# Patient Record
Sex: FEMALE | Race: WHITE | NOT HISPANIC OR LATINO | Employment: FULL TIME | URBAN - METROPOLITAN AREA
[De-identification: names, ages, dates, MRNs, and addresses within clinical notes are randomized per-mention and may not be internally consistent; named-entity substitution may affect disease eponyms.]

---

## 2017-05-30 ENCOUNTER — GENERIC CONVERSION - ENCOUNTER (OUTPATIENT)
Dept: OTHER | Facility: OTHER | Age: 37
End: 2017-05-30

## 2017-06-01 ENCOUNTER — HOSPITAL ENCOUNTER (EMERGENCY)
Facility: HOSPITAL | Age: 37
Discharge: HOME | End: 2017-06-01
Attending: EMERGENCY MEDICINE | Admitting: EMERGENCY MEDICINE

## 2017-06-01 ENCOUNTER — APPOINTMENT (EMERGENCY)
Dept: RADIOLOGY | Facility: HOSPITAL | Age: 37
End: 2017-06-01

## 2017-06-01 VITALS
DIASTOLIC BLOOD PRESSURE: 78 MMHG | SYSTOLIC BLOOD PRESSURE: 118 MMHG | RESPIRATION RATE: 18 BRPM | HEART RATE: 77 BPM | WEIGHT: 233.25 LBS | BODY MASS INDEX: 40.04 KG/M2 | TEMPERATURE: 97.8 F | OXYGEN SATURATION: 99 %

## 2017-06-01 DIAGNOSIS — F41.1 ANXIETY STATE: ICD-10-CM

## 2017-06-01 DIAGNOSIS — R07.9 CHEST PAIN, UNSPECIFIED TYPE: Primary | ICD-10-CM

## 2017-06-01 LAB
ANION GAP SERPL CALCULATED.3IONS-SCNC: 12 MMOL/L (ref 4–13)
ATRIAL RATE: 96 BPM
BASOPHILS # BLD AUTO: 0.04 THOUSANDS/ΜL (ref 0–0.1)
BASOPHILS NFR BLD AUTO: 0 % (ref 0–1)
BUN SERPL-MCNC: 17 MG/DL (ref 5–25)
CALCIUM SERPL-MCNC: 9.8 MG/DL (ref 8.3–10.1)
CHLORIDE SERPL-SCNC: 104 MMOL/L (ref 100–108)
CO2 SERPL-SCNC: 24 MMOL/L (ref 21–32)
CREAT SERPL-MCNC: 1.03 MG/DL (ref 0.6–1.3)
DEPRECATED D DIMER PPP: 318 NG/ML (FEU) (ref 0–424)
EOSINOPHIL # BLD AUTO: 0.37 THOUSAND/ΜL (ref 0–0.61)
EOSINOPHIL NFR BLD AUTO: 4 % (ref 0–6)
ERYTHROCYTE [DISTWIDTH] IN BLOOD BY AUTOMATED COUNT: 13.5 % (ref 11.6–15.1)
GFR SERPL CREATININE-BSD FRML MDRD: >60 ML/MIN/1.73SQ M
GLUCOSE SERPL-MCNC: 88 MG/DL (ref 65–140)
HCG UR QL: NEGATIVE
HCT VFR BLD AUTO: 42.1 % (ref 34.8–46.1)
HGB BLD-MCNC: 14.1 G/DL (ref 11.5–15.4)
LYMPHOCYTES # BLD AUTO: 2.47 THOUSANDS/ΜL (ref 0.6–4.47)
LYMPHOCYTES NFR BLD AUTO: 25 % (ref 14–44)
MCH RBC QN AUTO: 29.6 PG (ref 26.8–34.3)
MCHC RBC AUTO-ENTMCNC: 33.5 G/DL (ref 31.4–37.4)
MCV RBC AUTO: 88 FL (ref 82–98)
MONOCYTES # BLD AUTO: 0.7 THOUSAND/ΜL (ref 0.17–1.22)
MONOCYTES NFR BLD AUTO: 7 % (ref 4–12)
NEUTROPHILS # BLD AUTO: 6.34 THOUSANDS/ΜL (ref 1.85–7.62)
NEUTS SEG NFR BLD AUTO: 64 % (ref 43–75)
NT-PROBNP SERPL-MCNC: 19 PG/ML
P AXIS: 60 DEGREES
PLATELET # BLD AUTO: 294 THOUSANDS/UL (ref 149–390)
PMV BLD AUTO: 9.4 FL (ref 8.9–12.7)
POTASSIUM SERPL-SCNC: 3.9 MMOL/L (ref 3.5–5.3)
PR INTERVAL: 140 MS
QRS AXIS: 85 DEGREES
QRSD INTERVAL: 82 MS
QT INTERVAL: 344 MS
QTC INTERVAL: 418 MS
RBC # BLD AUTO: 4.76 MILLION/UL (ref 3.81–5.12)
SODIUM SERPL-SCNC: 140 MMOL/L (ref 136–145)
T WAVE AXIS: 77 DEGREES
TROPONIN I SERPL-MCNC: <0.02 NG/ML
TSH SERPL DL<=0.05 MIU/L-ACNC: 3.16 UIU/ML (ref 0.36–3.74)
VENTRICULAR RATE: 89 BPM
WBC # BLD AUTO: 9.92 THOUSAND/UL (ref 4.31–10.16)

## 2017-06-01 PROCEDURE — 36415 COLL VENOUS BLD VENIPUNCTURE: CPT | Performed by: EMERGENCY MEDICINE

## 2017-06-01 PROCEDURE — 81025 URINE PREGNANCY TEST: CPT | Performed by: EMERGENCY MEDICINE

## 2017-06-01 PROCEDURE — 83880 ASSAY OF NATRIURETIC PEPTIDE: CPT | Performed by: EMERGENCY MEDICINE

## 2017-06-01 PROCEDURE — 80048 BASIC METABOLIC PNL TOTAL CA: CPT | Performed by: EMERGENCY MEDICINE

## 2017-06-01 PROCEDURE — 84484 ASSAY OF TROPONIN QUANT: CPT | Performed by: EMERGENCY MEDICINE

## 2017-06-01 PROCEDURE — 96360 HYDRATION IV INFUSION INIT: CPT

## 2017-06-01 PROCEDURE — 71020 HB CHEST X-RAY 2VW FRONTAL&LATL: CPT

## 2017-06-01 PROCEDURE — 93005 ELECTROCARDIOGRAM TRACING: CPT | Performed by: EMERGENCY MEDICINE

## 2017-06-01 PROCEDURE — 93005 ELECTROCARDIOGRAM TRACING: CPT

## 2017-06-01 PROCEDURE — 85379 FIBRIN DEGRADATION QUANT: CPT | Performed by: EMERGENCY MEDICINE

## 2017-06-01 PROCEDURE — 84443 ASSAY THYROID STIM HORMONE: CPT | Performed by: EMERGENCY MEDICINE

## 2017-06-01 PROCEDURE — 85025 COMPLETE CBC W/AUTO DIFF WBC: CPT | Performed by: EMERGENCY MEDICINE

## 2017-06-01 PROCEDURE — 99285 EMERGENCY DEPT VISIT HI MDM: CPT

## 2017-06-01 RX ADMIN — SODIUM CHLORIDE 1000 ML: 0.9 INJECTION, SOLUTION INTRAVENOUS at 13:39

## 2017-06-05 ENCOUNTER — ALLSCRIPTS OFFICE VISIT (OUTPATIENT)
Dept: OTHER | Facility: OTHER | Age: 37
End: 2017-06-05

## 2017-06-09 ENCOUNTER — GENERIC CONVERSION - ENCOUNTER (OUTPATIENT)
Dept: OTHER | Facility: OTHER | Age: 37
End: 2017-06-09

## 2017-06-29 ENCOUNTER — ALLSCRIPTS OFFICE VISIT (OUTPATIENT)
Dept: OTHER | Facility: OTHER | Age: 37
End: 2017-06-29

## 2017-12-13 ENCOUNTER — APPOINTMENT (EMERGENCY)
Dept: CT IMAGING | Facility: HOSPITAL | Age: 37
End: 2017-12-13

## 2017-12-13 ENCOUNTER — HOSPITAL ENCOUNTER (EMERGENCY)
Facility: HOSPITAL | Age: 37
Discharge: HOME | End: 2017-12-13
Attending: EMERGENCY MEDICINE

## 2017-12-13 ENCOUNTER — APPOINTMENT (EMERGENCY)
Dept: ULTRASOUND IMAGING | Facility: HOSPITAL | Age: 37
End: 2017-12-13

## 2017-12-13 VITALS
SYSTOLIC BLOOD PRESSURE: 136 MMHG | DIASTOLIC BLOOD PRESSURE: 86 MMHG | RESPIRATION RATE: 20 BRPM | HEART RATE: 81 BPM | OXYGEN SATURATION: 95 % | TEMPERATURE: 97.5 F

## 2017-12-13 DIAGNOSIS — D21.9 FIBROID: ICD-10-CM

## 2017-12-13 DIAGNOSIS — R10.9 ABDOMINAL PAIN: Primary | ICD-10-CM

## 2017-12-13 LAB
ALBUMIN SERPL BCP-MCNC: 4.1 G/DL (ref 3.5–5)
ALP SERPL-CCNC: 89 U/L (ref 46–116)
ALT SERPL W P-5'-P-CCNC: 63 U/L (ref 12–78)
ANION GAP SERPL CALCULATED.3IONS-SCNC: 7 MMOL/L (ref 4–13)
APTT PPP: 27 SECONDS (ref 23–35)
AST SERPL W P-5'-P-CCNC: 42 U/L (ref 5–45)
ATRIAL RATE: 72 BPM
BASOPHILS # BLD AUTO: 0.05 THOUSANDS/ΜL (ref 0–0.1)
BASOPHILS NFR BLD AUTO: 1 % (ref 0–1)
BILIRUB SERPL-MCNC: 0.5 MG/DL (ref 0.2–1)
BUN SERPL-MCNC: 13 MG/DL (ref 5–25)
CALCIUM SERPL-MCNC: 9.5 MG/DL (ref 8.3–10.1)
CHLORIDE SERPL-SCNC: 102 MMOL/L (ref 100–108)
CLARITY, POC: CLEAR
CO2 SERPL-SCNC: 27 MMOL/L (ref 21–32)
COLOR, POC: YELLOW
CREAT SERPL-MCNC: 1.07 MG/DL (ref 0.6–1.3)
EOSINOPHIL # BLD AUTO: 0.45 THOUSAND/ΜL (ref 0–0.61)
EOSINOPHIL NFR BLD AUTO: 5 % (ref 0–6)
ERYTHROCYTE [DISTWIDTH] IN BLOOD BY AUTOMATED COUNT: 13.3 % (ref 11.6–15.1)
EXT BILIRUBIN, UA: NORMAL
EXT BLOOD URINE: NORMAL
EXT GLUCOSE, UA: NORMAL
EXT KETONES: NORMAL
EXT NITRITE, UA: NORMAL
EXT PH, UA: 5
EXT PREG TEST URINE: NEGATIVE
EXT PROTEIN, UA: NORMAL
EXT SPECIFIC GRAVITY, UA: 1.02
EXT UROBILINOGEN: 0.2
GFR SERPL CREATININE-BSD FRML MDRD: 67 ML/MIN/1.73SQ M
GLUCOSE SERPL-MCNC: 121 MG/DL (ref 65–140)
HCT VFR BLD AUTO: 44.6 % (ref 34.8–46.1)
HGB BLD-MCNC: 14.6 G/DL (ref 11.5–15.4)
INR PPP: 0.88 (ref 0.86–1.16)
LIPASE SERPL-CCNC: 214 U/L (ref 73–393)
LYMPHOCYTES # BLD AUTO: 2.99 THOUSANDS/ΜL (ref 0.6–4.47)
LYMPHOCYTES NFR BLD AUTO: 31 % (ref 14–44)
MCH RBC QN AUTO: 29 PG (ref 26.8–34.3)
MCHC RBC AUTO-ENTMCNC: 32.7 G/DL (ref 31.4–37.4)
MCV RBC AUTO: 89 FL (ref 82–98)
MONOCYTES # BLD AUTO: 0.6 THOUSAND/ΜL (ref 0.17–1.22)
MONOCYTES NFR BLD AUTO: 6 % (ref 4–12)
NEUTROPHILS # BLD AUTO: 5.65 THOUSANDS/ΜL (ref 1.85–7.62)
NEUTS SEG NFR BLD AUTO: 57 % (ref 43–75)
P AXIS: 58 DEGREES
PLATELET # BLD AUTO: 311 THOUSANDS/UL (ref 149–390)
PMV BLD AUTO: 9.3 FL (ref 8.9–12.7)
POTASSIUM SERPL-SCNC: 3.5 MMOL/L (ref 3.5–5.3)
PR INTERVAL: 144 MS
PROT SERPL-MCNC: 8.5 G/DL (ref 6.4–8.2)
PROTHROMBIN TIME: 12.2 SECONDS (ref 12.1–14.4)
QRS AXIS: 86 DEGREES
QRSD INTERVAL: 84 MS
QT INTERVAL: 374 MS
QTC INTERVAL: 409 MS
RBC # BLD AUTO: 5.03 MILLION/UL (ref 3.81–5.12)
SODIUM SERPL-SCNC: 136 MMOL/L (ref 136–145)
T WAVE AXIS: 66 DEGREES
VENTRICULAR RATE: 72 BPM
WBC # BLD AUTO: 9.74 THOUSAND/UL (ref 4.31–10.16)
WBC # BLD EST: NORMAL 10*3/UL

## 2017-12-13 PROCEDURE — 83690 ASSAY OF LIPASE: CPT | Performed by: EMERGENCY MEDICINE

## 2017-12-13 PROCEDURE — 96361 HYDRATE IV INFUSION ADD-ON: CPT

## 2017-12-13 PROCEDURE — 93005 ELECTROCARDIOGRAM TRACING: CPT

## 2017-12-13 PROCEDURE — 85610 PROTHROMBIN TIME: CPT | Performed by: EMERGENCY MEDICINE

## 2017-12-13 PROCEDURE — 81025 URINE PREGNANCY TEST: CPT | Performed by: EMERGENCY MEDICINE

## 2017-12-13 PROCEDURE — 74177 CT ABD & PELVIS W/CONTRAST: CPT

## 2017-12-13 PROCEDURE — 81002 URINALYSIS NONAUTO W/O SCOPE: CPT | Performed by: EMERGENCY MEDICINE

## 2017-12-13 PROCEDURE — 99284 EMERGENCY DEPT VISIT MOD MDM: CPT

## 2017-12-13 PROCEDURE — 76830 TRANSVAGINAL US NON-OB: CPT

## 2017-12-13 PROCEDURE — 76856 US EXAM PELVIC COMPLETE: CPT

## 2017-12-13 PROCEDURE — 85730 THROMBOPLASTIN TIME PARTIAL: CPT | Performed by: EMERGENCY MEDICINE

## 2017-12-13 PROCEDURE — 96374 THER/PROPH/DIAG INJ IV PUSH: CPT

## 2017-12-13 PROCEDURE — 36415 COLL VENOUS BLD VENIPUNCTURE: CPT | Performed by: EMERGENCY MEDICINE

## 2017-12-13 PROCEDURE — 85025 COMPLETE CBC W/AUTO DIFF WBC: CPT | Performed by: EMERGENCY MEDICINE

## 2017-12-13 PROCEDURE — 93005 ELECTROCARDIOGRAM TRACING: CPT | Performed by: EMERGENCY MEDICINE

## 2017-12-13 PROCEDURE — 80053 COMPREHEN METABOLIC PANEL: CPT | Performed by: EMERGENCY MEDICINE

## 2017-12-13 RX ORDER — MORPHINE SULFATE 10 MG/ML
6 INJECTION, SOLUTION INTRAMUSCULAR; INTRAVENOUS ONCE
Status: COMPLETED | OUTPATIENT
Start: 2017-12-13 | End: 2017-12-13

## 2017-12-13 RX ADMIN — HYDROMORPHONE HYDROCHLORIDE 1 MG: 1 INJECTION, SOLUTION INTRAMUSCULAR; INTRAVENOUS; SUBCUTANEOUS at 18:03

## 2017-12-13 RX ADMIN — SODIUM CHLORIDE 1000 ML: 0.9 INJECTION, SOLUTION INTRAVENOUS at 16:32

## 2017-12-13 RX ADMIN — IOHEXOL 100 ML: 350 INJECTION, SOLUTION INTRAVENOUS at 17:30

## 2017-12-13 RX ADMIN — MORPHINE SULFATE 6 MG: 10 INJECTION, SOLUTION INTRAMUSCULAR; INTRAVENOUS at 16:29

## 2017-12-13 NOTE — ED NOTES
Patient is resting comfortably  States pain is now 5/10  Reports, "I don't know, my chest feels a little funny " Dr Lucio Valencia aware and verbal order given to perform EKG        Tuyet Wade RN  12/13/17 1386

## 2017-12-13 NOTE — ED PROVIDER NOTES
History  Chief Complaint   Patient presents with    Groin Pain     Pt c/o L lower abd pain/L groin pain with nausea  Pt appears very uncomfortable during triage  Pt reports pain was sudden onset today around 0630  History provided by:  Patient  Abdominal Pain   Pain location:  LLQ  Pain quality: aching, fullness, heavy and pressure    Pain radiates to:  Does not radiate  Pain severity:  Severe  Onset quality:  Gradual  Timing:  Constant  Progression:  Worsening  Chronicity:  New  Context: awakening from sleep    Relieved by:  Nothing  Worsened by:  Nothing  Ineffective treatments:  None tried  Associated symptoms: nausea    Associated symptoms: no chest pain, no chills, no constipation, no cough, no diarrhea, no dysuria, no fever, no flatus, no hematemesis, no hematochezia, no shortness of breath, no sore throat and no vomiting        Prior to Admission Medications   Prescriptions Last Dose Informant Patient Reported? Taking? Cetirizine HCl (ZYRTEC ALLERGY) 10 MG CAPS   Yes No   Sig: Take 10 mg by mouth  sertraline (ZOLOFT) 100 mg tablet   Yes No   Sig: Take 100 mg by mouth daily at bedtime  zolpidem (AMBIEN CR) 12 5 MG CR tablet   Yes No   Sig: Take 12 5 mg by mouth daily at bedtime as needed for sleep  Facility-Administered Medications: None       History reviewed  No pertinent past medical history  Past Surgical History:   Procedure Laterality Date    CHOLECYSTECTOMY      TUBAL LIGATION         History reviewed  No pertinent family history  I have reviewed and agree with the history as documented  Social History   Substance Use Topics    Smoking status: Never Smoker    Smokeless tobacco: Never Used    Alcohol use Yes      Comment: occasional        Review of Systems   Constitutional: Negative for chills and fever  HENT: Negative for rhinorrhea, sore throat and trouble swallowing  Eyes: Negative for pain     Respiratory: Negative for cough, shortness of breath, wheezing and stridor  Cardiovascular: Negative for chest pain and leg swelling  Gastrointestinal: Positive for abdominal pain and nausea  Negative for constipation, diarrhea, flatus, hematemesis, hematochezia and vomiting  Endocrine: Negative for polyuria  Genitourinary: Negative for dysuria, flank pain and urgency  Musculoskeletal: Negative for joint swelling, myalgias and neck stiffness  Skin: Negative for rash  Allergic/Immunologic: Negative for immunocompromised state  Neurological: Negative for dizziness, syncope, weakness, numbness and headaches  Psychiatric/Behavioral: Negative for confusion and suicidal ideas  All other systems reviewed and are negative  Physical Exam  ED Triage Vitals   Temperature Pulse Respirations Blood Pressure SpO2   12/13/17 1544 12/13/17 1542 12/13/17 1542 12/13/17 1542 12/13/17 1542   97 5 °F (36 4 °C) 102 22 (!) 193/79 99 %      Temp Source Heart Rate Source Patient Position - Orthostatic VS BP Location FiO2 (%)   12/13/17 1544 12/13/17 1542 12/13/17 1542 12/13/17 1542 --   Oral Monitor Sitting Left arm       Pain Score       12/13/17 1542       8           Orthostatic Vital Signs  Vitals:    12/13/17 1542 12/13/17 1817 12/13/17 1927   BP: (!) 193/79 131/75 136/86   Pulse: 102 82 81   Patient Position - Orthostatic VS: Sitting Lying Sitting       Physical Exam   Constitutional: She is oriented to person, place, and time  She appears well-developed and well-nourished  HENT:   Head: Normocephalic and atraumatic  Eyes: EOM are normal  Pupils are equal, round, and reactive to light  Neck: Normal range of motion  Neck supple  Cardiovascular: Normal rate and regular rhythm  Exam reveals no friction rub  No murmur heard  Pulmonary/Chest: Breath sounds normal  No respiratory distress  She has no wheezes  She has no rales  Abdominal: Soft  Bowel sounds are normal  She exhibits no distension  There is tenderness in the left upper quadrant and left lower quadrant  There is no CVA tenderness  Musculoskeletal: Normal range of motion  She exhibits no edema or tenderness  Neurological: She is alert and oriented to person, place, and time  Skin: Skin is warm  No rash noted  Psychiatric: She has a normal mood and affect  Nursing note and vitals reviewed        ED Medications  Medications   sodium chloride 0 9 % bolus 1,000 mL (0 mL Intravenous Stopped 12/13/17 1944)   morphine (PF) 10 mg/mL injection 6 mg (6 mg Intravenous Given 12/13/17 1629)   iohexol (OMNIPAQUE) 350 MG/ML injection (SINGLE-DOSE) 100 mL (100 mL Intravenous Given 12/13/17 1730)   HYDROmorphone (DILAUDID) 1 mg/mL injection 1 mg (1 mg Intravenous Given 12/13/17 1803)       Diagnostic Studies  Results Reviewed     Procedure Component Value Units Date/Time    POCT pregnancy, urine [[id number]]  (Normal) Resulted:  12/13/17 1713    Lab Status:  Final result Updated:  12/13/17 1713     EXT PREG TEST UR (Ref: Negative) negative    POCT urinalysis dipstick [[id number]]  (Normal) Resulted:  12/13/17 1712    Lab Status:  Final result Specimen:  Urine Updated:  12/13/17 1713     Color, UA yellow     Clarity, UA clear     EXT Glucose, UA neg     EXT Bilirubin, UA (Ref: Negative) neg     EXT Ketones, UA (Ref: Negative) neg     EXT Spec Grav, UA 1 025     EXT Blood, UA (Ref: Negative) small     EXT pH, UA 5     EXT Protein, UA (Ref: Negative) neg     EXT Urobilinogen, UA (Ref: 0 2- 1 0) 0 2     EXT Leukocytes, UA (Ref: Negative) neg     EXT Nitrite, UA (Ref: Negative) neg    Comprehensive metabolic panel [[id number]]  (Abnormal) Collected:  12/13/17 1632    Lab Status:  Final result Specimen:  Blood from Arm, Left Updated:  12/13/17 1654     Sodium 136 mmol/L      Potassium 3 5 mmol/L      Chloride 102 mmol/L      CO2 27 mmol/L      Anion Gap 7 mmol/L      BUN 13 mg/dL      Creatinine 1 07 mg/dL      Glucose 121 mg/dL      Calcium 9 5 mg/dL      AST 42 U/L      ALT 63 U/L      Alkaline Phosphatase 89 U/L      Total Protein 8 5 (H) g/dL      Albumin 4 1 g/dL      Total Bilirubin 0 50 mg/dL      eGFR 67 ml/min/1 73sq m     Narrative:         National Kidney Disease Education Program recommendations are as follows:  GFR calculation is accurate only with a steady state creatinine  Chronic Kidney disease less than 60 ml/min/1 73 sq  meters  Kidney failure less than 15 ml/min/1 73 sq  meters  Lipase [[id number]]  (Normal) Collected:  12/13/17 1632    Lab Status:  Final result Specimen:  Blood from Arm, Left Updated:  12/13/17 1654     Lipase 214 u/L     Protime-INR [[id number]]  (Normal) Collected:  12/13/17 1632    Lab Status:  Final result Specimen:  Blood from Arm, Left Updated:  12/13/17 1652     Protime 12 2 seconds      INR 0 88    APTT [[id number]]  (Normal) Collected:  12/13/17 1632    Lab Status:  Final result Specimen:  Blood from Arm, Left Updated:  12/13/17 1652     PTT 27 seconds     Narrative: Therapeutic Heparin Range = 60-90 seconds    CBC and differential [[id number]]  (Normal) Collected:  12/13/17 1632    Lab Status:  Final result Specimen:  Blood from Arm, Left Updated:  12/13/17 1637     WBC 9 74 Thousand/uL      RBC 5 03 Million/uL      Hemoglobin 14 6 g/dL      Hematocrit 44 6 %      MCV 89 fL      MCH 29 0 pg      MCHC 32 7 g/dL      RDW 13 3 %      MPV 9 3 fL      Platelets 880 Thousands/uL      Neutrophils Relative 57 %      Lymphocytes Relative 31 %      Monocytes Relative 6 %      Eosinophils Relative 5 %      Basophils Relative 1 %      Neutrophils Absolute 5 65 Thousands/µL      Lymphocytes Absolute 2 99 Thousands/µL      Monocytes Absolute 0 60 Thousand/µL      Eosinophils Absolute 0 45 Thousand/µL      Basophils Absolute 0 05 Thousands/µL                  US pelvis complete w transvaginal   Final Result by Michael Nino DO (12/13 1929)      No evidence of ovarian torsion  Questionable small fundal fibroid               Workstation performed: [id number]         CT abdomen pelvis with contrast   Final Result by Sofie Chaudhary DO (12/13 1742)      No acute intra-abdominal or pelvic pathology  Hepatomegaly with fatty infiltration and indeterminant 11 mm hyperattenuating nodule in the dome of the right lobe  Recommend nonemergent (outpatient) MRI abdomen with IV contrast Kathleen Montenegro for further evaluation  Workstation performed: [id number]                    Procedures  Procedures       Phone Contacts  ED Phone Contact    ED Course  ED Course                                MDM  Number of Diagnoses or Management Options  Abdominal pain: new and requires workup  Fibroid: new and requires workup  Diagnosis management comments: This 82-year-old female presents emergency department left lower quadrant abdominal pain and discomfort  The patient had 2 CAT scan done as well as ultrasound done with no acute abnormality noted possible fibroid as a cause the patient pain differential included ovarian torsion, diverticulitis, intra-abdominal pathology, possible kidney stone as well given the fact the patient has some blood however given IV contrast unable to visualize  The patient's pain improved with medications given in the emergency department laboratory studies were unremarkable she feels much improved and follow up as an outpatient given strict instructions when to return back to the emergency department  Pt re-examined and evaluated after testing and treatment  Spoke with the patient and feeling improved and sxs have resolved  Will discharge home with close f/u with pcp and instructed to return to the ED if sxs worsen or continue  Pt agrees with the plan for discharge and feels comfortable to go home with proper f/u  Advised to return for worsening or additional problems  Diagnostic tests were reviewed and questions answered  Diagnosis, care plan and treatment options were discussed  The patient understand instructions and will follow up as directed           Amount and/or Complexity of Data Reviewed  Clinical lab tests: reviewed and ordered  Tests in the radiology section of CPT®: ordered and reviewed  Review and summarize past medical records: yes      CritCare Time    Disposition  Final diagnoses:   Abdominal pain   Fibroid     Time reflects when diagnosis was documented in both MDM as applicable and the Disposition within this note     Time User Action Codes Description Comment    12/13/2017  7:37 PM Juanis Lopez R Add [R10 9] Abdominal pain     12/13/2017  7:37 PM Brock Juan Add [D25 9] Fibroid       ED Disposition     ED Disposition Condition Comment    Discharge  [facility] discharge to home/self care  Condition at discharge: Stable        Follow-up Information     Follow up With Specialties Details Why Rose Marie Pandey MD Internal Medicine Call If symptoms worsen [address] [place] [address],[address]  [address] [address]  [phone]          Discharge Medication List as of 12/13/2017  7:38 PM      CONTINUE these medications which have NOT CHANGED    Details   Cetirizine HCl (ZYRTEC ALLERGY) 10 MG CAPS Take 10 mg by mouth , Until Discontinued, Historical Med      sertraline (ZOLOFT) 100 mg tablet Take 100 mg by mouth daily at bedtime  , Until Discontinued, Historical Med      zolpidem (AMBIEN CR) 12 5 MG CR tablet Take 12 5 mg by mouth daily at bedtime as needed for sleep , Until Discontinued, Historical Med           No discharge procedures on file      ED Provider  Electronically Signed by           Stefan Cortez DO  12/14/17 0288

## 2017-12-14 NOTE — ED NOTES
Discharge instructions discussed with patient prior to d/c  Encouraged to f/u with pcp  Educated on s/s of when to return to ED  Family on unit to take patient home        Francis Peralta RN  12/13/17 1950

## 2017-12-14 NOTE — DISCHARGE INSTRUCTIONS
Abdominal Pain, Ambulatory Care   GENERAL INFORMATION:   Abdominal pain  can be dull, achy, or sharp  You may have pain in one area of your abdomen, or in your entire abdomen  Your pain may be caused by constipation, food sensitivity or poisoning, infection, or a blockage  Abdominal pain can also be caused by a hernia, appendicitis, or an ulcer  The cause of your abdominal pain may be unknown  Seek immediate care for the following symptoms:   · New chest pain or shortness of breath    · Pulsing pain in your upper abdomen or lower back that suddenly becomes constant    · Pain in the right lower abdominal area that worsens with movement    · Fever over 100 4°F (38°C) or shaking chills    · Vomiting and you cannot keep food or fluids down    · Pain does not improve or gets worse over the next 8 to 12 hours    · Blood in your vomit or bowel movements, or they look black and tarry    · Skin or the whites of your eyes turn yellow    · Large amount of vaginal bleeding that is not your monthly period  Treatment for abdominal pain  may include medicine to calm your stomach, prevent vomiting, or decrease pain  Follow up with your healthcare provider as directed:  Write down your questions so you remember to ask them during your visits  CARE AGREEMENT:   You have the right to help plan your care  Learn about your health condition and how it may be treated  Discuss treatment options with your caregivers to decide what care you want to receive  You always have the right to refuse treatment  The above information is an  only  It is not intended as medical advice for individual conditions or treatments  Talk to your doctor, nurse or pharmacist before following any medical regimen to see if it is safe and effective for you  © 2014 [address] is for End User's use only and may not be sold, redistributed or otherwise used for commercial purposes   All illustrations and images included in [facility] are the copyrighted property of [employer]  or Munir Rehman  Abdominal Pain   WHAT YOU NEED TO KNOW:   Abdominal pain can be dull, achy, or sharp  You may have pain in one area of your abdomen, or in your entire abdomen  Your pain may be caused by a condition such as constipation, food sensitivity or poisoning, infection, or a blockage  Abdominal pain can also be from a hernia, appendicitis, or an ulcer  Liver, gallbladder, or kidney conditions can also cause abdominal pain  The cause of your abdominal pain may be unknown  DISCHARGE INSTRUCTIONS:   Return to the emergency department if:   · You have new chest pain or shortness of breath  · You have pulsing pain in your upper abdomen or lower back that suddenly becomes constant  · Your pain is in the right lower abdominal area and worsens with movement  · You have a fever over 100 4°F (38°C) or shaking chills  · You are vomiting and cannot keep food or liquids down  · Your pain does not improve or gets worse over the next 8 to 12 hours  · You see blood in your vomit or bowel movements, or they look black and tarry  · Your skin or the whites of your eyes turn yellow  · You are a woman and have a large amount of vaginal bleeding that is not your monthly period  Contact your healthcare provider if:   · You have pain in your lower back  · You are a man and have pain in your testicles  · You have pain when you urinate  · You have questions or concerns about your condition or care  Follow up with your healthcare provider within 24 hours or as directed:  Write down your questions so you remember to ask them during your visits  Medicines:   · Medicines  may be given to calm your stomach and prevent vomiting or to decrease pain  Ask how to take pain medicine safely  · Take your medicine as directed    Contact your healthcare provider if you think your medicine is not helping or if you have side effects  Tell him of her if you are allergic to any medicine  Keep a list of the medicines, vitamins, and herbs you take  Include the amounts, and when and why you take them  Bring the list or the pill bottles to follow-up visits  Carry your medicine list with you in case of an emergency  © 2017 [address] Information is for End User's use only and may not be sold, redistributed or otherwise used for commercial purposes  All illustrations and images included in CareNotes® are the copyrighted property of [employer]  or [employer]  The above information is an  only  It is not intended as medical advice for individual conditions or treatments  Talk to your doctor, nurse or pharmacist before following any medical regimen to see if it is safe and effective for you

## 2018-01-09 NOTE — MISCELLANEOUS
Message  Return to work or school:        Excused from work 03/17/2016 and 03/18/2016  DR [place]        Signatures   Electronically signed by : Ghazala Wells MD; Mar 30 2016  3:02PM EST                       (Author)

## 2018-01-09 NOTE — RESULT NOTES
Verified Results  STRESS TEST ONLY, EXERCISE [id number] 07:47AM Madalyn Valdivia    Order Number: [id number]     Test Name Result Flag Reference   STRESS TEST ONLY, EXERCISE (Report)     [address]   [address]   [phone]     Stress Electrocardiography during Exercise     Name: Gianfranco Pillai   MR #: [id number]   Account #: [[id number]]   Study date: 2016   : 1980   Age: 37 years   Gender: Female   Height: 64 in   Weight: 200 lb   BSA: 1 96 m squared     Allergies: ALLERGIES NOT ON FILE     Diagnosis: R07 9 - Chest pain, unspecified     RN: Rikki Oneal RN   Primary Physician: Ivette Collins   Referring Physician: Fransisco Francisco: Anne Marie Osco [facility]   Report Prepared By[de-identified] Tanya Jeans   Technician: Tanya Jeans   Technician: Jami Harris   Interpreting Physician: Nayely Perez MD     CLINICAL QUESTION: Detection of coronary artery disease  HISTORY: The patient is a 37year old  female  Chest pain status:   chest pain  Other symptoms: dyspnea  Coronary artery disease risk factors:   dyslipidemia  Cardiovascular history: none significant  PHYSICAL EXAM: Baseline physical exam screening: no wheezes audible  REST ECG: Normal sinus rhythm  Normal baseline ECG  PROCEDURE: Treadmill exercise testing was performed, using the Bear protocol  Stress electrocardiographic evaluation was performed  BEAR PROTOCOL:   HR bpm SBP mmHg DBP mmHg Symptoms   Baseline 89 120 80 none   Stage 1 134 140 90 none   Stage 2 155 148 90 --   Stage 3 171 148 90 chest discomfort   Recovery 1 121 170 102 chest discomfort   Recovery 2 102 140 92 subsiding   pre 02 sat 99% peak 02 sat 98% No medications or fluids given  STRESS SUMMARY: Duration of exercise was 7 min and 14 sec  The patient   exercised to protocol stage 3  Maximal work rate was 8 9 METs   Functional   capacity was decreased (greater than 40%)  Maximal heart rate during stress was   171 bpm ( 91 % of maximal predicted heart rate)  The heart rate response to   stress was exaggerated  There was normal resting blood pressure with a   hypertensive response to stress  The rate-pressure product for the peak heart   rate and blood pressure was 05682  The patient experienced typical chest pain   during stress; pain resolved spontaneously  The stress test was terminated due   to achievement of target heart rate, atypical chest discomfort, and fatigue  The stress ECG was negative for ischemia  There were no stress arrhythmias or   conduction abnormalities  SUMMARY:   - Stress results: Duration of exercise was 7 min and 14 sec  Functional   capacity was decreased (greater than 40%)  Target heart rate was achieved  There was normal resting blood pressure with a hypertensive response to stress  The patient experienced typical chest pain during stress; pain resolved   spontaneously  - ECG conclusions: The stress ECG was negative for ischemia       Prepared and signed by     Isiah Escudero MD   Signed 01/25/2016 11:27:57

## 2018-01-10 NOTE — RESULT NOTES
Verified Results  (1) THIN PREP PAP WITH IMAGING [id number] 86:40PB [address] [facility]     Test Name Result Flag Reference   LAB AP CASE REPORT (Report)     Gynecologic Cytology Report            Case: [id number]                  Authorizing Provider: Chung Humphrey DO     Collected:      02/22/2016 5684        First Screen:     Nestor Daniels, CT    Received:      02/25/2016 1002        Rescreen:       JOSE Salmeron                             Specimen:  LIQUID-BASED PAP, SCREENING, Endocervical   HPV HIGH RISK RESULT (Report)     HPV, High Risk: HPV NEG, HPV16 NEG, HPV18 NEG      Other High Risk HPV Negative, HPV 16 Negative, HPV 18 Negative  HPV types: 16,18,31,33,35,39,45,51,52,56,58,59,66 and 68 DNA are undetectable or below the pre-set threshold  [employer]'s FDA approved Yvan 4800 is utilized with strict adherence to the 's instruction  manual to test for the presence of High-Risk HPV DNA, as well as HPV 16 and HPV 18  This instrument  has been validated by our laboratory and/or by the   A negative result does not preclude the presence of HPV infection because results depend on adequate  specimen collection, absence of inhibitors and sufficient DNA to be detected  Additionally, HPV negative  results are not intended to prevent women from proceeding to colposcopy if clinically warranted  Positive HPV test results indicate the presence of any one or more of the high risk types, but since patients  are often co-infected with low-risk types it does not rule out the presence of low-risk types in patients  with mixed infections  LAB AP GYN PRIMARY INTERPRETATION      Negative for intraepithelial lesion or malignancy   LAB AP GYN INTERPRETATION      Shift in danita suggestive of bacterial vaginosis   LAB AP GYN SPECIMEN ADEQUACY      Satisfactory for evaluation  Endocervical/transformation zone component present     LAB AP GYN ADDITIONAL INFORMATION (Report)     [employer]'s FDA approved ,  and ThinPrep Imaging System are   utilized with strict adherence to the 's instruction manual to   prepare gynecologic and non-gynecologic cytology specimens for the   production of ThinPrep slides as well as for gynecologic ThinPrep imaging  These processes have been validated by our laboratory and/or by the     The Pap test is not a diagnostic procedure and should not be used as the   sole means to detect cervical cancer  It is only a screening procedure to   aid in the detection of cervical cancer and its precursors  Both   false-negative and false-positive results have been experienced  Your   patient's test result should be interpreted in this context together with   the history and clinical findings     LAB AP LMP not given     not given

## 2018-01-10 NOTE — MISCELLANEOUS
Message  Return to work or school:  4/29/2016       Please excuse patient on 5/6/2016 & 5/9/2016 due to medical illness  Reena Li DO        Signatures   Electronically signed by : Reena Li DO; May  9 2016 12:35PM EST                       (Author)

## 2018-01-10 NOTE — MISCELLANEOUS
Message   Recorded as Task   Date: 05/09/2016 10:25 AM, Created By: Jose Luis Barnett   Task Name: Medical Complaint Callback   Assigned To: [facility]   Regarding Patient: An Roche, Status: Active   CommentHall Coho - 09 May 2016 10:25 AM     TASK CREATED  Caller: myriam, Parent; Medical Complaint  myriam pt, mother called and stated pt, would like work note faxed for dates 5/6/2015 and 5/9/2016   pt, did not got to work due to diarrhea    fax number [phone] - work note entered per patient request as one time only      Signatures   Electronically signed by : Lizabeth Najjar, DO; May  9 2016 12:31PM EST                       (Author)

## 2018-01-10 NOTE — MISCELLANEOUS
Message  Return to work or school:   Yesy Lazcano is under my professional care  She was seen in my office on 02/02/2016   She is able to return to work on  02/08/2016      Dr Ashli Moss          Signatures   Electronically signed by : Ashli Moss DO; Feb 2 2016  2:20PM EST                       (Author)

## 2018-01-11 NOTE — RESULT NOTES
Verified Results  (1) CHLAMYDIA/GC AMPLIFIED DNA, PCR [id number] 39:12XC Laura Frazier     Test Name Result Flag Reference   CHLAMYDIA,AMPLIFIED DNA PROBE   C  trachomatis Amplified DNA Negative   C  trachomatis Amplified DNA Negative   N  GONORRHOEAE AMPLIFIED DNA   N  gonorrhoeae Amplified DNA Negative   N  gonorrhoeae Amplified DNA Negative

## 2018-01-11 NOTE — MISCELLANEOUS
Message  rec'd call from Dr Wayne Harvey - [facility] med dept(ph: #[phone])    pt had testing at [facility] in may for UDS/SDS(?) which was positive for BZD  I prescribed BZD on 6/5/17 and provided a letter stating Bernardinomarisela Martin is taking bzd under my care starting on 6/5  pt's PCP is Dr Carmen Portillo    pt reports she took old rx dose of BZD prior to UDS testing at [facility] med dept, last rx prior to mine in her chart was from 2014  no other prescribers of BZD to her in past per Bernardino Martin  had ambien ordered 9 mos ago by PCP and has 3 pills left, takes prn per Bernardino Martin    pt upset and tearful, wants to get this job at [employer] [facility] [employer] [facility], has had a lot of stressors lately in her life  feeling anxious on phone but denies suicidal ideation and reports she is safe/would not harm herself    plan - I did not discuss Brielle's medical care with Dr Wayne Harvey due to HIPAA restrictions, Dr Wayne Harvey rec'd letter I created and has seen rx that I ordered for BZD dated 6/5/17 already & mentioned this to me over phone  Bernardino Martin declined that I call Dr Wayne Harvey back & will speak to him   advised if she has rx to call her pharmacy to have rx pulled from file so she can show this to Dr Wayne Harvey   offered assistance to Afshan Trevino in any way I can, she was appreciative and feels safe, denies thoughts or plan of self-harm      Signatures   Electronically signed by : Lizabeth Najjar, DO; Jun 9 2017 12:48PM EST                       (Author)

## 2018-01-12 NOTE — MISCELLANEOUS
To whom it may concern:    Varghese Saunders is currently a patient in our office  She has been prescribed Ambien and trazodone in the past      If you have any questions or concerns, kindly call my office at [phone]          Sincerely,      Angeles Zamora MD            Electronically signed by:Fabiana Mancilla MD  May 30 2017  3:52PM EST Author

## 2018-01-12 NOTE — MISCELLANEOUS
Message  Return to work or school:        PT TO BE OUT OF WORK DUE TO MEDICAL ILLNESS ON [address]  DR [place]        Signatures   Electronically signed by : Rigo Mendoza MD; May 27 2016 12:58PM EST                       (Author)

## 2018-01-12 NOTE — MISCELLANEOUS
Message  Return to work or school:   Delvin Varela is under my professional care  She was seen in my office on 2/2/16 & 2/9/16   She is able to return to work on  2/15/16      Please excuse Ms Trina Henderson from work from Feb 1, 2016 thru Feb 12, 2016 due to medical illness  Keyona Ann DO        Signatures   Electronically signed by : Keyona Ann DO; Feb 15 2016 10:29AM EST                       (Author)

## 2018-01-13 VITALS
BODY MASS INDEX: 38.67 KG/M2 | HEART RATE: 84 BPM | RESPIRATION RATE: 16 BRPM | WEIGHT: 226.5 LBS | DIASTOLIC BLOOD PRESSURE: 88 MMHG | OXYGEN SATURATION: 94 % | SYSTOLIC BLOOD PRESSURE: 142 MMHG | HEIGHT: 64 IN | TEMPERATURE: 97.8 F

## 2018-01-13 NOTE — MISCELLANEOUS
Message  Return to work or school:    She is able to return to work on  07/06/2016      Pt to be excused from work on Tuesday, July 5th,2016 due to medical illness  Dr Abbie Ashford        Signatures   Electronically signed by : Liz Escoto MD; Jul 5 2016  6:26PM EST                       (Author)

## 2018-01-13 NOTE — MISCELLANEOUS
Message  The mother in law of the 3:30 appt called Candace Elizalde [phone]) and cancelled Dr Radha Umaña appt today (Jovanni Rump - a new patient)  Said she had to work and couldn't make calls from work  PT will call back to reschedule  Active Problems    1  Acute upper respiratory infection (465 9) (J06 9)   2  Adjustment disorder with anxiety (309 24) (F43 22)   3  Anemia (285 9) (D64 9)   4  Anxiety (300 00) (F41 9)   5  Chest pain (786 50) (R07 9)   6  Cough (786 2) (R05)   7  History of allergy (V15 09) (Z88 9)   8  Homocysteinemia (270 4) (E72 11)   9  Insomnia (780 52) (G47 00)   10  Migraine headache (346 90) (G43 909)   11  Overweight (278 02) (E66 3)   12  Patellofemoral dysfunction (719 86) (M25 869)   13  Pulmonary embolism (415 19) (I26 99)   14  Right knee pain (719 46) (M25 561)   15  Screening for STD (sexually transmitted disease) (V74 5) (Z11 3)   16  Vitamin D deficiency (268 9) (E55 9)   17  Well female exam with routine gynecological exam (V72 31) (Z01 419)    Current Meds   1  Multi-Vitamin Oral Tablet; TAKE 1 TABLET DAILY; Therapy: 76XVY9494 to Recorded   2  ProAir  (90 Base) MCG/ACT Inhalation Aerosol Solution; INHALE 1 PUFFS   Every 6 hours PRN SOB; Therapy: 46YEN7755 to (Last Rx:77Csg5542)  Requested for: 81Kbn6951 Ordered   3  Sertraline HCl - 100 MG Oral Tablet; TAKE 1 TABLET ONCE DAILY; Therapy: 91JFZ0455 to (Evaluate:60Teg7124)  Requested for: 33EQC8383; Last   Rx:19Jan2016 Ordered   4  Vitamin D 1000 UNIT CAPS; take on tablet daily; Therapy: 26ZLV6393 to Recorded   5  Zolpidem Tartrate 10 MG Oral Tablet; TAKE 1/2 -1 TABLET Bedtime PRN; Therapy: 37BIW2328 to (Evaluate:19Mar2016); Last Rx:19Jan2016 Ordered   6  ZyrTEC Allergy 10 MG Oral Tablet; TAKE 1 TABLET DAILY AS NEEDED; Therapy: 81SDK4594 to Recorded    Allergies    1   Penicillins    Signatures   Electronically signed by : Tawny Moore, ; Feb 25 2016 10:45AM EST                       (Author)

## 2018-01-13 NOTE — MISCELLANEOUS
Provider Comments  Provider Comments:   PT WAS A NO SHOW FOR HER OV WITH DR Levi Perea ON 06/29/2017 / Damien Vasquez      Signatures   Electronically signed by : Jacques Lau MD; Jun 29 2017  3:50PM EST                       (Author)

## 2018-01-13 NOTE — RESULT NOTES
Verified Results  (1) TSH 58Zmt3336 10:36AM [facility]    Order Number: [id number]     Test Name Result Flag Reference   TSH 2 144 uIU/mL  0 358-3 740   - Patient Instructions: This bloodwork is non-fasting  Please drink two glasses of water morning of bloodwork  - Patient Instructions: This bloodwork is non-fasting  Please drink two glasses of water morning of bloodwork  Patients undergoing fluorescein dye angiography may retain small amounts of fluorescein in the body for 48-72 hours post procedure  Samples containing fluorescein can produce falsely depressed TSH values  If the patient had this procedure,a specimen should be resubmitted post fluorescein clearance  The recommended reference ranges for TSH during pregnancy are as follows:  First trimester 0 1 to 2 5 uIU/mL  Second trimester  0 2 to 3 0 uIU/mL  Third trimester 0 3 to 3 0 uIU/m     (1) VITAMIN D 25-HYDROXY 97Aui4110 10:36AM [facility]   TW Order Number: [id number]     Test Name Result Flag Reference   VIT D 25-HYDROX 28 1 ng/mL L 30 0-100 0   This assay is a certified procedure of the CDC Vitamin D Standardization Certification Program (VDSCP)     Deficiency <20ng/ml   Insufficiency 20-30ng/ml   Sufficient  ng/ml     *Patients undergoing fluorescein dye angiography may retain small amounts of fluorescein in the body for 48-72 hours post procedure  Samples containing fluorescein can produce falsely elevated Vitamin D values  If the patient had this procedure, a specimen should be resubmitted post fluorescein clearance

## 2018-01-15 NOTE — MISCELLANEOUS
Dear to whom it may concern,    Please be aware that Camille Boyce has been prescribed alprazolam for medical treatment purposes  Please contact my office with any questions      [address],      Eduardo Duane, [place] [facility]  [phone]      Electronically signed by:Santiago Washington Foot DO  Jun 9 2017 10:37AM EST Review

## 2018-01-16 NOTE — RESULT NOTES
Verified Results  (1) VITAMIN B12 50MLW7382 10:09AM [facility] Order Number: [id number]     Test Name Result Flag Reference   VITAMIN B12 408 pg/mL  100-900     (1) FERRITIN 55GQW7219 10:09AM [facility] Order Number: [id number]     Test Name Result Flag Reference   FERRITIN 7 ng/mL L 8-388     (1) FOLATE 82BYR3791 10:09AM [facility] Order Number: [id number]     Test Name Result Flag Reference   FOLATE 4 0 ng/mL  3 1-17 5     (1) IRON 67GHG8746 10:09AM [facility] Order Number: [id number]     Test Name Result Flag Reference   IRON 19 ug/dL L      (1) TIBC 51LJO2910 10:09AM [facility] Order Number: [id number]     Test Name Result Flag Reference   TOTAL IRON BINDING CAPACITY 456 ug/dL H 250-450

## 2018-01-16 NOTE — MISCELLANEOUS
Message  Return to work or school:   Shayla Vasquez is under my professional care  She was seen in my office on       Please excuse from work on 3/17/16 due to illness  Dr Geovany Rajan/ roma vazquez        Signatures   Electronically signed by : Evelyn Mitchell, ; Mar 23 2016 11:19AM EST                       (Author)

## 2018-01-17 NOTE — PROGRESS NOTES
Assessment    1  Influenza-like illness (487 1) (J11 1)    Plan  Influenza-like illness    · Guaifenesin-Codeine 100-10 MG/5ML Oral Syrup; TAKE 5 ML Every 6 hours PRN  cough, may cause drowsiness   · ProAir  (90 Base) MCG/ACT Inhalation Aerosol Solution; INHALE 1 PUFFS  Every 6 hours PRN SOB   · Follow Up if Not Better Evaluation and Treatment  Follow-up  Status: Complete  Done:  70BKQ8402   · Drink at least 6 glasses of water or juice a day ; Status:Complete;   Done: 93OYP1182   · Good hand washing is one of the best ways to control the spread of germs ;  Status:Complete;   Done: 76UMB8944   · Rest in bed until your temperature returns to normal ; Status:Complete;   Done:  61MKS2690   · Stay home from work or school until your condition is improved ; Status:Complete;    Done: 60IRJ3667   · The following home treatments may soothe a sore throat ; Status:Complete;   Done:  07NOL2445   · Use a cool mist humidifier in the room ; Status:Complete;   Done: 04ILX5218    Discussion/Summary    1  rest, hydration  2  stay home/'self-quarantine' as we discussed  3  if symptoms worsen, go to Urgent care or ER  4  medication for cough - may cause drowsiness  5  inhaler to use every 8-12 hours as needed and taper down use over next several days  The patient was counseled regarding patient and family education, impressions, importance of compliance with treatment  Possible side effects of new medications were reviewed with the patient/guardian today  The treatment plan was reviewed with the patient/guardian   The patient/guardian understands and agrees with the treatment plan      Provider Comments  Provider Comments:   rapid flu test negative in office, however suspect BRET    recommend rest, self-quarantine & symptom based treatment  cough rx and inhaler prn - d/w pt in detail  note provided for off work this week & return 2/8/16  precautions given      Chief Complaint  Coughing,congestion,sore throat      History of Present Illness  HPI: 36 y/o F new to me with c/o sudden onset of fever, chills, cough, runny nose, feeling unwell for last 2 days    was feeling fine on sunday just prior to symptoms starting  minimal phlegm production but fever 101F yesterday at home  no SOB, except with wearing mask in office  no known flu exposure  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  rec'd flu vaccine 11/1/15  no hx of asthma, CVD, non-smoker, and not morbidly obese  denies any chance of being pregnant - had tubal ligation  no other complaints   Hospital Based Practices Required Assessment:    Readiness To Learn: Receptive  Barriers To Learning: none  Education Completed: disease/condition, medications, further treatment/follow-up and treatment/procedure   Teaching Method: verbal   Person Taught: patient   Evaluation Of Learning: verbalized/demonstrated understanding      Review of Systems    Constitutional: fever, feeling poorly and chills  ENT: sore throat and nasal discharge, but no earache  Cardiovascular: no chest pain  Respiratory: no wheezing    cough  Gastrointestinal: no diarrhea  Musculoskeletal: myalgias  Neurological: no confusion  ROS reviewed  Active Problems    1  Adjustment disorder with anxiety (309 24) (F43 22)   2  Anemia (285 9) (D64 9)   3  Anxiety (300 00) (F41 9)   4  Chest pain (786 50) (R07 9)   5  History of allergy (V15 09) (Z88 9)   6  Homocysteinemia (270 4) (E72 11)   7  Insomnia (780 52) (G47 00)   8  Migraine headache (346 90) (G43 909)   9  Overweight (278 02) (E66 3)   10  Patellofemoral dysfunction (719 86) (M25 869)   11  Pulmonary embolism (415 19) (I26 99)   12  Right knee pain (719 46) (M25 561)   13  Sterilization consult (V25 09) (Z30 09)   14  Vitamin D deficiency (268 9) (E55 9)    Past Medical History  Active Problems And Past Medical History Reviewed: The active problems and past medical history were reviewed and updated today  Social History    · Alcohol Use (History)   · Special occassions   · Daily Coffee Consumption (1  Cups/Day)   · 3x a week   · Marital History - Currently    · Never A Smoker   · Never Used Drugs   · Parentage   · 1 DAUGHTER   · Rarely consumes alcohol (V49 89) (Z78 9)   · Working Full Time   · cardiology ofc, used to be PCA unit clerk  The social history was reviewed and updated today  Current Meds   1  Multi-Vitamin Oral Tablet; TAKE 1 TABLET DAILY; Therapy: 51KRM5236 to Recorded   2  Sertraline HCl - 100 MG Oral Tablet; TAKE 1 TABLET ONCE DAILY; Therapy: 18TOY5192 to (Evaluate:92Xwc7218)  Requested for: 44JPO3649; Last   Rx:19Jan2016 Ordered   3  Vitamin D 1000 UNIT CAPS; take on tablet daily; Therapy: 56QML9988 to Recorded   4  Zolpidem Tartrate 10 MG Oral Tablet; TAKE 1/2 -1 TABLET Bedtime PRN; Therapy: 28NCM0301 to (Evaluate:19Mar2016); Last Rx:19Jan2016 Ordered   5  ZyrTEC Allergy 10 MG Oral Tablet; TAKE 1 TABLET DAILY AS NEEDED; Therapy: 37PVS1128 to Recorded    The medication list was reviewed and updated today  Allergies    1  Penicillins    Vitals   Recorded: 11Pnl4245 01:00PM Recorded: 22Afa8248 12:43PM   Temperature  99 6 F   Heart Rate  88   Respiration  16   Systolic  918   Diastolic  70   Height  5 ft 5 in   Weight  199 lb    BMI Calculated  33 12   BSA Calculated  1 98   O2 Saturation 99, RA      Physical Exam    Constitutional WD/WN, coughing and feeling unwell but able to speak in clear/concise sentences and in no acute distress  Head and Face   Palpation of the face and sinuses: No sinus tenderness  Eyes   Pupils and irises: Equal, round, reactive to light  Ears, Nose, Mouth, and Throat   Otoscopic examination: Tympanic membranes translucent with normal light reflex  Canals patent without erythema  Oropharynx: Abnormal   The posterior pharynx was erythematous, but did not have an exudate     Pulmonary   Respiratory effort: No increased work of breathing or signs of respiratory distress  Auscultation of lungs: Clear to auscultation  Cardiovascular   Auscultation of heart: Normal rate and rhythm, normal S1 and S2, no murmurs  Abdomen   Abdomen: Non-tender, no masses  Lymphatic   Palpation of lymph nodes in neck: No lymphadenopathy  Psychiatric   Judgment and insight: Normal     Mood and affect: Normal        Future Appointments    Date/Time Provider Specialty Site   02/05/2016 01:15 PM BRAYDEN Edwards   Hematology Oncology CANCER [facility] MEDICAL ONCOLOGY [place]   03/29/2016 06:00 PM Juancarlos Rajan MD Internal Medicine [facility] INTERNAL MED   84/61/5428 75:69 PM [address] [place] [address], Arthur Coburn DO Obstetrics/Gynecology [address] [place] [address]     Signatures   Electronically signed by : Drake Gurrola DO; Feb 2 2016  2:06PM EST                       (Author)

## 2018-01-17 NOTE — RESULT NOTES
Verified Results  (1) HOMOCYSTEINE 21Jan2016 08:02AM Floydene Negar Order Number: [id number]     Test Name Result Flag Reference   HOMOCYSTEINE 18 9 umol/L H 3 7-11 2     (1) CBC/PLT/DIFF 76VZC9529 08:02AM [facility]   TW Order Number: [id number]    TW Order Number: [id number]     Test Name Result Flag Reference   WBC COUNT 6 88 Thousand/uL  4 31-10 16   RBC COUNT 4 65 Million/uL  3 81-5 12   HEMOGLOBIN 10 7 g/dL L 11 5-15 4   HEMATOCRIT 35 2 %  34 8-46  1   MCV 75 7 fL L 82 0-98 0   MCH 23 0 pg L 26 8-34 3   MCHC 30 4 g/dL L 31 4-37 4   RDW 15 7 % H 11 6-15 1   MPV 9 4 fL  8 9-12 7   PLATELET COUNT 409 Thousands/uL  149-390   nRBC AUTOMATED 0 /100 WBCs     NEUTROPHILS RELATIVE PERCENT 50 %  43-75   LYMPHOCYTES RELATIVE PERCENT 37 %  14-44   MONOCYTES RELATIVE PERCENT 8 %  4-12   EOSINOPHILS RELATIVE PERCENT 4 %  0-6   BASOPHILS RELATIVE PERCENT 1 %  0-1   NEUTROPHILS ABSOLUTE COUNT 3 41 Thousands/µL  1 85-7 62   LYMPHOCYTES ABSOLUTE COUNT 2 53 Thousands/µL  0 60-4 47   MONOCYTES ABSOLUTE COUNT 0 55 Thousand/µL  0 17-1 22   EOSINOPHILS ABSOLUTE COUNT 0 24 Thousand/µL  0 00-0 61   BASOPHILS ABSOLUTE COUNT 0 08 Thousands/µL  0 00-0 10     (1) COMPREHENSIVE METABOLIC PANEL 73WMM7576 23:49ZZ Floydene Negar Order Number: [id number]      National Kidney Disease Education Program recommendations are as follows:  GFR calculation is accurate only with a steady state creatinine  Chronic Kidney disease less than 60 ml/min/1 73 sq  meters  Kidney failure less than 15 ml/min/1 73 sq  meters  Test Name Result Flag Reference   GLUCOSE,RANDM 97 mg/dL     If the patient is fasting, the ADA then defines impaired fasting glucose as > 100 mg/dL and diabetes as > or equal to 123 mg/dL     SODIUM 142 mmol/L  136-145   POTASSIUM 4 5 mmol/L  3 5-5 3   CHLORIDE 111 mmol/L H 100-108   CARBON DIOXIDE 25 mmol/L  21-32   ANION GAP (CALC) 6 mmol/L  4-13   BLOOD UREA NITROGEN 14 mg/dL  5-25 CREATININE 1 16 mg/dL  0 60-1 30   Standardized to IDMS reference method   CALCIUM 8 8 mg/dL  8 3-10 1   BILI, TOTAL 0 20 mg/dL  0 20-1 00   ALK PHOSPHATAS 95 U/L     ALT (SGPT) 24 U/L  12-78   AST(SGOT) 12 U/L  5-45   ALBUMIN 3 6 g/dL  3 5-5 0   TOTAL PROTEIN 7 7 g/dL  6 4-8 2   eGFR Non-African American 53 8 ml/min/1 73sq m       (1) LIPID PANEL, FASTING 21Jan2016 08:02AM Tian[facility] Contreras   TW Order Number: [id number]      Triglyceride:         Normal              <150 mg/dl       Borderline High    150-199 mg/dl       High               200-499 mg/dl       Very High          >499 mg/dl  Cholesterol:         Desirable        <200 mg/dl      Borderline High  200-239 mg/dl      High             >239 mg/dl  HDL Cholesterol:        High    >59 mg/dL      Low     <41 mg/dL  LDL CALCULATED:    This screening LDL is a calculated result  It does not have the accuracy of the Direct Measured LDL in the monitoring of patients with hyperlipidemia and/or statin therapy  Direct Measure LDL (YAK627) must be ordered separately in these patients  Test Name Result Flag Reference   CHOLESTEROL 173 mg/dL     HDL,DIRECT 43 mg/dL  40-60   LDL CHOLESTEROL CALCULATED 103 mg/dL H 0-100   TRIGLYCERIDES 134 mg/dL  <=150     (1) TSH 21Jan2016 08:02AM Tianna Contreras   TW Order Number: [id number]    Patients undergoing fluorescein dye angiography may retain small amounts of fluorescein in the body for 48-72 hours post procedure  Samples containing fluorescein can produce falsely depressed TSH values  If the patient had this procedure,a specimen should be resubmitted post fluorescein clearance          The recommended reference ranges for TSH during pregnancy are as follows:  First trimester 0 1 to 2 5 uIU/mL  Second trimester  0 2 to 3 0 uIU/mL  Third trimester 0 3 to 3 0 uIU/m     Test Name Result Flag Reference   TSH 2 760 uIU/mL  0 358-3 740     (1) VITAMIN D 25-HYDROXY 38EQV5545 08:02AM [facility] Contreras TW Order Number: [id number]    TW Order Number: [id number]     Test Name Result Flag Reference   VIT D 25-HYDROX 21 6 ng/mL L 30 0-100 0

## 2018-01-18 NOTE — RESULT NOTES
Message   let patient know chest x-ray looks fine/no pneumonia  recommend to continue with current treatment plan     Verified Results  * XR CHEST PA & LATERAL 11XUW4351 12:22PM Laney Neumann Order Number: [id number]   Performing Comments: 36 y/o F with recent flu-like illness and now with ongoing cough, r/o pneumonia     Test Name Result Flag Reference   XR CHEST PA & LATERAL (Report)     CHEST      INDICATION: Respiratory tract infection  Cough     COMPARISON: 1/5/2016     VIEWS: Frontal and lateral projections; 2 images     FINDINGS:        Cardiomediastinal silhouette appears unremarkable  The lungs are clear  No pneumothorax or pleural effusion  Visualized osseous structures appear within normal limits for the patient's age  IMPRESSION:     No active pulmonary disease         Workstation performed: [id number]     Signed by:   Ron Cerrato MD   2/9/16

## 2018-04-20 ENCOUNTER — HOSPITAL ENCOUNTER (EMERGENCY)
Facility: HOSPITAL | Age: 38
Discharge: HOME | End: 2018-04-20
Attending: EMERGENCY MEDICINE | Admitting: EMERGENCY MEDICINE
Payer: COMMERCIAL

## 2018-04-20 ENCOUNTER — APPOINTMENT (EMERGENCY)
Dept: RADIOLOGY | Facility: HOSPITAL | Age: 38
End: 2018-04-20
Payer: COMMERCIAL

## 2018-04-20 VITALS
WEIGHT: 220 LBS | SYSTOLIC BLOOD PRESSURE: 126 MMHG | BODY MASS INDEX: 37.76 KG/M2 | RESPIRATION RATE: 18 BRPM | TEMPERATURE: 98.3 F | DIASTOLIC BLOOD PRESSURE: 76 MMHG | HEART RATE: 78 BPM | OXYGEN SATURATION: 96 %

## 2018-04-20 DIAGNOSIS — Z86.711 HISTORY OF PULMONARY EMBOLUS (PE): ICD-10-CM

## 2018-04-20 DIAGNOSIS — R07.89 CHEST WALL PAIN: Primary | ICD-10-CM

## 2018-04-20 DIAGNOSIS — R06.00 DYSPNEA: ICD-10-CM

## 2018-04-20 LAB
ALBUMIN SERPL BCP-MCNC: 3.8 G/DL (ref 3.5–5)
ALP SERPL-CCNC: 103 U/L (ref 46–116)
ALT SERPL W P-5'-P-CCNC: 54 U/L (ref 12–78)
ANION GAP SERPL CALCULATED.3IONS-SCNC: 10 MMOL/L (ref 4–13)
APTT PPP: 26 SECONDS (ref 23–35)
AST SERPL W P-5'-P-CCNC: 27 U/L (ref 5–45)
ATRIAL RATE: 83 BPM
BASOPHILS # BLD AUTO: 0.04 THOUSANDS/ΜL (ref 0–0.1)
BASOPHILS NFR BLD AUTO: 1 % (ref 0–1)
BILIRUB SERPL-MCNC: 0.3 MG/DL (ref 0.2–1)
BUN SERPL-MCNC: 11 MG/DL (ref 5–25)
CALCIUM SERPL-MCNC: 9.1 MG/DL (ref 8.3–10.1)
CHLORIDE SERPL-SCNC: 104 MMOL/L (ref 100–108)
CO2 SERPL-SCNC: 25 MMOL/L (ref 21–32)
CREAT SERPL-MCNC: 0.9 MG/DL (ref 0.6–1.3)
DEPRECATED D DIMER PPP: 283 NG/ML (FEU) (ref 0–424)
EOSINOPHIL # BLD AUTO: 0.42 THOUSAND/ΜL (ref 0–0.61)
EOSINOPHIL NFR BLD AUTO: 5 % (ref 0–6)
ERYTHROCYTE [DISTWIDTH] IN BLOOD BY AUTOMATED COUNT: 13.1 % (ref 11.6–15.1)
GFR SERPL CREATININE-BSD FRML MDRD: 83 ML/MIN/1.73SQ M
GLUCOSE SERPL-MCNC: 110 MG/DL (ref 65–140)
HCT VFR BLD AUTO: 40.3 % (ref 34.8–46.1)
HGB BLD-MCNC: 13.7 G/DL (ref 11.5–15.4)
INR PPP: 0.88 (ref 0.86–1.16)
LYMPHOCYTES # BLD AUTO: 2.92 THOUSANDS/ΜL (ref 0.6–4.47)
LYMPHOCYTES NFR BLD AUTO: 33 % (ref 14–44)
MCH RBC QN AUTO: 29.3 PG (ref 26.8–34.3)
MCHC RBC AUTO-ENTMCNC: 34 G/DL (ref 31.4–37.4)
MCV RBC AUTO: 86 FL (ref 82–98)
MONOCYTES # BLD AUTO: 0.61 THOUSAND/ΜL (ref 0.17–1.22)
MONOCYTES NFR BLD AUTO: 7 % (ref 4–12)
NEUTROPHILS # BLD AUTO: 4.82 THOUSANDS/ΜL (ref 1.85–7.62)
NEUTS SEG NFR BLD AUTO: 54 % (ref 43–75)
P AXIS: 59 DEGREES
PLATELET # BLD AUTO: 272 THOUSANDS/UL (ref 149–390)
PMV BLD AUTO: 9.3 FL (ref 8.9–12.7)
POTASSIUM SERPL-SCNC: 3.8 MMOL/L (ref 3.5–5.3)
PR INTERVAL: 138 MS
PROT SERPL-MCNC: 7.8 G/DL (ref 6.4–8.2)
PROTHROMBIN TIME: 12.2 SECONDS (ref 12.1–14.4)
QRS AXIS: 66 DEGREES
QRSD INTERVAL: 74 MS
QT INTERVAL: 342 MS
QTC INTERVAL: 401 MS
RBC # BLD AUTO: 4.67 MILLION/UL (ref 3.81–5.12)
SODIUM SERPL-SCNC: 139 MMOL/L (ref 136–145)
T WAVE AXIS: 78 DEGREES
TROPONIN I SERPL-MCNC: <0.02 NG/ML
VENTRICULAR RATE: 83 BPM
WBC # BLD AUTO: 8.81 THOUSAND/UL (ref 4.31–10.16)

## 2018-04-20 PROCEDURE — 85379 FIBRIN DEGRADATION QUANT: CPT | Performed by: EMERGENCY MEDICINE

## 2018-04-20 PROCEDURE — 84484 ASSAY OF TROPONIN QUANT: CPT | Performed by: EMERGENCY MEDICINE

## 2018-04-20 PROCEDURE — 36415 COLL VENOUS BLD VENIPUNCTURE: CPT | Performed by: EMERGENCY MEDICINE

## 2018-04-20 PROCEDURE — 96361 HYDRATE IV INFUSION ADD-ON: CPT

## 2018-04-20 PROCEDURE — 85025 COMPLETE CBC W/AUTO DIFF WBC: CPT | Performed by: EMERGENCY MEDICINE

## 2018-04-20 PROCEDURE — 96375 TX/PRO/DX INJ NEW DRUG ADDON: CPT

## 2018-04-20 PROCEDURE — 71046 X-RAY EXAM CHEST 2 VIEWS: CPT

## 2018-04-20 PROCEDURE — 85610 PROTHROMBIN TIME: CPT | Performed by: EMERGENCY MEDICINE

## 2018-04-20 PROCEDURE — 99285 EMERGENCY DEPT VISIT HI MDM: CPT

## 2018-04-20 PROCEDURE — 96374 THER/PROPH/DIAG INJ IV PUSH: CPT

## 2018-04-20 PROCEDURE — 85730 THROMBOPLASTIN TIME PARTIAL: CPT | Performed by: EMERGENCY MEDICINE

## 2018-04-20 PROCEDURE — 93010 ELECTROCARDIOGRAM REPORT: CPT | Performed by: INTERNAL MEDICINE

## 2018-04-20 PROCEDURE — 80053 COMPREHEN METABOLIC PANEL: CPT | Performed by: EMERGENCY MEDICINE

## 2018-04-20 PROCEDURE — 93005 ELECTROCARDIOGRAM TRACING: CPT

## 2018-04-20 RX ORDER — ONDANSETRON 2 MG/ML
4 INJECTION INTRAMUSCULAR; INTRAVENOUS ONCE
Status: COMPLETED | OUTPATIENT
Start: 2018-04-20 | End: 2018-04-20

## 2018-04-20 RX ORDER — KETOROLAC TROMETHAMINE 30 MG/ML
15 INJECTION, SOLUTION INTRAMUSCULAR; INTRAVENOUS ONCE
Status: COMPLETED | OUTPATIENT
Start: 2018-04-20 | End: 2018-04-20

## 2018-04-20 RX ORDER — IBUPROFEN 600 MG/1
600 TABLET ORAL EVERY 8 HOURS PRN
Qty: 15 TABLET | Refills: 0 | Status: SHIPPED | OUTPATIENT
Start: 2018-04-20 | End: 2018-05-31 | Stop reason: HOSPADM

## 2018-04-20 RX ORDER — ALPRAZOLAM 0.25 MG/1
0.25 TABLET ORAL
COMMUNITY
Start: 2017-06-05 | End: 2019-01-23 | Stop reason: SDUPTHER

## 2018-04-20 RX ADMIN — SODIUM CHLORIDE 1000 ML: 0.9 INJECTION, SOLUTION INTRAVENOUS at 10:59

## 2018-04-20 RX ADMIN — ONDANSETRON 4 MG: 2 INJECTION INTRAMUSCULAR; INTRAVENOUS at 12:00

## 2018-04-20 RX ADMIN — KETOROLAC TROMETHAMINE 15 MG: 30 INJECTION, SOLUTION INTRAMUSCULAR at 12:56

## 2018-04-20 RX ADMIN — HYDROMORPHONE HYDROCHLORIDE 1 MG: 1 INJECTION, SOLUTION INTRAMUSCULAR; INTRAVENOUS; SUBCUTANEOUS at 11:01

## 2018-04-20 NOTE — ED PROVIDER NOTES
History  Chief Complaint   Patient presents with    Chest Pain     Pt presents to ER with c/o's (L) sided chest pain with some trouble breathing that started yesterday  Healthy appearing adult presents anxious in triage - moaning & tachypneic  79-year-old female presents to the emergency department for evaluation with chest pains on the left side    She relates additionally that she is having hard time breathing    She relates that she has always had chest pains   She relates that she initially did not think much of this  Yesterday the pains became more intense while seated at work  She reports having had the pain a lot of the time through the night  Today the pain is much more intense  She gestures to the mid chest and over towards the left shoulder describing the pain as shooting    She notes that the pain takes my breath away    She does not specifically noticed the pain worsening upon taking a deep breath  No cough  Patient does have some nausea and relates that she had had a bit of gagging  No true vomiting  No abdominal discomfort  Denies otherwise having felt poorly recently  No leg pain or swelling  Past medical history significant for mitral valve prolapse  Patient relates that she was told upon testing a couple of years ago that was not too bad  She had 1 episode of syncope around the time of diagnosis  Patient relates that she has had PEs before    She describes these as having occurred in the setting of long term hormonal use   She relates that she subsequently had tubal ligation and has not been on hormonal medications recently  Patient denies any recent immobilization  Discomfort does feel a bit similar to that with PEs  Prior to Admission Medications   Prescriptions Last Dose Informant Patient Reported? Taking?    ALPRAZolam (XANAX) 0 25 mg tablet   Yes Yes   Sig: Take 0 25 mg by mouth   Cetirizine HCl (ZYRTEC ALLERGY) 10 MG CAPS   Yes No   Sig: Take 10 mg by mouth    sertraline (ZOLOFT) 100 mg tablet   Yes No   Sig: Take 100 mg by mouth daily at bedtime  zolpidem (AMBIEN CR) 12 5 MG CR tablet   Yes No   Sig: Take 12 5 mg by mouth daily at bedtime as needed for sleep  Facility-Administered Medications: None       Past Medical History:   Diagnosis Date    Anxiety     Depression     Iliotibial band syndrome     last assessed - [id number]    Mitral valve prolapse     Presence of contraceptive device     last assessed - [id number]       Past Surgical History:   Procedure Laterality Date    CHOLECYSTECTOMY      GALLBLADDER SURGERY      KNEE SURGERY Left     growth plate fx left knee    TUBAL LIGATION         Family History   Problem Relation Age of Onset    Adopted: Yes    No Known Problems Mother      I have reviewed and agree with the history as documented  Social History   Substance Use Topics    Smoking status: Never Smoker    Smokeless tobacco: Never Used    Alcohol use Yes      Comment: occasional; Special occasions, rarely consumes alcohol - per [employer]        Review of Systems   Constitutional: Positive for diaphoresis (The patient relates that it does feel quite warm in the room  Ambient temperature cool  )  All other systems reviewed and are negative        Physical Exam  ED Triage Vitals   Temperature Pulse Respirations Blood Pressure SpO2   04/20/18 1024 04/20/18 1024 04/20/18 1024 04/20/18 1024 04/20/18 1024   98 3 °F (36 8 °C) 84 (!) 24 145/89 99 %      Temp Source Heart Rate Source Patient Position - Orthostatic VS BP Location FiO2 (%)   04/20/18 1024 04/20/18 1024 04/20/18 1024 04/20/18 1024 --   Oral Monitor Sitting Left arm       Pain Score       04/20/18 1055       8           Orthostatic Vital Signs  Vitals:    04/20/18 1200 04/20/18 1245 04/20/18 1300 04/20/18 1330   BP: 106/55 139/67 119/74 126/76   Pulse: 72 64 72 78   Patient Position - Orthostatic VS: Lying Lying Lying Lying       Physical Exam   Constitutional: She is oriented to person, place, and time  She appears well-developed and well-nourished  She appears distressed  HENT:   Head: Normocephalic  Eyes: Conjunctivae and EOM are normal    Cardiovascular: Normal rate and regular rhythm  Pulmonary/Chest: Effort normal and breath sounds normal  She has no wheezes  She has no rales  She exhibits tenderness (Central and left upper  No posterior thoracic tenderness  )  Abdominal: Soft  She exhibits no distension  There is no tenderness  Musculoskeletal: Normal range of motion  She exhibits no edema or tenderness  Neurological: She is alert and oriented to person, place, and time  Skin: Skin is warm  Mildly diaphoretic   Psychiatric: She has a normal mood and affect  Her behavior is normal    Nursing note and vitals reviewed  ED Medications  Medications   sodium chloride 0 9 % bolus 1,000 mL (0 mL Intravenous Stopped 4/20/18 1345)   HYDROmorphone (DILAUDID) injection 1 mg (1 mg Intravenous Given 4/20/18 1101)   ondansetron (ZOFRAN) injection 4 mg (4 mg Intravenous Given 4/20/18 1200)   ketorolac (TORADOL) injection 15 mg (15 mg Intravenous Given 4/20/18 1256)       Diagnostic Studies  Results Reviewed     Procedure Component Value Units Date/Time    D-Dimer [[id number]]  (Normal) Collected:  04/20/18 1057    Lab Status:  Final result Specimen:  Blood from Arm, Left Updated:  04/20/18 1216     D-Dimer, Quant 283 ng/ml (FEU)     Troponin I [[id number]]  (Normal) Collected:  04/20/18 1057    Lab Status:  Final result Specimen:  Blood from Arm, Left Updated:  04/20/18 1133     Troponin I <0 02 ng/mL     Narrative:         [employer] Chemistry analyzer 99% cutoff is > 0 04 ng/mL in network labs    o cTnI 99% cutoff is useful only when applied to patients in the clinical setting of myocardial ischemia  o cTnI 99% cutoff should be interpreted in the context of clinical history, ECG findings and possibly cardiac imaging to establish correct diagnosis    o cTnI 99% cutoff may be suggestive but clearly not indicative of a coronary event without the clinical setting of myocardial ischemia  Comprehensive metabolic panel [[id number]] Collected:  04/20/18 1057    Lab Status:  Final result Specimen:  Blood from Arm, Left Updated:  04/20/18 1121     Sodium 139 mmol/L      Potassium 3 8 mmol/L      Chloride 104 mmol/L      CO2 25 mmol/L      Anion Gap 10 mmol/L      BUN 11 mg/dL      Creatinine 0 90 mg/dL      Glucose 110 mg/dL      Calcium 9 1 mg/dL      AST 27 U/L      ALT 54 U/L      Alkaline Phosphatase 103 U/L      Total Protein 7 8 g/dL      Albumin 3 8 g/dL      Total Bilirubin 0 30 mg/dL      eGFR 83 ml/min/1 73sq m     Narrative:         National Kidney Disease Education Program recommendations are as follows:  GFR calculation is accurate only with a steady state creatinine  Chronic Kidney disease less than 60 ml/min/1 73 sq  meters  Kidney failure less than 15 ml/min/1 73 sq  meters      Protime-INR [[id number]]  (Normal) Collected:  04/20/18 1057    Lab Status:  Final result Specimen:  Blood from Arm, Left Updated:  04/20/18 1120     Protime 12 2 seconds      INR 0 88    APTT [[id number]]  (Normal) Collected:  04/20/18 1057    Lab Status:  Final result Specimen:  Blood from Arm, Left Updated:  04/20/18 1120     PTT 26 seconds     CBC and differential [[id number]]  (Normal) Collected:  04/20/18 1057    Lab Status:  Final result Specimen:  Blood from Arm, Left Updated:  04/20/18 1103     WBC 8 81 Thousand/uL      RBC 4 67 Million/uL      Hemoglobin 13 7 g/dL      Hematocrit 40 3 %      MCV 86 fL      MCH 29 3 pg      MCHC 34 0 g/dL      RDW 13 1 %      MPV 9 3 fL      Platelets 206 Thousands/uL      Neutrophils Relative 54 %      Lymphocytes Relative 33 %      Monocytes Relative 7 %      Eosinophils Relative 5 %      Basophils Relative 1 %      Neutrophils Absolute 4 82 Thousands/µL      Lymphocytes Absolute 2 92 Thousands/µL      Monocytes Absolute 0 61 Thousand/µL      Eosinophils Absolute 0 42 Thousand/µL      Basophils Absolute 0 04 Thousands/µL                  XR chest 2 views   Final Result by Eliseo Hand MD (04/20 1114)      No active pulmonary disease  Workstation performed: [id number]                    Procedures  ECG 12 Lead Documentation  Date/Time: 4/20/2018 10:41 AM  Performed by: Samantha Hu  Authorized by: Samantha Hu     ECG reviewed by me, the ED Provider: yes    Patient location:  ED  Previous ECG:     Previous ECG:  Compared to current    Comparison ECG info:  December 13, 2017    Similarity:  No change  Rate:     ECG rate:  83  Rhythm:     Rhythm: sinus rhythm    Ectopy:     Ectopy: none    QRS:     QRS axis:  Normal  Conduction:     Conduction: normal    ST segments:     ST segments:  Normal  T waves:     T waves: normal             Phone Contacts  ED Phone Contact    ED Course  ED Course as of Apr 20 1501   Fri Apr 20, 2018   1310 Patient experienced some improvement following hydromorphone  Patient was updated on study result is very relieved to know that she does not have a PE  Given chest wall tenderness will treat with NSAID  Will reassess  Discussed possibility of anxiety factoring into symptoms  Patient relates that she has always under stress though denies any increase in this today  She relates that in the past she has felt extremely anxious though did not have that sensation today  1331 Patient feeling much better at this time  Will discharge                                  MDM  CritCare Time    Disposition  Final diagnoses:   Chest wall pain   Dyspnea   History of pulmonary embolus (PE)     Time reflects when diagnosis was documented in both MDM as applicable and the Disposition within this note     Time User Action Codes Description Comment    4/20/2018  1:33 PM Zhen Vincent A Add [R07 [id number]] Chest wall pain     4/20/2018  1:33 PM Zhen [place] A Add [R06 [id number]] Dyspnea     4/20/2018  1:33 PM Roselyn Delaney Add [[id number]] History of pulmonary embolus (PE)       ED Disposition     ED Disposition Condition Comment    Discharge  Briellelynda Mak discharge to home/self care  Condition at discharge: Good        Follow-up Information     Follow up With Specialties Details Why Rose Marie Pandey MD Internal Medicine  For re-evaluation in 3 to 7 days if symptoms persist [address]  [address] [place] [address]  [phone]          Discharge Medication List as of 4/20/2018  1:34 PM      START taking these medications    Details   ibuprofen (MOTRIN) 600 mg tablet Take 1 tablet (600 mg total) by mouth every 8 (eight) hours as needed for moderate pain (pain), Starting Fri 4/20/2018, Print         CONTINUE these medications which have NOT CHANGED    Details   ALPRAZolam (XANAX) 0 25 mg tablet Take 0 25 mg by mouth, Starting Mon 6/5/2017, Historical Med      Cetirizine HCl (ZYRTEC ALLERGY) 10 MG CAPS Take 10 mg by mouth , Until Discontinued, Historical Med      sertraline (ZOLOFT) 100 mg tablet Take 100 mg by mouth daily at bedtime  , Until Discontinued, Historical Med      zolpidem (AMBIEN CR) 12 5 MG CR tablet Take 12 5 mg by mouth daily at bedtime as needed for sleep , Until Discontinued, Historical Med           No discharge procedures on file      ED Provider  Electronically Signed by           Meyer Dandy, MD  04/20/18 8848

## 2018-04-20 NOTE — ED NOTES
Prior to dilaudid admin  pt reports able to get a ride home from mother if d/c'd        aTty Monday, RN  04/20/18 1100

## 2018-04-20 NOTE — DISCHARGE INSTRUCTIONS
Chest Wall Pain, Ambulatory Care   GENERAL INFORMATION:   Chest wall pain  may be caused by problems with the muscles, cartilage, or bones of the chest wall  Chest wall pain may also be caused by pain that spreads to your chest from another part of your body  The pain may be aching, severe, dull, or sharp  It may come and go, or it may be constant  The pain may be worse when you move in certain ways, breathe deeply, or cough  Seek immediate care for the following symptoms:   · Severe pain    · You have any of the following signs of a heart attack:      ¨ Squeezing, pressure, or pain in your chest that lasts longer than 5 minutes or returns    ¨ Discomfort or pain in your back, neck, jaw, stomach, or arm     ¨ Trouble breathing    ¨ Nausea or vomiting    ¨ Lightheadedness or a sudden cold sweat, especially with chest pain or trouble breathing  Treatment  depends on the cause of your chest wall pain  You may need any of the following:  · NSAIDs  help decrease swelling and pain or fever  This medicine is available with or without a doctor's order  NSAIDs can cause stomach bleeding or kidney problems in certain people  If you take blood thinner medicine, always ask your healthcare provider if NSAIDs are safe for you  Always read the medicine label and follow directions  · Acetaminophen  decreases pain  It is available without a doctor's order  Ask how much to take and how often to take it  Follow directions  Acetaminophen can cause liver damage if not taken correctly  · Apply heat  on your chest for 20 to 30 minutes every 2 hours for as many days as directed  Heat helps decrease pain and muscle spasms  · Apply ice  on your chest for 15 to 20 minutes every hour or as directed  Use an ice pack, or put crushed ice in a plastic bag  Cover it with a towel  Ice helps prevent tissue damage and decreases swelling and pain    Follow up with your healthcare provider as directed:  Write down your questions so you remember to ask them during your visits  CARE AGREEMENT:   You have the right to help plan your care  Learn about your health condition and how it may be treated  Discuss treatment options with your caregivers to decide what care you want to receive  You always have the right to refuse treatment  The above information is an  only  It is not intended as medical advice for individual conditions or treatments  Talk to your doctor, nurse or pharmacist before following any medical regimen to see if it is safe and effective for you  © 2014 [address] is for End User's use only and may not be sold, redistributed or otherwise used for commercial purposes  All illustrations and images included in CareNotes® are the copyrighted property of [employer]  or [address]

## 2018-05-09 ENCOUNTER — OFFICE VISIT (OUTPATIENT)
Dept: INTERNAL MEDICINE CLINIC | Facility: CLINIC | Age: 38
End: 2018-05-09
Payer: COMMERCIAL

## 2018-05-09 VITALS
HEIGHT: 65 IN | WEIGHT: 230.4 LBS | RESPIRATION RATE: 18 BRPM | BODY MASS INDEX: 38.39 KG/M2 | OXYGEN SATURATION: 98 % | DIASTOLIC BLOOD PRESSURE: 84 MMHG | HEART RATE: 72 BPM | SYSTOLIC BLOOD PRESSURE: 130 MMHG | TEMPERATURE: 98.4 F

## 2018-05-09 DIAGNOSIS — G47.09 OTHER INSOMNIA: Primary | ICD-10-CM

## 2018-05-09 DIAGNOSIS — R05.9 COUGH: Primary | ICD-10-CM

## 2018-05-09 PROCEDURE — 94640 AIRWAY INHALATION TREATMENT: CPT | Performed by: INTERNAL MEDICINE

## 2018-05-09 PROCEDURE — 99213 OFFICE O/P EST LOW 20 MIN: CPT | Performed by: INTERNAL MEDICINE

## 2018-05-09 RX ORDER — ZOLPIDEM TARTRATE 12.5 MG/1
12.5 TABLET, FILM COATED, EXTENDED RELEASE ORAL
Qty: 30 TABLET | Refills: 0 | Status: CANCELLED | OUTPATIENT
Start: 2018-05-09

## 2018-05-09 RX ORDER — ZOLPIDEM TARTRATE 10 MG/1
TABLET ORAL
Qty: 30 TABLET | Refills: 0 | Status: SHIPPED | OUTPATIENT
Start: 2018-05-09 | End: 2018-06-20 | Stop reason: SDUPTHER

## 2018-05-09 RX ORDER — DOXYCYCLINE HYCLATE 100 MG/1
100 CAPSULE ORAL EVERY 12 HOURS SCHEDULED
Qty: 20 CAPSULE | Refills: 0 | Status: SHIPPED | OUTPATIENT
Start: 2018-05-09 | End: 2018-05-19

## 2018-05-09 RX ORDER — ALBUTEROL SULFATE 2.5 MG/3ML
2.5 SOLUTION RESPIRATORY (INHALATION) ONCE
Status: COMPLETED | OUTPATIENT
Start: 2018-05-09 | End: 2018-05-09

## 2018-05-09 RX ORDER — ALBUTEROL SULFATE 90 UG/1
2 AEROSOL, METERED RESPIRATORY (INHALATION) EVERY 6 HOURS PRN
Qty: 1 INHALER | Refills: 1 | Status: SHIPPED | OUTPATIENT
Start: 2018-05-09 | End: 2020-01-21 | Stop reason: SDUPTHER

## 2018-05-09 RX ADMIN — ALBUTEROL SULFATE 2.5 MG: 2.5 SOLUTION RESPIRATORY (INHALATION) at 11:14

## 2018-05-09 NOTE — PROGRESS NOTES
Assessment/Plan:    No problem-specific Assessment & Plan notes found for this encounter  Diagnoses and all orders for this visit:    Cough  Comments:  Symptomatic improvement after nebulizer treatment, given albuterol inhaler to use as needed  Start abx, use saline nasal spray daily  Stop decongestant  Orders:  -     albuterol inhalation solution 2 5 mg; Take 3 mL (2 5 mg total) by nebulization once   -     doxycycline hyclate (VIBRAMYCIN) 100 mg capsule; Take 1 capsule (100 mg total) by mouth every 12 (twelve) hours for 10 days  -     albuterol (PROVENTIL HFA,VENTOLIN HFA) 90 mcg/act inhaler; Inhale 2 puffs every 6 (six) hours as needed for wheezing    Other orders  -     Cancel: zolpidem (AMBIEN CR) 12 5 MG CR tablet; Take 1 tablet (12 5 mg total) by mouth daily at bedtime as needed for sleep      Work note provided  Subjective:      Patient ID: Rachel Lockhart is a 30 y o  female  Brielle complains of cold and sinus symptoms which started about 4 days ago  Concerns were due to allergies, took the congested with some relief  Symptoms gradually worse, now coughing up thick green sputum  Denies any fever or chills  She also has sinus congestion, occasional postnasal drip  No GI symptoms, muscle or joint pains  She has been taking Mucinex and other over-the-counter cold medication with minimal relief  The following portions of the patient's history were reviewed and updated as appropriate: allergies, current medications, past medical history, past social history and problem list     Review of Systems   Constitutional: Positive for fatigue  Negative for chills and fever  HENT: Positive for congestion, ear pain and postnasal drip  Negative for sinus pain, sinus pressure and sore throat  Respiratory: Positive for cough and wheezing  Negative for shortness of breath  Cardiovascular: Negative for chest pain  Musculoskeletal: Negative for arthralgias and myalgias     Neurological: Negative for dizziness and headaches  Objective:      /84   Pulse 72   Temp 98 4 °F (36 9 °C)   Resp 18   Ht 5' 5" (1 651 m)   Wt 105 kg (230 lb 6 4 oz)   SpO2 98%   BMI 38 34 kg/m²          Physical Exam   Constitutional: She appears well-developed and well-nourished  HENT:   Head: Normocephalic and atraumatic  Right Ear: Tympanic membrane, external ear and ear canal normal    Left Ear: Tympanic membrane, external ear and ear canal normal    Nose: Mucosal edema and rhinorrhea present  Mouth/Throat: Mucous membranes are normal    Eyes: Conjunctivae are normal  Pupils are equal, round, and reactive to light  Neck: Neck supple  Cardiovascular: Normal rate, regular rhythm and normal heart sounds  Pulmonary/Chest: Effort normal and breath sounds normal  She has no wheezes  She has no rales  Abdominal: Normal appearance and bowel sounds are normal    Lymphadenopathy:     She has no cervical adenopathy  Neurological: She is alert  Skin: Skin is warm  No rash noted  Nursing note and vitals reviewed  Patient given a treatment of albuterol nebulizer  Patient subjectively reports improvement of symptoms  Lung sounds improved

## 2018-05-09 NOTE — LETTER
May 9, 2018     Patient: Chucky Lyles   YOB: 1980   Date of Visit: 5/9/2018       To Whom it May Concern:    Aura Pankajsmooth is under my professional care  She was seen in my office on 5/9/2018  Kindly excuse her from work from 5/8 to 5/10/18  She may return to work on 5/11/18  If you have any questions or concerns, please don't hesitate to call           Sincerely,          Marcelo Gabriel MD        CC: No Recipients

## 2018-05-11 ENCOUNTER — TELEPHONE (OUTPATIENT)
Dept: INTERNAL MEDICINE CLINIC | Facility: CLINIC | Age: 38
End: 2018-05-11

## 2018-05-11 NOTE — TELEPHONE ENCOUNTER
PT  WAS SUPPOSED TO GO BACK TO WORK TODAY BUT IS STILL SICK  CAN YOU GIVE HER ANOTHER WORK NOTE ? SHE WILL GO BACK ON MON

## 2018-05-14 ENCOUNTER — HOSPITAL ENCOUNTER (EMERGENCY)
Facility: HOSPITAL | Age: 38
Discharge: HOME | End: 2018-05-14
Attending: EMERGENCY MEDICINE
Payer: COMMERCIAL

## 2018-05-14 ENCOUNTER — APPOINTMENT (EMERGENCY)
Dept: RADIOLOGY | Facility: HOSPITAL | Age: 38
End: 2018-05-14
Payer: COMMERCIAL

## 2018-05-14 VITALS
TEMPERATURE: 98.8 F | RESPIRATION RATE: 22 BRPM | DIASTOLIC BLOOD PRESSURE: 65 MMHG | SYSTOLIC BLOOD PRESSURE: 148 MMHG | OXYGEN SATURATION: 98 % | HEART RATE: 90 BPM

## 2018-05-14 DIAGNOSIS — J40 BRONCHITIS: Primary | ICD-10-CM

## 2018-05-14 LAB
ALBUMIN SERPL BCP-MCNC: 3.8 G/DL (ref 3.5–5)
ALP SERPL-CCNC: 104 U/L (ref 46–116)
ALT SERPL W P-5'-P-CCNC: 55 U/L (ref 12–78)
ANION GAP SERPL CALCULATED.3IONS-SCNC: 15 MMOL/L (ref 4–13)
AST SERPL W P-5'-P-CCNC: 23 U/L (ref 5–45)
ATRIAL RATE: 87 BPM
BASOPHILS # BLD AUTO: 0.05 THOUSANDS/ΜL (ref 0–0.1)
BASOPHILS NFR BLD AUTO: 1 % (ref 0–1)
BILIRUB SERPL-MCNC: 0.4 MG/DL (ref 0.2–1)
BUN SERPL-MCNC: 14 MG/DL (ref 5–25)
CALCIUM SERPL-MCNC: 9.8 MG/DL (ref 8.3–10.1)
CHLORIDE SERPL-SCNC: 103 MMOL/L (ref 100–108)
CO2 SERPL-SCNC: 19 MMOL/L (ref 21–32)
CREAT SERPL-MCNC: 1.04 MG/DL (ref 0.6–1.3)
DEPRECATED D DIMER PPP: <270 NG/ML (FEU) (ref 0–424)
EOSINOPHIL # BLD AUTO: 0.31 THOUSAND/ΜL (ref 0–0.61)
EOSINOPHIL NFR BLD AUTO: 3 % (ref 0–6)
ERYTHROCYTE [DISTWIDTH] IN BLOOD BY AUTOMATED COUNT: 13 % (ref 11.6–15.1)
GFR SERPL CREATININE-BSD FRML MDRD: 70 ML/MIN/1.73SQ M
GLUCOSE SERPL-MCNC: 118 MG/DL (ref 65–140)
HCT VFR BLD AUTO: 41.1 % (ref 34.8–46.1)
HGB BLD-MCNC: 13.9 G/DL (ref 11.5–15.4)
LYMPHOCYTES # BLD AUTO: 3.28 THOUSANDS/ΜL (ref 0.6–4.47)
LYMPHOCYTES NFR BLD AUTO: 32 % (ref 14–44)
MCH RBC QN AUTO: 29 PG (ref 26.8–34.3)
MCHC RBC AUTO-ENTMCNC: 33.8 G/DL (ref 31.4–37.4)
MCV RBC AUTO: 86 FL (ref 82–98)
MONOCYTES # BLD AUTO: 0.54 THOUSAND/ΜL (ref 0.17–1.22)
MONOCYTES NFR BLD AUTO: 5 % (ref 4–12)
NEUTROPHILS # BLD AUTO: 6.21 THOUSANDS/ΜL (ref 1.85–7.62)
NEUTS SEG NFR BLD AUTO: 59 % (ref 43–75)
P AXIS: 57 DEGREES
PLATELET # BLD AUTO: 282 THOUSANDS/UL (ref 149–390)
PMV BLD AUTO: 9.2 FL (ref 8.9–12.7)
POTASSIUM SERPL-SCNC: 3.5 MMOL/L (ref 3.5–5.3)
PR INTERVAL: 132 MS
PROT SERPL-MCNC: 8 G/DL (ref 6.4–8.2)
QRS AXIS: 89 DEGREES
QRSD INTERVAL: 86 MS
QT INTERVAL: 372 MS
QTC INTERVAL: 438 MS
RBC # BLD AUTO: 4.8 MILLION/UL (ref 3.81–5.12)
SODIUM SERPL-SCNC: 137 MMOL/L (ref 136–145)
T WAVE AXIS: 63 DEGREES
TROPONIN I SERPL-MCNC: <0.02 NG/ML
VENTRICULAR RATE: 83 BPM
WBC # BLD AUTO: 10.39 THOUSAND/UL (ref 4.31–10.16)

## 2018-05-14 PROCEDURE — 84484 ASSAY OF TROPONIN QUANT: CPT | Performed by: EMERGENCY MEDICINE

## 2018-05-14 PROCEDURE — 80053 COMPREHEN METABOLIC PANEL: CPT | Performed by: EMERGENCY MEDICINE

## 2018-05-14 PROCEDURE — 85379 FIBRIN DEGRADATION QUANT: CPT | Performed by: EMERGENCY MEDICINE

## 2018-05-14 PROCEDURE — 93010 ELECTROCARDIOGRAM REPORT: CPT | Performed by: INTERNAL MEDICINE

## 2018-05-14 PROCEDURE — 96374 THER/PROPH/DIAG INJ IV PUSH: CPT

## 2018-05-14 PROCEDURE — 36415 COLL VENOUS BLD VENIPUNCTURE: CPT | Performed by: EMERGENCY MEDICINE

## 2018-05-14 PROCEDURE — 93005 ELECTROCARDIOGRAM TRACING: CPT

## 2018-05-14 PROCEDURE — 71046 X-RAY EXAM CHEST 2 VIEWS: CPT

## 2018-05-14 PROCEDURE — 85025 COMPLETE CBC W/AUTO DIFF WBC: CPT | Performed by: EMERGENCY MEDICINE

## 2018-05-14 PROCEDURE — 99285 EMERGENCY DEPT VISIT HI MDM: CPT

## 2018-05-14 PROCEDURE — 94640 AIRWAY INHALATION TREATMENT: CPT

## 2018-05-14 PROCEDURE — 96361 HYDRATE IV INFUSION ADD-ON: CPT

## 2018-05-14 RX ORDER — METHYLPREDNISOLONE SODIUM SUCCINATE 125 MG/2ML
80 INJECTION, POWDER, LYOPHILIZED, FOR SOLUTION INTRAMUSCULAR; INTRAVENOUS ONCE
Status: COMPLETED | OUTPATIENT
Start: 2018-05-14 | End: 2018-05-14

## 2018-05-14 RX ORDER — PREDNISONE 20 MG/1
60 TABLET ORAL DAILY
Qty: 15 TABLET | Refills: 0 | Status: SHIPPED | OUTPATIENT
Start: 2018-05-14 | End: 2018-05-29

## 2018-05-14 RX ORDER — IPRATROPIUM BROMIDE AND ALBUTEROL SULFATE 2.5; .5 MG/3ML; MG/3ML
3 SOLUTION RESPIRATORY (INHALATION) ONCE
Status: COMPLETED | OUTPATIENT
Start: 2018-05-14 | End: 2018-05-14

## 2018-05-14 RX ADMIN — IPRATROPIUM BROMIDE AND ALBUTEROL SULFATE 3 ML: .5; 3 SOLUTION RESPIRATORY (INHALATION) at 11:00

## 2018-05-14 RX ADMIN — SODIUM CHLORIDE 1000 ML: 0.9 INJECTION, SOLUTION INTRAVENOUS at 11:02

## 2018-05-14 RX ADMIN — IPRATROPIUM BROMIDE AND ALBUTEROL SULFATE 3 ML: .5; 3 SOLUTION RESPIRATORY (INHALATION) at 12:57

## 2018-05-14 RX ADMIN — METHYLPREDNISOLONE SODIUM SUCCINATE 80 MG: 125 INJECTION, POWDER, FOR SOLUTION INTRAMUSCULAR; INTRAVENOUS at 11:05

## 2018-05-14 NOTE — ED PROVIDER NOTES
History  Chief Complaint   Patient presents with    Shortness of Breath     pt d/x bronchitis on wednesday, sob with exertion, no c/o SOB with numbness in b/l arms  62-year-old female presents today complaining of shortness of breath which has been ongoing for approximately 1 week  States she was seen evaluated by her primary care physician last Wednesday and was diagnosed with bronchitis  Was given prescriptions for albuterol inhaler and antibiotics  States her symptoms are not improving  Also reports she does have a history of pulmonary embolism  Is not currently on anticoagulants  Reports tingling in bilateral upper extremities and dizziness  History provided by:  Patient  Shortness of Breath   Severity:  Severe  Onset quality:  Gradual  Duration:  1 week  Timing:  Constant  Progression:  Waxing and waning  Chronicity:  New  Context comment:  See HPI  Relieved by:  Nothing  Worsened by:  Nothing  Ineffective treatments:  Inhaler (Antibiotics)  Associated symptoms: chest pain (Tightness) and wheezing    Associated symptoms: no abdominal pain, no claudication, no cough, no diaphoresis, no ear pain, no fever, no headaches, no hemoptysis, no neck pain, no PND, no rash, no sore throat, no sputum production, no syncope, no swollen glands and no vomiting    Risk factors: hx of PE/DVT and obesity    Risk factors: no recent alcohol use, no hx of cancer and no oral contraceptive use        Prior to Admission Medications   Prescriptions Last Dose Informant Patient Reported? Taking? ALPRAZolam (XANAX) 0 25 mg tablet More than a month at Unknown time Self Yes No   Sig: Take 0 25 mg by mouth   Cetirizine HCl (ZYRTEC ALLERGY) 10 MG CAPS Past Week at Unknown time Self Yes Yes   Sig: Take 10 mg by mouth     albuterol (PROVENTIL HFA,VENTOLIN HFA) 90 mcg/act inhaler 5/14/2018 at Unknown time  No Yes   Sig: Inhale 2 puffs every 6 (six) hours as needed for wheezing   doxycycline hyclate (VIBRAMYCIN) 100 mg capsule 5/14/2018 at Unknown time  No Yes   Sig: Take 1 capsule (100 mg total) by mouth every 12 (twelve) hours for 10 days   ibuprofen (MOTRIN) 600 mg tablet 5/13/2018 at Unknown time Self No Yes   Sig: Take 1 tablet (600 mg total) by mouth every 8 (eight) hours as needed for moderate pain (pain)   sertraline (ZOLOFT) 100 mg tablet 5/13/2018 at Unknown time Self Yes Yes   Sig: Take 100 mg by mouth daily at bedtime  zolpidem (AMBIEN) 10 mg tablet Past Month at Unknown time  No Yes   Sig: Take 1/2 to 1 tablet PO qHS prn      Facility-Administered Medications: None       Past Medical History:   Diagnosis Date    Anxiety     Depression     Iliotibial band syndrome     last assessed - [id number]    Mitral valve prolapse     Presence of contraceptive device     last assessed - [id number]       Past Surgical History:   Procedure Laterality Date    CHOLECYSTECTOMY      GALLBLADDER SURGERY      KNEE SURGERY Left     growth plate fx left knee    TUBAL LIGATION         Family History   Problem Relation Age of Onset    Adopted: Yes    No Known Problems Mother      I have reviewed and agree with the history as documented  Social History   Substance Use Topics    Smoking status: Never Smoker    Smokeless tobacco: Never Used    Alcohol use Yes      Comment: occasional; Special occasions, rarely consumes alcohol - per [employer]        Review of Systems   Constitutional: Negative for diaphoresis and fever  HENT: Negative for ear pain and sore throat  Eyes: Negative for visual disturbance  Respiratory: Positive for shortness of breath and wheezing  Negative for cough, hemoptysis and sputum production  Cardiovascular: Positive for chest pain (Tightness)  Negative for claudication, leg swelling, syncope and PND  Gastrointestinal: Negative for abdominal pain and vomiting  Musculoskeletal: Negative for neck pain  Skin: Negative for pallor, rash and wound  Neurological: Positive for numbness   Negative for headaches  Psychiatric/Behavioral: The patient is nervous/anxious  Physical Exam  ED Triage Vitals   Temperature Pulse Respirations Blood Pressure SpO2   05/14/18 1020 05/14/18 1013 05/14/18 1013 05/14/18 1013 05/14/18 1013   98 8 °F (37 1 °C) 81 22 150/75 99 %      Temp Source Heart Rate Source Patient Position - Orthostatic VS BP Location FiO2 (%)   05/14/18 1020 05/14/18 1013 05/14/18 1013 05/14/18 1013 --   Oral Monitor Sitting Left arm       Pain Score       05/14/18 1013       7           Orthostatic Vital Signs  Vitals:    05/14/18 1245 05/14/18 1300 05/14/18 1315 05/14/18 1400   BP:  119/62 148/65    Pulse: 68 78 94 90   Patient Position - Orthostatic VS:           Physical Exam   Constitutional: She is oriented to person, place, and time  She appears well-developed and well-nourished  She appears distressed (Extremely anxious, and hyperventilating)  HENT:   Head: Normocephalic and atraumatic  Mouth/Throat: Uvula is midline, oropharynx is clear and moist and mucous membranes are normal  No tonsillar exudate  Eyes: Pupils are equal, round, and reactive to light  Neck: Normal range of motion  Neck supple  Cardiovascular: Normal rate and regular rhythm  Pulmonary/Chest: Effort normal and breath sounds normal    Abdominal: Soft  Bowel sounds are normal  There is no tenderness  There is no rebound and no guarding  Musculoskeletal: Normal range of motion  Neurological: She is alert and oriented to person, place, and time  Patient moving all extremities equally, no focal neuro deficits noted  Skin: Skin is warm and dry  Psychiatric: She has a normal mood and affect  Nursing note and vitals reviewed        ED Medications  Medications   sodium chloride 0 9 % bolus 1,000 mL (0 mL Intravenous Stopped 5/14/18 1410)   methylPREDNISolone sodium succinate (Solu-MEDROL) injection 80 mg (80 mg Intravenous Given 5/14/18 1105)   ipratropium-albuterol (DUO-NEB) 0 5-2 5 mg/3 mL inhalation solution 3 mL (3 mL Nebulization Given 5/14/18 1100)   ipratropium-albuterol (DUO-NEB) 0 5-2 5 mg/3 mL inhalation solution 3 mL (3 mL Nebulization Given 5/14/18 1257)       Diagnostic Studies  Results Reviewed     Procedure Component Value Units Date/Time    D-Dimer [[id number]]  (Normal) Collected:  05/14/18 1100    Lab Status:  Final result Specimen:  Blood from Arm, Left Updated:  05/14/18 1147     D-Dimer, Quant <270 ng/ml (FEU)     Troponin I [[id number]]  (Normal) Collected:  05/14/18 1100    Lab Status:  Final result Specimen:  Blood from Arm, Left Updated:  05/14/18 1142     Troponin I <0 02 ng/mL     Narrative:         [employer] Chemistry analyzer 99% cutoff is > 0 04 ng/mL in network labs    o cTnI 99% cutoff is useful only when applied to patients in the clinical setting of myocardial ischemia  o cTnI 99% cutoff should be interpreted in the context of clinical history, ECG findings and possibly cardiac imaging to establish correct diagnosis  o cTnI 99% cutoff may be suggestive but clearly not indicative of a coronary event without the clinical setting of myocardial ischemia  Comprehensive metabolic panel [[id number]]  (Abnormal) Collected:  05/14/18 1100    Lab Status:  Final result Specimen:  Blood from Arm, Left Updated:  05/14/18 1140     Sodium 137 mmol/L      Potassium 3 5 mmol/L      Chloride 103 mmol/L      CO2 19 (L) mmol/L      Anion Gap 15 (H) mmol/L      BUN 14 mg/dL      Creatinine 1 04 mg/dL      Glucose 118 mg/dL      Calcium 9 8 mg/dL      AST 23 U/L      ALT 55 U/L      Alkaline Phosphatase 104 U/L      Total Protein 8 0 g/dL      Albumin 3 8 g/dL      Total Bilirubin 0 40 mg/dL      eGFR 70 ml/min/1 73sq m     Narrative:         National Kidney Disease Education Program recommendations are as follows:  GFR calculation is accurate only with a steady state creatinine  Chronic Kidney disease less than 60 ml/min/1 73 sq  meters  Kidney failure less than 15 ml/min/1 73 sq  meters      CBC and differential [[id number]]  (Abnormal) Collected:  05/14/18 1100    Lab Status:  Final result Specimen:  Blood from Arm, Left Updated:  05/14/18 1114     WBC 10 39 (H) Thousand/uL      RBC 4 80 Million/uL      Hemoglobin 13 9 g/dL      Hematocrit 41 1 %      MCV 86 fL      MCH 29 0 pg      MCHC 33 8 g/dL      RDW 13 0 %      MPV 9 2 fL      Platelets 031 Thousands/uL      Neutrophils Relative 59 %      Lymphocytes Relative 32 %      Monocytes Relative 5 %      Eosinophils Relative 3 %      Basophils Relative 1 %      Neutrophils Absolute 6 21 Thousands/µL      Lymphocytes Absolute 3 28 Thousands/µL      Monocytes Absolute 0 54 Thousand/µL      Eosinophils Absolute 0 31 Thousand/µL      Basophils Absolute 0 05 Thousands/µL                  XR chest 2 views   Final Result by Paul Cabral MD (05/14 1341)      No acute cardiopulmonary disease  Workstation performed: [id number]                    Procedures  Procedures       Phone Contacts  ED Phone Contact    ED Course                               MDM  Number of Diagnoses or Management Options  Bronchitis: new and requires workup  Diagnosis management comments: 852 AM  59-year-old female presents today complaining of shortness of breath worsening over the last week  Was recently diagnosed with bronchitis and is on antibiotics and an albuterol inhaler  States symptoms are not improving, and now states that she cannot breathe at all and is complaining of tingling in bilateral hands  I suspect the tingling is due to hyperventilation from severe anxiety, spent a long time verbally coaching the patient to decrease her respiratory rate  Vital signs are completely normal   Breath sounds are slightly decreased on exam, but difficult to auscultate due to the patient's frequent moaning with expiration, even when I asked her not to  Will send labs, including a D-dimer and a troponin  Will order DuoNeb and Solu-Medrol and re-evaluate      2:09 PM  Feeling better after 2nd DuoNeb  Chest x-ray is negative for acute pathology  Labs are unremarkable including a negative D-dimer  Symptoms are likely related to her bronchitis  She is not hypoxic  Will DC home on a burst of prednisone and have follow-up with PCP as an outpatient  Work note as requested by the patient which I will provide  Will DC  Amount and/or Complexity of Data Reviewed  Clinical lab tests: ordered and reviewed  Tests in the radiology section of CPT®: ordered and reviewed  Tests in the medicine section of CPT®: reviewed and ordered  Decide to obtain previous medical records or to obtain history from someone other than the patient: yes  Review and summarize past medical records: yes  Independent visualization of images, tracings, or specimens: yes    Risk of Complications, Morbidity, and/or Mortality  Presenting problems: high  Diagnostic procedures: high  Management options: high    Patient Progress  Patient progress: improved    CritCare Time    Disposition  Final diagnoses:   Bronchitis     Time reflects when diagnosis was documented in both MDM as applicable and the Disposition within this note     Time User Action Codes Description Comment    5/14/2018  1:59 PM Real Barreto Add [J40] Bronchitis       ED Disposition     ED Disposition Condition Comment    Discharge  [address] discharge to home/self care      Condition at discharge: Stable        Follow-up Information     Follow up With Specialties Details Why Contact Info Additional Klever Harris MD Internal Medicine Schedule an appointment as soon as possible for a visit  [address] Emergency Department Emergency Medicine  If symptoms worsen [address] [facility] [address] [phone] AN ED, [address], [place], [place], [address]        Patient's Medications   Discharge Prescriptions PREDNISONE 20 MG TABLET    Take 3 tablets (60 mg total) by mouth daily       Start Date: 5/14/2018 End Date: --       Order Dose: 60 mg       Quantity: 15 tablet    Refills: 0     No discharge procedures on file      ED Provider  Electronically Signed by           Chelsey Watt DO  05/14/18 7617

## 2018-05-14 NOTE — DISCHARGE INSTRUCTIONS
Acute Bronchitis   WHAT YOU NEED TO KNOW:   Acute bronchitis is swelling and irritation in the air passages of your lungs  This irritation may cause you to cough or have other breathing problems  Acute bronchitis often starts because of another illness, such as a cold or the flu  The illness spreads from your nose and throat to your windpipe and airways  Bronchitis is often called a chest cold  Acute bronchitis lasts about 3 to 6 weeks and is usually not a serious illness  Your cough can last for several weeks  DISCHARGE INSTRUCTIONS:   Return to the emergency department if:   · You cough up blood  · Your lips or fingernails turn blue  · You feel like you are not getting enough air when you breathe  Contact your healthcare provider if:   · You have a fever  · Your breathing problems do not go away or get worse  · Your cough does not get better within 4 weeks  · You have questions or concerns about your condition or care  Self-care:   · Get more rest   Rest helps your body to heal  Slowly start to do more each day  Rest when you feel it is needed  · Avoid irritants in the air  Avoid chemicals, fumes, and dust  Wear a face mask if you must work around dust or fumes  Stay inside on days when air pollution levels are high  If you have allergies, stay inside when pollen counts are high  Do not use aerosol products, such as spray-on deodorant, bug spray, and hair spray  · Do not smoke or be around others who smoke  Nicotine and other chemicals in cigarettes and cigars damages the cilia that move mucus out of your lungs  Ask your healthcare provider for information if you currently smoke and need help to quit  E-cigarettes or smokeless tobacco still contain nicotine  Talk to your healthcare provider before you use these products  · Drink liquids as directed  Liquids help keep your air passages moist and help you cough up mucus   You may need to drink more liquids when you have acute bronchitis  Ask how much liquid to drink each day and which liquids are best for you  · Use a humidifier or vaporizer  Use a cool mist humidifier or a vaporizer to increase air moisture in your home  This may make it easier for you to breathe and help decrease your cough  Decrease risk for acute bronchitis:   · Get the vaccinations you need  Ask your healthcare provider if you should get vaccinated against the flu or pneumonia  · Prevent the spread of germs  You can decrease your risk of acute bronchitis and other illnesses by doing the following:     [facility] your hands often with soap and water  Carry germ-killing hand lotion or gel with you  You can use the lotion or gel to clean your hands when soap and water are not available  ¨ Do not touch your eyes, nose, or mouth unless you have washed your hands first     ¨ Always cover your mouth when you cough to prevent the spread of germs  It is best to cough into a tissue or your shirt sleeve instead of into your hand  Ask those around you cover their mouths when they cough  ¨ Try to avoid people who have a cold or the flu  If you are sick, stay away from others as much as possible  Medicines: Your healthcare provider may  give you any of the following:  · Ibuprofen or acetaminophen  are medicines that help lower your fever  They are available without a doctor's order  Ask your healthcare provider which medicine is right for you  Ask how much to take and how often to take it  Follow directions  These medicines can cause stomach bleeding if not taken correctly  Ibuprofen can cause kidney damage  Do not take ibuprofen if you have kidney disease, an ulcer, or allergies to aspirin  Acetaminophen can cause liver damage  Do not take more than 4,000 milligrams in 24 hours  · Decongestants  help loosen mucus in your lungs and make it easier to cough up  This can help you breathe easier  · Cough suppressants  decrease your urge to cough   If your cough produces mucus, do not take a cough suppressant unless your healthcare provider tells you to  Your healthcare provider may suggest that you take a cough suppressant at night so you can rest     · Inhalers  may be given  Your healthcare provider may give you one or more inhalers to help you breathe easier and cough less  An inhaler gives your medicine to open your airways  Ask your healthcare provider to show you how to use your inhaler correctly  · Take your medicine as directed  Contact your healthcare provider if you think your medicine is not helping or if you have side effects  Tell him of her if you are allergic to any medicine  Keep a list of the medicines, vitamins, and herbs you take  Include the amounts, and when and why you take them  Bring the list or the pill bottles to follow-up visits  Carry your medicine list with you in case of an emergency  Follow up with your healthcare provider as directed:  Write down questions you have so you will remember to ask them during your follow-up visits  © 2017 [address] Information is for End User's use only and may not be sold, redistributed or otherwise used for commercial purposes  All illustrations and images included in CareNotes® are the copyrighted property of [employer]  or Munir Rehman  The above information is an  only  It is not intended as medical advice for individual conditions or treatments  Talk to your doctor, nurse or pharmacist before following any medical regimen to see if it is safe and effective for you

## 2018-05-14 NOTE — ED NOTES
EKG completed at 10:29, viewed and signed by Dr Zen Chatman, copy on chart     Anette Purigordy  05/14/18 [phone]

## 2018-05-21 ENCOUNTER — HOSPITAL ENCOUNTER (EMERGENCY)
Facility: HOSPITAL | Age: 38
Discharge: HOME | End: 2018-05-21
Attending: EMERGENCY MEDICINE | Admitting: EMERGENCY MEDICINE
Payer: COMMERCIAL

## 2018-05-21 ENCOUNTER — APPOINTMENT (EMERGENCY)
Dept: RADIOLOGY | Facility: HOSPITAL | Age: 38
End: 2018-05-21
Payer: COMMERCIAL

## 2018-05-21 VITALS
HEART RATE: 87 BPM | TEMPERATURE: 97.5 F | RESPIRATION RATE: 17 BRPM | WEIGHT: 230.4 LBS | SYSTOLIC BLOOD PRESSURE: 147 MMHG | DIASTOLIC BLOOD PRESSURE: 88 MMHG | OXYGEN SATURATION: 100 % | BODY MASS INDEX: 38.34 KG/M2

## 2018-05-21 DIAGNOSIS — R10.9 ABDOMINAL PAIN: Primary | ICD-10-CM

## 2018-05-21 DIAGNOSIS — K29.70 GASTRITIS: ICD-10-CM

## 2018-05-21 LAB
ALBUMIN SERPL BCP-MCNC: 3.5 G/DL (ref 3.5–5)
ALP SERPL-CCNC: 95 U/L (ref 46–116)
ALT SERPL W P-5'-P-CCNC: 46 U/L (ref 12–78)
ANION GAP SERPL CALCULATED.3IONS-SCNC: 12 MMOL/L (ref 4–13)
APTT PPP: 23 SECONDS (ref 24–36)
AST SERPL W P-5'-P-CCNC: 23 U/L (ref 5–45)
ATRIAL RATE: 83 BPM
BASOPHILS # BLD AUTO: 0.08 THOUSANDS/ΜL (ref 0–0.1)
BASOPHILS NFR BLD AUTO: 1 % (ref 0–1)
BILIRUB SERPL-MCNC: 0.4 MG/DL (ref 0.2–1)
BUN SERPL-MCNC: 24 MG/DL (ref 5–25)
CALCIUM SERPL-MCNC: 8.2 MG/DL (ref 8.3–10.1)
CHLORIDE SERPL-SCNC: 103 MMOL/L (ref 100–108)
CO2 SERPL-SCNC: 25 MMOL/L (ref 21–32)
CREAT SERPL-MCNC: 1.16 MG/DL (ref 0.6–1.3)
DEPRECATED D DIMER PPP: <270 NG/ML (FEU) (ref 0–424)
EOSINOPHIL # BLD AUTO: 0.3 THOUSAND/ΜL (ref 0–0.61)
EOSINOPHIL NFR BLD AUTO: 2 % (ref 0–6)
ERYTHROCYTE [DISTWIDTH] IN BLOOD BY AUTOMATED COUNT: 13.8 % (ref 11.6–15.1)
GFR SERPL CREATININE-BSD FRML MDRD: 61 ML/MIN/1.73SQ M
GLUCOSE SERPL-MCNC: 93 MG/DL (ref 65–140)
HCT VFR BLD AUTO: 43.1 % (ref 34.8–46.1)
HGB BLD-MCNC: 14.4 G/DL (ref 11.5–15.4)
INR PPP: 0.97 (ref 0.86–1.17)
LIPASE SERPL-CCNC: 295 U/L (ref 73–393)
LYMPHOCYTES # BLD AUTO: 5.13 THOUSANDS/ΜL (ref 0.6–4.47)
LYMPHOCYTES NFR BLD AUTO: 36 % (ref 14–44)
MCH RBC QN AUTO: 29.3 PG (ref 26.8–34.3)
MCHC RBC AUTO-ENTMCNC: 33.4 G/DL (ref 31.4–37.4)
MCV RBC AUTO: 88 FL (ref 82–98)
MONOCYTES # BLD AUTO: 0.96 THOUSAND/ΜL (ref 0.17–1.22)
MONOCYTES NFR BLD AUTO: 7 % (ref 4–12)
NEUTROPHILS # BLD AUTO: 7.67 THOUSANDS/ΜL (ref 1.85–7.62)
NEUTS SEG NFR BLD AUTO: 54 % (ref 43–75)
P AXIS: 59 DEGREES
PLATELET # BLD AUTO: 308 THOUSANDS/UL (ref 149–390)
PMV BLD AUTO: 9 FL (ref 8.9–12.7)
POTASSIUM SERPL-SCNC: 3.2 MMOL/L (ref 3.5–5.3)
PR INTERVAL: 136 MS
PROT SERPL-MCNC: 7.2 G/DL (ref 6.4–8.2)
PROTHROMBIN TIME: 12.6 SECONDS (ref 11.8–14.2)
QRS AXIS: 54 DEGREES
QRSD INTERVAL: 78 MS
QT INTERVAL: 350 MS
QTC INTERVAL: 402 MS
RBC # BLD AUTO: 4.92 MILLION/UL (ref 3.81–5.12)
SODIUM SERPL-SCNC: 140 MMOL/L (ref 136–145)
T WAVE AXIS: 62 DEGREES
TROPONIN I SERPL-MCNC: <0.02 NG/ML
VENTRICULAR RATE: 79 BPM
WBC # BLD AUTO: 14.14 THOUSAND/UL (ref 4.31–10.16)

## 2018-05-21 PROCEDURE — 71046 X-RAY EXAM CHEST 2 VIEWS: CPT

## 2018-05-21 PROCEDURE — 85025 COMPLETE CBC W/AUTO DIFF WBC: CPT | Performed by: EMERGENCY MEDICINE

## 2018-05-21 PROCEDURE — 93005 ELECTROCARDIOGRAM TRACING: CPT

## 2018-05-21 PROCEDURE — 83690 ASSAY OF LIPASE: CPT | Performed by: EMERGENCY MEDICINE

## 2018-05-21 PROCEDURE — 84484 ASSAY OF TROPONIN QUANT: CPT | Performed by: EMERGENCY MEDICINE

## 2018-05-21 PROCEDURE — 85610 PROTHROMBIN TIME: CPT | Performed by: EMERGENCY MEDICINE

## 2018-05-21 PROCEDURE — 99285 EMERGENCY DEPT VISIT HI MDM: CPT

## 2018-05-21 PROCEDURE — 80053 COMPREHEN METABOLIC PANEL: CPT | Performed by: EMERGENCY MEDICINE

## 2018-05-21 PROCEDURE — 85379 FIBRIN DEGRADATION QUANT: CPT | Performed by: EMERGENCY MEDICINE

## 2018-05-21 PROCEDURE — 93010 ELECTROCARDIOGRAM REPORT: CPT | Performed by: INTERNAL MEDICINE

## 2018-05-21 PROCEDURE — 36415 COLL VENOUS BLD VENIPUNCTURE: CPT | Performed by: EMERGENCY MEDICINE

## 2018-05-21 PROCEDURE — 85730 THROMBOPLASTIN TIME PARTIAL: CPT | Performed by: EMERGENCY MEDICINE

## 2018-05-21 PROCEDURE — 96374 THER/PROPH/DIAG INJ IV PUSH: CPT

## 2018-05-21 PROCEDURE — 96361 HYDRATE IV INFUSION ADD-ON: CPT

## 2018-05-21 RX ORDER — FAMOTIDINE 20 MG/1
20 TABLET, FILM COATED ORAL 2 TIMES DAILY
Qty: 60 TABLET | Refills: 0 | Status: SHIPPED | OUTPATIENT
Start: 2018-05-21 | End: 2018-05-31 | Stop reason: HOSPADM

## 2018-05-21 RX ORDER — MAGNESIUM HYDROXIDE/ALUMINUM HYDROXICE/SIMETHICONE 120; 1200; 1200 MG/30ML; MG/30ML; MG/30ML
30 SUSPENSION ORAL ONCE
Status: COMPLETED | OUTPATIENT
Start: 2018-05-21 | End: 2018-05-21

## 2018-05-21 RX ORDER — KETOROLAC TROMETHAMINE 30 MG/ML
30 INJECTION, SOLUTION INTRAMUSCULAR; INTRAVENOUS ONCE
Status: COMPLETED | OUTPATIENT
Start: 2018-05-21 | End: 2018-05-21

## 2018-05-21 RX ORDER — FAMOTIDINE 20 MG/1
20 TABLET, FILM COATED ORAL ONCE
Status: DISCONTINUED | OUTPATIENT
Start: 2018-05-21 | End: 2018-05-21 | Stop reason: HOSPADM

## 2018-05-21 RX ADMIN — KETOROLAC TROMETHAMINE 30 MG: 30 INJECTION, SOLUTION INTRAMUSCULAR at 10:37

## 2018-05-21 RX ADMIN — SODIUM CHLORIDE 1000 ML: 0.9 INJECTION, SOLUTION INTRAVENOUS at 10:37

## 2018-05-21 RX ADMIN — ALUMINUM HYDROXIDE, MAGNESIUM HYDROXIDE, AND SIMETHICONE 30 ML: 200; 200; 20 SUSPENSION ORAL at 10:22

## 2018-05-21 RX ADMIN — LIDOCAINE HYDROCHLORIDE 15 ML: 20 SOLUTION ORAL; TOPICAL at 10:22

## 2018-05-21 NOTE — DISCHARGE INSTRUCTIONS
Abdominal Pain, Ambulatory Care   GENERAL INFORMATION:   Abdominal pain  can be dull, achy, or sharp  You may have pain in one area of your abdomen, or in your entire abdomen  Your pain may be caused by constipation, food sensitivity or poisoning, infection, or a blockage  Abdominal pain can also be caused by a hernia, appendicitis, or an ulcer  The cause of your abdominal pain may be unknown  Seek immediate care for the following symptoms:   · New chest pain or shortness of breath    · Pulsing pain in your upper abdomen or lower back that suddenly becomes constant    · Pain in the right lower abdominal area that worsens with movement    · Fever over 100 4°F (38°C) or shaking chills    · Vomiting and you cannot keep food or fluids down    · Pain does not improve or gets worse over the next 8 to 12 hours    · Blood in your vomit or bowel movements, or they look black and tarry    · Skin or the whites of your eyes turn yellow    · Large amount of vaginal bleeding that is not your monthly period  Treatment for abdominal pain  may include medicine to calm your stomach, prevent vomiting, or decrease pain  Follow up with your healthcare provider as directed:  Write down your questions so you remember to ask them during your visits  CARE AGREEMENT:   You have the right to help plan your care  Learn about your health condition and how it may be treated  Discuss treatment options with your caregivers to decide what care you want to receive  You always have the right to refuse treatment  The above information is an  only  It is not intended as medical advice for individual conditions or treatments  Talk to your doctor, nurse or pharmacist before following any medical regimen to see if it is safe and effective for you  © 2014 [address] is for End User's use only and may not be sold, redistributed or otherwise used for commercial purposes   All illustrations and images included in Rene are the copyrighted property of A D [employer] M [employer]  or Munir Rehman  Gastritis   WHAT YOU NEED TO KNOW:   What is gastritis? Gastritis is inflammation or irritation of the lining of your stomach  What increases my risk for gastritis? · Infection with bacteria, a virus, or a parasite    · NSAIDs, aspirin, or steroid medicine    · Use of tobacco products or alcohol    · Trauma such as an injury to your stomach or intestine    · Autoimmune disorders such as diabetes, thyroid disease, or Crohn disease    · Stress    · Age older than 60 years    · Illegal drugs, such as cocaine  What are the signs and symptoms of gastritis? · Stomach pain, burning, or tenderness when you press on it    · Stomach fullness or tightness    · Nausea or vomiting    · Loss of appetite, or feeling full quickly when you eat    · Bad breath    · Fatigue or feeling more tired than usual    · Heartburn  How is gastritis diagnosed? Your healthcare provider will ask about your signs and symptoms and examine you  You may need any of the following:  · Blood tests  may be used to show an infection, dehydration, or anemia (low red blood cell levels)  · A bowel movement sample  may be tested for blood or the germ that may be causing your gastritis  · A breath test  may show if H pylori is causing your gastritis  You will be given a liquid to drink  Then you will breathe into a bag  Your healthcare provider will measure the amount of carbon dioxide in your breath  Extra amounts of carbon dioxide may mean you have an H pylori infection  · An endoscopy  may be used to look for irritation or bleeding in your stomach  Your healthcare provider will use an endoscope (tube with a light and camera on the end) during the procedure  He or she may take a sample from your stomach to be tested  How is gastritis treated? Your symptoms may go away without treatment  Treatment will depend on what is causing your gastritis  Your healthcare provider may recommend changes to the medicines you take  Medicines may be given to help treat a bacterial infection or decrease stomach acid  How can I manage or prevent gastritis? · Do not smoke  Nicotine and other chemicals in cigarettes and cigars can make your symptoms worse and cause lung damage  Ask your healthcare provider for information if you currently smoke and need help to quit  E-cigarettes or smokeless tobacco still contain nicotine  Talk to your healthcare provider before you use these products  · Do not drink alcohol  Alcohol can prevent healing and make your gastritis worse  Talk to your healthcare provider if you need help to stop drinking  · Do not take NSAIDs or aspirin unless directed  These and similar medicines can cause irritation of your stomach lining  If your healthcare provider says it is okay to take NSAIDs, take them with food  · Do not eat foods that cause irritation  Foods such as oranges and salsa can cause burning or pain  Eat a variety of healthy foods  Examples include fruits (not citrus), vegetables, low-fat dairy products, beans, whole-grain breads, and lean meats and fish  Try to eat small meals, and drink water with your meals  Do not eat for at least 3 hours before you go to bed  · Find ways to relax and decrease stress  Stress can increase stomach acid and make gastritis worse  Activities such as yoga, meditation, or listening to music can help you relax  Spend time with friends, or do things you enjoy  Call 911 for any of the following:   · You develop chest pain or shortness of breath  When should I seek immediate care? · You vomit blood  · You have black or bloody bowel movements  · You have severe stomach or back pain  When should I contact my healthcare provider? · You have a fever  · You have new or worsening symptoms, even after treatment  · You have questions or concerns about your condition or care    CARE AGREEMENT:   You have the right to help plan your care  Learn about your health condition and how it may be treated  Discuss treatment options with your caregivers to decide what care you want to receive  You always have the right to refuse treatment  The above information is an  only  It is not intended as medical advice for individual conditions or treatments  Talk to your doctor, nurse or pharmacist before following any medical regimen to see if it is safe and effective for you  © 2017 [address] Information is for End User's use only and may not be sold, redistributed or otherwise used for commercial purposes  All illustrations and images included in CareNotes® are the copyrighted property of A D A M [employer] Inc  or Munir Rehman

## 2018-05-21 NOTE — ED PROVIDER NOTES
History  Chief Complaint   Patient presents with    Chest Pain     Pt states that she was seen here last week for bronchitis but now is having chest pain that goes into her stomach  Pt states that the pain started yesterday  Patient presents to the emergency department for evaluation of epigastric pain that shoots up into her chest   This began yesterday  She does have a history of pulmonary embolism secondary to birth control pills  She is not on a blood thinner  She denies back or lower belly pain  Denies nausea vomiting diarrhea  Denies fever chills or cough  Denies melena or rectal bleeding  Denies trauma fall injury or new activity  Prior to Admission Medications   Prescriptions Last Dose Informant Patient Reported? Taking? ALPRAZolam (XANAX) 0 25 mg tablet  Self Yes No   Sig: Take 0 25 mg by mouth   Cetirizine HCl (ZYRTEC ALLERGY) 10 MG CAPS  Self Yes No   Sig: Take 10 mg by mouth  albuterol (PROVENTIL HFA,VENTOLIN HFA) 90 mcg/act inhaler   No No   Sig: Inhale 2 puffs every 6 (six) hours as needed for wheezing   ibuprofen (MOTRIN) 600 mg tablet  Self No No   Sig: Take 1 tablet (600 mg total) by mouth every 8 (eight) hours as needed for moderate pain (pain)   predniSONE 20 mg tablet   No No   Sig: Take 3 tablets (60 mg total) by mouth daily   sertraline (ZOLOFT) 100 mg tablet  Self Yes No   Sig: Take 100 mg by mouth daily at bedtime     zolpidem (AMBIEN) 10 mg tablet   No No   Sig: Take 1/2 to 1 tablet PO qHS prn      Facility-Administered Medications: None       Past Medical History:   Diagnosis Date    Anxiety     Depression     History of pulmonary embolus (PE)     Iliotibial band syndrome     last assessed - [id number]    Mitral valve prolapse     Presence of contraceptive device     last assessed - [id number]       Past Surgical History:   Procedure Laterality Date    CHOLECYSTECTOMY      GALLBLADDER SURGERY      KNEE SURGERY Left     growth plate fx left knee    TUBAL LIGATION         Family History   Problem Relation Age of Onset    Adopted: Yes    No Known Problems Mother      I have reviewed and agree with the history as documented  Social History   Substance Use Topics    Smoking status: Never Smoker    Smokeless tobacco: Never Used    Alcohol use Yes      Comment: occasional; Special occasions, rarely consumes alcohol - per [employer]        Review of Systems   Constitutional: Negative  Negative for activity change, appetite change, chills, diaphoresis, fatigue, fever and unexpected weight change  HENT: Negative  Negative for congestion, drooling, mouth sores, nosebleeds, rhinorrhea, sneezing, sore throat and trouble swallowing  Eyes: Negative  Negative for photophobia and visual disturbance  Respiratory: Negative  Negative for cough, chest tightness, shortness of breath, wheezing and stridor  Cardiovascular: Positive for chest pain  Negative for palpitations and leg swelling  Gastrointestinal: Positive for abdominal pain  Negative for abdominal distention, anal bleeding, blood in stool, constipation, diarrhea, nausea, rectal pain and vomiting  Endocrine: Negative  Genitourinary: Negative  Negative for difficulty urinating, dysuria, flank pain, frequency, hematuria, urgency, vaginal bleeding, vaginal discharge and vaginal pain  Musculoskeletal: Negative  Negative for back pain, neck pain and neck stiffness  Skin: Negative  Negative for rash and wound  Allergic/Immunologic: Negative  Neurological: Negative  Negative for dizziness, tremors, seizures, syncope, facial asymmetry, speech difficulty, weakness, light-headedness, numbness and headaches  Hematological: Negative  Does not bruise/bleed easily  Psychiatric/Behavioral: Negative  Negative for confusion, dysphoric mood, hallucinations, self-injury, sleep disturbance and suicidal ideas  The patient is not nervous/anxious and is not hyperactive          Physical Exam  Physical Exam   Constitutional: She is oriented to person, place, and time  She appears well-developed and well-nourished  Nontoxic appearance without respiratory distress  Patient looks mildly uncomfortable sitting upright on the stretcher  HENT:   Head: Normocephalic and atraumatic  Right Ear: External ear normal    Left Ear: External ear normal    Mouth/Throat: Oropharynx is clear and moist    Eyes: Conjunctivae and EOM are normal  Pupils are equal, round, and reactive to light  Neck: Normal range of motion  Neck supple  Cardiovascular: Normal rate, regular rhythm, normal heart sounds and intact distal pulses  Pulmonary/Chest: Effort normal and breath sounds normal  No respiratory distress  She has no wheezes  She has no rales  She exhibits no tenderness  Abdominal: Soft  Bowel sounds are normal  She exhibits no distension and no mass  There is no tenderness  There is no rebound and no guarding  No hernia  No peritoneal signs or reproducible tenderness on palpation  Musculoskeletal: Normal range of motion  She exhibits no edema, tenderness or deformity  Neurological: She is alert and oriented to person, place, and time  She has normal reflexes  She displays normal reflexes  No cranial nerve deficit or sensory deficit  She exhibits normal muscle tone  Coordination normal    Skin: Skin is warm and dry  No rash noted  Psychiatric: Her behavior is normal  Judgment and thought content normal    Anxious affect   Nursing note and vitals reviewed        Vital Signs  ED Triage Vitals   Temperature Pulse Respirations Blood Pressure SpO2   05/21/18 1030 05/21/18 1000 05/21/18 1000 05/21/18 1000 05/21/18 1000   97 5 °F (36 4 °C) 80 16 142/77 98 %      Temp Source Heart Rate Source Patient Position - Orthostatic VS BP Location FiO2 (%)   05/21/18 1030 05/21/18 1000 05/21/18 1000 05/21/18 1000 --   Oral Monitor Sitting Right arm       Pain Score       05/21/18 1000       8           Vitals:    05/21/18 1000 05/21/18 1030 05/21/18 1134   BP: 142/77 130/85 147/88   Pulse: 80 80 87   Patient Position - Orthostatic VS: Sitting Lying Lying       Visual Acuity      ED Medications  Medications   famotidine (PEPCID) tablet 20 mg (not administered)   aluminum-magnesium hydroxide-simethicone (MYLANTA) 200-200-20 mg/5 mL oral suspension 30 mL (30 mL Oral Given 5/21/18 1022)   lidocaine viscous (XYLOCAINE) 2 % mucosal solution 15 mL (15 mL Swish & Spit Given 5/21/18 1022)   sodium chloride 0 9 % bolus 1,000 mL (1,000 mL Intravenous New Bag 5/21/18 1037)   ketorolac (TORADOL) injection 30 mg (30 mg Intravenous Given 5/21/18 1037)       Diagnostic Studies  Results Reviewed     Procedure Component Value Units Date/Time    D-Dimer [[id number]]  (Normal) Collected:  05/21/18 1036    Lab Status:  Final result Specimen:  Blood Updated:  05/21/18 1159     D-Dimer, Quant <270 ng/ml (FEU)     APTT [[id number]]  (Abnormal) Collected:  05/21/18 1032    Lab Status:  Final result Specimen:  Blood from Arm, Left Updated:  05/21/18 1106     PTT 23 (L) seconds     Protime-INR [[id number]]  (Normal) Collected:  05/21/18 1032    Lab Status:  Final result Specimen:  Blood from Arm, Left Updated:  05/21/18 1059     Protime 12 6 seconds      INR 0 97    Comprehensive metabolic panel [[id number]]  (Abnormal) Collected:  05/21/18 1032    Lab Status:  Final result Specimen:  Blood from Arm, Left Updated:  05/21/18 1058     Sodium 140 mmol/L      Potassium 3 2 (L) mmol/L      Chloride 103 mmol/L      CO2 25 mmol/L      Anion Gap 12 mmol/L      BUN 24 mg/dL      Creatinine 1 16 mg/dL      Glucose 93 mg/dL      Calcium 8 2 (L) mg/dL      AST 23 U/L      ALT 46 U/L      Alkaline Phosphatase 95 U/L      Total Protein 7 2 g/dL      Albumin 3 5 g/dL      Total Bilirubin 0 40 mg/dL      eGFR 61 ml/min/1 73sq m     Narrative:         National Kidney Disease Education Program recommendations are as follows:  GFR calculation is accurate only with a steady state creatinine  Chronic Kidney disease less than 60 ml/min/1 73 sq  meters  Kidney failure less than 15 ml/min/1 73 sq  meters  Lipase [[id number]]  (Normal) Collected:  05/21/18 1032    Lab Status:  Final result Specimen:  Blood from Arm, Left Updated:  05/21/18 1058     Lipase 295 u/L     Troponin I [[id number]]  (Normal) Collected:  05/21/18 1032    Lab Status:  Final result Specimen:  Blood from Arm, Left Updated:  05/21/18 1057     Troponin I <0 02 ng/mL     Narrative:         [employer] Chemistry analyzer 99% cutoff is > 0 04 ng/mL in network labs    o cTnI 99% cutoff is useful only when applied to patients in the clinical setting of myocardial ischemia  o cTnI 99% cutoff should be interpreted in the context of clinical history, ECG findings and possibly cardiac imaging to establish correct diagnosis  o cTnI 99% cutoff may be suggestive but clearly not indicative of a coronary event without the clinical setting of myocardial ischemia  CBC and differential [[id number]]  (Abnormal) Collected:  05/21/18 1032    Lab Status:  Final result Specimen:  Blood from Arm, Left Updated:  05/21/18 1039     WBC 14 14 (H) Thousand/uL      RBC 4 92 Million/uL      Hemoglobin 14 4 g/dL      Hematocrit 43 1 %      MCV 88 fL      MCH 29 3 pg      MCHC 33 4 g/dL      RDW 13 8 %      MPV 9 0 fL      Platelets 545 Thousands/uL      Neutrophils Relative 54 %      Lymphocytes Relative 36 %      Monocytes Relative 7 %      Eosinophils Relative 2 %      Basophils Relative 1 %      Neutrophils Absolute 7 67 (H) Thousands/µL      Lymphocytes Absolute 5 13 (H) Thousands/µL      Monocytes Absolute 0 96 Thousand/µL      Eosinophils Absolute 0 30 Thousand/µL      Basophils Absolute 0 08 Thousands/µL                  XR chest 2 views   ED Interpretation by Carola Toledo MD (05/21 1044)   No acute disease                   Procedures  ECG 12 Lead Documentation  Date/Time: 5/21/2018 10:16 AM  Performed by: Dori Galan  Authorized by: Nii Lucas KRISTIN BLANCAS     ECG reviewed by me, the ED Provider: yes    Patient location:  ED  Previous ECG:     Previous ECG:  Compared to current    Similarity:  No change  Interpretation:     Interpretation: normal    Rate:     ECG rate:  79    ECG rate assessment: normal    Rhythm:     Rhythm: sinus rhythm    Ectopy:     Ectopy: none    QRS:     QRS axis:  Normal    QRS intervals:  Normal  Conduction:     Conduction: normal    ST segments:     ST segments:  Normal  T waves:     T waves: normal             Phone Contacts  ED Phone Contact    ED Course  ED Course as of May 21 1219   Mon May 21, 2018   1214 Patient is stable for discharge  I will start her on Pepcid  Will refer to GI  Discussed signs and symptoms requiring return to the ED  MDM  CritCare Time    Disposition  Final diagnoses:   Abdominal pain   Gastritis     Time reflects when diagnosis was documented in both MDM as applicable and the Disposition within this note     Time User Action Codes Description Comment    5/21/2018 12:16 PM Opal Palmer Add [R10 9] Abdominal pain     5/21/2018 12:16 PM Bandar James Add [K29 70] Gastritis       ED Disposition     ED Disposition Condition Comment    Discharge  [address] discharge to home/self care  Condition at discharge: Stable        Follow-up Information     Follow up With Specialties Details Why Devendra Valencia MD Gastroenterology Schedule an appointment as soon as possible for a visit  [address] [facility]  [phone]            Patient's Medications   Discharge Prescriptions    FAMOTIDINE (PEPCID) 20 MG TABLET    Take 1 tablet (20 mg total) by mouth 2 (two) times a day for 30 days       Start Date: 5/21/2018 End Date: 6/20/2018       Order Dose: 20 mg       Quantity: 60 tablet    Refills: 0     No discharge procedures on file      ED Provider  Electronically Signed by           Carola Toledo MD  05/21/18 2906

## 2018-05-21 NOTE — ED NOTES
Pt states that she started with abdominal pain yesterday and states that she then started to vomit which lead to the sharp "stabbing pain" in her chest that radiates down to her stomach        Tali Solorzano RN  05/21/18 0361

## 2018-05-29 ENCOUNTER — OFFICE VISIT (OUTPATIENT)
Dept: INTERNAL MEDICINE CLINIC | Facility: CLINIC | Age: 38
End: 2018-05-29
Payer: COMMERCIAL

## 2018-05-29 ENCOUNTER — HOSPITAL ENCOUNTER (INPATIENT)
Facility: HOSPITAL | Age: 38
LOS: 2 days | Discharge: HOME | DRG: 391 | End: 2018-05-31
Attending: EMERGENCY MEDICINE | Admitting: INTERNAL MEDICINE
Payer: COMMERCIAL

## 2018-05-29 ENCOUNTER — APPOINTMENT (EMERGENCY)
Dept: CT IMAGING | Facility: HOSPITAL | Age: 38
DRG: 391 | End: 2018-05-29
Payer: COMMERCIAL

## 2018-05-29 VITALS
DIASTOLIC BLOOD PRESSURE: 80 MMHG | WEIGHT: 230.2 LBS | HEART RATE: 86 BPM | BODY MASS INDEX: 38.35 KG/M2 | HEIGHT: 65 IN | OXYGEN SATURATION: 99 % | RESPIRATION RATE: 18 BRPM | SYSTOLIC BLOOD PRESSURE: 118 MMHG

## 2018-05-29 DIAGNOSIS — R10.13 EPIGASTRIC ABDOMINAL PAIN: ICD-10-CM

## 2018-05-29 DIAGNOSIS — J18.9 BILATERAL PNEUMONIA: Primary | ICD-10-CM

## 2018-05-29 DIAGNOSIS — K25.3 ACUTE GASTRIC EROSION: ICD-10-CM

## 2018-05-29 DIAGNOSIS — R10.9 LEFT LATERAL ABDOMINAL PAIN: Primary | ICD-10-CM

## 2018-05-29 DIAGNOSIS — R42 DIZZINESS: ICD-10-CM

## 2018-05-29 DIAGNOSIS — K29.70 GASTRITIS: ICD-10-CM

## 2018-05-29 DIAGNOSIS — K52.9 GASTROENTERITIS: ICD-10-CM

## 2018-05-29 DIAGNOSIS — R11.2 INTRACTABLE VOMITING WITH NAUSEA, UNSPECIFIED VOMITING TYPE: ICD-10-CM

## 2018-05-29 DIAGNOSIS — E86.0 MILD DEHYDRATION: ICD-10-CM

## 2018-05-29 LAB
ALBUMIN SERPL BCP-MCNC: 3.9 G/DL (ref 3.5–5)
ALP SERPL-CCNC: 88 U/L (ref 46–116)
ALT SERPL W P-5'-P-CCNC: 58 U/L (ref 12–78)
ANION GAP SERPL CALCULATED.3IONS-SCNC: 11 MMOL/L (ref 4–13)
APTT PPP: 25 SECONDS (ref 24–36)
AST SERPL W P-5'-P-CCNC: 27 U/L (ref 5–45)
BACTERIA UR QL AUTO: ABNORMAL /HPF
BASOPHILS # BLD AUTO: 0.07 THOUSANDS/ΜL (ref 0–0.1)
BASOPHILS NFR BLD AUTO: 1 % (ref 0–1)
BILIRUB SERPL-MCNC: 0.6 MG/DL (ref 0.2–1)
BILIRUB UR QL STRIP: NEGATIVE
BUN SERPL-MCNC: 11 MG/DL (ref 5–25)
CALCIUM SERPL-MCNC: 8.8 MG/DL (ref 8.3–10.1)
CHLORIDE SERPL-SCNC: 104 MMOL/L (ref 100–108)
CLARITY UR: CLEAR
CO2 SERPL-SCNC: 24 MMOL/L (ref 21–32)
COLOR UR: YELLOW
CREAT SERPL-MCNC: 1.09 MG/DL (ref 0.6–1.3)
EOSINOPHIL # BLD AUTO: 0.43 THOUSAND/ΜL (ref 0–0.61)
EOSINOPHIL NFR BLD AUTO: 5 % (ref 0–6)
ERYTHROCYTE [DISTWIDTH] IN BLOOD BY AUTOMATED COUNT: 13.6 % (ref 11.6–15.1)
GFR SERPL CREATININE-BSD FRML MDRD: 66 ML/MIN/1.73SQ M
GLUCOSE SERPL-MCNC: 93 MG/DL (ref 65–140)
GLUCOSE UR STRIP-MCNC: NEGATIVE MG/DL
HCT VFR BLD AUTO: 42.7 % (ref 34.8–46.1)
HGB BLD-MCNC: 14.2 G/DL (ref 11.5–15.4)
HGB UR QL STRIP.AUTO: ABNORMAL
INR PPP: 0.91 (ref 0.86–1.17)
KETONES UR STRIP-MCNC: ABNORMAL MG/DL
LACTATE SERPL-SCNC: 0.9 MMOL/L (ref 0.5–2)
LEUKOCYTE ESTERASE UR QL STRIP: ABNORMAL
LIPASE SERPL-CCNC: 152 U/L (ref 73–393)
LYMPHOCYTES # BLD AUTO: 3.21 THOUSANDS/ΜL (ref 0.6–4.47)
LYMPHOCYTES NFR BLD AUTO: 35 % (ref 14–44)
MCH RBC QN AUTO: 29.3 PG (ref 26.8–34.3)
MCHC RBC AUTO-ENTMCNC: 33.3 G/DL (ref 31.4–37.4)
MCV RBC AUTO: 88 FL (ref 82–98)
MONOCYTES # BLD AUTO: 0.63 THOUSAND/ΜL (ref 0.17–1.22)
MONOCYTES NFR BLD AUTO: 7 % (ref 4–12)
NEUTROPHILS # BLD AUTO: 4.94 THOUSANDS/ΜL (ref 1.85–7.62)
NEUTS SEG NFR BLD AUTO: 53 % (ref 43–75)
NITRITE UR QL STRIP: NEGATIVE
NON-SQ EPI CELLS URNS QL MICRO: ABNORMAL /HPF
PH UR STRIP.AUTO: 6.5 [PH] (ref 4.5–8)
PLATELET # BLD AUTO: 252 THOUSANDS/UL (ref 149–390)
PMV BLD AUTO: 9.1 FL (ref 8.9–12.7)
POTASSIUM SERPL-SCNC: 3.7 MMOL/L (ref 3.5–5.3)
PROT SERPL-MCNC: 7.7 G/DL (ref 6.4–8.2)
PROT UR STRIP-MCNC: ABNORMAL MG/DL
PROTHROMBIN TIME: 12 SECONDS (ref 11.8–14.2)
RBC # BLD AUTO: 4.85 MILLION/UL (ref 3.81–5.12)
RBC #/AREA URNS AUTO: ABNORMAL /HPF
SODIUM SERPL-SCNC: 139 MMOL/L (ref 136–145)
SP GR UR STRIP.AUTO: >=1.03 (ref 1–1.03)
UROBILINOGEN UR QL STRIP.AUTO: 0.2 E.U./DL
WBC # BLD AUTO: 9.28 THOUSAND/UL (ref 4.31–10.16)
WBC #/AREA URNS AUTO: ABNORMAL /HPF

## 2018-05-29 PROCEDURE — 85610 PROTHROMBIN TIME: CPT | Performed by: PHYSICIAN ASSISTANT

## 2018-05-29 PROCEDURE — 81001 URINALYSIS AUTO W/SCOPE: CPT

## 2018-05-29 PROCEDURE — 87040 BLOOD CULTURE FOR BACTERIA: CPT | Performed by: PHYSICIAN ASSISTANT

## 2018-05-29 PROCEDURE — 96375 TX/PRO/DX INJ NEW DRUG ADDON: CPT

## 2018-05-29 PROCEDURE — 85730 THROMBOPLASTIN TIME PARTIAL: CPT | Performed by: PHYSICIAN ASSISTANT

## 2018-05-29 PROCEDURE — 83690 ASSAY OF LIPASE: CPT | Performed by: PHYSICIAN ASSISTANT

## 2018-05-29 PROCEDURE — 85025 COMPLETE CBC W/AUTO DIFF WBC: CPT | Performed by: PHYSICIAN ASSISTANT

## 2018-05-29 PROCEDURE — 96374 THER/PROPH/DIAG INJ IV PUSH: CPT

## 2018-05-29 PROCEDURE — 96376 TX/PRO/DX INJ SAME DRUG ADON: CPT

## 2018-05-29 PROCEDURE — 94760 N-INVAS EAR/PLS OXIMETRY 1: CPT

## 2018-05-29 PROCEDURE — 80053 COMPREHEN METABOLIC PANEL: CPT | Performed by: PHYSICIAN ASSISTANT

## 2018-05-29 PROCEDURE — 1111F DSCHRG MED/CURRENT MED MERGE: CPT | Performed by: INTERNAL MEDICINE

## 2018-05-29 PROCEDURE — 74177 CT ABD & PELVIS W/CONTRAST: CPT

## 2018-05-29 PROCEDURE — 93005 ELECTROCARDIOGRAM TRACING: CPT

## 2018-05-29 PROCEDURE — 36415 COLL VENOUS BLD VENIPUNCTURE: CPT | Performed by: PHYSICIAN ASSISTANT

## 2018-05-29 PROCEDURE — 99214 OFFICE O/P EST MOD 30 MIN: CPT | Performed by: INTERNAL MEDICINE

## 2018-05-29 PROCEDURE — 99285 EMERGENCY DEPT VISIT HI MDM: CPT

## 2018-05-29 PROCEDURE — 94664 DEMO&/EVAL PT USE INHALER: CPT

## 2018-05-29 PROCEDURE — 96361 HYDRATE IV INFUSION ADD-ON: CPT

## 2018-05-29 PROCEDURE — 83605 ASSAY OF LACTIC ACID: CPT | Performed by: PHYSICIAN ASSISTANT

## 2018-05-29 RX ORDER — ALPRAZOLAM 0.25 MG/1
0.25 TABLET ORAL
Status: DISCONTINUED | OUTPATIENT
Start: 2018-05-29 | End: 2018-05-31 | Stop reason: HOSPADM

## 2018-05-29 RX ORDER — SENNOSIDES 8.6 MG
1 TABLET ORAL DAILY
Status: DISCONTINUED | OUTPATIENT
Start: 2018-05-30 | End: 2018-05-31 | Stop reason: HOSPADM

## 2018-05-29 RX ORDER — SODIUM CHLORIDE 9 MG/ML
125 INJECTION, SOLUTION INTRAVENOUS CONTINUOUS
Status: DISCONTINUED | OUTPATIENT
Start: 2018-05-30 | End: 2018-05-29 | Stop reason: HOSPADM

## 2018-05-29 RX ORDER — ACETAMINOPHEN 325 MG/1
650 TABLET ORAL EVERY 6 HOURS PRN
Status: DISCONTINUED | OUTPATIENT
Start: 2018-05-29 | End: 2018-05-31 | Stop reason: HOSPADM

## 2018-05-29 RX ORDER — LEVOFLOXACIN 5 MG/ML
750 INJECTION, SOLUTION INTRAVENOUS EVERY 24 HOURS
Status: DISCONTINUED | OUTPATIENT
Start: 2018-05-30 | End: 2018-05-31 | Stop reason: HOSPADM

## 2018-05-29 RX ORDER — FAMOTIDINE 20 MG/1
20 TABLET, FILM COATED ORAL 2 TIMES DAILY
Status: DISCONTINUED | OUTPATIENT
Start: 2018-05-30 | End: 2018-05-30

## 2018-05-29 RX ORDER — SODIUM CHLORIDE 9 MG/ML
125 INJECTION, SOLUTION INTRAVENOUS CONTINUOUS
Status: DISCONTINUED | OUTPATIENT
Start: 2018-05-30 | End: 2018-05-31

## 2018-05-29 RX ORDER — ONDANSETRON 2 MG/ML
4 INJECTION INTRAMUSCULAR; INTRAVENOUS EVERY 6 HOURS PRN
Status: DISCONTINUED | OUTPATIENT
Start: 2018-05-29 | End: 2018-05-31 | Stop reason: HOSPADM

## 2018-05-29 RX ORDER — MORPHINE SULFATE 4 MG/ML
4 INJECTION, SOLUTION INTRAMUSCULAR; INTRAVENOUS ONCE
Status: COMPLETED | OUTPATIENT
Start: 2018-05-29 | End: 2018-05-29

## 2018-05-29 RX ORDER — LEVOFLOXACIN 5 MG/ML
750 INJECTION, SOLUTION INTRAVENOUS ONCE
Status: COMPLETED | OUTPATIENT
Start: 2018-05-29 | End: 2018-05-30

## 2018-05-29 RX ORDER — MORPHINE SULFATE 10 MG/ML
6 INJECTION, SOLUTION INTRAMUSCULAR; INTRAVENOUS ONCE
Status: COMPLETED | OUTPATIENT
Start: 2018-05-29 | End: 2018-05-29

## 2018-05-29 RX ORDER — ALBUTEROL SULFATE 90 UG/1
2 AEROSOL, METERED RESPIRATORY (INHALATION) EVERY 6 HOURS PRN
Status: DISCONTINUED | OUTPATIENT
Start: 2018-05-29 | End: 2018-05-31 | Stop reason: HOSPADM

## 2018-05-29 RX ORDER — ZOLPIDEM TARTRATE 5 MG/1
5 TABLET ORAL
Status: DISCONTINUED | OUTPATIENT
Start: 2018-05-29 | End: 2018-05-31 | Stop reason: HOSPADM

## 2018-05-29 RX ORDER — LORATADINE 10 MG/1
10 TABLET ORAL DAILY
Status: DISCONTINUED | OUTPATIENT
Start: 2018-05-30 | End: 2018-05-31 | Stop reason: HOSPADM

## 2018-05-29 RX ORDER — ONDANSETRON 2 MG/ML
4 INJECTION INTRAMUSCULAR; INTRAVENOUS ONCE
Status: COMPLETED | OUTPATIENT
Start: 2018-05-29 | End: 2018-05-29

## 2018-05-29 RX ORDER — SERTRALINE HYDROCHLORIDE 100 MG/1
100 TABLET, FILM COATED ORAL
Status: DISCONTINUED | OUTPATIENT
Start: 2018-05-30 | End: 2018-05-31 | Stop reason: HOSPADM

## 2018-05-29 RX ORDER — CALCIUM CARBONATE 200(500)MG
1000 TABLET,CHEWABLE ORAL DAILY PRN
Status: DISCONTINUED | OUTPATIENT
Start: 2018-05-29 | End: 2018-05-31 | Stop reason: HOSPADM

## 2018-05-29 RX ORDER — IBUPROFEN 600 MG/1
600 TABLET ORAL EVERY 8 HOURS PRN
Status: DISCONTINUED | OUTPATIENT
Start: 2018-05-29 | End: 2018-05-30

## 2018-05-29 RX ADMIN — ONDANSETRON 4 MG: 2 INJECTION INTRAMUSCULAR; INTRAVENOUS at 19:45

## 2018-05-29 RX ADMIN — SODIUM CHLORIDE 1000 ML: 0.9 INJECTION, SOLUTION INTRAVENOUS at 19:44

## 2018-05-29 RX ADMIN — ONDANSETRON 4 MG: 2 INJECTION INTRAMUSCULAR; INTRAVENOUS at 21:36

## 2018-05-29 RX ADMIN — MORPHINE SULFATE 6 MG: 10 INJECTION INTRAVENOUS at 19:45

## 2018-05-29 RX ADMIN — IOHEXOL 100 ML: 350 INJECTION, SOLUTION INTRAVENOUS at 20:39

## 2018-05-29 RX ADMIN — LEVOFLOXACIN 750 MG: 5 INJECTION, SOLUTION INTRAVENOUS at 21:36

## 2018-05-29 RX ADMIN — MORPHINE SULFATE 4 MG: 4 INJECTION INTRAVENOUS at 21:36

## 2018-05-29 RX ADMIN — IBUPROFEN 600 MG: 600 TABLET ORAL at 23:59

## 2018-05-29 RX ADMIN — ONDANSETRON 4 MG: 2 INJECTION INTRAMUSCULAR; INTRAVENOUS at 23:59

## 2018-05-29 NOTE — ED PROVIDER NOTES
History  Chief Complaint   Patient presents with    Abdominal Pain     Pt  reports left upper quadrant pain started last Monday  Was seen in ER, told she had gastritis  Pt  reports vomiting and diarrhea since Thursday     Patient presents emergency room with a 1 week history of epigastric tenderness  She was seen and evaluated in the emergency room, 1 week ago and diagnosed with the gastritis  She was discharged home and instructed to follow up with her physician  Last Thursday, 5 days ago patient started with the vomiting and diarrhea daily  She states today she vomited 3 times and had 3 episodes of diarrhea  She denies any blood or mucus in her stools  She states that 2 weeks ago she was on an antibiotic for acute bronchitis with a steroid as well  She has not traveled outside of the [place]  She denies any raw seafood use  She denies any urinary symptoms of dysuria, frequency, urgency, hematuria  She denies any upper respiratory symptoms of coughing, shortness of breath, nasal congestion, sore throat, postnasal drip  She denies any chance of pregnancy as she has had a previous tubal in the past   She went to see her physician today who sent her to the emergency room for repeat evaluation  Patient has past medical history that is positive for anxiety, depression, pulmonary embolism, iliotibial band syndrome, mitral valve prolapse  Past surgical history includes knee surgery, tubal ligation          History provided by:  Patient   used: No    Abdominal Pain   Pain location:  Epigastric  Pain quality: cramping and sharp    Pain radiates to:  Epigastric region and LUQ  Pain severity:  Moderate  Onset quality:  Gradual  Duration:  1 week  Timing:  Constant  Progression:  Worsening  Chronicity:  New  Context: awakening from sleep, diet changes and previous surgery    Context: not alcohol use, not eating, not laxative use, not medication withdrawal, not recent illness, not recent sexual activity, not recent travel, not retching, not sick contacts, not suspicious food intake and not trauma    Relieved by:  Nothing  Worsened by:  Nothing  Ineffective treatments:  None tried  Associated symptoms: anorexia, diarrhea, fatigue, nausea and vomiting    Associated symptoms: no belching, no chest pain, no chills, no constipation, no cough, no dysuria, no fever, no flatus, no hematemesis, no hematochezia, no hematuria, no melena, no shortness of breath and no sore throat    Risk factors: obesity    Risk factors: no alcohol abuse, no aspirin use, not elderly, has not had multiple surgeries, no NSAID use, not pregnant and no recent hospitalization        Prior to Admission Medications   Prescriptions Last Dose Informant Patient Reported? Taking? ALPRAZolam (XANAX) 0 25 mg tablet  Self Yes Yes   Sig: Take 0 25 mg by mouth   Cetirizine HCl (ZYRTEC ALLERGY) 10 MG CAPS  Self Yes Yes   Sig: Take 10 mg by mouth  albuterol (PROVENTIL HFA,VENTOLIN HFA) 90 mcg/act inhaler   No Yes   Sig: Inhale 2 puffs every 6 (six) hours as needed for wheezing   famotidine (PEPCID) 20 mg tablet   No Yes   Sig: Take 1 tablet (20 mg total) by mouth 2 (two) times a day for 30 days   ibuprofen (MOTRIN) 600 mg tablet  Self No Yes   Sig: Take 1 tablet (600 mg total) by mouth every 8 (eight) hours as needed for moderate pain (pain)   sertraline (ZOLOFT) 100 mg tablet  Self Yes Yes   Sig: Take 100 mg by mouth daily at bedtime     zolpidem (AMBIEN) 10 mg tablet   No Yes   Sig: Take 1/2 to 1 tablet PO qHS prn      Facility-Administered Medications: None       Past Medical History:   Diagnosis Date    Anxiety     Depression     History of pulmonary embolus (PE)     Iliotibial band syndrome     last assessed - [id number]    Mitral valve prolapse     Presence of contraceptive device     last assessed - [id number]       Past Surgical History:   Procedure Laterality Date    CHOLECYSTECTOMY      GALLBLADDER SURGERY      KNEE SURGERY Left     growth plate fx left knee    TUBAL LIGATION         Family History   Problem Relation Age of Onset    Adopted: Yes    No Known Problems Mother      I have reviewed and agree with the history as documented  Social History   Substance Use Topics    Smoking status: Never Smoker    Smokeless tobacco: Never Used    Alcohol use Yes      Comment: occasional; Special occasions, rarely consumes alcohol - per [employer]        Review of Systems   Constitutional: Positive for activity change, appetite change and fatigue  Negative for chills and fever  HENT: Negative for congestion, dental problem, ear discharge, ear pain, hearing loss, mouth sores, postnasal drip, rhinorrhea, sore throat and trouble swallowing  Eyes: Negative for pain, discharge, redness and itching  Respiratory: Negative for cough, chest tightness and shortness of breath  Cardiovascular: Negative for chest pain and palpitations  Gastrointestinal: Positive for abdominal pain, anorexia, diarrhea, nausea and vomiting  Negative for constipation, flatus, hematemesis, hematochezia and melena  Endocrine: Negative for cold intolerance, heat intolerance, polydipsia, polyphagia and polyuria  Genitourinary: Negative for dysuria, frequency, hematuria and urgency  Musculoskeletal: Negative for back pain and gait problem  Skin: Negative for color change, pallor, rash and wound  Allergic/Immunologic: Negative for environmental allergies, food allergies and immunocompromised state  Neurological: Negative for weakness  Hematological: Negative for adenopathy  Psychiatric/Behavioral: Negative for confusion  All other systems reviewed and are negative  Physical Exam  Physical Exam   Constitutional: She is oriented to person, place, and time  She appears well-developed and well-nourished  No distress  HENT:   Head: Normocephalic     Right Ear: External ear normal    Left Ear: External ear normal    Nose: Nose normal    Mouth/Throat: Oropharynx is clear and moist    Eyes: Conjunctivae are normal  Right eye exhibits no discharge  Left eye exhibits no discharge  Neck: Neck supple  No JVD present  No tracheal deviation present  No thyromegaly present  Cardiovascular: Normal rate, regular rhythm and normal heart sounds  Exam reveals no gallop and no friction rub  No murmur heard  Pulmonary/Chest: Effort normal and breath sounds normal  No stridor  No respiratory distress  She has no wheezes  She has no rales  She exhibits no tenderness  Abdominal: She exhibits no mass  There is tenderness  There is guarding  There is no rebound  Epigastric area  Musculoskeletal: She exhibits no edema  Lymphadenopathy:     She has no cervical adenopathy  Neurological: She is alert and oriented to person, place, and time  Skin: Skin is warm  Capillary refill takes less than 2 seconds  She is not diaphoretic  Psychiatric: She has a normal mood and affect  Her behavior is normal  Judgment and thought content normal    Nursing note and vitals reviewed        Vital Signs  ED Triage Vitals [05/29/18 1624]   Temperature Pulse Respirations Blood Pressure SpO2   (!) 97 3 °F (36 3 °C) 76 18 123/69 98 %      Temp Source Heart Rate Source Patient Position - Orthostatic VS BP Location FiO2 (%)   Oral Monitor Sitting Left arm --      Pain Score       Worst Possible Pain           Vitals:    05/29/18 2115 05/29/18 2145 05/29/18 2223 05/29/18 2230   BP: 132/70  136/60 115/78   Pulse: 68 66 69 68   Patient Position - Orthostatic VS: Lying  Sitting Lying       Visual Acuity      ED Medications  Medications   sodium chloride 0 9 % bolus 1,000 mL (0 mL Intravenous Stopped 5/29/18 2259)   morphine (PF) 10 mg/mL injection 6 mg (6 mg Intravenous Given 5/29/18 1945)   ondansetron (ZOFRAN) injection 4 mg (4 mg Intravenous Given 5/29/18 1945)   iohexol (OMNIPAQUE) 350 MG/ML injection (MULTI-DOSE) 100 mL (100 mL Intravenous Given 5/29/18 2039) morphine (PF) 4 mg/mL injection 4 mg (4 mg Intravenous Given 5/29/18 2136)   ondansetron (ZOFRAN) injection 4 mg (4 mg Intravenous Given 5/29/18 2136)   levofloxacin (LEVAQUIN) IVPB (premix) 750 mg (750 mg Intravenous New Bag 5/29/18 2136)       Diagnostic Studies  Results Reviewed     Procedure Component Value Units Date/Time    Urine Microscopic [[id number]]  (Abnormal) Collected:  05/29/18 2006    Lab Status:  Final result Specimen:  Urine from Urine, Clean Catch Updated:  05/29/18 2029     RBC, UA 1-2 (A) /hpf      WBC, UA 1-2 (A) /hpf      Epithelial Cells Occasional /hpf      Bacteria, UA Occasional /hpf     Lactic acid, plasma [[id number]]  (Normal) Collected:  05/29/18 1950    Lab Status:  Final result Specimen:  Blood from Arm, Right Updated:  05/29/18 2025     LACTIC ACID 0 9 mmol/L     Narrative:         Result may be elevated if tourniquet was used during collection  Comprehensive metabolic panel [[id number]] Collected:  05/29/18 1950    Lab Status:  Final result Specimen:  Blood from Arm, Right Updated:  05/29/18 2023     Sodium 139 mmol/L      Potassium 3 7 mmol/L      Chloride 104 mmol/L      CO2 24 mmol/L      Anion Gap 11 mmol/L      BUN 11 mg/dL      Creatinine 1 09 mg/dL      Glucose 93 mg/dL      Calcium 8 8 mg/dL      AST 27 U/L      ALT 58 U/L      Alkaline Phosphatase 88 U/L      Total Protein 7 7 g/dL      Albumin 3 9 g/dL      Total Bilirubin 0 60 mg/dL      eGFR 66 ml/min/1 73sq m     Narrative:         National Kidney Disease Education Program recommendations are as follows:  GFR calculation is accurate only with a steady state creatinine  Chronic Kidney disease less than 60 ml/min/1 73 sq  meters  Kidney failure less than 15 ml/min/1 73 sq  meters      Lipase [[id number]]  (Normal) Collected:  05/29/18 1950    Lab Status:  Final result Specimen:  Blood from Arm, Right Updated:  05/29/18 2023     Lipase 152 u/L     Protime-INR [[id number]]  (Normal) Collected:  05/29/18 1950    Lab Status: Final result Specimen:  Blood from Arm, Right Updated:  05/29/18 2017     Protime 12 0 seconds      INR 0 91    APTT [[id number]]  (Normal) Collected:  05/29/18 1950    Lab Status:  Final result Specimen:  Blood from Arm, Right Updated:  05/29/18 2017     PTT 25 seconds     CBC and differential [[id number]]  (Normal) Collected:  05/29/18 1950    Lab Status:  Final result Specimen:  Blood from Arm, Right Updated:  05/29/18 2004     WBC 9 28 Thousand/uL      RBC 4 85 Million/uL      Hemoglobin 14 2 g/dL      Hematocrit 42 7 %      MCV 88 fL      MCH 29 3 pg      MCHC 33 3 g/dL      RDW 13 6 %      MPV 9 1 fL      Platelets 208 Thousands/uL      Neutrophils Relative 53 %      Lymphocytes Relative 35 %      Monocytes Relative 7 %      Eosinophils Relative 5 %      Basophils Relative 1 %      Neutrophils Absolute 4 94 Thousands/µL      Lymphocytes Absolute 3 21 Thousands/µL      Monocytes Absolute 0 63 Thousand/µL      Eosinophils Absolute 0 43 Thousand/µL      Basophils Absolute 0 07 Thousands/µL     ED Urine Macroscopic [[id number]]  (Abnormal) Collected:  05/29/18 2006    Lab Status:  Final result Specimen:  Urine Updated:  05/29/18 2002     Color, UA Yellow     Clarity, UA Clear     pH, UA 6 5     Leukocytes, UA Trace (A)     Nitrite, UA Negative     Protein, UA 30 (1+) (A) mg/dl      Glucose, UA Negative mg/dl      Ketones, UA Trace (A) mg/dl      Urobilinogen, UA 0 2 E U /dl      Bilirubin, UA Negative     Blood, UA Trace (A)     Specific Gravity, UA >=1 030    Narrative:       CLINITEK RESULT    Blood culture #2 [[id number]] Collected:  05/29/18 1950    Lab Status: In process Specimen:  Blood from Arm, Right Updated:  05/29/18 2001    Blood culture #1 [[id number]] Collected:  05/29/18 1956    Lab Status: In process Specimen:  Blood from Arm, Right Updated:  05/29/18 2000                 CT abdomen pelvis with contrast   ED Interpretation by Sophie Salas PA-C (05/29 2058)   No CT findings for pancreatitis  Partially visualized scattered patchy opacities at the bilateral lung bases suspicious for multifocal pneumonia  Consider follow-up with dedicated chest CT for more complete evaluation as warranted  Final Result by Bienvenido Manzo MD (05/29 2056)      1  No CT findings for pancreatitis  2  Partially visualized scattered patchy opacity at the bilateral lung bases suspicious for multifocal pneumonia  Consider follow-up with dedicated chest CT for more complete evaluation as warranted  Workstation performed: [id number]                    Procedures  ECG 12 Lead Documentation  Date/Time: 5/29/2018 8:22 PM  Performed by: Ana Maria Taylor  Authorized by: Ana Maria Taylor     Indications / Diagnosis:  Epigastric pain  ECG reviewed by me, the ED Provider: yes    Patient location:  ED  Previous ECG:     Previous ECG:  Compared to current    Comparison ECG info:  5/21/18    Similarity:  No change    Comparison to cardiac monitor: Yes    Interpretation:     Interpretation: normal    Rate:     ECG rate:  65    ECG rate assessment: normal    Rhythm:     Rhythm: sinus rhythm    Ectopy:     Ectopy: none    QRS:     QRS axis:  Normal  Conduction:     Conduction: normal    ST segments:     ST segments:  Normal  T waves:     T waves: normal             Phone Contacts  ED Phone Contact    ED Course  ED Course as of May 29 2314   Tue May 29, 2018   2106 I spoke to the patient regarding her history of a PE  It was 4 years ago and was related to birth control use  No palpitations  No leg pain of swelling  Recent history of bronchitis                                    MDM  Number of Diagnoses or Management Options  Bilateral pneumonia: new and requires workup  Epigastric abdominal pain: new and requires workup  Gastroenteritis: new and requires workup  Mild dehydration: new and requires workup     Amount and/or Complexity of Data Reviewed  Clinical lab tests: ordered and reviewed  Tests in the radiology section of CPT®: ordered and reviewed  Tests in the medicine section of CPT®: ordered and reviewed    Risk of Complications, Morbidity, and/or Mortality  Presenting problems: high  Diagnostic procedures: high  Management options: high  General comments: Patient presents emergency room with epigastric pain that radiates toward her back  She complains of nausea and vomiting and multiple episodes of diarrhea  She was seen in the emergency room,  1 week ago and was diagnosed with acute gastritis  She was discharged home but she was not feeling any better  She was sent by her family doctor for repeat evaluation  She was seen and examined  X-rays back consisted of a CT scan of her abdomen and pelvis were obtained  They demonstrated multi lobar pneumonia in the lower lobes of both lungs  The patient's laboratory studies were reviewed  She was medicated for pain  Her EKG showed no ischemic changes  She was admitted to the [facility] service for further evaluation and treatment      Patient Progress  Patient progress: stable    CritCare Time    Disposition  Final diagnoses:   Bilateral pneumonia - Community-acquired   Gastroenteritis   Epigastric abdominal pain   Mild dehydration     Time reflects when diagnosis was documented in both MDM as applicable and the Disposition within this note     Time User Action Codes Description Comment    5/29/2018  9:34 PM Verneita Melter Add [J18 9] Bilateral pneumonia     5/29/2018  9:34 PM Verneita Melter Modify [J18 9] Bilateral pneumonia Community-acquired    5/29/2018  9:36 PM Verneita Melter Add [K52 9] Gastroenteritis     5/29/2018  9:37 PM Verneita Melter Add [R10 13] Epigastric abdominal pain     5/29/2018  9:37 PM Verneita Melter Add [E86 0] Mild dehydration       ED Disposition     ED Disposition Condition Comment    Admit  Case was discussed with [facility] PA-C and the patient's admission status was agreed to be Admission Status: inpatient status to the service of Dr Reji Lyons   Follow-up Information    None         Current Discharge Medication List      CONTINUE these medications which have NOT CHANGED    Details   albuterol (PROVENTIL HFA,VENTOLIN HFA) 90 mcg/act inhaler Inhale 2 puffs every 6 (six) hours as needed for wheezing  Qty: 1 Inhaler, Refills: 1    Associated Diagnoses: Cough      ALPRAZolam (XANAX) 0 25 mg tablet Take 0 25 mg by mouth      Cetirizine HCl (ZYRTEC ALLERGY) 10 MG CAPS Take 10 mg by mouth  famotidine (PEPCID) 20 mg tablet Take 1 tablet (20 mg total) by mouth 2 (two) times a day for 30 days  Qty: 60 tablet, Refills: 0    Associated Diagnoses: Gastritis      ibuprofen (MOTRIN) 600 mg tablet Take 1 tablet (600 mg total) by mouth every 8 (eight) hours as needed for moderate pain (pain)  Qty: 15 tablet, Refills: 0    Associated Diagnoses: Chest wall pain      sertraline (ZOLOFT) 100 mg tablet Take 100 mg by mouth daily at bedtime  zolpidem (AMBIEN) 10 mg tablet Take 1/2 to 1 tablet PO qHS prn  Qty: 30 tablet, Refills: 0    Associated Diagnoses: Other insomnia           No discharge procedures on file      ED Provider  Electronically Signed by           Rebecca Deutsch PA-C  05/29/18 0904

## 2018-05-29 NOTE — LETTER
[facility] [address]  [address] [facility] [address]  Dept: [phone]    May 31, 2018     Patient: Sharri Mayes   YOB: 1980   Date of Visit: 5/29/2018       To Whom it May Concern:    Timmie Kocher is under my professional care  She was seen in the hospital from 5/29/2018   to 05/31/18  She may return to work on 6/1/18  Please excuse her absence  If you have any questions or concerns, please don't hesitate to call           Sincerely,          Chirag Horen MD

## 2018-05-29 NOTE — PROGRESS NOTES
Assessment/Plan:       Diagnoses and all orders for this visit:    Left lateral abdominal pain  Comments:  pain moderae to severe today/now in offfice with pt sweating 2nd to level of 7/10 pain  advised given severity of pain/symptoms to go to ER now for further david    Intractable vomiting with nausea, unspecified vomiting type    Dizziness          Subjective:      Patient ID: Tedd Sicard is a 30 y o  female  HPI    Here for follow up  Treated for a URI 3 weeks ago with abx/doxycycline and inhaler  Went to ER few days later as not feeling better, given neb tx and oral steroid for 5-7 days with relief of URI but then developed left upper abd pain/n/v/d/loss of appetite  Also having reflux sx lately  No hx of gastritis in past and denies Etoh intake  Took nsaids last month after ER visit for chest pain 4/2018  Hx of anxiety but reports this has been better as of late, seeing counselor  Pt c/o moderate to severe left sided upper abd pain, grabbing her abdomen, sweating and c/o feeling unwell and Nauseous during appt today  No blood in stool and having very little to eat except some spaghetti today  Works in [place], used to work in clinical care  Had GB removed and tubal ligation in past   No other complaints  Past Medical History:   Diagnosis Date    Anxiety     Depression     History of pulmonary embolus (PE)     Iliotibial band syndrome     last assessed - [id number]    Mitral valve prolapse     Presence of contraceptive device     last assessed - [id number]     Vitals:    05/29/18 1454   BP: 118/80   Pulse: 86   Resp: 18   SpO2: 99%   Weight: 104 kg (230 lb 3 2 oz)   Height: 5' 5" (1 651 m)     Body mass index is 38 31 kg/m²  No current facility-administered medications for this visit       Current Outpatient Prescriptions:     ALPRAZolam (XANAX) 0 25 mg tablet, Take 0 25 mg by mouth, Disp: , Rfl:     Cetirizine HCl (ZYRTEC ALLERGY) 10 MG CAPS, Take 10 mg by mouth , Disp: , Rfl:   famotidine (PEPCID) 20 mg tablet, Take 1 tablet (20 mg total) by mouth 2 (two) times a day for 30 days, Disp: 60 tablet, Rfl: 0    sertraline (ZOLOFT) 100 mg tablet, Take 100 mg by mouth daily at bedtime  , Disp: , Rfl:     zolpidem (AMBIEN) 10 mg tablet, Take 1/2 to 1 tablet PO qHS prn, Disp: 30 tablet, Rfl: 0    albuterol (PROVENTIL HFA,VENTOLIN HFA) 90 mcg/act inhaler, Inhale 2 puffs every 6 (six) hours as needed for wheezing, Disp: 1 Inhaler, Rfl: 1    ibuprofen (MOTRIN) 600 mg tablet, Take 1 tablet (600 mg total) by mouth every 8 (eight) hours as needed for moderate pain (pain), Disp: 15 tablet, Rfl: 0    Facility-Administered Medications Ordered in Other Visits:     levofloxacin (LEVAQUIN) IVPB (premix) 750 mg, 750 mg, Intravenous, Once, Emmy Seip, PA-C, Last Rate: 100 mL/hr at 05/29/18 2136, 750 mg at 05/29/18 2136  Allergies   Allergen Reactions    Penicillin G Hives         Review of Systems   Constitutional: Positive for chills  Negative for fever  HENT: Negative for congestion  Eyes: Negative for visual disturbance  Respiratory: Negative for shortness of breath  Cardiovascular: Negative for chest pain  Gastrointestinal: Positive for abdominal pain, diarrhea, nausea and vomiting  Negative for blood in stool  Genitourinary: Negative for difficulty urinating  Musculoskeletal: Negative for back pain  Neurological: Positive for headaches  Psychiatric/Behavioral: The patient is nervous/anxious  Objective:      /80   Pulse 86   Resp 18   Ht 5' 5" (1 651 m)   Wt 104 kg (230 lb 3 2 oz)   LMP 05/11/2018   SpO2 99%   BMI 38 31 kg/m²          Physical Exam   Constitutional: She is oriented to person, place, and time  She appears well-developed  She appears distressed (2nd to abd pain)  HENT:   Head: Normocephalic and atraumatic  Mouth/Throat: Oropharynx is clear and moist    Eyes: Conjunctivae are normal  Pupils are equal, round, and reactive to light  Cardiovascular: Normal rate, regular rhythm and normal heart sounds  No murmur heard  Pulmonary/Chest: Effort normal and breath sounds normal  She has no wheezes  She has no rales  Abdominal: Soft  Bowel sounds are normal  There is tenderness  There is guarding (voluntary in LUQ)  There is no rebound and negative Vasquez's sign  Musculoskeletal: She exhibits no edema  Neurological: She is alert and oriented to person, place, and time  Psychiatric: Her behavior is normal  Her mood appears anxious  In pain & concerned   Vitals reviewed

## 2018-05-30 PROBLEM — K29.00 ACUTE GASTRITIS WITHOUT HEMORRHAGE: Status: ACTIVE | Noted: 2018-05-30

## 2018-05-30 PROBLEM — R10.13 EPIGASTRIC ABDOMINAL PAIN: Status: ACTIVE | Noted: 2018-05-29

## 2018-05-30 PROBLEM — J18.9 MULTIFOCAL PNEUMONIA: Status: ACTIVE | Noted: 2018-05-30

## 2018-05-30 LAB
ANION GAP SERPL CALCULATED.3IONS-SCNC: 9 MMOL/L (ref 4–13)
ATRIAL RATE: 72 BPM
BUN SERPL-MCNC: 11 MG/DL (ref 5–25)
CALCIUM SERPL-MCNC: 7.8 MG/DL (ref 8.3–10.1)
CHLORIDE SERPL-SCNC: 107 MMOL/L (ref 100–108)
CO2 SERPL-SCNC: 22 MMOL/L (ref 21–32)
CREAT SERPL-MCNC: 1.11 MG/DL (ref 0.6–1.3)
ERYTHROCYTE [DISTWIDTH] IN BLOOD BY AUTOMATED COUNT: 13.4 % (ref 11.6–15.1)
GFR SERPL CREATININE-BSD FRML MDRD: 64 ML/MIN/1.73SQ M
GLUCOSE SERPL-MCNC: 120 MG/DL (ref 65–140)
HCT VFR BLD AUTO: 38 % (ref 34.8–46.1)
HGB BLD-MCNC: 12.5 G/DL (ref 11.5–15.4)
L PNEUMO1 AG UR QL IA.RAPID: NEGATIVE
MCH RBC QN AUTO: 29.5 PG (ref 26.8–34.3)
MCHC RBC AUTO-ENTMCNC: 32.9 G/DL (ref 31.4–37.4)
MCV RBC AUTO: 90 FL (ref 82–98)
P AXIS: 59 DEGREES
PLATELET # BLD AUTO: 210 THOUSANDS/UL (ref 149–390)
PLATELET # BLD AUTO: 227 THOUSANDS/UL (ref 149–390)
PMV BLD AUTO: 9.1 FL (ref 8.9–12.7)
PMV BLD AUTO: 9.4 FL (ref 8.9–12.7)
POTASSIUM SERPL-SCNC: 3.9 MMOL/L (ref 3.5–5.3)
PR INTERVAL: 142 MS
QRS AXIS: 71 DEGREES
QRSD INTERVAL: 80 MS
QT INTERVAL: 398 MS
QTC INTERVAL: 413 MS
RBC # BLD AUTO: 4.24 MILLION/UL (ref 3.81–5.12)
S PNEUM AG UR QL: NEGATIVE
SODIUM SERPL-SCNC: 138 MMOL/L (ref 136–145)
T WAVE AXIS: 60 DEGREES
VENTRICULAR RATE: 65 BPM
WBC # BLD AUTO: 7.7 THOUSAND/UL (ref 4.31–10.16)

## 2018-05-30 PROCEDURE — 87449 NOS EACH ORGANISM AG IA: CPT | Performed by: PHYSICIAN ASSISTANT

## 2018-05-30 PROCEDURE — 99253 IP/OBS CNSLTJ NEW/EST LOW 45: CPT | Performed by: INTERNAL MEDICINE

## 2018-05-30 PROCEDURE — 85027 COMPLETE CBC AUTOMATED: CPT | Performed by: PHYSICIAN ASSISTANT

## 2018-05-30 PROCEDURE — 93010 ELECTROCARDIOGRAM REPORT: CPT | Performed by: INTERNAL MEDICINE

## 2018-05-30 PROCEDURE — 80048 BASIC METABOLIC PNL TOTAL CA: CPT | Performed by: PHYSICIAN ASSISTANT

## 2018-05-30 PROCEDURE — 85049 AUTOMATED PLATELET COUNT: CPT | Performed by: PHYSICIAN ASSISTANT

## 2018-05-30 PROCEDURE — C9113 INJ PANTOPRAZOLE SODIUM, VIA: HCPCS | Performed by: INTERNAL MEDICINE

## 2018-05-30 PROCEDURE — 99223 1ST HOSP IP/OBS HIGH 75: CPT | Performed by: INTERNAL MEDICINE

## 2018-05-30 RX ORDER — METOCLOPRAMIDE HYDROCHLORIDE 5 MG/ML
10 INJECTION INTRAMUSCULAR; INTRAVENOUS EVERY 6 HOURS PRN
Status: DISCONTINUED | OUTPATIENT
Start: 2018-05-30 | End: 2018-05-31 | Stop reason: HOSPADM

## 2018-05-30 RX ORDER — PANTOPRAZOLE SODIUM 40 MG/1
40 INJECTION, POWDER, FOR SOLUTION INTRAVENOUS
Status: DISCONTINUED | OUTPATIENT
Start: 2018-05-30 | End: 2018-05-31 | Stop reason: HOSPADM

## 2018-05-30 RX ORDER — SUCRALFATE ORAL 1 G/10ML
1000 SUSPENSION ORAL EVERY 6 HOURS SCHEDULED
Status: DISCONTINUED | OUTPATIENT
Start: 2018-05-30 | End: 2018-05-31 | Stop reason: HOSPADM

## 2018-05-30 RX ADMIN — METOCLOPRAMIDE 10 MG: 5 INJECTION, SOLUTION INTRAMUSCULAR; INTRAVENOUS at 21:24

## 2018-05-30 RX ADMIN — SUCRALFATE 1000 MG: 1 SUSPENSION ORAL at 16:20

## 2018-05-30 RX ADMIN — SODIUM CHLORIDE 125 ML/HR: 0.9 INJECTION, SOLUTION INTRAVENOUS at 00:06

## 2018-05-30 RX ADMIN — SUCRALFATE 1000 MG: 1 SUSPENSION ORAL at 11:50

## 2018-05-30 RX ADMIN — FAMOTIDINE 20 MG: 20 TABLET ORAL at 09:52

## 2018-05-30 RX ADMIN — ZOLPIDEM TARTRATE 5 MG: 5 TABLET ORAL at 21:33

## 2018-05-30 RX ADMIN — SUCRALFATE 1000 MG: 1 SUSPENSION ORAL at 05:44

## 2018-05-30 RX ADMIN — LORATADINE 10 MG: 10 TABLET ORAL at 09:53

## 2018-05-30 RX ADMIN — SERTRALINE HYDROCHLORIDE 100 MG: 100 TABLET ORAL at 00:05

## 2018-05-30 RX ADMIN — SERTRALINE HYDROCHLORIDE 100 MG: 100 TABLET ORAL at 21:27

## 2018-05-30 RX ADMIN — SUCRALFATE 1000 MG: 1 SUSPENSION ORAL at 00:21

## 2018-05-30 RX ADMIN — SODIUM CHLORIDE 125 ML/HR: 0.9 INJECTION, SOLUTION INTRAVENOUS at 08:10

## 2018-05-30 RX ADMIN — PANTOPRAZOLE SODIUM 40 MG: 40 INJECTION, POWDER, FOR SOLUTION INTRAVENOUS at 16:20

## 2018-05-30 RX ADMIN — SODIUM CHLORIDE 125 ML/HR: 0.9 INJECTION, SOLUTION INTRAVENOUS at 16:18

## 2018-05-30 RX ADMIN — ENOXAPARIN SODIUM 40 MG: 100 INJECTION SUBCUTANEOUS at 09:52

## 2018-05-30 RX ADMIN — ACETAMINOPHEN 650 MG: 325 TABLET ORAL at 21:28

## 2018-05-30 RX ADMIN — METOCLOPRAMIDE 10 MG: 5 INJECTION, SOLUTION INTRAMUSCULAR; INTRAVENOUS at 08:07

## 2018-05-30 RX ADMIN — METOCLOPRAMIDE 10 MG: 5 INJECTION, SOLUTION INTRAMUSCULAR; INTRAVENOUS at 01:44

## 2018-05-30 RX ADMIN — ALPRAZOLAM 0.25 MG: 0.25 TABLET ORAL at 21:28

## 2018-05-30 RX ADMIN — LEVOFLOXACIN 750 MG: 5 INJECTION, SOLUTION INTRAVENOUS at 21:29

## 2018-05-30 RX ADMIN — SODIUM CHLORIDE 125 ML/HR: 0.9 INJECTION, SOLUTION INTRAVENOUS at 21:45

## 2018-05-30 RX ADMIN — ZOLPIDEM TARTRATE 5 MG: 5 TABLET ORAL at 00:05

## 2018-05-30 NOTE — PLAN OF CARE
Nutrition/Hydration-ADULT     Nutrient/Hydration intake appropriate for improving, restoring or maintaining nutritional needs Progressing        PAIN - ADULT     Verbalizes/displays adequate comfort level or baseline comfort level Progressing        Potential for Falls     Patient will remain free of falls Progressing

## 2018-05-30 NOTE — ASSESSMENT & PLAN NOTE
· Noted on CT abdomen in bilateral lower lung fields   Patient report since having bronchitis about 2 weeks ago, she has felt as though she hasn't been able to take a full, deep breath  · No sepsis criteria met on admission  · IV levaquin  · Incentive spirometry  · Trend labs/vitals

## 2018-05-30 NOTE — CASE MANAGEMENT
Initial Clinical Review    Admission: Date/Time/Statement: 5/29/18 @ 2136     Orders Placed This Encounter   Procedures    Inpatient Admission (expected length of stay for this patient is greater than two midnights)     Standing Status:   Standing     Number of Occurrences:   1     Order Specific Question:   Admitting Physician     Answer:   Siria Lazo [[id number]]     Order Specific Question:   Level of Care     Answer:   Med Surg [16]     Order Specific Question:   Estimated length of stay     Answer:   More than 2 Midnights     Order Specific Question:   Certification     Answer:   I certify that inpatient services are medically necessary for this patient for a duration of greater than two midnights  See H&P and MD Progress Notes for additional information about the patient's course of treatment  ED: Date/Time/Mode of Arrival:   ED Arrival Information     Expected Arrival Acuity Means of Arrival Escorted By Service Admission Type    5/29/2018 15:20 5/29/2018 15:30 Urgent Wheelchair Family Member General Medicine Urgent    Arrival Complaint    ABDOMINAL PAIN - N/V          Chief Complaint:   Chief Complaint   Patient presents with    Abdominal Pain     Pt  reports left upper quadrant pain started last Monday  Was seen in ER, told she had gastritis  Pt  reports vomiting and diarrhea since Thursday       History of Illness: 30 y o  female presents the ED for evaluation of worsening left upper quadrant abdominal pain x5 days  Patient reports 2 weeks ago she was diagnosis of bronchitis and was treated with an antibiotic and steroids  On Monday of last week, patient developed left upper quadrant abdominal pain and was evaluated in the ED  Patient was diagnosed with gastritis  Patient reports that since Thursday her symptoms have worsened, stating that her left upper quadrant abdominal pain is sharp and worse in nature  She also reports increased nausea, vomiting and diarrhea  Denies any fevers or chills  Denies any chest pain or shortness of breath  She does feel as though she is unable to take a deep breath 2/2 abdominal pain  Denies any blood in vomit or stool  Patient reports poor oral intake secondary to nausea  Nothing at home was made her symptoms better or worse  Eating seems to exacerbate her pain and nausea  Patient went to follow up with her PCP today who sent her to the ED for repeat evaluation  ED Vital Signs:   ED Triage Vitals [05/29/18 1624]   Temperature Pulse Respirations Blood Pressure SpO2   (!) 97 3 °F (36 3 °C) 76 18 123/69 98 %      Temp Source Heart Rate Source Patient Position - Orthostatic VS BP Location FiO2 (%)   Oral Monitor Sitting Left arm --      Pain Score       Worst Possible Pain        Wt Readings from Last 1 Encounters:   05/29/18 108 kg (238 lb 12 1 oz)       Vital Signs (abnormal): Low temperature 97 3  Abnormal Labs/Diagnostic Test Results:     Ct pelvis - No CT findings for pancreatitis  2  Partially visualized scattered patchy opacity at the bilateral lung bases suspicious for multifocal pneumonia   Consider follow-up with dedicated chest CT for more complete evaluation as warranted    ED Treatment: blood cultures     Medication Administration from 05/29/2018 1519 to 05/29/2018 2306       Date/Time Order Dose Route Action Action by Comments     05/29/2018 2259 sodium chloride 0 9 % bolus 1,000 mL 0 mL Intravenous Stopped Viki Manning RN      05/29/2018 1944 sodium chloride 0 9 % bolus 1,000 mL 1,000 mL Intravenous Roney 37 Elsa eRdman RN      05/29/2018 1945 morphine (PF) 10 mg/mL injection 6 mg 6 mg Intravenous Given Viki Manning RN      05/29/2018 1945 ondansetron (ZOFRAN) injection 4 mg 4 mg Intravenous Given Viki Manning RN      05/29/2018 2039 iohexol (OMNIPAQUE) 350 MG/ML injection (MULTI-DOSE) 100 mL 100 mL Intravenous Given [facility]       05/29/2018 2136 morphine (PF) 4 mg/mL injection 4 mg 4 mg Intravenous Given Elsa Redman RN 05/29/2018 2136 ondansetron (ZOFRAN) injection 4 mg 4 mg Intravenous Given Shar Shetty RN      05/29/2018 2136 levofloxacin (LEVAQUIN) IVPB (premix) 750 mg 750 mg Intravenous New Bag Shar Shetty RN           Past Medical/Surgical History: Active Ambulatory Problems     Diagnosis Date Noted    Anemia 01/22/2016    Anxiety 07/09/2013    Homocysteinemia ([address] ) 01/19/2016    Hyperlipidemia 07/29/2016    Insomnia 06/26/2013    Migraine headache 11/17/2014    Overweight 06/26/2013    Patellofemoral dysfunction 04/28/2014    Pulmonary embolism (HCC) 05/27/2014    Vitamin D deficiency 07/09/2013    Chest pain 01/19/2016     Resolved Ambulatory Problems     Diagnosis Date Noted    No Resolved Ambulatory Problems     Past Medical History:   Diagnosis Date    Anxiety     Depression     History of pulmonary embolus (PE)     Iliotibial band syndrome     Mitral valve prolapse     Presence of contraceptive device        Admitting Diagnosis: Gastroenteritis [K52 9]  Epigastric abdominal pain [R10 13]  Mild dehydration [E86 0]  Bilateral pneumonia [J18 9]  Unspecified abdominal pain [R10 9]    Age/Sex: 30 y o  female    Assessment/Plan:   Multifocal pneumonia   Assessment & Plan     · Noted on CT abdomen in bilateral lower lung fields  Patient report since having bronchitis about 2 weeks ago, she has felt as though she hasn't been able to take a full, deep breath  · No sepsis criteria met on admission  · IV levaquin  · Incentive spirometry  · Trend labs/vitals          Acute gastritis without hemorrhage   Assessment & Plan     · Dx last week  Now with worsening LUQ abdominal pain and epigastric pain   Poor PO intake and increased nausea/vomiting/diarrhea  · Continue PPI, carafate, antinausea, pain control   · GI consult-- appreciate input          Anxiety   Assessment & Plan     · Continue sertraline, alprazolam          Homocysteinemia (HCC)   Assessment & Plan     · Hx of PE 4 years ago-- not currently anticoagulated at this time  · Low suspicion for PE at this time-- no SOB/chest pain/tachycardia/hypoxia          Insomnia   Assessment & Plan     · ambien PRN         Admission Orders:  5/29/2018  2136 INPATIENT   Scheduled Meds:   Current Facility-Administered Medications:  enoxaparin 40 mg Subcutaneous Daily    famotidine 20 mg Oral BID    levofloxacin 750 mg Intravenous Q24H    loratadine 10 mg Oral Daily    senna 1 tablet Oral Daily    sertraline 100 mg Oral HS    sodium chloride 125 mL/hr Intravenous Continuous Last Rate: 125 mL/hr (05/30/18 0810)   sucralfate 1,000 mg Oral Q6H [address]      Continuous Infusions:   sodium chloride 125 mL/hr Last Rate: 125 mL/hr (05/30/18 0810)     PRN Meds:     Metoclopramide 10 mg iv - used x 2      Ondansetron 4 mg iv - used x 1      zolpidem - used x 1  Ibuprofen - used x 1  OTHER ORDERS: aspiration precautions  scds  Clears  Consult GI    Per GI - Epigastric/LUQ pain  N/V  -Patient recently with bronchitis treated with antibiotics and steroids with onset of upper abdominal pain which brought her to the ED last week, she trialed pepcid however her symptoms worsened and she developed vomiting    -Concern for gastritis versus PUD given persistent severe pain  Given the fact that she had an acute onset of N/V/D that has improved may have been a superimposed infectious component  -Recommend PPI BID as well as carafate    -Continue clear liquid diet  -Avoid NSAIDS  -Would recommend EGD tomorrow 5/31 to rule out PUD      Thank you,  [facility] in the [employer] by Munir Rehman for 2017  Network Utilization Review Department  Phone: [phone]; Fax [phone]  ATTENTION: The Network Utilization Review Department is now centralized for our 7 Facilities  Make a note that we have a new phone and fax numbers for our Department   Please call with any questions or concerns to [phone] and carefully follow the prompts so that you are directed to the right person  All voicemails are confidential  Fax any determinations, approvals, denials, and requests for initial or continue stay review clinical to [phone]  Due to HIGH CALL volume, it would be easier if you could please send faxed requests to expedite your requests and in part, help us provide discharge notifications faster

## 2018-05-30 NOTE — ASSESSMENT & PLAN NOTE
· Dx last week  Now with worsening LUQ abdominal pain and epigastric pain   Poor PO intake and increased nausea/vomiting/diarrhea  · Continue PPI, carafate, antinausea, pain control   · GI consult-- appreciate input

## 2018-05-30 NOTE — ASSESSMENT & PLAN NOTE
· Hx of PE 4 years ago-- not currently anticoagulated at this time  · Low suspicion for PE at this time-- no SOB/chest pain/tachycardia/hypoxia

## 2018-05-30 NOTE — H&P
H&P- Jett Castillo 1980, 30 y o  female MRN: [id number]    Unit/Bed#: -01 Encounter: [id number]    Primary Care Provider: Vasiliy Langford MD   Date and time admitted to hospital: 5/29/2018  7:12 PM    * Multifocal pneumonia   Assessment & Plan    · Noted on CT abdomen in bilateral lower lung fields  Patient report since having bronchitis about 2 weeks ago, she has felt as though she hasn't been able to take a full, deep breath  · No sepsis criteria met on admission  · IV levaquin  · Incentive spirometry  · Trend labs/vitals        Acute gastritis without hemorrhage   Assessment & Plan    · Dx last week  Now with worsening LUQ abdominal pain and epigastric pain  Poor PO intake and increased nausea/vomiting/diarrhea  · Continue PPI, carafate, antinausea, pain control   · GI consult-- appreciate input        Anxiety   Assessment & Plan    · Continue sertraline, alprazolam        Homocysteinemia (HCC)   Assessment & Plan    · Hx of PE 4 years ago-- not currently anticoagulated at this time  · Low suspicion for PE at this time-- no SOB/chest pain/tachycardia/hypoxia        Insomnia   Assessment & Plan    · ambien PRN          VTE Prophylaxis: Enoxaparin (Lovenox)  / sequential compression device   Code Status: Level 1-- Full Code  POLST: There is no POLST form on file for this patient (pre-hospital)  Discussion with family: family aware of admission    Anticipated Length of Stay:  Patient will be admitted on an Inpatient basis with an anticipated length of stay of  > 2 midnights  Justification for Hospital Stay: tx PNA, GI eval     Total Time for Visit, including Counseling / Coordination of Care: 30 minutes  Greater than 50% of this total time spent on direct patient counseling and coordination of care      Chief Complaint:   LUQ abdominal pain     History of Present Illness:    Jett Castillo is a 30 y o  female presents the ED for evaluation of worsening left upper quadrant abdominal pain x5 days  Patient reports 2 weeks ago she was diagnosis of bronchitis and was treated with an antibiotic and steroids  On Monday of last week, patient developed left upper quadrant abdominal pain and was evaluated in the ED  Patient was diagnosed with gastritis  Patient reports that since Thursday her symptoms have worsened, stating that her left upper quadrant abdominal pain is sharp and worse in nature  She also reports increased nausea, vomiting and diarrhea  Denies any fevers or chills  Denies any chest pain or shortness of breath  She does feel as though she is unable to take a deep breath 2/2 abdominal pain  Denies any blood in vomit or stool  Patient reports poor oral intake secondary to nausea  Nothing at home was made her symptoms better or worse  Eating seems to exacerbate her pain and nausea  Patient went to follow up with her PCP today who sent her to the ED for repeat evaluation  Review of Systems:    Review of Systems   Constitutional: Positive for appetite change  HENT: Negative  Eyes: Negative  Respiratory: Negative  Cardiovascular: Negative  Gastrointestinal: Positive for abdominal pain, diarrhea, nausea and vomiting  Genitourinary: Negative  Musculoskeletal: Negative  Skin: Negative  Neurological: Negative  Hematological: Negative  Psychiatric/Behavioral: Negative  Past Medical and Surgical History:     Past Medical History:   Diagnosis Date    Anxiety     Depression     History of pulmonary embolus (PE)     Iliotibial band syndrome     last assessed - [id number]    Mitral valve prolapse     Presence of contraceptive device     last assessed - [id number]       Past Surgical History:   Procedure Laterality Date    CHOLECYSTECTOMY      GALLBLADDER SURGERY      KNEE SURGERY Left     growth plate fx left knee    TUBAL LIGATION         Meds/Allergies:    Prior to Admission medications    Medication Sig Start Date End Date Taking?  Authorizing Provider   albuterol (PROVENTIL HFA,VENTOLIN HFA) 90 mcg/act inhaler Inhale 2 puffs every 6 (six) hours as needed for wheezing 5/9/18  Yes Agustin Mcgee MD   ALPRAZolam Marceil Ax) 0 25 mg tablet Take 0 25 mg by mouth 6/5/17  Yes Historical Provider, MD   Cetirizine HCl (ZYRTEC ALLERGY) 10 MG CAPS Take 10 mg by mouth  Yes Historical Provider, MD   famotidine (PEPCID) 20 mg tablet Take 1 tablet (20 mg total) by mouth 2 (two) times a day for 30 days 5/21/18 6/20/18 Yes Jeff Rose MD   ibuprofen (MOTRIN) 600 mg tablet Take 1 tablet (600 mg total) by mouth every 8 (eight) hours as needed for moderate pain (pain) 4/20/18  Yes Vergia MD Nixon   sertraline (ZOLOFT) 100 mg tablet Take 100 mg by mouth daily at bedtime  Yes Historical Provider, MD   zolpidem (AMBIEN) 10 mg tablet Take 1/2 to 1 tablet PO qHS prn 5/9/18  Yes Agustin Mcgee MD     I have reviewed home medications with patient personally  Allergies:    Allergies   Allergen Reactions    Penicillin G Hives       Social History:     Marital Status: /Civil Union   Occupation:  for [employer]   Patient Pre-hospital Living Situation: home with family  Patient Pre-hospital Level of Mobility: independent  Patient Pre-hospital Diet Restrictions: none  Substance Use History:   History   Alcohol Use    Yes     Comment: occasional; Special occasions, rarely consumes alcohol - per Allscripts     History   Smoking Status    Never Smoker   Smokeless Tobacco    Never Used     History   Drug Use No       Family History:    non-contributory    Physical Exam:     Vitals:   Blood Pressure: 123/77 (05/29/18 2313)  Pulse: 70 (05/29/18 2358)  Temperature: 97 6 °F (36 4 °C) (05/29/18 2313)  Temp Source: Oral (05/29/18 2313)  Respirations: 18 (05/29/18 2313)  Height: 5' 5" (165 1 cm) (05/29/18 2313)  Weight - Scale: 108 kg (238 lb 12 1 oz) (05/29/18 2313)  SpO2: 96 % (05/29/18 2358)    Physical Exam   Constitutional: She is oriented to person, place, and time  No distress  obese   HENT:   Head: Normocephalic and atraumatic  Mouth/Throat: No oropharyngeal exudate  Eyes: Conjunctivae and EOM are normal  Pupils are equal, round, and reactive to light  No scleral icterus  Neck: No JVD present  Cardiovascular: Normal rate and regular rhythm  Exam reveals no gallop and no friction rub  No murmur heard  Pulmonary/Chest: No respiratory distress  She has no wheezes  She has no rales  Decreased effort   Abdominal: Soft  Bowel sounds are normal  There is tenderness (LUQ)  There is no rebound and no guarding  Musculoskeletal: She exhibits no edema or tenderness  Neurological: She is alert and oriented to person, place, and time  She displays normal reflexes  No cranial nerve deficit  She exhibits normal muscle tone  Skin: Skin is warm and dry  No rash noted  She is not diaphoretic  No erythema  Psychiatric: She has a normal mood and affect  Her behavior is normal        Additional Data:     Lab Results: I have personally reviewed pertinent reports  Results from last 7 days  Lab Units 05/29/18  1950   WBC Thousand/uL 9 28   HEMOGLOBIN g/dL 14 2   HEMATOCRIT % 42 7   PLATELETS Thousands/uL 252   NEUTROS PCT % 53   LYMPHS PCT % 35   MONOS PCT % 7   EOS PCT % 5       Results from last 7 days  Lab Units 05/29/18  1950   SODIUM mmol/L 139   POTASSIUM mmol/L 3 7   CHLORIDE mmol/L 104   CO2 mmol/L 24   BUN mg/dL 11   CREATININE mg/dL 1 09   CALCIUM mg/dL 8 8   TOTAL PROTEIN g/dL 7 7   BILIRUBIN TOTAL mg/dL 0 60   ALK PHOS U/L 88   ALT U/L 58   AST U/L 27   GLUCOSE RANDOM mg/dL 93       Results from last 7 days  Lab Units 05/29/18  1950   INR  0 91               Imaging: I have personally reviewed pertinent reports  CT abdomen pelvis with contrast   ED Interpretation by Karen Toro PA-C (05/29 2058)   No CT findings for pancreatitis    Partially visualized scattered patchy opacities at the bilateral lung bases suspicious for multifocal pneumonia  Consider follow-up with dedicated chest CT for more complete evaluation as warranted  Final Result by Leigh Vance MD (05/29 2056)      1  No CT findings for pancreatitis  2  Partially visualized scattered patchy opacity at the bilateral lung bases suspicious for multifocal pneumonia  Consider follow-up with dedicated chest CT for more complete evaluation as warranted  Workstation performed: [id number]             EKG, Pathology, and Other Studies Reviewed on Admission:   · EKG: NSR    Allscripts / Epic Records Reviewed: Yes     ** Please Note: This note has been constructed using a voice recognition system   **

## 2018-05-30 NOTE — RESPIRATORY THERAPY NOTE
RT Protocol Note  Sridhar Vidal 30 y o  female MRN: [id number]  Unit/Bed#: -01 Encounter: [id number]    Assessment    Active Problems:    * No active hospital problems  *      Home Pulmonary Medications:  None       Past Medical History:   Diagnosis Date    Anxiety     Depression     History of pulmonary embolus (PE)     Iliotibial band syndrome     last assessed - [id number]    Mitral valve prolapse     Presence of contraceptive device     last assessed - [id number]     Social History     Social History    Marital status: /Civil Union     Spouse name: N/A    Number of children: 1    Years of education: N/A     Occupational History    working [employer]      used to be PCA unit clerk     Social History Main Topics    Smoking status: Never Smoker    Smokeless tobacco: Never Used    Alcohol use Yes      Comment: occasional; Special occasions, rarely consumes alcohol - per [employer]    Drug use: No    Sexual activity: Not Asked     Other Topics Concern    None     Social History Narrative    Daily coffee consumption (1 Cups/Day) 3x a week    Parentage - 1 daughter    working       Subjective    Subjective Data: "I had a PE once"    Objective    Physical Exam:   Assessment Type: Assess only  General Appearance: Alert, Awake  Respiratory Pattern: Normal  Chest Assessment: Chest expansion symmetrical  Bilateral Breath Sounds: Diminished, Clear  Cough: None  O2 Device: RA    Vitals:  Blood pressure 123/77, pulse 70, temperature 97 6 °F (36 4 °C), temperature source Oral, resp  rate 18, height 5' 5" (1 651 m), weight 108 kg (238 lb 12 1 oz), last menstrual period 05/11/2018, SpO2 96 %  Imaging and other studies: I have personally reviewed pertinent reports  O2 Device: RA     Plan    Respiratory Plan: No distress/Pulmonary history, Discontinue Protocol        Resp Comments: Pt assessed per respiratory protocol  SPO2 96% on RA with diminished clear breath sounds   Pt has no pulmonary hx nor does she take any respiratory medications at home  D/C respiratory protocol

## 2018-05-30 NOTE — CONSULTS
Consultation - [facility]  Sharri  30 y o  female MRN: [id number]  Unit/Bed#: -01 Encounter: [id number]        Consults    Reason for Consult / Principal Problem: abdominal pain      ASSESSMENT AND PLAN:      Epigastric/LUQ pain  N/V  -Patient recently with bronchitis treated with antibiotics and steroids with onset of upper abdominal pain which brought her to the ED last week, she trialed pepcid however her symptoms worsened and she developed vomiting    -Concern for gastritis versus PUD given persistent severe pain  Given the fact that she had an acute onset of N/V/D that has improved may have been a superimposed infectious component  -Recommend PPI BID as well as carafate    -Continue clear liquid diet  -Avoid NSAIDS  -Would recommend EGD tomorrow 5/31 to rule out PUD  ______________________________________________________________________    HPI:  The patient is a 31-year-old female who presented with left upper quadrant abdominal pain  She states about 2 weeks ago she was diagnosed bronchitis and was treated with antibiotics and steroids  On May 20th she developed epigastric and left upper quadrant pain nausea pain worse with eating  She attributed this to the recent medications however the pain persisted and worsened so she went to the ER May 21st her symptoms were attributed to gastritis and she was started on Pepcid  She states that since that time her symptoms have gotten worse  On Thursday she developed nausea and vomiting as well as diarrhea  The diarrhea has since resolved  The last time she vomited was yesterday after trying to eat a turkey sandwich  She is tolerating clears  She does have persistent nausea  She denies NSAID use  She denies a chronic history of GI symptoms denies chronic history of GERD  She has never had an EGD prior  She is not a smoker  She does not drink alcohol she denies any other illicit drug use    CT in the ER does not show abnormality in the abdomen pelvis however does show possible multifocal pneumonia and she was started on IV Levaquin  She denies any black or bloody stools  She denies shortness of breath or dyspnea  She has a history of a cholecystectomy  REVIEW OF SYSTEMS:    CONSTITUTIONAL: Positive for chills  Denies any fever, rigors, and weight loss  HEENT: No earache or tinnitus  Denies hearing loss or visual disturbances  CARDIOVASCULAR: No chest pain or palpitations  RESPIRATORY: Denies any cough, hemoptysis, shortness of breath or dyspnea on exertion  GASTROINTESTINAL: As noted in the History of Present Illness  GENITOURINARY: No problems with urination  Denies any hematuria or dysuria  NEUROLOGIC: No dizziness or vertigo, denies headaches  MUSCULOSKELETAL: Denies any muscle or joint pain  SKIN: Denies skin rashes or itching  ENDOCRINE: Denies excessive thirst  Denies intolerance to heat or cold  PSYCHOSOCIAL: Denies depression or anxiety  Denies any recent memory loss  Historical Information   Past Medical History:   Diagnosis Date    Anxiety     Depression     History of pulmonary embolus (PE)     Iliotibial band syndrome     last assessed - [id number]    Mitral valve prolapse     Presence of contraceptive device     last assessed - [id number]     Past Surgical History:   Procedure Laterality Date    CHOLECYSTECTOMY      GALLBLADDER SURGERY      KNEE SURGERY Left     growth plate fx left knee    TUBAL LIGATION       Social History   History   Alcohol Use    Yes     Comment: occasional; Special occasions, rarely consumes alcohol - per [employer]     History   Drug Use No     History   Smoking Status    Never Smoker   Smokeless Tobacco    Never Used     Family History   Problem Relation Age of Onset    Adopted:  Yes    No Known Problems Mother        Meds/Allergies     Prescriptions Prior to Admission   Medication    albuterol (PROVENTIL HFA,VENTOLIN HFA) 90 mcg/act inhaler    ALPRAZolam (XANAX) 0 25 mg tablet    Cetirizine HCl (ZYRTEC ALLERGY) 10 MG CAPS    famotidine (PEPCID) 20 mg tablet    ibuprofen (MOTRIN) 600 mg tablet    sertraline (ZOLOFT) 100 mg tablet    zolpidem (AMBIEN) 10 mg tablet     Current Facility-Administered Medications   Medication Dose Route Frequency    acetaminophen (TYLENOL) tablet 650 mg  650 mg Oral Q6H PRN    albuterol (PROVENTIL HFA,VENTOLIN HFA) inhaler 2 puff  2 puff Inhalation Q6H PRN    ALPRAZolam (XANAX) tablet 0 25 mg  0 25 mg Oral HS PRN    calcium carbonate (TUMS) chewable tablet 1,000 mg  1,000 mg Oral Daily PRN    enoxaparin (LOVENOX) subcutaneous injection 40 mg  40 mg Subcutaneous Daily    famotidine (PEPCID) tablet 20 mg  20 mg Oral BID    levofloxacin (LEVAQUIN) IVPB (premix) 750 mg  750 mg Intravenous Q24H    loratadine (CLARITIN) tablet 10 mg  10 mg Oral Daily    metoclopramide (REGLAN) injection 10 mg  10 mg Intravenous Q6H PRN    ondansetron (ZOFRAN) injection 4 mg  4 mg Intravenous Q6H PRN    senna (SENOKOT) tablet 8 6 mg  1 tablet Oral Daily    sertraline (ZOLOFT) tablet 100 mg  100 mg Oral HS    sodium chloride 0 9 % infusion  125 mL/hr Intravenous Continuous    sucralfate (CARAFATE) oral suspension 1,000 mg  1,000 mg Oral Q6H CAROLINA    zolpidem (AMBIEN) tablet 5 mg  5 mg Oral HS PRN       Allergies   Allergen Reactions    Penicillin G Hives           Objective     Blood pressure 117/70, pulse 69, temperature 97 8 °F (36 6 °C), temperature source Oral, resp  rate 18, height 5' 5" (1 651 m), weight 108 kg (238 lb 12 1 oz), last menstrual period 05/11/2018, SpO2 95 %  Body mass index is 39 73 kg/m²  Intake/Output Summary (Last 24 hours) at 05/30/18 1056  Last data filed at 05/30/18 0809   Gross per 24 hour   Intake          2126 25 ml   Output                0 ml   Net          2126 25 ml         PHYSICAL EXAM:      General Appearance:   Alert, cooperative, no distress   HEENT:   Normocephalic, atraumatic, anicteric      Neck:  Supple, symmetrical, trachea midline   Lungs:   Clear to auscultation bilaterally   Heart[de-identified]   Regular rate and rhythm; no murmur, rub, or gallop     Abdomen:   Soft, moderate tender in the epigastric/LUQ, non-distended; normal bowel sounds; no masses, no organomegaly    Genitalia:   Deferred    Rectal:   Deferred    Extremities:  No cyanosis, clubbing or edema    Pulses:  2+ and symmetric all extremities    Skin:  No jaundice, rashes, or lesions    Lymph nodes:  No palpable cervical lymphadenopathy        Lab Results:   Admission on 05/29/2018   Component Date Value    WBC 05/29/2018 9 28     RBC 05/29/2018 4 85     Hemoglobin 05/29/2018 14 2     Hematocrit 05/29/2018 42 7     MCV 05/29/2018 88     MCH 05/29/2018 29 3     MCHC 05/29/2018 33 3     RDW 05/29/2018 13 6     MPV 05/29/2018 9 1     Platelets 36/89/7547 252     Neutrophils Relative 05/29/2018 53     Lymphocytes Relative 05/29/2018 35     Monocytes Relative 05/29/2018 7     Eosinophils Relative 05/29/2018 5     Basophils Relative 05/29/2018 1     Neutrophils Absolute 05/29/2018 4 94     Lymphocytes Absolute 05/29/2018 3 21     Monocytes Absolute 05/29/2018 0 63     Eosinophils Absolute 05/29/2018 0 43     Basophils Absolute 05/29/2018 0 07     Protime 05/29/2018 12 0     INR 05/29/2018 0 91     PTT 05/29/2018 25     Sodium 05/29/2018 139     Potassium 05/29/2018 3 7     Chloride 05/29/2018 104     CO2 05/29/2018 24     Anion Gap 05/29/2018 11     BUN 05/29/2018 11     Creatinine 05/29/2018 1 09     Glucose 05/29/2018 93     Calcium 05/29/2018 8 8     AST 05/29/2018 27     ALT 05/29/2018 58     Alkaline Phosphatase 05/29/2018 88     Total Protein 05/29/2018 7 7     Albumin 05/29/2018 3 9     Total Bilirubin 05/29/2018 0 60     eGFR 05/29/2018 66     LACTIC ACID 05/29/2018 0 9     Lipase 05/29/2018 152     Color, UA 05/29/2018 Yellow     Clarity, UA 05/29/2018 Clear     pH, UA 05/29/2018 6 5     Leukocytes, UA 05/29/2018 Trace*    Nitrite, UA 05/29/2018 Negative     Protein, UA 05/29/2018 30 (1+)*    Glucose, UA 05/29/2018 Negative     Ketones, UA 05/29/2018 Trace*    Urobilinogen, UA 05/29/2018 0 2     Bilirubin, UA 05/29/2018 Negative     Blood, UA 05/29/2018 Trace*    Specific Gravity, UA 05/29/2018 >=1 030     RBC, UA 05/29/2018 1-2*    WBC, UA 05/29/2018 1-2*    Epithelial Cells 05/29/2018 Occasional     Bacteria, UA 05/29/2018 Occasional     Platelets 59/49/2593 227     MPV 05/30/2018 9 1     Sodium 05/30/2018 138     Potassium 05/30/2018 3 9     Chloride 05/30/2018 107     CO2 05/30/2018 22     Anion Gap 05/30/2018 9     BUN 05/30/2018 11     Creatinine 05/30/2018 1 11     Glucose 05/30/2018 120     Calcium 05/30/2018 7 8*    eGFR 05/30/2018 64     WBC 05/30/2018 7 70     RBC 05/30/2018 4 24     Hemoglobin 05/30/2018 12 5     Hematocrit 05/30/2018 38 0     MCV 05/30/2018 90     MCH 05/30/2018 29 5     MCHC 05/30/2018 32 9     RDW 05/30/2018 13 4     Platelets 25/47/6130 210     MPV 05/30/2018 9 4     Ventricular Rate 05/29/2018 65     Atrial Rate 05/29/2018 72     OH Interval 05/29/2018 142     QRSD Interval 05/29/2018 80     QT Interval 05/29/2018 398     QTC Interval 05/29/2018 413     P Axis 05/29/2018 59     QRS Axis 05/29/2018 71     T Wave Axis 05/29/2018 60        Imaging Studies: I have personally reviewed pertinent imaging studies

## 2018-05-31 ENCOUNTER — ANESTHESIA EVENT (INPATIENT)
Dept: GASTROENTEROLOGY | Facility: HOSPITAL | Age: 38
DRG: 391 | End: 2018-05-31
Payer: COMMERCIAL

## 2018-05-31 ENCOUNTER — ANESTHESIA (INPATIENT)
Dept: GASTROENTEROLOGY | Facility: HOSPITAL | Age: 38
DRG: 391 | End: 2018-05-31
Payer: COMMERCIAL

## 2018-05-31 VITALS
SYSTOLIC BLOOD PRESSURE: 105 MMHG | BODY MASS INDEX: 39.78 KG/M2 | DIASTOLIC BLOOD PRESSURE: 62 MMHG | TEMPERATURE: 98 F | OXYGEN SATURATION: 94 % | HEIGHT: 65 IN | HEART RATE: 67 BPM | RESPIRATION RATE: 18 BRPM | WEIGHT: 238.76 LBS

## 2018-05-31 PROBLEM — K25.9 GASTRIC EROSION: Status: ACTIVE | Noted: 2018-05-31

## 2018-05-31 LAB
ANION GAP SERPL CALCULATED.3IONS-SCNC: 10 MMOL/L (ref 4–13)
BASOPHILS # BLD AUTO: 0.03 THOUSANDS/ΜL (ref 0–0.1)
BASOPHILS NFR BLD AUTO: 1 % (ref 0–1)
BUN SERPL-MCNC: 5 MG/DL (ref 5–25)
CALCIUM SERPL-MCNC: 8.7 MG/DL (ref 8.3–10.1)
CHLORIDE SERPL-SCNC: 108 MMOL/L (ref 100–108)
CO2 SERPL-SCNC: 23 MMOL/L (ref 21–32)
CREAT SERPL-MCNC: 0.96 MG/DL (ref 0.6–1.3)
EOSINOPHIL # BLD AUTO: 0.37 THOUSAND/ΜL (ref 0–0.61)
EOSINOPHIL NFR BLD AUTO: 6 % (ref 0–6)
ERYTHROCYTE [DISTWIDTH] IN BLOOD BY AUTOMATED COUNT: 13.5 % (ref 11.6–15.1)
GFR SERPL CREATININE-BSD FRML MDRD: 77 ML/MIN/1.73SQ M
GLUCOSE SERPL-MCNC: 110 MG/DL (ref 65–140)
HCT VFR BLD AUTO: 39.2 % (ref 34.8–46.1)
HGB BLD-MCNC: 12.9 G/DL (ref 11.5–15.4)
LYMPHOCYTES # BLD AUTO: 1.68 THOUSANDS/ΜL (ref 0.6–4.47)
LYMPHOCYTES NFR BLD AUTO: 27 % (ref 14–44)
MCH RBC QN AUTO: 29.4 PG (ref 26.8–34.3)
MCHC RBC AUTO-ENTMCNC: 32.9 G/DL (ref 31.4–37.4)
MCV RBC AUTO: 89 FL (ref 82–98)
MONOCYTES # BLD AUTO: 0.54 THOUSAND/ΜL (ref 0.17–1.22)
MONOCYTES NFR BLD AUTO: 9 % (ref 4–12)
NEUTROPHILS # BLD AUTO: 3.68 THOUSANDS/ΜL (ref 1.85–7.62)
NEUTS SEG NFR BLD AUTO: 58 % (ref 43–75)
PLATELET # BLD AUTO: 202 THOUSANDS/UL (ref 149–390)
PMV BLD AUTO: 9.1 FL (ref 8.9–12.7)
POTASSIUM SERPL-SCNC: 3.9 MMOL/L (ref 3.5–5.3)
RBC # BLD AUTO: 4.39 MILLION/UL (ref 3.81–5.12)
SODIUM SERPL-SCNC: 141 MMOL/L (ref 136–145)
WBC # BLD AUTO: 6.3 THOUSAND/UL (ref 4.31–10.16)

## 2018-05-31 PROCEDURE — C9113 INJ PANTOPRAZOLE SODIUM, VIA: HCPCS | Performed by: INTERNAL MEDICINE

## 2018-05-31 PROCEDURE — 0DD68ZX EXTRACTION OF STOMACH, VIA NATURAL OR ARTIFICIAL OPENING ENDOSCOPIC, DIAGNOSTIC: ICD-10-PCS | Performed by: INTERNAL MEDICINE

## 2018-05-31 PROCEDURE — 88342 IMHCHEM/IMCYTCHM 1ST ANTB: CPT | Performed by: PATHOLOGY

## 2018-05-31 PROCEDURE — 85025 COMPLETE CBC W/AUTO DIFF WBC: CPT | Performed by: INTERNAL MEDICINE

## 2018-05-31 PROCEDURE — 80048 BASIC METABOLIC PNL TOTAL CA: CPT | Performed by: INTERNAL MEDICINE

## 2018-05-31 PROCEDURE — 99239 HOSP IP/OBS DSCHRG MGMT >30: CPT | Performed by: INTERNAL MEDICINE

## 2018-05-31 PROCEDURE — 88305 TISSUE EXAM BY PATHOLOGIST: CPT | Performed by: PATHOLOGY

## 2018-05-31 PROCEDURE — 43239 EGD BIOPSY SINGLE/MULTIPLE: CPT | Performed by: INTERNAL MEDICINE

## 2018-05-31 RX ORDER — LEVOFLOXACIN 750 MG/1
750 TABLET ORAL EVERY 24 HOURS
Qty: 3 TABLET | Refills: 0 | Status: SHIPPED | OUTPATIENT
Start: 2018-05-31 | End: 2018-06-07 | Stop reason: ALTCHOICE

## 2018-05-31 RX ORDER — PROPOFOL 10 MG/ML
INJECTION, EMULSION INTRAVENOUS AS NEEDED
Status: DISCONTINUED | OUTPATIENT
Start: 2018-05-31 | End: 2018-05-31 | Stop reason: SURG

## 2018-05-31 RX ORDER — OMEPRAZOLE 40 MG/1
40 CAPSULE, DELAYED RELEASE ORAL DAILY
Qty: 30 CAPSULE | Refills: 1 | Status: ON HOLD | OUTPATIENT
Start: 2018-05-31 | End: 2018-06-13

## 2018-05-31 RX ADMIN — PROPOFOL 50 MG: 10 INJECTION, EMULSION INTRAVENOUS at 08:32

## 2018-05-31 RX ADMIN — SUCRALFATE 1000 MG: 1 SUSPENSION ORAL at 05:30

## 2018-05-31 RX ADMIN — PROPOFOL 150 MG: 10 INJECTION, EMULSION INTRAVENOUS at 08:30

## 2018-05-31 RX ADMIN — SUCRALFATE 1000 MG: 1 SUSPENSION ORAL at 00:06

## 2018-05-31 RX ADMIN — PANTOPRAZOLE SODIUM 40 MG: 40 INJECTION, POWDER, FOR SOLUTION INTRAVENOUS at 09:50

## 2018-05-31 RX ADMIN — ENOXAPARIN SODIUM 40 MG: 100 INJECTION SUBCUTANEOUS at 09:50

## 2018-05-31 RX ADMIN — SODIUM CHLORIDE 125 ML/HR: 0.9 INJECTION, SOLUTION INTRAVENOUS at 09:51

## 2018-05-31 RX ADMIN — PROPOFOL 20 MG: 10 INJECTION, EMULSION INTRAVENOUS at 08:35

## 2018-05-31 RX ADMIN — SUCRALFATE 1000 MG: 1 SUSPENSION ORAL at 12:33

## 2018-05-31 RX ADMIN — LORATADINE 10 MG: 10 TABLET ORAL at 09:50

## 2018-05-31 NOTE — DISCHARGE SUMMARY
Discharge Summary - [facility]    Patient Information: Susie Carter 30 y o  female MRN: [id number]  Unit/Bed#: -01 Encounter: [id number]    Discharging Physician / Practitioner: Nadine Patton MD  PCP: Yaneth Cruz MD  Admission Date: 5/29/2018  Discharge Date: 05/31/18    Disposition:     Home    Reason for Admission: abdominal pain    Discharge Diagnoses:     Principal Problem:    Multifocal pneumonia  Active Problems:    Anxiety    Homocysteinemia ([address] )    Insomnia    Acute gastritis without hemorrhage    Epigastric abdominal pain    Gastric erosion  Resolved Problems:    * No resolved hospital problems  *      Consultations During Hospital Stay:  · Gastroenterology    Procedures Performed:     · Endoscopy with biopsy    Significant Findings / Test Results:     · Gastric erosion on EGD  · Ct scan with multifocal pneumonia    Incidental Findings:   · As above     Test Results Pending at Discharge (will require follow up):   · Gastric biopsy     Outpatient Tests Requested:  · None    Complications:  None    Hospital Course:     Susie Carter is a 30 y o  female patient who originally presented to the hospital on 5/29/2018 due to abdominal pain and vomiting  In the ED a CT scan of the abdomen was performed which showed no acute intra-abdominal findings but it did show some multifocal infiltrates the captured portions of her lungs  Due to her p o  intolerance she was admitted and placed on IV antibiotics, fluids, anti acids and GI was consulted  During her hospital course she had an endoscopy performed which showed a gastric erosion, biopsy was taken  She was treated with Carafate and Protonix with significant improvement in her abdominal pain  Post EGD diet was advanced which she tolerated without difficulty and she was discharged home with an oral antibiotic for pneumonia and omeprazole for for gastric erosions  She was counseled to avoid NSAIDs    And counseled to follow up with her primary care physician to confirm resolution of her pneumonia  Condition at Discharge: stable     Discharge Day Visit / Exam:     Subjective:  "I feel better" tolerating diet  Vitals: Blood Pressure: 105/62 (05/31/18 0936)  Pulse: 67 (05/31/18 0936)  Temperature: 98 °F (36 7 °C) (05/31/18 0936)  Temp Source: Oral (05/31/18 0936)  Respirations: 18 (05/31/18 0936)  Height: 5' 5" (165 1 cm) (05/29/18 2313)  Weight - Scale: 108 kg (238 lb 12 1 oz) (05/29/18 2313)  SpO2: 94 % (05/31/18 0936)  Exam:   Physical Exam   Constitutional: She is oriented to person, place, and time  She appears well-developed and well-nourished  HENT:   Head: Normocephalic and atraumatic  Eyes: EOM are normal  Pupils are equal, round, and reactive to light  No scleral icterus  Neck: Neck supple  No JVD present  Cardiovascular: Normal rate and regular rhythm  Pulmonary/Chest: Effort normal  She has no wheezes  She has no rales  Abdominal: Soft  There is no tenderness  There is no rebound  Musculoskeletal: Normal range of motion  She exhibits no edema  Neurological: She is alert and oriented to person, place, and time  Skin: Skin is warm  Discharge instructions/Information to patient and family:   See after visit summary for information provided to patient and family  Provisions for Follow-Up Care:  See after visit summary for information related to follow-up care and any pertinent home health orders  Planned Readmission: None     Discharge Statement:  I spent 40 minutes discharging the patient  This time was spent on the day of discharge  I had direct contact with the patient on the day of discharge  Greater than 50% of the total time was spent examining patient, answering all patient questions, arranging and discussing plan of care with patient as well as directly providing post-discharge instructions  Additional time then spent on discharge activities      Discharge Medications:  See after visit summary for reconciled discharge medications provided to patient and family        ** Please Note: This note has been constructed using a voice recognition system **

## 2018-05-31 NOTE — OP NOTE
**** GI/ENDOSCOPY REPORT ****     PATIENT NAME: Jona Barahona - VISIT ID:  Patient ID: [id number]   YOB: 1980     INTRODUCTION: Esophagogastroduodenoscopy - A 30 female patient presents   for an inpatient Esophagogastroduodenoscopy at [facility]  INDICATIONS: Pain located in the epigastrium  CONSENT: The benefits, risks, and alternatives to the procedure were   discussed and informed consent was obtained from the patient  PREPARATION:  EKG, pulse, pulse oximetry and blood pressure were monitored   throughout the procedure  MEDICATIONS: Anesthesia-check records     PROCEDURE:  The endoscope was passed without difficulty through the mouth   under direct visualization and advanced to the 2nd portion of the   duodenum  The scope was withdrawn and the mucosa was carefully examined  FINDINGS:   Esophagus: The esophagus appeared to be normal   Stomach:   Linear erythema with few erosions in the antrum  A biopsy was taken  The   specimen was collected for pathology  Duodenum: The duodenum appeared to   be normal      COMPLICATIONS: There were no complications  IMPRESSIONS: Normal esophagus  Linear erythema with few erosions in the   antrum  Normal duodenum  RECOMMENDATIONS: Follow-up on the results of the biopsy specimens  Protonix  ESTIMATED BLOOD LOSS:     PATHOLOGY SPECIMENS: Random biopsy taken  Specimen collected for pathology  PROCEDURE CODES:     ICD-9 Codes: 789 06 Abdominal pain, epigastric     ICD-10 Codes: R10 13 Epigastric pain     PERFORMED BY: BRAYDEN Taylor  on 05/31/2018  Version 1, electronically signed by BRAYDEN Lim  on 05/31/2018   at 08:41

## 2018-05-31 NOTE — OP NOTE
ESOPHAGOGASTRODUODENOSCOPY    PROCEDURE: EGD/ Biopsy    INDICATIONS: Abdominal pain, Epigastric    POST-OP DIAGNOSIS: See the impression below    SEDATION: Monitored anesthesia care, check anesthesia records    PHYSICAL EXAM:    Vitals:    05/31/18 0820   BP: 130/72   Pulse: 71   Resp: 16   Temp: 98 °F (36 7 °C)   SpO2: 97%    Body mass index is 39 73 kg/m²  General: NAD  Heart: S1 & S2 normal, RRR  Lungs: CTA, No rales or rhonchi  Abdomen: Soft, nontender, nondistended, good bowel sounds    CONSENT:  Informed consent was obtained for the procedure, including sedation after explaining the risks and benefits of the procedure  Risks including but not limited to bleeding, perforation, infection, aspiration were discussed in detail  Also explained about less than 100% sensitivity with the exam and other alternatives  PREPARATION:   EKG tracing, pulse oximetry, blood pressure were monitored throughout the procedure  Patient was identified by myself both verbally and by visual inspection of ID band  DESCRIPTION:   Patient was placed in the left lateral decubitus position and was sedated with the above medication  The gastroscope was introduced in to the oropharynx and the esophagus was intubated under direct visualization  Scope was passed down the esophagus up to 2nd part of the duodenum  A careful inspection was made as the gastroscope was withdrawn, including a retroflexed view of the stomach; findings and interventions are described below  FINDINGS:    #1  Esophagus and GEJ- normal mucosa    #2  Stomach- linear erythema was noted in the antrum  Few small erosions in the pre-pyloric area  Biopsies done to check for H pylori    #3  Duodenum- normal         IMPRESSIONS:      As above    RECOMMENDATIONS:     Check pathology    Protonix    COMPLICATIONS:  None; patient tolerated the procedure well            DISPOSITION: PACU           CONDITION: Stable

## 2018-05-31 NOTE — DISCHARGE INSTR - AVS FIRST PAGE
Avoid alcohol, aspirin, ibuprofen and naproxen containing medications  If you develop a rash, diarrhea, fever or shortness of breath - stop the antibiotic and seek medical attention

## 2018-05-31 NOTE — ANESTHESIA PREPROCEDURE EVALUATION
Review of Systems/Medical History  Patient summary reviewed  Chart reviewed      Cardiovascular  Hyperlipidemia,    Pulmonary  Pneumonia,   Comment: PE     GI/Hepatic    No GERD ,        Negative  ROS        Endo/Other    Obesity  morbid obesity   GYN  Negative gynecology ROS     Comment: s/p BTL     Hematology  Anemia ,     Musculoskeletal  Negative musculoskeletal ROS        Neurology    Headaches,    Psychology   Anxiety, Depression ,              Physical Exam    Airway    Mallampati score: II  TM Distance: <3 FB  Neck ROM: full     Dental   No notable dental hx     Cardiovascular  Cardiovascular exam normal    Pulmonary  Pulmonary exam normal     Other Findings        Anesthesia Plan  ASA Score- 3     Anesthesia Type- IV sedation with anesthesia with ASA Monitors  Additional Monitors:   Airway Plan:         Plan Factors-  Patient did not smoke on day of surgery  Induction- intravenous  Postoperative Plan-     Informed Consent- Anesthetic plan and risks discussed with patient

## 2018-06-01 ENCOUNTER — TRANSITIONAL CARE MANAGEMENT (OUTPATIENT)
Dept: INTERNAL MEDICINE CLINIC | Facility: CLINIC | Age: 38
End: 2018-06-01

## 2018-06-01 ENCOUNTER — TELEPHONE (OUTPATIENT)
Dept: INTERNAL MEDICINE CLINIC | Facility: CLINIC | Age: 38
End: 2018-06-01

## 2018-06-01 NOTE — CASE MANAGEMENT
Notification of Discharge  This is a Notification of Discharge from our facility [address]  Please be advised that this patient has been discharge from our facility  Below you will find the admission and discharge date and time including the patients disposition  PRESENTATION DATE: 5/29/2018  7:12 PM  IP ADMISSION DATE: 5/29/18 2136  DISCHARGE DATE: 5/31/2018  2:30 PM  DISPOSITION: Home/Self Care    [facility] in the [facility] by Munir Rehman for 2017  Network Utilization Review Department  Phone: [phone]; Fax [phone]  ATTENTION: The Network Utilization Review Department is now centralized for our 7 Facilities  Make a note that we have a new phone and fax numbers for our Department  Please call with any questions or concerns to [phone] and carefully follow the prompts so that you are directed to the right person  All voicemails are confidential  Fax any determinations, approvals, denials, and requests for initial or continue stay review clinical to [phone]  Due to HIGH CALL volume, it would be easier if you could please send faxed requests to expedite your requests and in part, help us provide discharge notifications faster        Reference #[id number]

## 2018-06-01 NOTE — TELEPHONE ENCOUNTER
PT  WAS SEEN IN THE ER TUES  AND WAS ADMITTED AND CAME HOME YESTERDAY  NEEDS A NEW NOTE TO GO BACK TO WORK ON Monday  CALL PT  WHEN READY

## 2018-06-03 LAB
BACTERIA BLD CULT: NORMAL
BACTERIA BLD CULT: NORMAL

## 2018-06-06 ENCOUNTER — TELEPHONE (OUTPATIENT)
Dept: GASTROENTEROLOGY | Facility: AMBULARY SURGERY CENTER | Age: 38
End: 2018-06-06

## 2018-06-06 NOTE — TELEPHONE ENCOUNTER
----- Message from Vinh Hill MD sent at 6/5/2018  5:32 PM EDT -----    Gastric biopsies came back as benign and negative for H  pylori  Patient to continue PPI and call for follow-up office visit if symptoms persist or PRN

## 2018-06-07 ENCOUNTER — OFFICE VISIT (OUTPATIENT)
Dept: INTERNAL MEDICINE CLINIC | Facility: CLINIC | Age: 38
End: 2018-06-07
Payer: COMMERCIAL

## 2018-06-07 VITALS
OXYGEN SATURATION: 97 % | SYSTOLIC BLOOD PRESSURE: 110 MMHG | HEART RATE: 70 BPM | DIASTOLIC BLOOD PRESSURE: 68 MMHG | WEIGHT: 228 LBS | HEIGHT: 65 IN | BODY MASS INDEX: 37.99 KG/M2 | RESPIRATION RATE: 18 BRPM | TEMPERATURE: 98.1 F

## 2018-06-07 DIAGNOSIS — G47.09 OTHER INSOMNIA: ICD-10-CM

## 2018-06-07 DIAGNOSIS — J18.9 MULTIFOCAL PNEUMONIA: Primary | ICD-10-CM

## 2018-06-07 DIAGNOSIS — K29.00 ACUTE GASTRITIS WITHOUT HEMORRHAGE, UNSPECIFIED GASTRITIS TYPE: ICD-10-CM

## 2018-06-07 DIAGNOSIS — K25.3 ACUTE GASTRIC EROSION: ICD-10-CM

## 2018-06-07 PROBLEM — R11.15 NON-INTRACTABLE CYCLICAL VOMITING WITH NAUSEA: Status: ACTIVE | Noted: 2018-06-07

## 2018-06-07 PROCEDURE — 99495 TRANSJ CARE MGMT MOD F2F 14D: CPT | Performed by: INTERNAL MEDICINE

## 2018-06-07 NOTE — ASSESSMENT & PLAN NOTE
She is still experiencing intermittent vomiting  Instructed to eat small frequent meals, no raw foods  Limit dairy and coffee

## 2018-06-07 NOTE — PROGRESS NOTES
Assessment/Plan:    Non-intractable cyclical vomiting with nausea  She is still experiencing intermittent vomiting  Instructed to eat small frequent meals, no raw foods  Limit dairy and coffee  Multifocal pneumonia  Completed antibiotics  Monitor for fevers  Use incentive spirometry daily  Repeat CXR in a month  Gastric erosion  Mild erosions seen on EGD  Maintain on daily PPI  Diagnoses and all orders for this visit:    Multifocal pneumonia  -     XR chest pa & lateral; Future    Acute gastric erosion    Acute gastritis without hemorrhage, unspecified gastritis type    Other insomnia      Regular follow up in 1 month  Out of work noted provided  Subjective:      Patient ID: Juan José Rg is a 30 y o  female  Date and time hospital follow up call was made:  6/1/2018 10:00 AM  Patient was hopsitalized at:  [facility]  Date of admission:  5/29/18  Date of discharge:  5/31/18  Diagnosis:  Multifocal pneumonia   Disposition:  Home  Current symptoms:  Fatigue  Scheduled for follow up?:  Yes  Referrals needed:  Gastro 6/30/18  Counseling:  Patient  Comments:  Ivan Campbell 6/1/2018           Dipika Cuellar still complains of nausea and vomiting  She reports throwing up about 2 days ago, had a salad and vomited undigested food  She did not have any episode yesterday  This morning, she again vomited previously digested food  She has occasional nausea  She experiences left upper quadrant pain followed by nausea or vomiting  Denies any abdominal pain or cramping  She reports stool slightly loose today, nonbloody  She denies any cough, shortness of breath or wheezing  She reports having a fever 2 days ago of 101  This has not recurred  She was apprised that she was diagnosed with pneumonia because she did not have any of the symptoms  She has returned to work, still feels tired sometimes  She gets out of breath occasionally if she does too much  No nocturnal symptoms      She reports left upper quadrant pain would occasionally disrupt sleep, takes Ambien every night  The following portions of the patient's history were reviewed and updated as appropriate: allergies, current medications, past medical history, past social history and problem list     Review of Systems   Constitutional: Positive for fever  Negative for appetite change  HENT: Negative for congestion  Respiratory: Positive for shortness of breath  Negative for cough and wheezing  Cardiovascular: Negative for chest pain  Gastrointestinal: Positive for abdominal pain, nausea and vomiting  Genitourinary: Negative for dysuria  Musculoskeletal: Negative for arthralgias  Neurological: Negative for headaches  Psychiatric/Behavioral: Positive for sleep disturbance  Objective:      /68   Pulse 70   Temp 98 1 °F (36 7 °C)   Resp 18   Ht 5' 5" (1 651 m)   Wt 103 kg (228 lb)   LMP 05/11/2018   SpO2 97%   BMI 37 94 kg/m²          Physical Exam   Constitutional: She is oriented to person, place, and time  She appears well-developed and well-nourished  HENT:   Head: Normocephalic and atraumatic  Nose: Nose normal    Eyes: Conjunctivae are normal  Pupils are equal, round, and reactive to light  Neck: Neck supple  Cardiovascular: Normal rate, regular rhythm and normal heart sounds  No edema  Pulmonary/Chest: Effort normal and breath sounds normal  She has no wheezes  She has no rales  Abdominal: Soft  Bowel sounds are normal  There is no tenderness  There is no CVA tenderness  Neurological: She is alert and oriented to person, place, and time  Skin: Skin is warm  No rash noted  Psychiatric: She has a normal mood and affect  Her behavior is normal    Nursing note and vitals reviewed  Lab &/or imaging results reviewed with patient

## 2018-06-07 NOTE — LETTER
June 7, 2018     Patient: Juan Ochoa   YOB: 1980   Date of Visit: 6/7/2018       To Whom it May Concern:    Selenanicolasa Miller is under my professional care  She was seen in my office on 6/7/2018  She may return to work on 6/11/18  If you have any questions or concerns, please don't hesitate to call           Sincerely,          Javi Mariscal MD        CC: No Recipients

## 2018-06-11 ENCOUNTER — TELEPHONE (OUTPATIENT)
Dept: INTERNAL MEDICINE CLINIC | Facility: CLINIC | Age: 38
End: 2018-06-11

## 2018-06-11 ENCOUNTER — ANNUAL EXAM (OUTPATIENT)
Dept: OBGYN CLINIC | Facility: CLINIC | Age: 38
End: 2018-06-11
Payer: COMMERCIAL

## 2018-06-11 VITALS
SYSTOLIC BLOOD PRESSURE: 118 MMHG | HEIGHT: 65 IN | WEIGHT: 227 LBS | BODY MASS INDEX: 37.82 KG/M2 | DIASTOLIC BLOOD PRESSURE: 72 MMHG

## 2018-06-11 DIAGNOSIS — N92.6 IRREGULAR MENSES: ICD-10-CM

## 2018-06-11 DIAGNOSIS — Z01.419 WOMEN'S ANNUAL ROUTINE GYNECOLOGICAL EXAMINATION: Primary | ICD-10-CM

## 2018-06-11 PROCEDURE — S0612 ANNUAL GYNECOLOGICAL EXAMINA: HCPCS | Performed by: OBSTETRICS & GYNECOLOGY

## 2018-06-11 NOTE — TELEPHONE ENCOUNTER
Cornell Lau last Thursday for Hospital f/u  She has a note to be out of work until today, but pt  Is still vomiting so she didn't go back to work today  She is planning on going back tomorrow if not vomiting  She needs a new note stating out of work today also

## 2018-06-11 NOTE — TELEPHONE ENCOUNTER
Left detailed message for pt that this will be reviewed and advised by Wednesday and to call if she has any concerns

## 2018-06-11 NOTE — PROGRESS NOTES
ASSESSMENT & PLAN: Juan José Rg is a 30 y o  [place] with normal gynecologic exam     1   Routine well woman exam done today  2    Pap and HPV:Pap with HPV was done today  Current ASCCP Guidelines reviewed  3   The patient declined STD testing  No testing performed  Safe sex practices have been discussed  4  The patient is sexually active  She relies on tubal ligation for contraception  5  The following were reviewed in today's visit: breast self exam, exercise and healthy diet  6  Patient to return to office in 12 months for annual exam    7  Irregular period - h/o thyroid issues with weight gain  Will check TSH, FSH  Patient concerned for premature menopause  Call if no period in 6 months, recommend withdrawal bleed 3-4 times a year  Weight loss encouraged  All questions have been answered to her satisfaction  CC:  Annual Gynecologic Examination    HPI: Juan José Rg is a 30 y o  [place] who presents for annual gynecologic examination  She has the following concerns:  c/o left side pain that runs down her leg, comes/goes x 3 months  had ER visit that was Dx with Fibroids     Us reviewed - 1 2 cm fundal fibroid, unlikely cause of pain/discomfort  Had an irregular period in may  No period x 1 year before that  Never had a regular period  No trouble getting pregnant  Health Maintenance:    She exercises 2 days per week - working on it  She wears her seatbelt routinely  She does not perform regular monthly self breast exams  She feels safe at home  Patients does try to follow a balanced diet        Past Medical History:   Diagnosis Date    Anxiety     Depression     Fibroid     History of pulmonary embolus (PE)     Iliotibial band syndrome     last assessed - [id number]    Mitral valve prolapse        Past Surgical History:   Procedure Laterality Date    CHOLECYSTECTOMY      ESOPHAGOGASTRODUODENOSCOPY N/A 5/31/2018    Procedure: ESOPHAGOGASTRODUODENOSCOPY (EGD); Surgeon: Carmenza Yen MD;  [facility]; Service: Gastroenterology    GALLBLADDER SURGERY      KNEE SURGERY Left     growth plate fx left knee    KNEE SURGERY Right     TUBAL LIGATION      WISDOM TOOTH EXTRACTION         Past OB/Gyn History:  Period Cycle (Days):  (n/a irregular )  Period Duration (Days): 4 + days   Period Pattern: (!) Irregular  Menstrual Flow: Moderate, Light  Menstrual Control: Tampon  Dysmenorrhea: (!) Severe  Dysmenorrhea Symptoms: Headache, Cramping (back pain )Patient's last menstrual period was 05/11/2018 (approximate)  History of sexually transmitted infection : No  Patient is currently sexually active: heterosexual  Birth control:bilateral tubal ligation  Last Pap  2016:  no abnormalities;  HPV negative    Family History  Family History   Problem Relation Age of Onset    Adopted: Yes    No Known Problems Mother     Alcohol abuse Neg Hx     Substance Abuse Neg Hx     Mental illness Neg Hx     Depression Neg Hx        Social History:  Social History     Social History    Marital status: /Civil Union     Spouse name: N/A    Number of children: 1    Years of education: N/A     Occupational History    working [employer]      used to be PCA unit clerk     Social History Main Topics    Smoking status: Never Smoker    Smokeless tobacco: Never Used    Alcohol use Yes      Comment: occasional; Special occasions, rarely consumes alcohol - per [employer]    Drug use: No    Sexual activity: Yes     Partners: Male     Birth control/ protection: Female Sterilization     Other Topics Concern    Not on file     Social History Narrative    Daily coffee consumption (1 Cups/Day) 3x a week    Parentage - 1 daughter    working     Presently lives with , daughter  Patient is   Patient is currently employed  Allergies:   Allergies   Allergen Reactions    Penicillin G Hives       Medications:    Current Outpatient Prescriptions:    albuterol (PROVENTIL HFA,VENTOLIN HFA) 90 mcg/act inhaler, Inhale 2 puffs every 6 (six) hours as needed for wheezing, Disp: 1 Inhaler, Rfl: 1    ALPRAZolam (XANAX) 0 25 mg tablet, Take 0 25 mg by mouth, Disp: , Rfl:     Cetirizine HCl (ZYRTEC ALLERGY) 10 MG CAPS, Take 10 mg by mouth , Disp: , Rfl:     omeprazole (PriLOSEC) 40 MG capsule, Take 1 capsule (40 mg total) by mouth daily, Disp: 30 capsule, Rfl: 1    sertraline (ZOLOFT) 100 mg tablet, Take 100 mg by mouth daily at bedtime  , Disp: , Rfl:     zolpidem (AMBIEN) 10 mg tablet, Take 1/2 to 1 tablet PO qHS prn, Disp: 30 tablet, Rfl: 0    Review of Systems:  A complete review of systems was performed and was negative, except as listed in HPI  Denies weight changes, excessive fatigue, breast lump or changes, urinary incontinence, urinary frequency, constipation or bowel changes, blood in stool, severe headaches, vaginal bleeding, vaginal discharge  Physical Exam:  /72   Ht 5' 4 5" (1 638 m)   Wt 103 kg (227 lb)   LMP 05/11/2018 (Approximate) Comment: has tubal ligation   BMI 38 36 kg/m²    GEN: The patient was alert and oriented x3, pleasant well-appearing female in no acute distress  HEENT:  Unremarkable, no anterior or posterior lymphadenopathy, no thyromegaly  CV:  RRR, no murmurs  RESP:  Clear to auscultation bilaterally  BREAST:  Symmetric breasts with no palpable breast masses or obvious breast lesions  She has no retractions or nipple discharge  She has no axillary abnormalities or palpable masses  Self breast exam is taught  ABD:  Soft, nontender, non-distended  EXT: nontender, no edema  PELVIC:  Normal appearing external female genitalia, normal vaginal epithelium, No discharge  Cervix present, no lesions  Bimanual: absent CMT,  normal uterus, non-tender  No palpable adnexal masses  Pap was not collected

## 2018-06-12 ENCOUNTER — HOSPITAL ENCOUNTER (OUTPATIENT)
Facility: HOSPITAL | Age: 38
Setting detail: OBSERVATION
Discharge: HOME | End: 2018-06-13
Attending: EMERGENCY MEDICINE | Admitting: INTERNAL MEDICINE
Payer: COMMERCIAL

## 2018-06-12 ENCOUNTER — TRANSCRIBE ORDERS (OUTPATIENT)
Dept: LAB | Facility: CLINIC | Age: 38
End: 2018-06-12

## 2018-06-12 ENCOUNTER — LAB (OUTPATIENT)
Dept: LAB | Facility: CLINIC | Age: 38
End: 2018-06-12
Payer: COMMERCIAL

## 2018-06-12 ENCOUNTER — APPOINTMENT (EMERGENCY)
Dept: CT IMAGING | Facility: HOSPITAL | Age: 38
End: 2018-06-12
Payer: COMMERCIAL

## 2018-06-12 ENCOUNTER — OFFICE VISIT (OUTPATIENT)
Dept: INTERNAL MEDICINE CLINIC | Facility: CLINIC | Age: 38
End: 2018-06-12
Payer: COMMERCIAL

## 2018-06-12 VITALS
HEART RATE: 69 BPM | OXYGEN SATURATION: 98 % | RESPIRATION RATE: 18 BRPM | SYSTOLIC BLOOD PRESSURE: 108 MMHG | BODY MASS INDEX: 37.75 KG/M2 | WEIGHT: 226.6 LBS | HEIGHT: 65 IN | TEMPERATURE: 98.9 F | DIASTOLIC BLOOD PRESSURE: 70 MMHG

## 2018-06-12 DIAGNOSIS — E86.0 DEHYDRATION: ICD-10-CM

## 2018-06-12 DIAGNOSIS — R19.7 DIARRHEA, UNSPECIFIED TYPE: ICD-10-CM

## 2018-06-12 DIAGNOSIS — N92.6 IRREGULAR MENSES: ICD-10-CM

## 2018-06-12 DIAGNOSIS — R11.15 CYCLIC VOMITING SYNDROME: Primary | ICD-10-CM

## 2018-06-12 DIAGNOSIS — K25.3 ACUTE GASTRIC EROSION: ICD-10-CM

## 2018-06-12 DIAGNOSIS — R63.8 UNABLE TO EAT: ICD-10-CM

## 2018-06-12 DIAGNOSIS — R10.13 EPIGASTRIC ABDOMINAL PAIN: ICD-10-CM

## 2018-06-12 DIAGNOSIS — K29.00 ACUTE GASTRITIS WITHOUT HEMORRHAGE, UNSPECIFIED GASTRITIS TYPE: ICD-10-CM

## 2018-06-12 DIAGNOSIS — R50.9 FEVER, UNSPECIFIED FEVER CAUSE: ICD-10-CM

## 2018-06-12 DIAGNOSIS — R10.9 LEFT FLANK PAIN: ICD-10-CM

## 2018-06-12 DIAGNOSIS — R11.2 INTRACTABLE VOMITING WITH NAUSEA, UNSPECIFIED VOMITING TYPE: Primary | ICD-10-CM

## 2018-06-12 LAB
ALBUMIN SERPL BCP-MCNC: 3.8 G/DL (ref 3.5–5)
ALP SERPL-CCNC: 94 U/L (ref 46–116)
ALT SERPL W P-5'-P-CCNC: 40 U/L (ref 12–78)
ANION GAP SERPL CALCULATED.3IONS-SCNC: 11 MMOL/L (ref 4–13)
APTT PPP: 26 SECONDS (ref 24–36)
AST SERPL W P-5'-P-CCNC: 19 U/L (ref 5–45)
BACTERIA UR QL AUTO: ABNORMAL /HPF
BASOPHILS # BLD AUTO: 0.06 THOUSANDS/ΜL (ref 0–0.1)
BASOPHILS NFR BLD AUTO: 1 % (ref 0–1)
BILIRUB SERPL-MCNC: 0.3 MG/DL (ref 0.2–1)
BILIRUB UR QL STRIP: NEGATIVE
BUN SERPL-MCNC: 14 MG/DL (ref 5–25)
CALCIUM SERPL-MCNC: 9.3 MG/DL (ref 8.3–10.1)
CAOX CRY URNS QL MICRO: ABNORMAL /HPF
CHLORIDE SERPL-SCNC: 104 MMOL/L (ref 100–108)
CLARITY UR: CLEAR
CO2 SERPL-SCNC: 24 MMOL/L (ref 21–32)
COLOR UR: YELLOW
CREAT SERPL-MCNC: 1.03 MG/DL (ref 0.6–1.3)
EOSINOPHIL # BLD AUTO: 0.6 THOUSAND/ΜL (ref 0–0.61)
EOSINOPHIL NFR BLD AUTO: 7 % (ref 0–6)
ERYTHROCYTE [DISTWIDTH] IN BLOOD BY AUTOMATED COUNT: 13.3 % (ref 11.6–15.1)
EXT PREG TEST URINE: NEGATIVE
FSH SERPL-ACNC: 4.6 MIU/ML
GFR SERPL CREATININE-BSD FRML MDRD: 71 ML/MIN/1.73SQ M
GLUCOSE SERPL-MCNC: 97 MG/DL (ref 65–140)
GLUCOSE UR STRIP-MCNC: NEGATIVE MG/DL
HCT VFR BLD AUTO: 42.1 % (ref 34.8–46.1)
HGB BLD-MCNC: 14.2 G/DL (ref 11.5–15.4)
HGB UR QL STRIP.AUTO: ABNORMAL
INR PPP: 0.91 (ref 0.86–1.17)
KETONES UR STRIP-MCNC: NEGATIVE MG/DL
LEUKOCYTE ESTERASE UR QL STRIP: NEGATIVE
LIPASE SERPL-CCNC: 241 U/L (ref 73–393)
LYMPHOCYTES # BLD AUTO: 2.9 THOUSANDS/ΜL (ref 0.6–4.47)
LYMPHOCYTES NFR BLD AUTO: 32 % (ref 14–44)
MCH RBC QN AUTO: 29.1 PG (ref 26.8–34.3)
MCHC RBC AUTO-ENTMCNC: 33.7 G/DL (ref 31.4–37.4)
MCV RBC AUTO: 86 FL (ref 82–98)
MONOCYTES # BLD AUTO: 0.8 THOUSAND/ΜL (ref 0.17–1.22)
MONOCYTES NFR BLD AUTO: 9 % (ref 4–12)
MUCOUS THREADS UR QL AUTO: ABNORMAL
NEUTROPHILS # BLD AUTO: 4.76 THOUSANDS/ΜL (ref 1.85–7.62)
NEUTS SEG NFR BLD AUTO: 52 % (ref 43–75)
NITRITE UR QL STRIP: NEGATIVE
NON-SQ EPI CELLS URNS QL MICRO: ABNORMAL /HPF
PH UR STRIP.AUTO: 6 [PH] (ref 4.5–8)
PLATELET # BLD AUTO: 290 THOUSANDS/UL (ref 149–390)
PMV BLD AUTO: 9.3 FL (ref 8.9–12.7)
POTASSIUM SERPL-SCNC: 3.8 MMOL/L (ref 3.5–5.3)
PROT SERPL-MCNC: 7.7 G/DL (ref 6.4–8.2)
PROT UR STRIP-MCNC: ABNORMAL MG/DL
PROTHROMBIN TIME: 12 SECONDS (ref 11.8–14.2)
RBC # BLD AUTO: 4.88 MILLION/UL (ref 3.81–5.12)
RBC #/AREA URNS AUTO: ABNORMAL /HPF
SODIUM SERPL-SCNC: 139 MMOL/L (ref 136–145)
SP GR UR STRIP.AUTO: >=1.03 (ref 1–1.03)
TSH SERPL DL<=0.05 MIU/L-ACNC: 2.18 UIU/ML (ref 0.36–3.74)
UROBILINOGEN UR QL STRIP.AUTO: 0.2 E.U./DL
WBC # BLD AUTO: 9.12 THOUSAND/UL (ref 4.31–10.16)
WBC #/AREA URNS AUTO: ABNORMAL /HPF

## 2018-06-12 PROCEDURE — C9113 INJ PANTOPRAZOLE SODIUM, VIA: HCPCS | Performed by: EMERGENCY MEDICINE

## 2018-06-12 PROCEDURE — 99285 EMERGENCY DEPT VISIT HI MDM: CPT

## 2018-06-12 PROCEDURE — 84443 ASSAY THYROID STIM HORMONE: CPT

## 2018-06-12 PROCEDURE — 74177 CT ABD & PELVIS W/CONTRAST: CPT

## 2018-06-12 PROCEDURE — 36415 COLL VENOUS BLD VENIPUNCTURE: CPT

## 2018-06-12 PROCEDURE — 96361 HYDRATE IV INFUSION ADD-ON: CPT

## 2018-06-12 PROCEDURE — 81025 URINE PREGNANCY TEST: CPT | Performed by: EMERGENCY MEDICINE

## 2018-06-12 PROCEDURE — 81001 URINALYSIS AUTO W/SCOPE: CPT

## 2018-06-12 PROCEDURE — 83690 ASSAY OF LIPASE: CPT | Performed by: EMERGENCY MEDICINE

## 2018-06-12 PROCEDURE — 96376 TX/PRO/DX INJ SAME DRUG ADON: CPT

## 2018-06-12 PROCEDURE — 99219 PR INITIAL OBSERVATION CARE/DAY 50 MINUTES: CPT | Performed by: PHYSICIAN ASSISTANT

## 2018-06-12 PROCEDURE — 3008F BODY MASS INDEX DOCD: CPT | Performed by: INTERNAL MEDICINE

## 2018-06-12 PROCEDURE — 85730 THROMBOPLASTIN TIME PARTIAL: CPT | Performed by: EMERGENCY MEDICINE

## 2018-06-12 PROCEDURE — 85025 COMPLETE CBC W/AUTO DIFF WBC: CPT | Performed by: EMERGENCY MEDICINE

## 2018-06-12 PROCEDURE — 1036F TOBACCO NON-USER: CPT | Performed by: INTERNAL MEDICINE

## 2018-06-12 PROCEDURE — 1111F DSCHRG MED/CURRENT MED MERGE: CPT | Performed by: INTERNAL MEDICINE

## 2018-06-12 PROCEDURE — 83001 ASSAY OF GONADOTROPIN (FSH): CPT

## 2018-06-12 PROCEDURE — 99214 OFFICE O/P EST MOD 30 MIN: CPT | Performed by: INTERNAL MEDICINE

## 2018-06-12 PROCEDURE — 96375 TX/PRO/DX INJ NEW DRUG ADDON: CPT

## 2018-06-12 PROCEDURE — 85610 PROTHROMBIN TIME: CPT | Performed by: EMERGENCY MEDICINE

## 2018-06-12 PROCEDURE — 80053 COMPREHEN METABOLIC PANEL: CPT | Performed by: EMERGENCY MEDICINE

## 2018-06-12 PROCEDURE — 96374 THER/PROPH/DIAG INJ IV PUSH: CPT

## 2018-06-12 RX ORDER — SUCRALFATE ORAL 1 G/10ML
1000 SUSPENSION ORAL EVERY 6 HOURS SCHEDULED
Status: DISCONTINUED | OUTPATIENT
Start: 2018-06-13 | End: 2018-06-13 | Stop reason: HOSPADM

## 2018-06-12 RX ORDER — ALBUTEROL SULFATE 90 UG/1
2 AEROSOL, METERED RESPIRATORY (INHALATION) EVERY 6 HOURS PRN
Status: DISCONTINUED | OUTPATIENT
Start: 2018-06-12 | End: 2018-06-13 | Stop reason: HOSPADM

## 2018-06-12 RX ORDER — PANTOPRAZOLE SODIUM 40 MG/1
40 INJECTION, POWDER, FOR SOLUTION INTRAVENOUS ONCE
Status: COMPLETED | OUTPATIENT
Start: 2018-06-12 | End: 2018-06-12

## 2018-06-12 RX ORDER — MAGNESIUM HYDROXIDE/ALUMINUM HYDROXICE/SIMETHICONE 120; 1200; 1200 MG/30ML; MG/30ML; MG/30ML
15 SUSPENSION ORAL EVERY 6 HOURS PRN
Status: DISCONTINUED | OUTPATIENT
Start: 2018-06-12 | End: 2018-06-13 | Stop reason: HOSPADM

## 2018-06-12 RX ORDER — ALPRAZOLAM 0.25 MG/1
0.25 TABLET ORAL 2 TIMES DAILY PRN
Status: DISCONTINUED | OUTPATIENT
Start: 2018-06-12 | End: 2018-06-13 | Stop reason: HOSPADM

## 2018-06-12 RX ORDER — ONDANSETRON 2 MG/ML
4 INJECTION INTRAMUSCULAR; INTRAVENOUS ONCE
Status: COMPLETED | OUTPATIENT
Start: 2018-06-12 | End: 2018-06-12

## 2018-06-12 RX ORDER — SODIUM CHLORIDE 9 MG/ML
125 INJECTION, SOLUTION INTRAVENOUS CONTINUOUS
Status: DISCONTINUED | OUTPATIENT
Start: 2018-06-12 | End: 2018-06-12

## 2018-06-12 RX ORDER — SODIUM CHLORIDE 9 MG/ML
100 INJECTION, SOLUTION INTRAVENOUS CONTINUOUS
Status: DISPENSED | OUTPATIENT
Start: 2018-06-12 | End: 2018-06-13

## 2018-06-12 RX ORDER — ZOLPIDEM TARTRATE 10 MG/1
TABLET ORAL
Qty: 30 TABLET | Refills: 0 | Status: CANCELLED | OUTPATIENT
Start: 2018-06-12

## 2018-06-12 RX ORDER — ZOLPIDEM TARTRATE 5 MG/1
10 TABLET ORAL
Status: DISCONTINUED | OUTPATIENT
Start: 2018-06-12 | End: 2018-06-13 | Stop reason: HOSPADM

## 2018-06-12 RX ORDER — PANTOPRAZOLE SODIUM 40 MG/1
40 INJECTION, POWDER, FOR SOLUTION INTRAVENOUS EVERY 12 HOURS SCHEDULED
Status: DISCONTINUED | OUTPATIENT
Start: 2018-06-12 | End: 2018-06-12

## 2018-06-12 RX ORDER — LORATADINE 10 MG/1
10 TABLET ORAL DAILY
Status: DISCONTINUED | OUTPATIENT
Start: 2018-06-13 | End: 2018-06-13 | Stop reason: HOSPADM

## 2018-06-12 RX ORDER — SERTRALINE HYDROCHLORIDE 100 MG/1
100 TABLET, FILM COATED ORAL
Status: DISCONTINUED | OUTPATIENT
Start: 2018-06-12 | End: 2018-06-13 | Stop reason: HOSPADM

## 2018-06-12 RX ORDER — ONDANSETRON 2 MG/ML
4 INJECTION INTRAMUSCULAR; INTRAVENOUS EVERY 6 HOURS PRN
Status: DISCONTINUED | OUTPATIENT
Start: 2018-06-12 | End: 2018-06-13 | Stop reason: HOSPADM

## 2018-06-12 RX ORDER — PANTOPRAZOLE SODIUM 40 MG/1
40 INJECTION, POWDER, FOR SOLUTION INTRAVENOUS EVERY 12 HOURS SCHEDULED
Status: DISCONTINUED | OUTPATIENT
Start: 2018-06-13 | End: 2018-06-13 | Stop reason: HOSPADM

## 2018-06-12 RX ORDER — METOCLOPRAMIDE HYDROCHLORIDE 5 MG/ML
10 INJECTION INTRAMUSCULAR; INTRAVENOUS ONCE
Status: COMPLETED | OUTPATIENT
Start: 2018-06-12 | End: 2018-06-12

## 2018-06-12 RX ORDER — ACETAMINOPHEN 325 MG/1
650 TABLET ORAL EVERY 6 HOURS PRN
Status: DISCONTINUED | OUTPATIENT
Start: 2018-06-12 | End: 2018-06-13 | Stop reason: HOSPADM

## 2018-06-12 RX ADMIN — SODIUM CHLORIDE 100 ML/HR: 0.9 INJECTION, SOLUTION INTRAVENOUS at 21:57

## 2018-06-12 RX ADMIN — ONDANSETRON 4 MG: 2 INJECTION INTRAMUSCULAR; INTRAVENOUS at 18:51

## 2018-06-12 RX ADMIN — SERTRALINE HYDROCHLORIDE 100 MG: 100 TABLET ORAL at 21:56

## 2018-06-12 RX ADMIN — ONDANSETRON 4 MG: 2 INJECTION INTRAMUSCULAR; INTRAVENOUS at 17:19

## 2018-06-12 RX ADMIN — PANTOPRAZOLE SODIUM 40 MG: 40 INJECTION, POWDER, FOR SOLUTION INTRAVENOUS at 17:21

## 2018-06-12 RX ADMIN — SODIUM CHLORIDE 1000 ML: 0.9 INJECTION, SOLUTION INTRAVENOUS at 17:19

## 2018-06-12 RX ADMIN — METOCLOPRAMIDE 10 MG: 5 INJECTION, SOLUTION INTRAMUSCULAR; INTRAVENOUS at 19:57

## 2018-06-12 RX ADMIN — HYDROMORPHONE HYDROCHLORIDE 0.5 MG: 1 INJECTION, SOLUTION INTRAMUSCULAR; INTRAVENOUS; SUBCUTANEOUS at 17:20

## 2018-06-12 RX ADMIN — SODIUM CHLORIDE 125 ML/HR: 0.9 INJECTION, SOLUTION INTRAVENOUS at 20:23

## 2018-06-12 RX ADMIN — ZOLPIDEM TARTRATE 10 MG: 5 TABLET ORAL at 23:29

## 2018-06-12 RX ADMIN — IOHEXOL 100 ML: 350 INJECTION, SOLUTION INTRAVENOUS at 18:16

## 2018-06-12 NOTE — PROGRESS NOTES
Assessment/Plan:     Diagnoses and all orders for this visit:    Intractable vomiting with nausea, unspecified vomiting type  Comments:  s/p multifocal pneumonia, gastritis but sx worsening iwth weight loss/low BP  diff dx includes -> colitis, pyelonephritis, duodenal ulcer disease? Fever, unspecified fever cause  -     UA w Reflex to Microscopic w Reflex to Culture -Lab Collect    Diarrhea, unspecified type    Unable to eat    Dehydration    Left flank pain  -     UA w Reflex to Microscopic w Reflex to Culture -Lab Collect    Other orders  -     Cancel: zolpidem (AMBIEN) 10 mg tablet; Take 1/2 to 1 tablet PO qHS prn      given worsening of pt's condition over last several days(no sick contacts or raw/undercooked food except for salad with robert lettuce) with fever and N/V/dehydration -> advised to go to [facility] ER now for further evaluation  Case discussed with patient access and Dr Citlali Beach dr)    Subjective:      Patient ID: Sharri Mayes is a 30 y o  female  HPI    Here for follow up and with cont'd symptoms  Was admitted in May for N/V/abd pain and found to have gastritis s/p EGD and multifocal pneumonia  Took PPI and abx and felt better, discharged home in 2 days  Saw PCP last week, after which Yulissa Mora started to feel worse with N/V/D/fever/left sided abd pain  Denies NSAIDs or new/worsening stress  Pt feels nauseous/dehydrated/'hot'/ and like she is going to vomit in my office  Temp of up to 101+F recently  Pt lost 5-6 lbs in last 2-3 weeks  Orthostatics in office with BP in low 754M systolic  No other complaints      Past Medical History:   Diagnosis Date    Anxiety     Depression     Fibroid     History of pulmonary embolus (PE)     Iliotibial band syndrome     last assessed - [id number]    Mitral valve prolapse      Vitals:    06/12/18 1535 06/12/18 1613 06/12/18 1614 06/12/18 1615   BP: 130/82 100/74 110/68 108/70   Pulse: 75 73 71 69   Resp: 18      Temp: 98 9 °F (37 2 °C)      TempSrc: Oral      SpO2: 98%      Weight: 103 kg (226 lb 9 6 oz)      Height: 5' 5" (1 651 m)        Body mass index is 37 71 kg/m²  No current facility-administered medications for this visit  Current Outpatient Prescriptions:     albuterol (PROVENTIL HFA,VENTOLIN HFA) 90 mcg/act inhaler, Inhale 2 puffs every 6 (six) hours as needed for wheezing, Disp: 1 Inhaler, Rfl: 1    ALPRAZolam (XANAX) 0 25 mg tablet, Take 0 25 mg by mouth, Disp: , Rfl:     Cetirizine HCl (ZYRTEC ALLERGY) 10 MG CAPS, Take 10 mg by mouth , Disp: , Rfl:     omeprazole (PriLOSEC) 40 MG capsule, Take 1 capsule (40 mg total) by mouth daily, Disp: 30 capsule, Rfl: 1    sertraline (ZOLOFT) 100 mg tablet, Take 100 mg by mouth daily at bedtime  , Disp: , Rfl:     zolpidem (AMBIEN) 10 mg tablet, Take 1/2 to 1 tablet PO qHS prn, Disp: 30 tablet, Rfl: 0    Facility-Administered Medications Ordered in Other Visits:     HYDROmorphone (DILAUDID) injection 0 5 mg, 0 5 mg, Intravenous, Once, Shaina K Turock, DO    pantoprazole (PROTONIX) injection 40 mg, 40 mg, Intravenous, Once, Shaina K Turock, DO    sodium chloride 0 9 % bolus 1,000 mL, 1,000 mL, Intravenous, Once, Shaina K Turock, DO, Last Rate: 1,000 mL/hr at 06/12/18 1719, 1,000 mL at 06/12/18 1719  Allergies   Allergen Reactions    Penicillin G Hives         Review of Systems   Constitutional: Positive for appetite change, chills, fatigue and fever  HENT: Negative for congestion  Eyes: Negative for visual disturbance  Respiratory: Negative for shortness of breath  Cardiovascular: Negative for chest pain  Gastrointestinal: Positive for abdominal pain, diarrhea, nausea and vomiting  Negative for blood in stool  Endocrine: Negative for polyuria  Genitourinary: Positive for flank pain  Musculoskeletal: Positive for arthralgias and myalgias  Neurological: Negative for numbness  Psychiatric/Behavioral: Negative for dysphoric mood           Objective:      BP 108/70   Pulse 69   Temp 98 9 °F (37 2 °C) (Oral)   Resp 18   Ht 5' 5" (1 651 m)   Wt 103 kg (226 lb 9 6 oz)   SpO2 98%   BMI 37 71 kg/m²          Physical Exam   Constitutional: She is oriented to person, place, and time  She appears well-developed and well-nourished  She appears distressed (due to N/V/abd pain, is holding an emesis basin in my office in case she vomits)  HENT:   Head: Normocephalic and atraumatic  Mouth/Throat: Oropharynx is clear and moist    Eyes: Conjunctivae are normal  Pupils are equal, round, and reactive to light  Cardiovascular: Normal rate, regular rhythm and normal heart sounds  No murmur heard  Pulmonary/Chest: Effort normal and breath sounds normal  She has no wheezes  She has no rales  Abdominal: Soft  Bowel sounds are normal  There is tenderness in the left upper quadrant  There is guarding (voluntary) and CVA tenderness (on left)  There is no rebound and negative Vasquez's sign  Musculoskeletal: She exhibits no edema  Neurological: She is alert and oriented to person, place, and time  Skin: She is diaphoretic  Psychiatric: She has a normal mood and affect  Her behavior is normal    Vitals reviewed

## 2018-06-12 NOTE — ED NOTES
Patient returned from [facility]  06/12/18 [address] [place] [address]  06/12/18 8332

## 2018-06-12 NOTE — LETTER
June 12, 2018     Patient: May Rai   YOB: 1980   Date of Visit: 6/12/2018       To Whom it May Concern:    Juana Carlota is under my professional care  She was seen in my office on 6/12/2018  If you have any questions or concerns, please don't hesitate to call           Sincerely,          Ebony Tomlinson, DO

## 2018-06-12 NOTE — ED PROVIDER NOTES
History  Chief Complaint   Patient presents with    Abdominal Pain     pt presents via hospital wheelchair with c/o LUQ pain x several weeks, vomiting and diarrhae  per pt recent EGD revealed erosion       History provided by:  Patient   used: No    Abdominal Pain   Pain location:  Epigastric and LUQ  Pain quality: gnawing    Pain radiates to:  Does not radiate  Onset quality:  Gradual  Timing:  Constant  Progression:  Worsening  Chronicity:  New  Context: not alcohol use and not trauma    Relieved by:  Nothing  Worsened by:  Eating  Associated symptoms: anorexia, diarrhea and vomiting    Associated symptoms: no chest pain, no cough, no fatigue, no fever, no hematuria and no shortness of breath    Diarrhea:     Quality:  Explosive    Severity:  Moderate    Timing:  Intermittent    Progression:  Unchanged  Vomiting:     Quality:  Stomach contents    Severity:  Moderate    Timing:  Intermittent    Progression:  Unchanged  Risk factors: recent hospitalization    Risk factors: no alcohol abuse and no NSAID use        Prior to Admission Medications   Prescriptions Last Dose Informant Patient Reported? Taking? ALPRAZolam (XANAX) 0 25 mg tablet 6/11/2018 at Unknown time Self Yes Yes   Sig: Take 0 25 mg by mouth   Cetirizine HCl (ZYRTEC ALLERGY) 10 MG CAPS 6/12/2018 at Unknown time Self Yes Yes   Sig: Take 10 mg by mouth  albuterol (PROVENTIL HFA,VENTOLIN HFA) 90 mcg/act inhaler Past Month at Unknown time Self No Yes   Sig: Inhale 2 puffs every 6 (six) hours as needed for wheezing   omeprazole (PriLOSEC) 40 MG capsule 6/12/2018 at Unknown time Self No Yes   Sig: Take 1 capsule (40 mg total) by mouth daily   sertraline (ZOLOFT) 100 mg tablet 6/11/2018 at Unknown time Self Yes Yes   Sig: Take 100 mg by mouth daily at bedtime     zolpidem (AMBIEN) 10 mg tablet 6/11/2018 at Unknown time Self No Yes   Sig: Take 1/2 to 1 tablet PO qHS prn   Patient taking differently: 10 mg daily at bedtime as needed Take 1/2 to 1 tablet PO qHS prn       Facility-Administered Medications: None       Past Medical History:   Diagnosis Date    Anxiety     Depression     Fibroid     History of pulmonary embolus (PE)     Iliotibial band syndrome     last assessed - [id number]    Mitral valve prolapse        Past Surgical History:   Procedure Laterality Date    CHOLECYSTECTOMY      ESOPHAGOGASTRODUODENOSCOPY N/A 5/31/2018    Procedure: ESOPHAGOGASTRODUODENOSCOPY (EGD); Surgeon: Dante Vogel MD;  Location: [facility]; Service: Gastroenterology    GALLBLADDER SURGERY      KNEE SURGERY Left     growth plate fx left knee    KNEE SURGERY Right     TUBAL LIGATION      WISDOM TOOTH EXTRACTION         Family History   Problem Relation Age of Onset    Adopted: Yes    No Known Problems Mother     Alcohol abuse Neg Hx     Substance Abuse Neg Hx     Mental illness Neg Hx     Depression Neg Hx      I have reviewed and agree with the history as documented  Social History   Substance Use Topics    Smoking status: Never Smoker    Smokeless tobacco: Never Used    Alcohol use Yes      Comment: occasional; Special occasions, rarely consumes alcohol - per [employer]        Review of Systems   Constitutional: Negative for fatigue and fever  HENT: Negative for congestion and ear pain  Eyes: Negative for discharge and redness  Respiratory: Negative for apnea, cough, shortness of breath and wheezing  Cardiovascular: Negative for chest pain  Gastrointestinal: Positive for abdominal pain, anorexia, diarrhea and vomiting  Endocrine: Negative for cold intolerance and polydipsia  Genitourinary: Negative for difficulty urinating and hematuria  Musculoskeletal: Negative for arthralgias and back pain  Skin: Negative for color change and rash  Allergic/Immunologic: Negative for environmental allergies and immunocompromised state  Neurological: Negative for numbness and headaches     Hematological: Negative for adenopathy  Does not bruise/bleed easily  Psychiatric/Behavioral: Negative for agitation and behavioral problems  Physical Exam  Physical Exam   Constitutional: She is oriented to person, place, and time  Vital signs are normal  She appears well-developed and well-nourished  Non-toxic appearance  She appears distressed  HENT:   Head: Normocephalic and atraumatic  Right Ear: Tympanic membrane and external ear normal    Left Ear: Tympanic membrane and external ear normal    Nose: Nose normal  No rhinorrhea, sinus tenderness or nasal deformity  Mouth/Throat: Uvula is midline and oropharynx is clear and moist  Normal dentition  Eyes: Conjunctivae, EOM and lids are normal  Pupils are equal, round, and reactive to light  Right eye exhibits no discharge  Left eye exhibits no discharge  Neck: Trachea normal and normal range of motion  Neck supple  No JVD present  Carotid bruit is not present  Cardiovascular: Normal rate, regular rhythm, intact distal pulses and normal pulses  No extrasystoles are present  PMI is not displaced  Pulmonary/Chest: Effort normal and breath sounds normal  No accessory muscle usage  No respiratory distress  She has no wheezes  She has no rhonchi  She has no rales  Abdominal: Soft  Normal appearance and bowel sounds are normal  She exhibits no mass  There is generalized tenderness  There is no rigidity, no rebound and no guarding  Musculoskeletal:        Right shoulder: She exhibits normal range of motion, no bony tenderness, no swelling and no deformity  Cervical back: Normal  She exhibits normal range of motion, no tenderness, no bony tenderness and no deformity  Lymphadenopathy:     She has no cervical adenopathy  She has no axillary adenopathy  Neurological: She is alert and oriented to person, place, and time  She has normal strength and normal reflexes  No cranial nerve deficit or sensory deficit  GCS eye subscore is 4  GCS verbal subscore is 5  GCS motor subscore is 6  Skin: Skin is warm and dry  No rash noted  Psychiatric: She has a normal mood and affect  Her speech is normal and behavior is normal    Nursing note and vitals reviewed        Vital Signs  ED Triage Vitals   Temperature Pulse Respirations Blood Pressure SpO2   06/12/18 1650 06/12/18 1650 06/12/18 1650 06/12/18 1650 06/12/18 1650   97 7 °F (36 5 °C) 67 18 114/77 96 %      Temp Source Heart Rate Source Patient Position - Orthostatic VS BP Location FiO2 (%)   06/12/18 1650 06/12/18 1650 06/12/18 1846 06/12/18 1846 --   Oral Monitor Sitting Right arm       Pain Score       06/12/18 1720       7           Vitals:    06/12/18 1650 06/12/18 1846   BP: 114/77 130/77   Pulse: 67 69   Patient Position - Orthostatic VS:  Sitting       Visual Acuity      ED Medications  Medications   sodium chloride 0 9 % infusion (not administered)   ondansetron (ZOFRAN) injection 4 mg (4 mg Intravenous Given 6/12/18 1719)   sodium chloride 0 9 % bolus 1,000 mL (1,000 mL Intravenous New Bag 6/12/18 1719)   HYDROmorphone (DILAUDID) injection 0 5 mg (0 5 mg Intravenous Given 6/12/18 1720)   pantoprazole (PROTONIX) injection 40 mg (40 mg Intravenous Given 6/12/18 1721)   iohexol (OMNIPAQUE) 350 MG/ML injection (MULTI-DOSE) 100 mL (100 mL Intravenous Given 6/12/18 1816)   ondansetron (ZOFRAN) injection 4 mg (4 mg Intravenous Given 6/12/18 1851)   metoclopramide (REGLAN) injection 10 mg (10 mg Intravenous Given 6/12/18 1957)       Diagnostic Studies  Results Reviewed     Procedure Component Value Units Date/Time    Urine Microscopic [[id number]]  (Abnormal) Collected:  06/12/18 1710    Lab Status:  Final result Specimen:  Urine from Urine, Clean Catch Updated:  06/12/18 1903     RBC, UA 0-1 (A) /hpf      WBC, UA 0-5 /hpf      Epithelial Cells Occasional /hpf      Bacteria, UA None Seen /hpf      Ca Oxalate Meagan, UA Occasional (A) /hpf      MUCOUS THREADS Occasional (A)    Comprehensive metabolic panel [[id number]] Collected:  06/12/18 1719    Lab Status:  Final result Specimen:  Blood from Arm, Left Updated:  06/12/18 1753     Sodium 139 mmol/L      Potassium 3 8 mmol/L      Chloride 104 mmol/L      CO2 24 mmol/L      Anion Gap 11 mmol/L      BUN 14 mg/dL      Creatinine 1 03 mg/dL      Glucose 97 mg/dL      Calcium 9 3 mg/dL      AST 19 U/L      ALT 40 U/L      Alkaline Phosphatase 94 U/L      Total Protein 7 7 g/dL      Albumin 3 8 g/dL      Total Bilirubin 0 30 mg/dL      eGFR 71 ml/min/1 73sq m     Narrative:         National Kidney Disease Education Program recommendations are as follows:  GFR calculation is accurate only with a steady state creatinine  Chronic Kidney disease less than 60 ml/min/1 73 sq  meters  Kidney failure less than 15 ml/min/1 73 sq  meters      Lipase [[id number]]  (Normal) Collected:  06/12/18 1719    Lab Status:  Final result Specimen:  Blood from Arm, Left Updated:  06/12/18 1753     Lipase 241 u/L     Protime-INR [[id number]]  (Normal) Collected:  06/12/18 1719    Lab Status:  Final result Specimen:  Blood from Arm, Left Updated:  06/12/18 1747     Protime 12 0 seconds      INR 0 91    APTT [[id number]]  (Normal) Collected:  06/12/18 1719    Lab Status:  Final result Specimen:  Blood from Arm, Left Updated:  06/12/18 1747     PTT 26 seconds     CBC and differential [[id number]]  (Abnormal) Collected:  06/12/18 1719    Lab Status:  Final result Specimen:  Blood from Arm, Left Updated:  06/12/18 1738     WBC 9 12 Thousand/uL      RBC 4 88 Million/uL      Hemoglobin 14 2 g/dL      Hematocrit 42 1 %      MCV 86 fL      MCH 29 1 pg      MCHC 33 7 g/dL      RDW 13 3 %      MPV 9 3 fL      Platelets 692 Thousands/uL      Neutrophils Relative 52 %      Lymphocytes Relative 32 %      Monocytes Relative 9 %      Eosinophils Relative 7 (H) %      Basophils Relative 1 %      Neutrophils Absolute 4 76 Thousands/µL      Lymphocytes Absolute 2 90 Thousands/µL      Monocytes Absolute 0 80 Thousand/µL      Eosinophils Absolute 0 60 Thousand/µL      Basophils Absolute 0 06 Thousands/µL     POCT pregnancy, urine [[id number]]  (Normal) Resulted:  06/12/18 1706    Lab Status:  Final result Updated:  06/12/18 1706     EXT PREG TEST UR (Ref: Negative) negative    ED Urine Macroscopic [[id number]]  (Abnormal) Collected:  06/12/18 1710    Lab Status:  Final result Specimen:  Urine Updated:  06/12/18 1705     Color, UA Yellow     Clarity, UA Clear     pH, UA 6 0     Leukocytes, UA Negative     Nitrite, UA Negative     Protein, UA Trace (A) mg/dl      Glucose, UA Negative mg/dl      Ketones, UA Negative mg/dl      Urobilinogen, UA 0 2 E U /dl      Bilirubin, UA Negative     Blood, UA Small (A)     Specific Gravity, UA >=1 030    Narrative:       CLINITEK RESULT    Clostridium difficile toxin by PCR [[id number]]     Lab Status:  No result Specimen:  Stool from Per Rectum                  CT abdomen pelvis with contrast    (Results Pending)              Procedures  Procedures       Phone Contacts  ED Phone Contact    ED Course                               MDM  Number of Diagnoses or Management Options  Cyclic vomiting syndrome: established and worsening     Amount and/or Complexity of Data Reviewed  Clinical lab tests: ordered and reviewed  Tests in the radiology section of CPT®: ordered and reviewed  Tests in the medicine section of CPT®: ordered  Review and summarize past medical records: yes  Discuss the patient with other providers: yes    Patient Progress  Patient progress: stable    CritCare Time    Disposition  Final diagnoses:   Cyclic vomiting syndrome     Time reflects when diagnosis was documented in both MDM as applicable and the Disposition within this note     Time User Action Codes Description Comment    6/12/2018  7:49 PM Sarah Bonilla  H3] Cyclic vomiting syndrome       ED Disposition     ED Disposition Condition Comment    Admit  Case was discussed with mayank bolden and the patient's admission status was agreed to be Admission Status: observation status to the service of Dr Yoandy Chinchilla   Follow-up Information    None         Patient's Medications   Discharge Prescriptions    No medications on file     No discharge procedures on file      ED Provider  Electronically Signed by           Chen Mccarty DO  06/12/18 2007

## 2018-06-12 NOTE — ED NOTES
Pt aware stool sample is needed and will provide specimen when able         Lelia Hyde RN  06/12/18 0433

## 2018-06-13 VITALS
BODY MASS INDEX: 37.47 KG/M2 | TEMPERATURE: 98.1 F | RESPIRATION RATE: 16 BRPM | OXYGEN SATURATION: 97 % | SYSTOLIC BLOOD PRESSURE: 112 MMHG | DIASTOLIC BLOOD PRESSURE: 70 MMHG | HEART RATE: 71 BPM | HEIGHT: 65 IN | WEIGHT: 224.87 LBS

## 2018-06-13 LAB — C DIFF TOX GENS STL QL NAA+PROBE: NORMAL

## 2018-06-13 PROCEDURE — 87493 C DIFF AMPLIFIED PROBE: CPT | Performed by: EMERGENCY MEDICINE

## 2018-06-13 PROCEDURE — 99217 PR OBSERVATION CARE DISCHARGE MANAGEMENT: CPT | Performed by: INTERNAL MEDICINE

## 2018-06-13 PROCEDURE — C9113 INJ PANTOPRAZOLE SODIUM, VIA: HCPCS | Performed by: PHYSICIAN ASSISTANT

## 2018-06-13 PROCEDURE — 99244 OFF/OP CNSLTJ NEW/EST MOD 40: CPT | Performed by: INTERNAL MEDICINE

## 2018-06-13 RX ORDER — ONDANSETRON 4 MG/1
4 TABLET, ORALLY DISINTEGRATING ORAL EVERY 6 HOURS PRN
Qty: 20 TABLET | Refills: 0 | Status: SHIPPED | OUTPATIENT
Start: 2018-06-13 | End: 2019-01-23 | Stop reason: ALTCHOICE

## 2018-06-13 RX ORDER — OMEPRAZOLE 40 MG/1
40 CAPSULE, DELAYED RELEASE ORAL
Qty: 60 CAPSULE | Refills: 1 | Status: SHIPPED | OUTPATIENT
Start: 2018-06-13 | End: 2018-11-06 | Stop reason: SDUPTHER

## 2018-06-13 RX ORDER — SUCRALFATE ORAL 1 G/10ML
1000 SUSPENSION ORAL EVERY 6 HOURS SCHEDULED
Qty: 420 ML | Refills: 0 | Status: SHIPPED | OUTPATIENT
Start: 2018-06-13 | End: 2019-01-23 | Stop reason: ALTCHOICE

## 2018-06-13 RX ADMIN — PANTOPRAZOLE SODIUM 40 MG: 40 INJECTION, POWDER, FOR SOLUTION INTRAVENOUS at 08:07

## 2018-06-13 RX ADMIN — SUCRALFATE 1000 MG: 1 SUSPENSION ORAL at 06:14

## 2018-06-13 RX ADMIN — SUCRALFATE 1000 MG: 1 SUSPENSION ORAL at 11:33

## 2018-06-13 RX ADMIN — ONDANSETRON 4 MG: 2 INJECTION INTRAMUSCULAR; INTRAVENOUS at 08:18

## 2018-06-13 RX ADMIN — SUCRALFATE 1000 MG: 1 SUSPENSION ORAL at 00:46

## 2018-06-13 RX ADMIN — LORATADINE 10 MG: 10 TABLET ORAL at 08:07

## 2018-06-13 NOTE — CONSULTS
Consultation - [facility]  Can Arcenio 30 y o  female MRN: [id number]  Unit/Bed#: -01 Encounter: [id number]        Consults    Reason for Consult / Principal Problem: N/V/D      ASSESSMENT AND PLAN:      Recurrent N/V/D   Epigastric pain  -hospitalized 2 weeks ago for the same she underwent an EGD which showed gastric erosions negative for H pylori  She had been started on pantoprazole once daily which she was taking   -she feels that she improves with Carafate therapy  Would recommend continuing that 4 times a day as well as increasing the pantoprazole to twice daily   -Zofran as needed  -if she is tolerating diet is okay for her to be discharged she should follow up in our office in 2-3 weeks  ______________________________________________________________________    HPI:  Patient is a 45-year-old female recently seen by the GI service on May 30th with epigastric pain nausea and vomiting after being treated with antibiotics and steroids for bronchitis  She had an EGD on May 31st which showed small erosions in the stomach negative for H pylori  She improved and was discharged home on pantoprazole once daily which she has been taking  She states she generally was improved after discharge however 2-3 days ago she started developing recurrent nausea and vomiting after eating a salad she also has diarrhea 2 to 3 times a day  She states that she has discomfort in the epigastric area  She denies fever chills  She denies NSAID use she denies sick contacts  She denies GERD  The vomiting occurred after she was trying to eat  CBC and CMP are unremarkable as well as TSH  Her vitals signs are stable  She has improved with Carafate  REVIEW OF SYSTEMS:    CONSTITUTIONAL: Denies any fever, chills, rigors, and weight loss  HEENT: No earache or tinnitus  Denies hearing loss or visual disturbances  CARDIOVASCULAR: No chest pain or palpitations     RESPIRATORY: Denies any cough, hemoptysis, shortness of breath or dyspnea on exertion  GASTROINTESTINAL: As noted in the History of Present Illness  GENITOURINARY: No problems with urination  Denies any hematuria or dysuria  NEUROLOGIC: No dizziness or vertigo, denies headaches  MUSCULOSKELETAL: Denies any muscle or joint pain  SKIN: Denies skin rashes or itching  ENDOCRINE: Denies excessive thirst  Denies intolerance to heat or cold  PSYCHOSOCIAL: Denies depression or anxiety  Denies any recent memory loss  Historical Information   Past Medical History:   Diagnosis Date    Anxiety     Depression     Fibroid     History of pulmonary embolus (PE)     Iliotibial band syndrome     last assessed - [id number]    Mitral valve prolapse      Past Surgical History:   Procedure Laterality Date    CHOLECYSTECTOMY      ESOPHAGOGASTRODUODENOSCOPY N/A 5/31/2018    Procedure: ESOPHAGOGASTRODUODENOSCOPY (EGD); Surgeon: Chito Roca MD;  Location: [facility]; Service: Gastroenterology    GALLBLADDER SURGERY      KNEE SURGERY Left     growth plate fx left knee    KNEE SURGERY Right     TUBAL LIGATION      WISDOM TOOTH EXTRACTION       Social History   History   Alcohol Use    Yes     Comment: occasional; Special occasions, rarely consumes alcohol - per [employer]     History   Drug Use No     History   Smoking Status    Never Smoker   Smokeless Tobacco    Never Used     Family History   Problem Relation Age of Onset    Adopted:  Yes    No Known Problems Mother     Alcohol abuse Neg Hx     Substance Abuse Neg Hx     Mental illness Neg Hx     Depression Neg Hx        Meds/Allergies     Prescriptions Prior to Admission   Medication    albuterol (PROVENTIL HFA,VENTOLIN HFA) 90 mcg/act inhaler    ALPRAZolam (XANAX) 0 25 mg tablet    Cetirizine HCl (ZYRTEC ALLERGY) 10 MG CAPS    omeprazole (PriLOSEC) 40 MG capsule    sertraline (ZOLOFT) 100 mg tablet    zolpidem (AMBIEN) 10 mg tablet     Current Facility-Administered Medications   Medication Dose Route Frequency    acetaminophen (TYLENOL) tablet 650 mg  650 mg Oral Q6H PRN    albuterol (PROVENTIL HFA,VENTOLIN HFA) inhaler 2 puff  2 puff Inhalation Q6H PRN    ALPRAZolam (XANAX) tablet 0 25 mg  0 25 mg Oral BID PRN    aluminum-magnesium hydroxide-simethicone (MYLANTA) 200-200-20 mg/5 mL oral suspension 15 mL  15 mL Oral Q6H PRN    loratadine (CLARITIN) tablet 10 mg  10 mg Oral Daily    ondansetron (ZOFRAN) injection 4 mg  4 mg Intravenous Q6H PRN    pantoprazole (PROTONIX) injection 40 mg  40 mg Intravenous Q12H CAROLINA    sertraline (ZOLOFT) tablet 100 mg  100 mg Oral HS    sucralfate (CARAFATE) oral suspension 1,000 mg  1,000 mg Oral Q6H CAROLINA    zolpidem (AMBIEN) tablet 10 mg  10 mg Oral HS PRN       Allergies   Allergen Reactions    Penicillin G Hives           Objective     Blood pressure 112/70, pulse 71, temperature 98 1 °F (36 7 °C), temperature source Oral, resp  rate 16, height 5' 5" (1 651 m), weight 102 kg (224 lb 13 9 oz), SpO2 97 %  Body mass index is 37 42 kg/m²  Intake/Output Summary (Last 24 hours) at 06/13/18 0956  Last data filed at 06/13/18 0841   Gross per 24 hour   Intake             1120 ml   Output                0 ml   Net             1120 ml         PHYSICAL EXAM:      General Appearance:   Alert, cooperative, no distress   HEENT:   Normocephalic, atraumatic, anicteric      Neck:  Supple, symmetrical, trachea midline   Lungs:   Clear to auscultation bilaterally; no rales, rhonchi or wheezing; respirations unlabored    Heart[de-identified]   Regular rate and rhythm; no murmur, rub, or gallop     Abdomen:   Soft, mild epigastric tenderness, non-distended; normal bowel sounds; no masses, no organomegaly    Genitalia:   Deferred    Rectal:   Deferred    Extremities:  No cyanosis, clubbing or edema    Pulses:  2+ and symmetric all extremities    Skin:  No jaundice, rashes, or lesions    Lymph nodes:  No palpable cervical lymphadenopathy        Lab Results: Admission on 06/12/2018   Component Date Value    WBC 06/12/2018 9 12     RBC 06/12/2018 4 88     Hemoglobin 06/12/2018 14 2     Hematocrit 06/12/2018 42 1     MCV 06/12/2018 86     MCH 06/12/2018 29 1     MCHC 06/12/2018 33 7     RDW 06/12/2018 13 3     MPV 06/12/2018 9 3     Platelets 55/93/0652 290     Neutrophils Relative 06/12/2018 52     Lymphocytes Relative 06/12/2018 32     Monocytes Relative 06/12/2018 9     Eosinophils Relative 06/12/2018 7*    Basophils Relative 06/12/2018 1     Neutrophils Absolute 06/12/2018 4 76     Lymphocytes Absolute 06/12/2018 2 90     Monocytes Absolute 06/12/2018 0 80     Eosinophils Absolute 06/12/2018 0 60     Basophils Absolute 06/12/2018 0 06     Protime 06/12/2018 12 0     INR 06/12/2018 0 91     PTT 06/12/2018 26     Sodium 06/12/2018 139     Potassium 06/12/2018 3 8     Chloride 06/12/2018 104     CO2 06/12/2018 24     Anion Gap 06/12/2018 11     BUN 06/12/2018 14     Creatinine 06/12/2018 1 03     Glucose 06/12/2018 97     Calcium 06/12/2018 9 3     AST 06/12/2018 19     ALT 06/12/2018 40     Alkaline Phosphatase 06/12/2018 94     Total Protein 06/12/2018 7 7     Albumin 06/12/2018 3 8     Total Bilirubin 06/12/2018 0 30     eGFR 06/12/2018 71     Lipase 06/12/2018 241     EXT PREG TEST UR (Ref: N* 06/12/2018 negative     Color, UA 06/12/2018 Yellow     Clarity, UA 06/12/2018 Clear     pH, UA 06/12/2018 6 0     Leukocytes, UA 06/12/2018 Negative     Nitrite, UA 06/12/2018 Negative     Protein, UA 06/12/2018 Trace*    Glucose, UA 06/12/2018 Negative     Ketones, UA 06/12/2018 Negative     Urobilinogen, UA 06/12/2018 0 2     Bilirubin, UA 06/12/2018 Negative     Blood, UA 06/12/2018 Small*    Specific Gravity, UA 06/12/2018 >=1 030     RBC, UA 06/12/2018 0-1*    WBC, UA 06/12/2018 0-5     Epithelial Cells 06/12/2018 Occasional     Bacteria, UA 06/12/2018 None Seen     Ca Oxalate Meagan, UA 06/12/2018 Occasional*    MUCOUS THREADS 06/12/2018 Occasional*       Imaging Studies: I have personally reviewed pertinent imaging studies

## 2018-06-13 NOTE — DISCHARGE SUMMARY
Discharge Summary - [facility]    Patient Information: Kira Reardon 30 y o  female MRN: [id number]  Unit/Bed#: -01 Encounter: [id number]    Discharging Physician / Practitioner: Ashish Scott MD  PCP: Devin Sr MD  Admission Date: 6/12/2018  Discharge Date: 06/13/18    Disposition:     Home    Reason for Admission:  Epigastric pain and nausea    Discharge Diagnoses:     Principal Problem:    Acute gastritis without hemorrhage  Active Problems:    Anemia    Anxiety    Hyperlipidemia    Insomnia    Migraine headache    Vitamin D deficiency    Gastric erosion    Non-intractable cyclical vomiting with nausea  Resolved Problems:    * No resolved hospital problems  *      Consultations During Hospital Stay:  · GI    Procedures Performed:     · CT abdomen   No evidence of bowel obstruction, colitis or diverticulitis  Normal appendix  Steatosis  No evidence of pyelonephritis or obstructive uropathy  Hospital Course:     Kira Reardon is a 30 y o  female patient who originally presented to the hospital on 6/12/2018 due to abdominal pain and nausea  She was recently diagnosed with gastric erosions  She presented with worsening nausea unable to eat well  She was admitted provided with supportive care IV pantoprazole antiemetics  Today she reports improved symptoms, she is tolerating p o  feeds and is agreeable to discharge  Patient was seen in consultation with Gastroenterology, her PP is increased to b i d  dosing, she will be discharged on Carafate  She remains hemodynamically stable, symptomatically improved and is deemed ready for discharge today  She is requested to follow up with GI on discharge      Condition at Discharge: fair     Discharge Day Visit / Exam:     Subjective:      Comfortably sitting up in bed  Tolerating p o  feeds  Nausea improved  Abdominal pain improving  Agreeable to discharge plan    Vitals: Blood Pressure: 112/70 (06/13/18 9933)  Pulse: 71 (06/13/18 0739)  Temperature: 98 1 °F (36 7 °C) (06/13/18 0739)  Temp Source: Oral (06/13/18 0739)  Respirations: 16 (06/13/18 0739)  Height: 5' 5" (165 1 cm) (06/12/18 2044)  Weight - Scale: 102 kg (224 lb 13 9 oz) (06/12/18 2044)  SpO2: 97 % (06/13/18 0739)  Exam:   Physical Exam    Obese  Short thick neck  Lungs diminished breath sounds bases  Heart sounds S1-S2 noted  Abdomen soft  Awake alert obeys simple commands  No rash      Discharge instructions/Information to patient and family:   See after visit summary for information provided to patient and family  Discharge plan discussed with the GI physician assistant and with the patient  Close outpatient follow-up with GI    Provisions for Follow-Up Care:  See after visit summary for information related to follow-up care and any pertinent home health orders  Planned Readmission: no     Discharge Statement:  I spent 45 minutes discharging the patient  This time was spent on the day of discharge  I had direct contact with the patient on the day of discharge  Greater than 50% of the total time was spent examining patient, answering all patient questions, arranging and discussing plan of care with patient as well as directly providing post-discharge instructions  Additional time then spent on discharge activities  Discharge Medications:  See after visit summary for reconciled discharge medications provided to patient and family        ** Please Note: This note has been constructed using a voice recognition system **

## 2018-06-13 NOTE — SOCIAL WORK
Not a readmission  Not a bundle  LACE 50  Cm met w pt who resides in [place] with her   Has support at home  Pt ind w adls  No dme  Drives  Employed  Her father is POA and pt has advanced directive  Copy for hospital records requested  Pt uses [facility] for RXs  No VNA hx  Will drive self home at d c  Observation notice provided  No other reported needs at this time    CM reviewed discharge planning process including the following: identifying help at home, patient preference for discharge planning needs, pharmacy preference, and availability of treatment team to discuss questions or concerns patient and/or family may have regarding understanding medications and recognizing signs and symptoms once discharged  CM also encouraged patient to follow up with all recommended appointments after discharge  Patient advised of importance for patient and family to participate in managing patients medical well being  CM name and role reviewed and Discharge Checklist provided  Encouraged patient and caregiver to review prior to discharge

## 2018-06-13 NOTE — ED NOTES
Pt's vital signs stable and hemodynamically stable  Pt (-) new arrhythmias present and (-) chest pain  Therefore, pt transported to assigned room by ED fabricio Garcia RN  06/12/18 2036

## 2018-06-13 NOTE — PLAN OF CARE
Problem: DISCHARGE PLANNING - CARE MANAGEMENT  Goal: Discharge to post-acute care or home with appropriate resources  INTERVENTIONS:  - Conduct assessment to determine patient/family and health care team treatment goals, and need for post-acute services based on payer coverage, community resources, and patient preferences, and barriers to discharge  - Address psychosocial, clinical, and financial barriers to discharge as identified in assessment in conjunction with the patient/family and health care team  - Arrange appropriate level of post-acute services according to patients   needs and preference and payer coverage in collaboration with the physician and health care team  - Communicate with and update the patient/family, physician, and health care team regarding progress on the discharge plan  - Arrange appropriate transportation to post-acute venues  Outcome: Progressing  Not a readmission  Not a bundle  LACE 50  Cm met w pt who resides in [facility] with her   Has support at home  Pt ind w adls  No dme  Drives  Employed  Her father is POA and pt has advanced directive  Copy for hospital records requested  Pt uses [facility] for RXs  No VNA hx  Will drive self home at d c  Observation notice provided  No other reported needs at this time    CM reviewed discharge planning process including the following: identifying help at home, patient preference for discharge planning needs, pharmacy preference, and availability of treatment team to discuss questions or concerns patient and/or family may have regarding understanding medications and recognizing signs and symptoms once discharged  CM also encouraged patient to follow up with all recommended appointments after discharge  Patient advised of importance for patient and family to participate in managing patients medical well being  CM name and role reviewed and Discharge Checklist provided  Encouraged patient and caregiver to review prior to discharge

## 2018-06-13 NOTE — PLAN OF CARE
DISCHARGE PLANNING     Discharge to home or other facility with appropriate resources Progressing          Knowledge Deficit     Patient/family/caregiver demonstrates understanding of disease process, treatment plan, medications, and discharge instructions Progressing

## 2018-06-13 NOTE — ASSESSMENT & PLAN NOTE
1  Admitted 5/30, EGD found erosive gastritis in the antrum without hemorrhage   -discharged on PPI and carafate tolerating full diet on dc   -bx resulted benign and negative for H  Pylori   -returns with worsening epigastric pain, n/v   -CT unremarkable, labs wnl  2  Will admit for obs, stool studies sent in ED  3  PPI IV, carafate  4  Consult GI   5   Supportive care

## 2018-06-13 NOTE — H&P
H&P- Kira Reardon 1980, 30 y o  female MRN: [id number]    Unit/Bed#: -01 Encounter: [id number]    Primary Care Provider: Devin Sr MD   Date and time admitted to hospital: 6/12/2018  4:40 PM     * Acute gastritis without hemorrhage   Assessment & Plan    1  Admitted 5/30, EGD found erosive gastritis in the antrum without hemorrhage   -discharged on PPI and carafate tolerating full diet on dc   -bx resulted benign and negative for H  Pylori   -returns with worsening epigastric pain, n/v   -CT unremarkable, labs wnl  2  Will admit for obs, stool studies sent in ED  3  PPI IV, carafate  4  Consult GI   5  Supportive care         Anxiety   Assessment & Plan    1  Continue Zoloft and prn xanax        Insomnia   Assessment & Plan    1  Ambien hs  VTE Prophylaxis: low risk VTE  / sequential compression device   Code Status: FULL  POLST: POLST form is not discussed and not completed at this time  Discussion with family: none    Anticipated Length of Stay:  Patient will be admitted on an Observation basis with an anticipated length of stay of  < 2 midnights  Justification for Hospital Stay: per plan above    Total Time for Visit, including Counseling / Coordination of Care: 30 minutes  Greater than 50% of this total time spent on direct patient counseling and coordination of care  Chief Complaint:   Epigastric pain    History of Present Illness:    Kira Reardon is a 30 y o  female with PMHx of anxiety who presents with epigastric pain  She states she was tolerating a full diet when discharged form the hospital on her last admission however she still had persisting epigastric pain and was never truly back to her baseline  She says that Thursday she developed diarrhea, nausea and vomiting  Her epigastric pain then worsened and she decided to see her PCP who ultimately told her to come to the ED    She reports about 3 episodes of diarrhea per day since Thursday unprovoked and nausea and vomiting about 4 times per day provoked by food  She has had very little appetite until receiving the anti-emetics in the ED after which now she states she is "starving"  She ate two bags of goldfish upon arrival to the floor without any nausea or vomiting  She does report a fever 101F two nights ago as well as earlier this week  Otherwise is denying all other symptoms and feel better currently after receiving carafate  She denies CP, SOB, HA, dizziness, LE edema  Review of Systems:    Review of Systems   Constitutional: Positive for appetite change and fever  HENT: Negative  Eyes: Negative  Respiratory: Negative  Cardiovascular: Negative  Gastrointestinal: Positive for abdominal pain, diarrhea and nausea  Endocrine: Negative  Genitourinary: Negative  Musculoskeletal: Negative  Skin: Negative  Allergic/Immunologic: Negative  Neurological: Negative  Hematological: Negative  Psychiatric/Behavioral: Negative  Past Medical and Surgical History:     Past Medical History:   Diagnosis Date    Anxiety     Depression     Fibroid     History of pulmonary embolus (PE)     Iliotibial band syndrome     last assessed - [id number]    Mitral valve prolapse        Past Surgical History:   Procedure Laterality Date    CHOLECYSTECTOMY      ESOPHAGOGASTRODUODENOSCOPY N/A 5/31/2018    Procedure: ESOPHAGOGASTRODUODENOSCOPY (EGD); Surgeon: Mariam Farrell MD;  [facility]; Service: Gastroenterology    GALLBLADDER SURGERY      KNEE SURGERY Left     growth plate fx left knee    KNEE SURGERY Right     TUBAL LIGATION      WISDOM TOOTH EXTRACTION         Meds/Allergies:    Prior to Admission medications    Medication Sig Start Date End Date Taking?  Authorizing Provider   albuterol (PROVENTIL HFA,VENTOLIN HFA) 90 mcg/act inhaler Inhale 2 puffs every 6 (six) hours as needed for wheezing 5/9/18  Yes Chemo Greenberg MD   ALPRAZolam Iris Liz) 0 25 mg tablet Take 0 25 mg by mouth 6/5/17  Yes Historical Provider, MD   Cetirizine HCl (ZYRTEC ALLERGY) 10 MG CAPS Take 10 mg by mouth  Yes Historical Provider, MD   omeprazole (PriLOSEC) 40 MG capsule Take 1 capsule (40 mg total) by mouth daily 5/31/18  Yes Karri Hernandez MD   sertraline (ZOLOFT) 100 mg tablet Take 100 mg by mouth daily at bedtime  Yes Historical Provider, MD   zolpidem (AMBIEN) 10 mg tablet Take 1/2 to 1 tablet PO qHS prn  Patient taking differently: 10 mg daily at bedtime as needed Take 1/2 to 1 tablet PO qHS prn  5/9/18  Yes Agustin Mcgee MD     I have reviewed home medications with patient personally  Allergies: Allergies   Allergen Reactions    Penicillin G Hives       Social History:     Marital Status: /Civil Union   Occupation: not discussed  Patient Pre-hospital Living Situation: home with  and daughter  Patient Pre-hospital Level of Mobility: independent  Patient Pre-hospital Diet Restrictions: none  Substance Use History:   History   Alcohol Use    Yes     Comment: occasional; Special occasions, rarely consumes alcohol - per [employer]     History   Smoking Status    Never Smoker   Smokeless Tobacco    Never Used     History   Drug Use No       Family History:    non-contributory    Physical Exam:     Vitals:   Blood Pressure: 126/66 (06/12/18 2044)  Pulse: 68 (06/12/18 2044)  Temperature: 97 6 °F (36 4 °C) (06/12/18 2044)  Temp Source: Oral (06/12/18 2044)  Respirations: 16 (06/12/18 2044)  Weight - Scale: 102 kg (224 lb 3 3 oz) (06/12/18 1650)  SpO2: 97 % (06/12/18 2044)    Physical Exam   Constitutional: She appears well-developed and well-nourished  No distress  HENT:   Head: Normocephalic and atraumatic  Cardiovascular: Normal rate, regular rhythm, normal heart sounds and intact distal pulses  Exam reveals no gallop and no friction rub  No murmur heard  Pulmonary/Chest: Effort normal and breath sounds normal  No respiratory distress   She has no wheezes  She has no rales  She exhibits no tenderness  Abdominal: Soft  Bowel sounds are normal  She exhibits no distension (LUQ and epigastric ttp) and no mass  There is tenderness  There is no rebound, no guarding, no tenderness at McBurney's point and negative Vasquez's sign  Musculoskeletal: Normal range of motion  She exhibits no edema or tenderness  Neurological: She is alert  Skin: Skin is warm and dry  No rash noted  She is not diaphoretic  No erythema  No pallor  Psychiatric: She has a normal mood and affect  Her behavior is normal    Nursing note and vitals reviewed  Additional Data:     Lab Results: I have personally reviewed pertinent reports  Results from last 7 days  Lab Units 06/12/18  1719   WBC Thousand/uL 9 12   HEMOGLOBIN g/dL 14 2   HEMATOCRIT % 42 1   PLATELETS Thousands/uL 290   NEUTROS PCT % 52   LYMPHS PCT % 32   MONOS PCT % 9   EOS PCT % 7*       Results from last 7 days  Lab Units 06/12/18  1719   SODIUM mmol/L 139   POTASSIUM mmol/L 3 8   CHLORIDE mmol/L 104   CO2 mmol/L 24   BUN mg/dL 14   CREATININE mg/dL 1 03   CALCIUM mg/dL 9 3   TOTAL PROTEIN g/dL 7 7   BILIRUBIN TOTAL mg/dL 0 30   ALK PHOS U/L 94   ALT U/L 40   AST U/L 19   GLUCOSE RANDOM mg/dL 97       Results from last 7 days  Lab Units 06/12/18  1719   INR  0 91               Imaging: I have personally reviewed pertinent reports  CT abdomen pelvis with contrast   Final Result by Germania Poole MD (06/12 1838)         1  No evidence of bowel obstruction, colitis or diverticulitis  Normal appendix  2   Steatosis  3   No evidence of pyelonephritis or obstructive uropathy  Workstation performed: [id number]             EKG, Pathology, and Other Studies Reviewed on Admission:   · EKG: NSR    Allscripts / Epic Records Reviewed: Yes     ** Please Note: This note has been constructed using a voice recognition system   **

## 2018-06-18 ENCOUNTER — TELEPHONE (OUTPATIENT)
Dept: INTERNAL MEDICINE CLINIC | Facility: CLINIC | Age: 38
End: 2018-06-18

## 2018-06-18 NOTE — TELEPHONE ENCOUNTER
PT WAS IN LAST WEEK FOR AN [address] WITH DR BEE  HE SENT HER TO THE ER AND SHE WAS ADMITTED FOR 1 NIGHT  SHE MISSED WORK ALL LAST WEEK AND TODAY  WORK IS REQUIRING A MEDICAL NOTE STATING IT IS OKAY FOR HER TO RETURN TO WORK TOMORROW  PLEASE ADVISE  CALL  [phone]

## 2018-06-20 DIAGNOSIS — G47.09 OTHER INSOMNIA: ICD-10-CM

## 2018-06-20 RX ORDER — ZOLPIDEM TARTRATE 10 MG/1
TABLET ORAL
Qty: 30 TABLET | Refills: 0 | OUTPATIENT
Start: 2018-06-20 | End: 2018-07-31 | Stop reason: SDUPTHER

## 2018-07-03 DIAGNOSIS — F41.9 ANXIETY: Primary | ICD-10-CM

## 2018-07-03 RX ORDER — SERTRALINE HYDROCHLORIDE 100 MG/1
100 TABLET, FILM COATED ORAL
Qty: 90 TABLET | Refills: 0 | Status: SHIPPED | OUTPATIENT
Start: 2018-07-03 | End: 2018-10-04 | Stop reason: SDUPTHER

## 2018-07-31 DIAGNOSIS — G47.09 OTHER INSOMNIA: ICD-10-CM

## 2018-07-31 RX ORDER — ZOLPIDEM TARTRATE 10 MG/1
TABLET ORAL
Qty: 30 TABLET | Refills: 0 | Status: SHIPPED | OUTPATIENT
Start: 2018-07-31 | End: 2018-08-31 | Stop reason: SDUPTHER

## 2018-08-31 DIAGNOSIS — G47.09 OTHER INSOMNIA: ICD-10-CM

## 2018-08-31 RX ORDER — ZOLPIDEM TARTRATE 10 MG/1
TABLET ORAL
Qty: 30 TABLET | Refills: 0 | Status: SHIPPED | OUTPATIENT
Start: 2018-08-31 | End: 2018-10-04 | Stop reason: SDUPTHER

## 2018-10-04 DIAGNOSIS — G47.09 OTHER INSOMNIA: ICD-10-CM

## 2018-10-04 DIAGNOSIS — F41.9 ANXIETY: ICD-10-CM

## 2018-10-05 RX ORDER — SERTRALINE HYDROCHLORIDE 100 MG/1
TABLET, FILM COATED ORAL
Qty: 30 TABLET | Refills: 1 | Status: SHIPPED | OUTPATIENT
Start: 2018-10-05 | End: 2018-12-05 | Stop reason: SDUPTHER

## 2018-10-05 RX ORDER — ZOLPIDEM TARTRATE 10 MG/1
TABLET ORAL
Qty: 30 TABLET | Refills: 0 | Status: SHIPPED | OUTPATIENT
Start: 2018-10-05 | End: 2018-11-14 | Stop reason: SDUPTHER

## 2018-11-06 DIAGNOSIS — K25.3 ACUTE GASTRIC EROSION: ICD-10-CM

## 2018-11-06 RX ORDER — OMEPRAZOLE 40 MG/1
40 CAPSULE, DELAYED RELEASE ORAL
Qty: 60 CAPSULE | Refills: 1 | Status: SHIPPED | OUTPATIENT
Start: 2018-11-06 | End: 2019-01-23 | Stop reason: SDUPTHER

## 2018-11-14 DIAGNOSIS — G47.09 OTHER INSOMNIA: ICD-10-CM

## 2018-11-15 RX ORDER — ZOLPIDEM TARTRATE 10 MG/1
TABLET ORAL
Qty: 30 TABLET | Refills: 0 | Status: SHIPPED | OUTPATIENT
Start: 2018-11-15 | End: 2018-12-20 | Stop reason: SDUPTHER

## 2018-12-05 DIAGNOSIS — F41.9 ANXIETY: ICD-10-CM

## 2018-12-05 RX ORDER — SERTRALINE HYDROCHLORIDE 100 MG/1
TABLET, FILM COATED ORAL
Qty: 30 TABLET | Refills: 0 | Status: SHIPPED | OUTPATIENT
Start: 2018-12-05 | End: 2019-01-07 | Stop reason: SDUPTHER

## 2018-12-20 DIAGNOSIS — G47.09 OTHER INSOMNIA: ICD-10-CM

## 2018-12-21 RX ORDER — ZOLPIDEM TARTRATE 10 MG/1
TABLET ORAL
Qty: 30 TABLET | Refills: 0 | Status: SHIPPED | OUTPATIENT
Start: 2018-12-21 | End: 2019-01-23 | Stop reason: SDUPTHER

## 2018-12-28 ENCOUNTER — HOSPITAL ENCOUNTER (EMERGENCY)
Facility: HOSPITAL | Age: 38
Discharge: HOME | End: 2018-12-28
Attending: EMERGENCY MEDICINE | Admitting: EMERGENCY MEDICINE
Payer: COMMERCIAL

## 2018-12-28 ENCOUNTER — APPOINTMENT (EMERGENCY)
Dept: RADIOLOGY | Facility: HOSPITAL | Age: 38
End: 2018-12-28
Payer: COMMERCIAL

## 2018-12-28 ENCOUNTER — APPOINTMENT (EMERGENCY)
Dept: CT IMAGING | Facility: HOSPITAL | Age: 38
End: 2018-12-28
Payer: COMMERCIAL

## 2018-12-28 VITALS
HEART RATE: 60 BPM | SYSTOLIC BLOOD PRESSURE: 123 MMHG | RESPIRATION RATE: 22 BRPM | BODY MASS INDEX: 41.36 KG/M2 | OXYGEN SATURATION: 97 % | TEMPERATURE: 97.9 F | HEIGHT: 65 IN | DIASTOLIC BLOOD PRESSURE: 61 MMHG | WEIGHT: 248.24 LBS

## 2018-12-28 DIAGNOSIS — K52.9 GASTROENTERITIS: Primary | ICD-10-CM

## 2018-12-28 LAB
ALBUMIN SERPL BCP-MCNC: 4.1 G/DL (ref 3.5–5)
ALP SERPL-CCNC: 118 U/L (ref 46–116)
ALT SERPL W P-5'-P-CCNC: 56 U/L (ref 12–78)
ANION GAP SERPL CALCULATED.3IONS-SCNC: 14 MMOL/L (ref 4–13)
ANISOCYTOSIS BLD QL SMEAR: PRESENT
APTT PPP: 26 SECONDS (ref 26–38)
AST SERPL W P-5'-P-CCNC: 31 U/L (ref 5–45)
BACTERIA UR QL AUTO: NORMAL /HPF
BASOPHILS # BLD MANUAL: 0 THOUSAND/UL (ref 0–0.1)
BASOPHILS NFR MAR MANUAL: 0 % (ref 0–1)
BILIRUB SERPL-MCNC: 0.3 MG/DL (ref 0.2–1)
BILIRUB UR QL STRIP: NEGATIVE
BUN SERPL-MCNC: 11 MG/DL (ref 5–25)
CALCIUM SERPL-MCNC: 9.4 MG/DL (ref 8.3–10.1)
CHLORIDE SERPL-SCNC: 102 MMOL/L (ref 100–108)
CLARITY UR: CLEAR
CO2 SERPL-SCNC: 23 MMOL/L (ref 21–32)
COLOR UR: YELLOW
CREAT SERPL-MCNC: 0.97 MG/DL (ref 0.6–1.3)
EOSINOPHIL # BLD MANUAL: 0.76 THOUSAND/UL (ref 0–0.4)
EOSINOPHIL NFR BLD MANUAL: 7 % (ref 0–6)
ERYTHROCYTE [DISTWIDTH] IN BLOOD BY AUTOMATED COUNT: 13.3 % (ref 11.6–15.1)
EXT PREG TEST URINE: NEGATIVE
GFR SERPL CREATININE-BSD FRML MDRD: 75 ML/MIN/1.73SQ M
GLUCOSE SERPL-MCNC: 132 MG/DL (ref 65–140)
GLUCOSE UR STRIP-MCNC: NEGATIVE MG/DL
HCT VFR BLD AUTO: 43.2 % (ref 34.8–46.1)
HGB BLD-MCNC: 14.2 G/DL (ref 11.5–15.4)
HGB UR QL STRIP.AUTO: ABNORMAL
INR PPP: 0.96 (ref 0.86–1.17)
KETONES UR STRIP-MCNC: NEGATIVE MG/DL
LEUKOCYTE ESTERASE UR QL STRIP: NEGATIVE
LG PLATELETS BLD QL SMEAR: PRESENT
LIPASE SERPL-CCNC: 306 U/L (ref 73–393)
LYMPHOCYTES # BLD AUTO: 28 % (ref 14–44)
LYMPHOCYTES # BLD AUTO: 3.04 THOUSAND/UL (ref 0.6–4.47)
MCH RBC QN AUTO: 29.3 PG (ref 26.8–34.3)
MCHC RBC AUTO-ENTMCNC: 32.9 G/DL (ref 31.4–37.4)
MCV RBC AUTO: 89 FL (ref 82–98)
METAMYELOCYTES NFR BLD MANUAL: 2 % (ref 0–1)
MONOCYTES # BLD AUTO: 0.43 THOUSAND/UL (ref 0–1.22)
MONOCYTES NFR BLD: 4 % (ref 4–12)
MYELOCYTES NFR BLD MANUAL: 1 % (ref 0–1)
NEUTROPHILS # BLD MANUAL: 6.19 THOUSAND/UL (ref 1.85–7.62)
NEUTS BAND NFR BLD MANUAL: 3 % (ref 0–8)
NEUTS SEG NFR BLD AUTO: 54 % (ref 43–75)
NITRITE UR QL STRIP: NEGATIVE
NON-SQ EPI CELLS URNS QL MICRO: NORMAL /HPF
NRBC BLD AUTO-RTO: 0 /100 WBCS
PH UR STRIP.AUTO: 5.5 [PH] (ref 4.5–8)
PLATELET # BLD AUTO: 289 THOUSANDS/UL (ref 149–390)
PLATELET BLD QL SMEAR: ADEQUATE
PMV BLD AUTO: 9.1 FL (ref 8.9–12.7)
POLYCHROMASIA BLD QL SMEAR: PRESENT
POTASSIUM SERPL-SCNC: 3.9 MMOL/L (ref 3.5–5.3)
PROT SERPL-MCNC: 8.4 G/DL (ref 6.4–8.2)
PROT UR STRIP-MCNC: ABNORMAL MG/DL
PROTHROMBIN TIME: 12.5 SECONDS (ref 11.8–14.2)
RBC # BLD AUTO: 4.85 MILLION/UL (ref 3.81–5.12)
RBC #/AREA URNS AUTO: NORMAL /HPF
SODIUM SERPL-SCNC: 139 MMOL/L (ref 136–145)
SP GR UR STRIP.AUTO: <=1.005 (ref 1–1.03)
TOTAL CELLS COUNTED SPEC: 100
TROPONIN I SERPL-MCNC: <0.02 NG/ML
UROBILINOGEN UR QL STRIP.AUTO: 0.2 E.U./DL
VARIANT LYMPHS # BLD AUTO: 1 %
WBC # BLD AUTO: 10.86 THOUSAND/UL (ref 4.31–10.16)
WBC #/AREA URNS AUTO: NORMAL /HPF

## 2018-12-28 PROCEDURE — 96375 TX/PRO/DX INJ NEW DRUG ADDON: CPT

## 2018-12-28 PROCEDURE — 85610 PROTHROMBIN TIME: CPT | Performed by: PHYSICIAN ASSISTANT

## 2018-12-28 PROCEDURE — 85007 BL SMEAR W/DIFF WBC COUNT: CPT | Performed by: PHYSICIAN ASSISTANT

## 2018-12-28 PROCEDURE — 81025 URINE PREGNANCY TEST: CPT | Performed by: PHYSICIAN ASSISTANT

## 2018-12-28 PROCEDURE — 80053 COMPREHEN METABOLIC PANEL: CPT | Performed by: PHYSICIAN ASSISTANT

## 2018-12-28 PROCEDURE — 36415 COLL VENOUS BLD VENIPUNCTURE: CPT | Performed by: PHYSICIAN ASSISTANT

## 2018-12-28 PROCEDURE — 96374 THER/PROPH/DIAG INJ IV PUSH: CPT

## 2018-12-28 PROCEDURE — 81001 URINALYSIS AUTO W/SCOPE: CPT

## 2018-12-28 PROCEDURE — 71046 X-RAY EXAM CHEST 2 VIEWS: CPT

## 2018-12-28 PROCEDURE — 93005 ELECTROCARDIOGRAM TRACING: CPT

## 2018-12-28 PROCEDURE — 85730 THROMBOPLASTIN TIME PARTIAL: CPT | Performed by: PHYSICIAN ASSISTANT

## 2018-12-28 PROCEDURE — 96361 HYDRATE IV INFUSION ADD-ON: CPT

## 2018-12-28 PROCEDURE — 96376 TX/PRO/DX INJ SAME DRUG ADON: CPT

## 2018-12-28 PROCEDURE — 74177 CT ABD & PELVIS W/CONTRAST: CPT

## 2018-12-28 PROCEDURE — 85027 COMPLETE CBC AUTOMATED: CPT | Performed by: PHYSICIAN ASSISTANT

## 2018-12-28 PROCEDURE — 83690 ASSAY OF LIPASE: CPT | Performed by: PHYSICIAN ASSISTANT

## 2018-12-28 PROCEDURE — 99285 EMERGENCY DEPT VISIT HI MDM: CPT

## 2018-12-28 PROCEDURE — 84484 ASSAY OF TROPONIN QUANT: CPT | Performed by: PHYSICIAN ASSISTANT

## 2018-12-28 RX ORDER — MORPHINE SULFATE 4 MG/ML
4 INJECTION, SOLUTION INTRAMUSCULAR; INTRAVENOUS ONCE
Status: COMPLETED | OUTPATIENT
Start: 2018-12-28 | End: 2018-12-28

## 2018-12-28 RX ORDER — ONDANSETRON 2 MG/ML
4 INJECTION INTRAMUSCULAR; INTRAVENOUS ONCE
Status: COMPLETED | OUTPATIENT
Start: 2018-12-28 | End: 2018-12-28

## 2018-12-28 RX ORDER — PROMETHAZINE HYDROCHLORIDE 25 MG/ML
12.5 INJECTION, SOLUTION INTRAMUSCULAR; INTRAVENOUS ONCE
Status: COMPLETED | OUTPATIENT
Start: 2018-12-28 | End: 2018-12-28

## 2018-12-28 RX ORDER — ONDANSETRON 4 MG/1
4 TABLET, ORALLY DISINTEGRATING ORAL EVERY 6 HOURS PRN
Qty: 10 TABLET | Refills: 0 | Status: SHIPPED | OUTPATIENT
Start: 2018-12-28 | End: 2019-01-23 | Stop reason: ALTCHOICE

## 2018-12-28 RX ORDER — DICYCLOMINE HCL 20 MG
20 TABLET ORAL EVERY 6 HOURS PRN
Qty: 15 TABLET | Refills: 0 | Status: SHIPPED | OUTPATIENT
Start: 2018-12-28 | End: 2019-01-23 | Stop reason: ALTCHOICE

## 2018-12-28 RX ADMIN — PROMETHAZINE HYDROCHLORIDE 12.5 MG: 25 INJECTION INTRAMUSCULAR; INTRAVENOUS at 19:12

## 2018-12-28 RX ADMIN — ONDANSETRON 4 MG: 2 INJECTION INTRAMUSCULAR; INTRAVENOUS at 16:14

## 2018-12-28 RX ADMIN — MORPHINE SULFATE 4 MG: 4 INJECTION INTRAVENOUS at 16:45

## 2018-12-28 RX ADMIN — ONDANSETRON 4 MG: 2 INJECTION INTRAMUSCULAR; INTRAVENOUS at 17:52

## 2018-12-28 RX ADMIN — SODIUM CHLORIDE 1000 ML: 0.9 INJECTION, SOLUTION INTRAVENOUS at 16:51

## 2018-12-28 RX ADMIN — IOHEXOL 100 ML: 350 INJECTION, SOLUTION INTRAVENOUS at 18:11

## 2018-12-28 NOTE — ED PROVIDER NOTES
History  Chief Complaint   Patient presents with    Abdominal Pain     pt c/o abdominal pain that radiates from LUQ into Left side chest, "so bad last night I couldn't lay down" nausea and vomiting  51-year-old female with past medical history of anxiety, pulmonary embolism, depression, cholecystectomy, who presents to the emergency department for waxing and waning left upper quadrant abdominal pain with radiation into the left chest that began yesterday and was worse today  Describes the pain as sharp 10/10 pain  Nothing makes it better or worse  Patient has had multiple episodes of nausea and non-bloody and non-bilious vomiting  Did have non bloody diarrhea today  Denies shortness of breath or coughing or wheezing  Does have increased urinary frequency, but denies dysuria or hematuria or urgency  Denies back pain  Denies fevers, chills  Denies URI sx  Patient did not take anything for pain or vomiting prior to arrival in the ED  Last BM was just prior to arrival in the ED  History provided by:  Patient   used: No        Prior to Admission Medications   Prescriptions Last Dose Informant Patient Reported? Taking? ALPRAZolam (XANAX) 0 25 mg tablet  Self Yes No   Sig: Take 0 25 mg by mouth   Cetirizine HCl (ZYRTEC ALLERGY) 10 MG CAPS  Self Yes No   Sig: Take 10 mg by mouth     albuterol (PROVENTIL HFA,VENTOLIN HFA) 90 mcg/act inhaler  Self No No   Sig: Inhale 2 puffs every 6 (six) hours as needed for wheezing   omeprazole (PriLOSEC) 40 MG capsule   No No   Sig: Take 1 capsule (40 mg total) by mouth 2 (two) times a day before meals   ondansetron (ZOFRAN-ODT) 4 mg disintegrating tablet   No No   Sig: Take 1 tablet (4 mg total) by mouth every 6 (six) hours as needed for nausea or vomiting   sertraline (ZOLOFT) 100 mg tablet   No No   Sig: TAKE ONE TABLET BY MOUTH EVERY DAY AT BEDTIME   sucralfate (CARAFATE) 1 g/10 mL suspension   No No   Sig: Take 10 mL (1,000 mg total) by mouth every 6 (six) hours   zolpidem (AMBIEN) 10 mg tablet   No No   Sig: TALE 1/2 OR 1 TABLET BY MOUTH AT BEDTIME AS NEEDED      Facility-Administered Medications: None       Past Medical History:   Diagnosis Date    Anxiety     Depression     Fibroid     History of pulmonary embolus (PE)     Iliotibial band syndrome     last assessed - [id number]    Mitral valve prolapse        Past Surgical History:   Procedure Laterality Date    CHOLECYSTECTOMY      ESOPHAGOGASTRODUODENOSCOPY N/A 5/31/2018    Procedure: ESOPHAGOGASTRODUODENOSCOPY (EGD); Surgeon: Bianca Kang MD;  [facility]; Service: Gastroenterology    GALLBLADDER SURGERY      KNEE SURGERY Left     growth plate fx left knee    KNEE SURGERY Right     TUBAL LIGATION      WISDOM TOOTH EXTRACTION         Family History   Problem Relation Age of Onset    Adopted: Yes    No Known Problems Mother     Alcohol abuse Neg Hx     Substance Abuse Neg Hx     Mental illness Neg Hx     Depression Neg Hx      I have reviewed and agree with the history as documented  Social History   Substance Use Topics    Smoking status: Never Smoker    Smokeless tobacco: Never Used    Alcohol use Yes      Comment: occasional; Special occasions, rarely consumes alcohol - per [employer]        Review of Systems   Constitutional: Negative for chills and fever  HENT: Negative  Eyes: Negative  Respiratory: Negative for cough, chest tightness, shortness of breath and wheezing  Cardiovascular: Positive for chest pain  Negative for palpitations and leg swelling  Gastrointestinal: Positive for abdominal pain, nausea and vomiting  Negative for abdominal distention, anal bleeding, blood in stool, constipation, diarrhea and rectal pain  Genitourinary: Positive for frequency  Negative for dysuria, flank pain, hematuria and urgency  Musculoskeletal: Negative for arthralgias, back pain, gait problem, joint swelling, myalgias, neck pain and neck stiffness     Skin: Negative for color change, pallor, rash and wound  Neurological: Negative for dizziness, syncope, weakness, light-headedness, numbness and headaches  Psychiatric/Behavioral: Negative  Physical Exam  Physical Exam   Constitutional: She is oriented to person, place, and time  She appears well-developed and well-nourished  She appears distressed  Appears uncomfortable  Actively vomiting and dry heaving  HENT:   Head: Normocephalic and atraumatic  Mouth/Throat: Oropharynx is clear and moist    Eyes: Pupils are equal, round, and reactive to light  Conjunctivae and EOM are normal    Neck: Normal range of motion  Neck supple  Cardiovascular: Normal rate, regular rhythm and intact distal pulses  Pulmonary/Chest: Effort normal and breath sounds normal  She has no wheezes  She has no rales  Abdominal: Soft  Bowel sounds are normal  She exhibits no distension and no mass  There is tenderness (epigastric and LUQ moderate TTP )  There is no rebound and no guarding  No hernia  No CVA Tenderness bilaterally  Musculoskeletal: Normal range of motion  She exhibits no edema or tenderness  Neurological: She is alert and oriented to person, place, and time  No cranial nerve deficit or sensory deficit  She exhibits normal muscle tone  Coordination normal    Skin: Skin is warm and dry  Capillary refill takes less than 2 seconds  She is not diaphoretic  Psychiatric: She has a normal mood and affect  Her behavior is normal    Nursing note and vitals reviewed        Vital Signs  ED Triage Vitals   Temperature Pulse Respirations Blood Pressure SpO2   12/28/18 1712 12/28/18 1353 12/28/18 1353 12/28/18 1355 12/28/18 1353   97 9 °F (36 6 °C) 83 20 140/96 98 %      Temp Source Heart Rate Source Patient Position - Orthostatic VS BP Location FiO2 (%)   12/28/18 1353 12/28/18 1353 12/28/18 1353 12/28/18 1353 --   Oral Monitor Sitting Left arm       Pain Score       12/28/18 1353       7           Vitals:    12/28/18 1355 12/28/18 1553 12/28/18 1730 12/28/18 1905   BP: 140/96 138/78 129/81 123/61   Pulse:  80 82 60   Patient Position - Orthostatic VS:  Sitting Lying Sitting       Visual Acuity      ED Medications  Medications   ondansetron (ZOFRAN) injection 4 mg (4 mg Intravenous Given 12/28/18 1614)   sodium chloride 0 9 % bolus 1,000 mL (0 mL Intravenous Stopped 12/28/18 1750)   morphine (PF) 4 mg/mL injection 4 mg (4 mg Intravenous Given 12/28/18 1645)   ondansetron (ZOFRAN) injection 4 mg (4 mg Intravenous Given 12/28/18 1752)   iohexol (OMNIPAQUE) 350 MG/ML injection (MULTI-DOSE) 100 mL (100 mL Intravenous Given 12/28/18 1811)   promethazine (PHENERGAN) injection 12 5 mg (12 5 mg Intravenous Given 12/28/18 1912)       Diagnostic Studies  Results Reviewed     Procedure Component Value Units Date/Time    CBC and differential [[id number]]  (Abnormal) Collected:  12/28/18 1651    Lab Status:  Final result Specimen:  Blood from Arm, Right Updated:  12/28/18 1825     WBC 10 86 (H) Thousand/uL      RBC 4 85 Million/uL      Hemoglobin 14 2 g/dL      Hematocrit 43 2 %      MCV 89 fL      MCH 29 3 pg      MCHC 32 9 g/dL      RDW 13 3 %      MPV 9 1 fL      Platelets 846 Thousands/uL      nRBC 0 /100 WBCs     Narrative: This is an appended report  These results have been appended to a previously verified report      Lipase [[id number]]  (Normal) Collected:  12/28/18 1651    Lab Status:  Final result Specimen:  Blood from Arm, Right Updated:  12/28/18 1804     Lipase 306 u/L     Comprehensive metabolic panel [[id number]]  (Abnormal) Collected:  12/28/18 1651    Lab Status:  Final result Specimen:  Blood from Arm, Right Updated:  12/28/18 1800     Sodium 139 mmol/L      Potassium 3 9 mmol/L      Chloride 102 mmol/L      CO2 23 mmol/L      ANION GAP 14 (H) mmol/L      BUN 11 mg/dL      Creatinine 0 97 mg/dL      Glucose 132 mg/dL      Calcium 9 4 mg/dL      AST 31 U/L      ALT 56 U/L      Alkaline Phosphatase 118 (H) U/L      Total Protein 8 4 (H) g/dL      Albumin 4 1 g/dL      Total Bilirubin 0 30 mg/dL      eGFR 75 ml/min/1 73sq m     Narrative:         National Kidney Disease Education Program recommendations are as follows:  GFR calculation is accurate only with a steady state creatinine  Chronic Kidney disease less than 60 ml/min/1 73 sq  meters  Kidney failure less than 15 ml/min/1 73 sq  meters      Troponin I [[id number]]  (Normal) Collected:  12/28/18 1651    Lab Status:  Final result Specimen:  Blood from Arm, Right Updated:  12/28/18 1740     Troponin I <0 02 ng/mL     Protime-INR [[id number]]  (Normal) Collected:  12/28/18 1651    Lab Status:  Final result Specimen:  Blood from Arm, Right Updated:  12/28/18 1728     Protime 12 5 seconds      INR 0 96    APTT [[id number]]  (Normal) Collected:  12/28/18 1651    Lab Status:  Final result Specimen:  Blood from Arm, Right Updated:  12/28/18 1728     PTT 26 seconds     Urine Microscopic [[id number]]  (Normal) Collected:  12/28/18 1656    Lab Status:  Final result Specimen:  Urine from Urine, Clean Catch Updated:  12/28/18 1726     RBC, UA None Seen /hpf      WBC, UA None Seen /hpf      Epithelial Cells None Seen /hpf      Bacteria, UA Occasional /hpf     POCT pregnancy, urine [[id number]]  (Normal) Resulted:  12/28/18 1659    Lab Status:  Final result Updated:  12/28/18 1659     EXT PREG TEST UR (Ref: Negative) Negative    ED Urine Macroscopic [[id number]]  (Abnormal) Collected:  12/28/18 1656    Lab Status:  Final result Specimen:  Urine Updated:  12/28/18 1657     Color, UA Yellow     Clarity, UA Clear     pH, UA 5 5     Leukocytes, UA Negative     Nitrite, UA Negative     Protein,  (2+) (A) mg/dl      Glucose, UA Negative mg/dl      Ketones, UA Negative mg/dl      Urobilinogen, UA 0 2 E U /dl      Bilirubin, UA Negative     Blood, UA Moderate (A)     Specific [place], UA <=1 005    Narrative:       CLINITEK RESULT                 XR chest 2 views   ED Interpretation by Lobito Pressley PA-C (12/28 1707)   No acute cardiopulmomary disease  Final Result by Miles Stuart MD (12/28 1937)      No acute cardiopulmonary disease  Workstation performed: [id number]         CT abdomen pelvis with contrast   Final Result by Guicho Frankel MD (12/28 1852)         1  No evidence of bowel obstruction, colitis or diverticulitis  2   Steatosis and hepatomegaly              Workstation performed: [id number]                    Procedures  ECG 12 Lead Documentation  Date/Time: 12/28/2018 4:45 PM  Performed by: Robert Briggs  Authorized by: Yogesh Porras     Indications / Diagnosis:  Vomiting, chest pain  Patient location:  ED  Previous ECG:     Previous ECG:  Compared to current    Similarity:  No change  Interpretation:     Interpretation: normal    Rate:     ECG rate:  95    ECG rate assessment: normal    Rhythm:     Rhythm: sinus rhythm    Ectopy:     Ectopy: none    QRS:     QRS axis:  Normal  Conduction:     Conduction: normal    ST segments:     ST segments:  Normal  T waves:     T waves: normal             Phone Contacts  ED Phone Contact    ED Course         HEART Risk Score      Most Recent Value   History  0 Filed at: 12/28/2018 1600   ECG  0 Filed at: 12/28/2018 1600   Age  0 Filed at: 12/28/2018 1600   Risk Factors  0 Filed at: 12/28/2018 1600   Troponin  0 Filed at: 12/28/2018 1600   Heart Score Risk Calculator   History  0 Filed at: 12/28/2018 1600   ECG  0 Filed at: 12/28/2018 1600   Age  0 Filed at: 12/28/2018 1600   Risk Factors  0 Filed at: 12/28/2018 1600   Troponin  0 Filed at: 12/28/2018 1600   HEART Score  0 Filed at: 12/28/2018 1600   HEART Score  0 Filed at: 12/28/2018 1600                            MDM  Number of Diagnoses or Management Options  Gastroenteritis:   Diagnosis management comments: Differential Diagnosis includes but is not limited to: hepatitis, gastritis, gastroenteritis, gastric ulcer, renal stone, pyelonephritis, UTI, pancreatitis, perforation, ACS   Labs show mild open anion gap (14)  Mild leukocytosis  Trop is negative  EKG NSR    UA shows blood, but otherwise no infection  Pain and vomiting improved following morphine and zofran  Given IV fluids in the ED  CT Abdomen/Pelvis pending  Patient signed out to Dr Bhanu Avelar pending CT scan and final dispo  Amount and/or Complexity of Data Reviewed  Clinical lab tests: ordered  Tests in the radiology section of CPT®: ordered      CritCare Time    Disposition  Final diagnoses:   Gastroenteritis     Time reflects when diagnosis was documented in both MDM as applicable and the Disposition within this note     Time User Action Codes Description Comment    12/28/2018  7:02 PM Lisbeth BURCH Add [K52 9] Gastroenteritis       ED Disposition     ED Disposition Condition Comment    Discharge  [address] discharge to home/self care  Condition at discharge: Good        Follow-up Information     Follow up With Specialties Details Why Contact Info    Moni Yoder MD Internal Medicine  As needed [address] [place] [address],[address]  [facility] [address]  [phone]            Discharge Medication List as of 12/28/2018  7:04 PM      START taking these medications    Details   dicyclomine (BENTYL) 20 mg tablet Take 1 tablet (20 mg total) by mouth every 6 (six) hours as needed (abdominal discomfort/ diarrhea), Starting Fri 12/28/2018, Print      !! ondansetron (ZOFRAN-ODT) 4 mg disintegrating tablet Take 1 tablet (4 mg total) by mouth every 6 (six) hours as needed for nausea or vomiting, Starting Fri 12/28/2018, Print       !! - Potential duplicate medications found  Please discuss with provider        CONTINUE these medications which have NOT CHANGED    Details   albuterol (PROVENTIL HFA,VENTOLIN HFA) 90 mcg/act inhaler Inhale 2 puffs every 6 (six) hours as needed for wheezing, Starting Wed 5/9/2018, Normal      ALPRAZolam (XANAX) 0 25 mg tablet Take 0 25 mg by mouth, Starting Mon 6/5/2017, Historical Med      Cetirizine HCl (ZYRTEC ALLERGY) 10 MG CAPS Take 10 mg by mouth , Historical Med      omeprazole (PriLOSEC) 40 MG capsule Take 1 capsule (40 mg total) by mouth 2 (two) times a day before meals, Starting Tue 11/6/2018, Normal      !! ondansetron (ZOFRAN-ODT) 4 mg disintegrating tablet Take 1 tablet (4 mg total) by mouth every 6 (six) hours as needed for nausea or vomiting, Starting Wed 6/13/2018, Print      sertraline (ZOLOFT) 100 mg tablet TAKE ONE TABLET BY MOUTH EVERY DAY AT BEDTIME, Normal      sucralfate (CARAFATE) 1 g/10 mL suspension Take 10 mL (1,000 mg total) by mouth every 6 (six) hours, Starting Wed 6/13/2018, Print      zolpidem (AMBIEN) 10 mg tablet TALE 1/2 OR 1 TABLET BY MOUTH AT BEDTIME AS NEEDED, Normal       !! - Potential duplicate medications found  Please discuss with provider  No discharge procedures on file      ED Provider  Electronically Signed by           Tootie Bains PA-C  12/29/18 322 Passangélica Charles PA-C  12/29/18 [id number]

## 2018-12-28 NOTE — ED CARE HANDOFF
Emergency Department Sign Out Note        Sign out and transfer of care from [facility]  See Separate Emergency Department note  The patient, Jewelene Monday, was evaluated by the previous provider for nausea, vomiting, diarrhea and left upper quadrant pain  Workup Completed:  Labs, yes  CT pending  ED Course / Workup Pending (followup):  CT scan/re-evaluation      HEART Risk Score      Most Recent Value   History  0 Filed at: 12/28/2018 1600   ECG  0 Filed at: 12/28/2018 1600   Age  0 Filed at: 12/28/2018 1600   Risk Factors  0 Filed at: 12/28/2018 1600   Troponin  0 Filed at: 12/28/2018 1600   Heart Score Risk Calculator   History  0 Filed at: 12/28/2018 1600   ECG  0 Filed at: 12/28/2018 1600   Age  0 Filed at: 12/28/2018 1600   Risk Factors  0 Filed at: 12/28/2018 1600   Troponin  0 Filed at: 12/28/2018 1600   HEART Score  0 Filed at: 12/28/2018 1600   HEART Score  0 Filed at: 12/28/2018 1600                          ED Course as of Dec 29 2025   Fri Dec 28, 2018   1910 Patient feeling much improved from that on arrival though still has some mild nausea  Promethazine will be given  Study results reviewed with patient  Providing with prescriptions for antiemetic and anti spasmodic  Patient scheduled to return to work on Monday  Work note will be given as she did leave early today  Procedures  MDM  CritCare Time      Disposition  Final diagnoses:   Gastroenteritis     Time reflects when diagnosis was documented in both MDM as applicable and the Disposition within this note     Time User Action Codes Description Comment    12/28/2018  7:02 PM Bozena BURCH Add [V22 9] Gastroenteritis       ED Disposition     ED Disposition Condition Comment    Discharge  Jewelene Monday discharge to home/self care      Condition at discharge: Good        Follow-up Information     Follow up With Specialties Details Why Rose Marie Pandey MD Internal Medicine  As needed [address] [facility]   [address] [facility]  [phone]          Discharge Medication List as of 12/28/2018  7:04 PM      START taking these medications    Details   dicyclomine (BENTYL) 20 mg tablet Take 1 tablet (20 mg total) by mouth every 6 (six) hours as needed (abdominal discomfort/ diarrhea), Starting Fri 12/28/2018, Print      !! ondansetron (ZOFRAN-ODT) 4 mg disintegrating tablet Take 1 tablet (4 mg total) by mouth every 6 (six) hours as needed for nausea or vomiting, Starting Fri 12/28/2018, Print       !! - Potential duplicate medications found  Please discuss with provider  CONTINUE these medications which have NOT CHANGED    Details   albuterol (PROVENTIL HFA,VENTOLIN HFA) 90 mcg/act inhaler Inhale 2 puffs every 6 (six) hours as needed for wheezing, Starting Wed 5/9/2018, Normal      ALPRAZolam (XANAX) 0 25 mg tablet Take 0 25 mg by mouth, Starting Mon 6/5/2017, Historical Med      Cetirizine HCl (ZYRTEC ALLERGY) 10 MG CAPS Take 10 mg by mouth , Historical Med      omeprazole (PriLOSEC) 40 MG capsule Take 1 capsule (40 mg total) by mouth 2 (two) times a day before meals, Starting Tue 11/6/2018, Normal      !! ondansetron (ZOFRAN-ODT) 4 mg disintegrating tablet Take 1 tablet (4 mg total) by mouth every 6 (six) hours as needed for nausea or vomiting, Starting Wed 6/13/2018, Print      sertraline (ZOLOFT) 100 mg tablet TAKE ONE TABLET BY MOUTH EVERY DAY AT BEDTIME, Normal      sucralfate (CARAFATE) 1 g/10 mL suspension Take 10 mL (1,000 mg total) by mouth every 6 (six) hours, Starting Wed 6/13/2018, Print      zolpidem (AMBIEN) 10 mg tablet TALE 1/2 OR 1 TABLET BY MOUTH AT BEDTIME AS NEEDED, Normal       !! - Potential duplicate medications found  Please discuss with provider  No discharge procedures on file         ED Provider  Electronically Signed by     Sudhir Hill MD  12/29/18 2025

## 2018-12-29 NOTE — DISCHARGE INSTRUCTIONS
Drink plenty of fluids to stay well hydrated  You may take zofran 4mg every 6 hours as needed for nausea and dicyclomine 20 mg every 6 hours as needed for abdominal cramping/diarrhea  Gradually advance your diet to plain foods & ultimately regular foods over the next few days as you feel improved  Gastroenteritis   WHAT YOU NEED TO KNOW:   Gastroenteritis, or stomach flu, is an infection of the stomach and intestines  DISCHARGE INSTRUCTIONS:   Call 911 for any of the following:   · You have trouble breathing or a very fast pulse  Seek care immediately if:   · You see blood in your diarrhea  · You cannot stop vomiting  · You have not urinated for 12 hours  · You feel like you are going to faint  Contact your healthcare provider if:   · You have a fever  · You continue to vomit or have diarrhea, even after treatment  · You see worms in your diarrhea  · Your mouth or eyes are dry  You are not urinating as much or as often  · You have questions or concerns about your condition or care  Medicines:   · Medicines  may be given to stop vomiting or diarrhea, decrease abdominal cramps, or treat an infection  · Take your medicine as directed  Contact your healthcare provider if you think your medicine is not helping or if you have side effects  Tell him or her if you are allergic to any medicine  Keep a list of the medicines, vitamins, and herbs you take  Include the amounts, and when and why you take them  Bring the list or the pill bottles to follow-up visits  Carry your medicine list with you in case of an emergency  Manage your symptoms:   · Drink liquids as directed  Ask your healthcare provider how much liquid to drink each day, and which liquids are best for you  You may also need to drink an oral rehydration solution (ORS)  An ORS has the right amounts of sugar, salt, and minerals in water to replace body fluids  · Eat bland foods    When you feel hungry, begin eating soft, bland foods  Examples are bananas, clear soup, potatoes, and applesauce  Do not have dairy products, alcohol, sugary drinks, or drinks with caffeine until you feel better  · Rest as much as possible  Slowly start to do more each day when you begin to feel better  Prevent the spread of gastroenteritis:  Gastroenteritis can spread easily  Keep yourself, your family, and your surroundings clean to help prevent the spread of gastroenteritis:  · Wash your hands often  Use soap and water  Wash your hands after you use the bathroom, change a child's diapers, or sneeze  Wash your hands before you prepare or eat food  · Clean surfaces and do laundry often  Wash your clothes and towels separately from the rest of the laundry  Clean surfaces in your home with antibacterial  or bleach  · Clean food thoroughly and cook safely  Wash raw vegetables before you cook  Cook meat, fish, and eggs fully  Do not use the same dishes for raw meat as you do for other foods  Refrigerate any leftover food immediately  · Be aware when you camp or travel  Drink only clean water  Do not drink from rivers or lakes unless you purify or boil the water first  When you travel, drink bottled water and do not add ice  Do not eat fruit that has not been peeled  Do not eat raw fish or meat that is not fully cooked  Follow up with your healthcare provider as directed:  Write down your questions so you remember to ask them during your visits  © 2017 [employer] Information is for End User's use only and may not be sold, redistributed or otherwise used for commercial purposes  All illustrations and images included in CareNotes® are the copyrighted property of [employer]  or Munir Rehman  The above information is an  only  It is not intended as medical advice for individual conditions or treatments   Talk to your doctor, nurse or pharmacist before following any medical regimen to see if it is safe and effective for you

## 2018-12-30 LAB
ATRIAL RATE: 96 BPM
P AXIS: 51 DEGREES
PR INTERVAL: 152 MS
QRS AXIS: 92 DEGREES
QRSD INTERVAL: 82 MS
QT INTERVAL: 356 MS
QTC INTERVAL: 449 MS
T WAVE AXIS: 61 DEGREES
VENTRICULAR RATE: 96 BPM

## 2018-12-30 PROCEDURE — 93010 ELECTROCARDIOGRAM REPORT: CPT | Performed by: INTERNAL MEDICINE

## 2019-01-07 DIAGNOSIS — F41.9 ANXIETY: ICD-10-CM

## 2019-01-07 RX ORDER — SERTRALINE HYDROCHLORIDE 100 MG/1
100 TABLET, FILM COATED ORAL
Qty: 30 TABLET | Refills: 0 | Status: SHIPPED | OUTPATIENT
Start: 2019-01-07 | End: 2019-01-23 | Stop reason: SDUPTHER

## 2019-01-23 ENCOUNTER — OFFICE VISIT (OUTPATIENT)
Dept: INTERNAL MEDICINE CLINIC | Facility: CLINIC | Age: 39
End: 2019-01-23
Payer: COMMERCIAL

## 2019-01-23 VITALS
WEIGHT: 245 LBS | TEMPERATURE: 97.3 F | DIASTOLIC BLOOD PRESSURE: 94 MMHG | RESPIRATION RATE: 16 BRPM | OXYGEN SATURATION: 94 % | HEART RATE: 61 BPM | SYSTOLIC BLOOD PRESSURE: 128 MMHG | BODY MASS INDEX: 40.82 KG/M2 | HEIGHT: 65 IN

## 2019-01-23 DIAGNOSIS — K25.3 ACUTE GASTRIC EROSION: ICD-10-CM

## 2019-01-23 DIAGNOSIS — E55.9 VITAMIN D DEFICIENCY: ICD-10-CM

## 2019-01-23 DIAGNOSIS — E78.5 HYPERLIPIDEMIA, UNSPECIFIED HYPERLIPIDEMIA TYPE: ICD-10-CM

## 2019-01-23 DIAGNOSIS — F41.9 ANXIETY: Primary | ICD-10-CM

## 2019-01-23 DIAGNOSIS — Z71.9 HEALTH COUNSELING: ICD-10-CM

## 2019-01-23 DIAGNOSIS — Z23 NEED FOR INFLUENZA VACCINATION: ICD-10-CM

## 2019-01-23 DIAGNOSIS — G47.09 OTHER INSOMNIA: ICD-10-CM

## 2019-01-23 DIAGNOSIS — K21.00 GASTROESOPHAGEAL REFLUX DISEASE WITH ESOPHAGITIS: ICD-10-CM

## 2019-01-23 PROBLEM — R10.9 STOMACH PAIN: Status: RESOLVED | Noted: 2018-06-12 | Resolved: 2019-01-23

## 2019-01-23 PROBLEM — R11.15 NON-INTRACTABLE CYCLICAL VOMITING WITH NAUSEA: Status: RESOLVED | Noted: 2018-06-07 | Resolved: 2019-01-23

## 2019-01-23 PROBLEM — K21.9 GERD (GASTROESOPHAGEAL REFLUX DISEASE): Status: ACTIVE | Noted: 2019-01-23

## 2019-01-23 PROBLEM — J18.9 MULTIFOCAL PNEUMONIA: Status: RESOLVED | Noted: 2018-05-30 | Resolved: 2019-01-23

## 2019-01-23 PROCEDURE — 99214 OFFICE O/P EST MOD 30 MIN: CPT | Performed by: INTERNAL MEDICINE

## 2019-01-23 PROCEDURE — 3008F BODY MASS INDEX DOCD: CPT | Performed by: INTERNAL MEDICINE

## 2019-01-23 PROCEDURE — 90686 IIV4 VACC NO PRSV 0.5 ML IM: CPT | Performed by: INTERNAL MEDICINE

## 2019-01-23 PROCEDURE — 90471 IMMUNIZATION ADMIN: CPT | Performed by: INTERNAL MEDICINE

## 2019-01-23 RX ORDER — SERTRALINE HYDROCHLORIDE 100 MG/1
150 TABLET, FILM COATED ORAL
Qty: 135 TABLET | Refills: 1 | Status: SHIPPED | OUTPATIENT
Start: 2019-01-23 | End: 2019-04-09 | Stop reason: SDUPTHER

## 2019-01-23 RX ORDER — OMEPRAZOLE 20 MG/1
20 CAPSULE, DELAYED RELEASE ORAL DAILY
Qty: 90 CAPSULE | Refills: 0 | Status: SHIPPED | OUTPATIENT
Start: 2019-01-23 | End: 2021-04-22

## 2019-01-23 RX ORDER — ALPRAZOLAM 0.25 MG/1
0.25 TABLET ORAL DAILY PRN
Qty: 30 TABLET | Refills: 0 | Status: SHIPPED | OUTPATIENT
Start: 2019-01-23 | End: 2019-04-09 | Stop reason: SDUPTHER

## 2019-01-23 RX ORDER — ZOLPIDEM TARTRATE 10 MG/1
TABLET ORAL
Qty: 30 TABLET | Refills: 0 | Status: SHIPPED | OUTPATIENT
Start: 2019-01-23 | End: 2019-02-28 | Stop reason: SDUPTHER

## 2019-01-23 NOTE — PROGRESS NOTES
Assessment/Plan:    Anxiety  Increase sertraline to 150 mg daily  Rare use of alprazolam   PDMP reviewed  Insomnia  Encouraged to decrease use of Ambien  Class 3 severe obesity due to excess calories without serious comorbidity with body mass index (BMI) of 40 0 to 44 9 in adult [facility])  Gained almost 20 lbs since 6 months ago  Eats fairly healthy, proper portions  Start regular aerobic exercise  Goal to lose 10 lbs by next visit  Briefly discussed weight loss medication such as Saxenda  Acute gastritis without hemorrhage  Completed treatment  GERD (gastroesophageal reflux disease)  Unable to wean off PPI due to symptoms  Given lower dose of pantoprazole  Hyperlipidemia  Lipids due  Iron deficiency anemia  Recent CBC normal     Vitamin D deficiency  On daily D3  Diagnoses and all orders for this visit:    Anxiety  -     ALPRAZolam (XANAX) 0 25 mg tablet; Take 1 tablet (0 25 mg total) by mouth daily as needed for anxiety  -     sertraline (ZOLOFT) 100 mg tablet; Take 1 5 tablets (150 mg total) by mouth daily at bedtime    Other insomnia  -     zolpidem (AMBIEN) 10 mg tablet; Take 1/2 to 1 tablet PO qHS prn  -     CBC and differential; Future  -     Comprehensive metabolic panel; Future    Hyperlipidemia, unspecified hyperlipidemia type  -     CBC and differential; Future  -     Comprehensive metabolic panel; Future  -     Lipid panel; Future  -     TSH, 3rd generation with Free T4 reflex; Future    Gastroesophageal reflux disease with esophagitis    Need for influenza vaccination  -     SYRINGE/SINGLE-DOSE VIAL: influenza vaccine, [id number], quadrivalent, 0 5 mL, preservative-free (AFLURIA, FLUARIX, FLULAVAL, FLUZONE)    Vitamin D deficiency  -     Vitamin D 25 hydroxy; Future    Acute gastric erosion  -     omeprazole (PriLOSEC) 20 mg delayed release capsule;  Take 1 capsule (20 mg total) by mouth daily    Health counseling    Other orders  -     Cancel: PREFERRED: influenza vaccine, [id number], quadrivalent, recombinant, PF, 0 5 mL (FLUBLOK)      Follow up in 4 months or as needed  Subjective:      Patient ID: Vilma Salinas is a 41 y o  female  Brett Martinez is feeling well  She reports feeling more anxious lately, unable to go to sleep  She attributes this to stress  She continues to take Ambien almost every night  She has been taking her sertraline regularly  She rarely takes alprazolam     She continues to take over-the-counter Prilosec for reflux symptoms  She completed therapy, GI did not refill her medications  She tried taking Tums or Zantac which did not help  She fell on her porch about a month ago, she was at the ER, diagnosed with a bruise plane  She currently denies any symptoms  No chest pain, shortness of breath, difficulty breathing or abdominal pain  She moves her bowels regularly  The following portions of the patient's history were reviewed and updated as appropriate: allergies, current medications, past medical history, past social history and problem list     Review of Systems   Constitutional: Negative for appetite change and fatigue  HENT: Negative for congestion and ear pain  Eyes: Negative for visual disturbance  Respiratory: Negative for cough and shortness of breath  Cardiovascular: Negative for chest pain, palpitations and leg swelling  Gastrointestinal: Negative for abdominal pain, constipation and diarrhea  Genitourinary: Negative for dysuria, frequency and urgency  Musculoskeletal: Negative for arthralgias and myalgias  Skin: Negative for rash and wound  Neurological: Negative for dizziness, numbness and headaches  Hematological: Does not bruise/bleed easily  Psychiatric/Behavioral: Positive for sleep disturbance  Negative for confusion and dysphoric mood  The patient is nervous/anxious            Objective:      /94   Pulse 61   Temp (!) 97 3 °F (36 3 °C)   Resp 16   Ht 5' 5" (1 651 m)   Wt 111 kg (245 lb) SpO2 94%   BMI 40 77 kg/m²          Physical Exam   Constitutional: She is oriented to person, place, and time  She appears well-developed and well-nourished  HENT:   Head: Normocephalic and atraumatic  Nose: Nose normal    Eyes: Pupils are equal, round, and reactive to light  Conjunctivae are normal    Neck: Neck supple  Cardiovascular: Normal rate, regular rhythm and normal heart sounds  No edema  Pulmonary/Chest: Effort normal and breath sounds normal  She has no wheezes  She has no rales  Abdominal: Soft  Bowel sounds are normal    Neurological: She is alert and oriented to person, place, and time  Skin: Skin is warm  No rash noted  Psychiatric: Her behavior is normal  Her mood appears anxious  Cognition and memory are normal    Nursing note and vitals reviewed  Lab &/or imaging results reviewed with patient

## 2019-01-23 NOTE — ASSESSMENT & PLAN NOTE
Gained almost 20 lbs since 6 months ago  Eats fairly healthy, proper portions  Start regular aerobic exercise  Goal to lose 10 lbs by next visit  Briefly discussed weight loss medication such as Saxenda

## 2019-02-16 ENCOUNTER — HOSPITAL ENCOUNTER (EMERGENCY)
Facility: HOSPITAL | Age: 39
Discharge: HOME | End: 2019-02-16
Attending: EMERGENCY MEDICINE | Admitting: EMERGENCY MEDICINE
Payer: COMMERCIAL

## 2019-02-16 ENCOUNTER — APPOINTMENT (EMERGENCY)
Dept: RADIOLOGY | Facility: HOSPITAL | Age: 39
End: 2019-02-16
Payer: COMMERCIAL

## 2019-02-16 VITALS
OXYGEN SATURATION: 99 % | SYSTOLIC BLOOD PRESSURE: 166 MMHG | DIASTOLIC BLOOD PRESSURE: 115 MMHG | HEART RATE: 88 BPM | TEMPERATURE: 97.7 F | RESPIRATION RATE: 20 BRPM

## 2019-02-16 DIAGNOSIS — S92.309A METATARSAL FRACTURE: Primary | ICD-10-CM

## 2019-02-16 PROCEDURE — 73630 X-RAY EXAM OF FOOT: CPT

## 2019-02-16 PROCEDURE — 99283 EMERGENCY DEPT VISIT LOW MDM: CPT

## 2019-02-16 RX ORDER — IBUPROFEN 600 MG/1
600 TABLET ORAL EVERY 8 HOURS PRN
Qty: 15 TABLET | Refills: 0 | Status: SHIPPED | OUTPATIENT
Start: 2019-02-16 | End: 2019-04-05 | Stop reason: ALTCHOICE

## 2019-02-16 RX ORDER — HYDROCODONE BITARTRATE AND ACETAMINOPHEN 5; 325 MG/1; MG/1
1 TABLET ORAL EVERY 6 HOURS PRN
Qty: 8 TABLET | Refills: 0 | Status: SHIPPED | OUTPATIENT
Start: 2019-02-16 | End: 2019-02-24

## 2019-02-16 RX ORDER — IBUPROFEN 400 MG/1
400 TABLET ORAL ONCE
Status: COMPLETED | OUTPATIENT
Start: 2019-02-16 | End: 2019-02-16

## 2019-02-16 RX ORDER — IBUPROFEN 600 MG/1
600 TABLET ORAL ONCE
Status: DISCONTINUED | OUTPATIENT
Start: 2019-02-16 | End: 2019-02-16 | Stop reason: HOSPADM

## 2019-02-16 RX ADMIN — IBUPROFEN 400 MG: 400 TABLET ORAL at 13:27

## 2019-02-16 NOTE — ED PROVIDER NOTES
History  Chief Complaint   Patient presents with   [address] [place] [address] Injury     Patient reports falling off of step and injurying left foot  Happened approx 1 hour ago  Patient presents emergency room after missing a step and twisting her left foot  She complains of pain over the 5th metatarsal area  She has pain that is increased with ambulation and palpation  She has no history of her previous injury  She denies any ankle lower leg or knee pain  She has a past medical history that is positive for anxiety, depression, fibroids, GERD, pulmonary embolism, iliotibial band syndrome, mitral valve prolapse, obesity  History provided by:  Patient  Foot Injury - Major   Location:  Foot  Time since incident:  1 hour  Foot location:  L foot  Pain details:     Quality:  Aching    Radiates to:  Does not radiate    Severity:  Moderate    Onset quality:  Sudden    Duration:  1 hour    Timing:  Constant    Progression:  Waxing and waning  Relieved by:  Rest  Worsened by:  Bearing weight (Palpation)  Ineffective treatments:  None tried  Associated symptoms: decreased ROM and stiffness    Associated symptoms: no back pain, no fatigue, no fever, no itching, no muscle weakness, no neck pain, no numbness, no swelling and no tingling    Risk factors: obesity    Risk factors: no concern for non-accidental trauma, no frequent fractures, no known bone disorder and no recent illness        Prior to Admission Medications   Prescriptions Last Dose Informant Patient Reported? Taking? ALPRAZolam (XANAX) 0 25 mg tablet   No No   Sig: Take 1 tablet (0 25 mg total) by mouth daily as needed for anxiety   Cetirizine HCl (ZYRTEC ALLERGY) 10 MG CAPS  Self Yes No   Sig: Take 10 mg by mouth     albuterol (PROVENTIL HFA,VENTOLIN HFA) 90 mcg/act inhaler  Self No No   Sig: Inhale 2 puffs every 6 (six) hours as needed for wheezing   omeprazole (PriLOSEC) 20 mg delayed release capsule   No No   Sig: Take 1 capsule (20 mg total) by mouth daily sertraline (ZOLOFT) 100 mg tablet   No No   Sig: Take 1 5 tablets (150 mg total) by mouth daily at bedtime   zolpidem (AMBIEN) 10 mg tablet   No No   Sig: Take 1/2 to 1 tablet PO qHS prn      Facility-Administered Medications: None       Past Medical History:   Diagnosis Date    Anxiety     Depression     Fibroid     GERD (gastroesophageal reflux disease)     History of pulmonary embolus (PE)     Iliotibial band syndrome     last assessed - [id number]    Mitral valve prolapse     Obesity        Past Surgical History:   Procedure Laterality Date    CHOLECYSTECTOMY      ESOPHAGOGASTRODUODENOSCOPY N/A 5/31/2018    Procedure: ESOPHAGOGASTRODUODENOSCOPY (EGD); Surgeon: Maude Christian MD;  [facility]; Service: Gastroenterology    GALLBLADDER SURGERY      KNEE SURGERY Left     growth plate fx left knee    KNEE SURGERY Right     TUBAL LIGATION      WISDOM TOOTH EXTRACTION         Family History   Adopted: Yes   Problem Relation Age of Onset    No Known Problems Mother     Alcohol abuse Neg Hx     Substance Abuse Neg Hx     Mental illness Neg Hx     Depression Neg Hx      I have reviewed and agree with the history as documented  Social History     Tobacco Use    Smoking status: Never Smoker    Smokeless tobacco: Never Used   Substance Use Topics    Alcohol use: Yes     Comment: occasional; Special occasions, rarely consumes alcohol - per [employer]    Drug use: No        Review of Systems   Constitutional: Positive for activity change  Negative for fatigue and fever  Musculoskeletal: Positive for arthralgias, gait problem and stiffness  Negative for back pain, joint swelling and neck pain  Skin: Negative for color change, itching, pallor, rash and wound  Psychiatric/Behavioral: Negative for confusion  All other systems reviewed and are negative  Physical Exam  Physical Exam   Constitutional: She is oriented to person, place, and time   She appears well-developed and well-nourished  She appears distressed  HENT:   Head: Normocephalic and atraumatic  Right Ear: External ear normal    Left Ear: External ear normal    Nose: Nose normal    Eyes: Conjunctivae are normal  Right eye exhibits no discharge  Left eye exhibits no discharge  Neck: Neck supple  Pulmonary/Chest: Effort normal    Musculoskeletal:   Examination of the left foot-it is atraumatic upon inspection  There is tenderness palpated over the mid to proximal aspect of the 5th metatarsal   The remainder of the foot and ankle and lower leg and knee are nontender with good range of motion  Capillary refills less than 2 sec  There are palpable dorsalis pedis and posterior tibial pulses that are +2 and symmetrical   Foot is aligned, there is no obvious deformity  Neurological: She is alert and oriented to person, place, and time  Skin: Skin is warm  Capillary refill takes less than 2 seconds  She is not diaphoretic  Psychiatric: She has a normal mood and affect  Her behavior is normal  Thought content normal    Nursing note and vitals reviewed        Vital Signs  ED Triage Vitals [02/16/19 1324]   Temperature Pulse Respirations Blood Pressure SpO2   97 7 °F (36 5 °C) 88 20 (!) 166/115 99 %      Temp Source Heart Rate Source Patient Position - Orthostatic VS BP Location FiO2 (%)   Oral Monitor Sitting Left arm --      Pain Score       Worst Possible Pain           Vitals:    02/16/19 1324   BP: (!) 166/115   Pulse: 88   Patient Position - Orthostatic VS: Sitting       Visual Acuity      ED Medications  Medications   ibuprofen (MOTRIN) tablet 600 mg (has no administration in time range)   ibuprofen (MOTRIN) tablet 400 mg (400 mg Oral Given 2/16/19 1327)       Diagnostic Studies  Results Reviewed     None                 XR foot 3+ views LEFT   ED Interpretation by Alpa Gomez PA-C (02/16 1493)   Nondisplaced linear fracture of the proximal 5th  metatarsal                  Procedures  Static Splint Application  Date/Time: 2/16/2019 2:10 PM  Performed by: Shaista Steele PA-C  Authorized by: Shaista Steele PA-C     Patient location:  Bedside  Procedure performed by emergency physician: No    Other Assisting Provider: Yes (comment)    Consent:     Consent obtained:  Verbal    Consent given by:  Patient    Risks discussed:  Discoloration, numbness, pain and swelling    Alternatives discussed:  No treatment  Universal protocol:     Procedure explained and questions answered to patient or proxy's satisfaction: yes      Site/side marked: yes      Immediately prior to procedure a time out was called: yes      Patient identity confirmed:  Verbally with patient  Indication:     Indications: fracture    Pre-procedure details:     Sensation:  Normal    Skin color:  Pink  Procedure details:     Laterality:  Left    Location:  Foot    Foot:  L foot    Strapping: no      Splint type:  Short leg    Supplies:  Ortho-Glass  Post-procedure details:     Pain:  Improved    Sensation:  Normal    Neurovascular Exam: skin pink, capillary refill <2 sec, normal pulses and skin intact, warm, and dry      Patient tolerance of procedure: Tolerated well, no immediate complications           Phone Contacts  ED Phone Contact    ED Course                               MDM  Number of Diagnoses or Management Options  Metatarsal fracture: new and requires workup     Amount and/or Complexity of Data Reviewed  Tests in the radiology section of CPT®: ordered and reviewed  Tests in the medicine section of CPT®: ordered and reviewed    Risk of Complications, Morbidity, and/or Mortality  Presenting problems: moderate  Diagnostic procedures: moderate  Management options: moderate  General comments: Patient presents emergency room after missing a step and twisting her left foot  She complains of pain over the lateral aspect of her left foot  She was seen and examined    X-rays were obtained which reveal a nondisplaced fracture of the proximal left 5th metatarsal   She is placed in a posterior splint-short leg  She was given crutches to be nonweightbearing as tolerated on the left lower extremity  She was given a prescription for Motrin to take 3 times a day for mild-to-moderate pain  She was also given a prescription for Vicodin take for moderate to severe pain  She will call make an appointment with Orthopedics for follow-up  Patient Progress  Patient progress: stable      Disposition  Final diagnoses:   Metatarsal fracture - Acute nondisplaced fracture of the proximal left 5th metatarsal      Time reflects when diagnosis was documented in both MDM as applicable and the Disposition within this note     Time User Action Codes Description Comment    2/16/2019  2:00 PM Erickson Rivera Add [D55 627G] Metatarsal fracture     2/16/2019  2:01 PM Erickson Rivera Modify [S92 309A] Metatarsal fracture Acute nondisplaced fracture of the proximal left 5th metatarsal       ED Disposition     ED Disposition Condition Date/Time Comment    Discharge Stable Sat Feb 16, 2019  2:00 PM [facility] discharge to home/self care              Follow-up Information     Follow up With Specialties Details Why Contact Info    Justa Carpio,  Orthopedic Surgery Schedule an appointment as soon as possible for a visit in 2 days  [address] [place] [address] 1192  [address] [facility] [address]  [place] [address]  [phone]            Patient's Medications   Discharge Prescriptions    HYDROCODONE-ACETAMINOPHEN (NORCO) 5-325 MG PER TABLET    Take 1 tablet by mouth every 6 (six) hours as needed for pain for up to 8 daysMax Daily Amount: 4 tablets       Start Date: 2/16/2019 End Date: 2/24/2019       Order Dose: 1 tablet       Quantity: 8 tablet    Refills: 0    IBUPROFEN (MOTRIN) 600 MG TABLET    Take 1 tablet (600 mg total) by mouth every 8 (eight) hours as needed for mild pain or moderate pain for up to 15 doses       Start Date: 2/16/2019 End Date: --       Order Dose: 600 mg       Quantity: 15 tablet    Refills: 0     No discharge procedures on file      ED Provider  Electronically Signed by           Juwan Rush PA-C  02/16/19 0748

## 2019-02-21 ENCOUNTER — TELEPHONE (OUTPATIENT)
Dept: OBGYN CLINIC | Facility: CLINIC | Age: 39
End: 2019-02-21

## 2019-02-21 ENCOUNTER — OFFICE VISIT (OUTPATIENT)
Dept: OBGYN CLINIC | Facility: CLINIC | Age: 39
End: 2019-02-21
Payer: COMMERCIAL

## 2019-02-21 VITALS
HEART RATE: 96 BPM | BODY MASS INDEX: 40.77 KG/M2 | HEIGHT: 65 IN | SYSTOLIC BLOOD PRESSURE: 155 MMHG | DIASTOLIC BLOOD PRESSURE: 85 MMHG

## 2019-02-21 DIAGNOSIS — S92.352A CLOSED DISPLACED FRACTURE OF FIFTH METATARSAL BONE OF LEFT FOOT, INITIAL ENCOUNTER: Primary | ICD-10-CM

## 2019-02-21 PROCEDURE — 99243 OFF/OP CNSLTJ NEW/EST LOW 30: CPT | Performed by: ORTHOPAEDIC SURGERY

## 2019-02-21 NOTE — TELEPHONE ENCOUNTER
We never discussed this during her appointment otherwise I would have told her that I don't prescribe narcotics for this injury  Tylenol, elevation and staying off of it will be all she needs  Narcotic pain medicine is typically not required now that her fracture is stabilized

## 2019-02-21 NOTE — TELEPHONE ENCOUNTER
Dr Paco Jesus saw you today for a fx of l foot casting and thought you were going to refill her Vicodin 5-325 mg that she was given in the ED  She uses [facility]  Please let her know once ordered or if you have any questions @ [phone]    Thank you

## 2019-02-21 NOTE — PROGRESS NOTES
BRAYDEN Huang  Attending, Orthopaedic Surgery  Foot and [address] [facility] [address] [facility]      ORTHOPAEDIC FOOT AND ANKLE CLINIC VISIT     Assessment:     Encounter Diagnosis   Name Primary?  Closed displaced fracture of fifth metatarsal bone of left foot, initial encounter Yes            Plan:   · The patient verbalized understanding of exam findings and treatment plan  We engaged in the shared decision-making process and treatment options were discussed at length with the patient  Surgical and conservative management discussed today along with risks and benefits  · Discussed the prognosis of a zone 2 [place] fracture and explained that if we do not see signs of healing on repeat xrays in 6 weeks, we will need to consider surgical intervention for fixation of the fracture  · We will place her in a cast today  · NWB LLE  · Recommend she purchase a knee rolling scooter  · Due to prior history of pulmonary embolism, we will start her on Lovenox daily for a total of 6 weeks for DVT ppx  Return in about 3 weeks (around 3/14/2019)  At that time, we will remove her cast and put her in a cam boot  History of Present Illness:   Chief Complaint:   Chief Complaint   Patient presents with   Fredy Fees is a 41 y o  female who is being seen for left foot injury  Patient reports she was at her daughter's birthday party 5 days ago when she missed a step and inverted her foot with sudden onset of pain  She was seen in the ED immediately after the injury and was found to have a De [place]a fractures of the left foot  She was placed in a splint and referred to our clinic for further management  Pain is localized at the base of the 5th metatarsal with minimal radiating and described as sharp and severe  Patient denies numbness, tingling or radicular pain  Denies history of neuropathy  Patient does not smoke, does not have diabetes and does not take blood thinners   She has prior history of PE  Patient denies family history of anesthesia complications and has not had any complications with anesthesia  Pain/symptom timing:  Worse during the day when active  Pain/symptom context:  Worse with activites and work  Pain/symptom modifying factors:  Rest makes better, activities make worse  Pain/symptom associated signs/symptoms: none    Prior treatment   · NSAIDsYes   · Injections Not Asked   · Bracing/Orthotics Yes  - she has been in a splint since the day of the injury  · Physical Therapy Not Asked     Orthopedic Surgical History:   none    Past Medical, Surgical and Social History:  Past Medical History:  has a past medical history of Anxiety, Depression, Fibroid, GERD (gastroesophageal reflux disease), History of pulmonary embolus (PE), Iliotibial band syndrome, Mitral valve prolapse, and Obesity  Problem List: does not have any pertinent problems on file  Past Surgical History:  has a past surgical history that includes Cholecystectomy; Tubal ligation; Gallbladder surgery; Knee surgery (Left); Esophagogastroduodenoscopy (N/A, 5/31/2018); Knee surgery (Right); and [place] tooth extraction  Family History: family history includes No Known Problems in her mother  She was adopted  Social History:  reports that she has never smoked  She has never used smokeless tobacco  She reports that she drinks alcohol  She reports that she does not use drugs  Current Medications: has a current medication list which includes the following prescription(s): albuterol, alprazolam, cetirizine hcl, hydrocodone-acetaminophen, ibuprofen, omeprazole, sertraline, zolpidem, and enoxaparin  Allergies: is allergic to penicillin g       Review of Systems:  General- denies fever/chills  HEENT- denies hearing loss or sore throat  Eyes- denies eye pain or visual disturbances, denies red eyes  Respiratory- denies cough or SOB  Cardio- denies chest pain or palpitations  GI- denies abdominal pain  Endocrine- denies urinary frequency  Urinary- denies pain with urination  Musculoskeletal- Negative except noted above  Skin- denies rashes or wounds  Neurological- denies dizziness or headache  Psychiatric- denies anxiety or difficulty concentrating    Physical Exam:   /85 (BP Location: Left arm, Patient Position: Sitting, Cuff Size: Adult)   Pulse 96   Ht 5' 5" (1 651 m)   BMI 40 77 kg/m²   General/Constitutional: No apparent distress: well-nourished and well developed  Eyes: normal ocular motion  Lymphatic: No appreciable lymphadenopathy  Respiratory: Non-labored breathing  Vascular: No edema, swelling or tenderness, except as noted in detailed exam   Integumentary: No impressive skin lesions present, except as noted in detailed exam   Neuro: No ataxia or tremors noted  Psych: Normal mood and affect, oriented to person, place and time  Appropriate affect  Musculoskeletal: Normal, except as noted in detailed exam and in HPI  Examination    Left    Gait Deferred due to NWB status, using walking for ambulation assistance    Musculoskeletal Tender to palpation at base of the 5th metatarsal     Skin Normal      Nails Normal    Range of Motion  full degrees dorsiflexion, full degrees plantarflexion  Subtalar motion: normal    Stability Stable    Muscle Strength Deferred due to fracture    Neurologic Normal    Sensation Intact to light touch throughout sural, saphenous, superficial peroneal, deep peroneal and medial/lateral plantar nerve distributions  [place] 5 07 filament (10g) testing deferred      Cardiovascular Brisk capillary refill < 2 seconds,intact DP and PT pulses    Special Tests None      Imaging Studies:   3 views of the left foot were taken, reviewed and interpreted independently that demonstrate a nondisplaced zone 2 [place] fracture of the base of the 5th metatarsal       Scribe Attestation    I,:   Alida Hester PA-C am acting as a scribe while in the presence of the attending physician : I,:   Catie Sloan MD personally performed the services described in this documentation    as scribed in my presence :              Amanda Chroman Lachman, MD  Foot & Ankle Surgery   Department of [address] [place] [address]      I personally performed the service  Amanda Chroman Lachman, MD

## 2019-02-28 DIAGNOSIS — G47.09 OTHER INSOMNIA: ICD-10-CM

## 2019-02-28 RX ORDER — ZOLPIDEM TARTRATE 10 MG/1
TABLET ORAL
Qty: 30 TABLET | Refills: 0 | Status: SHIPPED | OUTPATIENT
Start: 2019-02-28 | End: 2019-04-03 | Stop reason: SDUPTHER

## 2019-03-08 ENCOUNTER — OFFICE VISIT (OUTPATIENT)
Dept: OBGYN CLINIC | Facility: CLINIC | Age: 39
End: 2019-03-08
Payer: COMMERCIAL

## 2019-03-08 VITALS
DIASTOLIC BLOOD PRESSURE: 84 MMHG | HEART RATE: 108 BPM | HEIGHT: 65 IN | SYSTOLIC BLOOD PRESSURE: 144 MMHG | BODY MASS INDEX: 40.77 KG/M2

## 2019-03-08 DIAGNOSIS — S92.352D CLOSED FRACTURE OF BASE OF FIFTH METATARSAL BONE WITH ROUTINE HEALING, LEFT: Primary | ICD-10-CM

## 2019-03-08 PROCEDURE — 99213 OFFICE O/P EST LOW 20 MIN: CPT | Performed by: ORTHOPAEDIC SURGERY

## 2019-03-08 NOTE — PROGRESS NOTES
BRAYDEN Wren  Attending, Orthopaedic Surgery  Foot and [address] [address] [facility]      ORTHOPAEDIC FOOT AND ANKLE CLINIC VISIT     Assessment:     Encounter Diagnosis   Name Primary?  Closed fracture of base of fifth metatarsal bone with routine healing, left Yes            Plan:   · The patient verbalized understanding of exam findings and treatment plan  We engaged in the shared decision-making process and treatment options were discussed at length with the patient  Surgical and conservative management discussed today along with risks and benefits  · She was transitioned today from an [facility] to a [facility] boot  · NWB  For 4 more weeks  · Xray weightbearing left foot at her next visit  History of Present Illness:   Chief Complaint:   Chief Complaint   Patient presents with   Jessica Rai is a 41 y o  female who is being seen in follow-up for left rogers fracture  When we last saw she we recommended SLC and NWB  Pain has improved  Residual pain is localized at fracture site with minimal radiating and described as sharp and severe  Pain/symptom timing:  Worse during the day when active  Pain/symptom context:  Worse with activites and work  Pain/symptom modifying factors:  Rest makes better, activities make worse  Pain/symptom associated signs/symptoms: none    Prior treatment   · NSAIDsYes   · Injections No   · Bracing/Orthotics Yes    · Physical Therapy No     Orthopedic Surgical History:   See above    Past Medical, Surgical and Social History:  Past Medical History:  has a past medical history of Anxiety, Depression, Fibroid, GERD (gastroesophageal reflux disease), History of pulmonary embolus (PE), Iliotibial band syndrome, Mitral valve prolapse, and Obesity  Problem List: does not have any pertinent problems on file  Past Surgical History:  has a past surgical history that includes Cholecystectomy;  Tubal ligation; Gallbladder surgery; Knee surgery (Left); Esophagogastroduodenoscopy (N/A, 5/31/2018); Knee surgery (Right); and [place] tooth extraction  Family History: family history includes No Known Problems in her mother  She was adopted  Social History:  reports that she has never smoked  She has never used smokeless tobacco  She reports that she drinks alcohol  She reports that she does not use drugs  Current Medications: has a current medication list which includes the following prescription(s): albuterol, alprazolam, cetirizine hcl, enoxaparin, ibuprofen, omeprazole, sertraline, and zolpidem  Allergies: is allergic to penicillin g  Review of Systems:  General- denies fever/chills  HEENT- denies hearing loss or sore throat  Eyes- denies eye pain or visual disturbances, denies red eyes  Respiratory- denies cough or SOB  Cardio- denies chest pain or palpitations  GI- denies abdominal pain  Endocrine- denies urinary frequency  Urinary- denies pain with urination  Musculoskeletal- Negative except noted above  Skin- denies rashes or wounds  Neurological- denies dizziness or headache  Psychiatric- denies anxiety or difficulty concentrating    Physical Exam:   /84 (BP Location: Left arm, Patient Position: Sitting, Cuff Size: Adult)   Pulse (!) 108   Ht 5' 5" (1 651 m)   BMI 40 77 kg/m²   General/Constitutional: No apparent distress: well-nourished and well developed  Eyes: normal ocular motion  Lymphatic: No appreciable lymphadenopathy  Respiratory: Non-labored breathing  Vascular: No edema, swelling or tenderness, except as noted in detailed exam   Integumentary: No impressive skin lesions present, except as noted in detailed exam   Neuro: No ataxia or tremors noted  Psych: Normal mood and affect, oriented to person, place and time  Appropriate affect  Musculoskeletal: Normal, except as noted in detailed exam and in HPI  Examination    Left    Gait NWB LLE   Musculoskeletal Tender to palpation at fracture site    Skin Normal     Nails Normal    Range of Motion  20 degrees dorsiflexion, 30 degrees plantarflexion  Subtalar motion: normal    Stability Stable    Muscle Strength 5/5 tibialis anterior  5/5 gastrocnemius-soleus  5/5 posterior tibialis  5/5 peroneal/eversion strength  5/5 EHL  5/5 FHL    Neurologic Normal    Sensation Intact to light touch throughout sural, saphenous, superficial peroneal, deep peroneal and medial/lateral plantar nerve distributions  [place] 5 07 filament (10g) testing deferred  Cardiovascular Brisk capillary refill < 2 seconds,intact DP and PT pulses    Special Tests None      Imaging Studies:   No new imaging      James R Lachman, MD  Foot & Ankle Surgery   Department of [address] [place] [address]      I personally performed the service  Earlyne Ohms Lachman, MD

## 2019-03-19 ENCOUNTER — TELEPHONE (OUTPATIENT)
Dept: OBGYN CLINIC | Facility: CLINIC | Age: 39
End: 2019-03-19

## 2019-03-19 DIAGNOSIS — S92.352A CLOSED DISPLACED FRACTURE OF FIFTH METATARSAL BONE OF LEFT FOOT, INITIAL ENCOUNTER: ICD-10-CM

## 2019-03-19 NOTE — TELEPHONE ENCOUNTER
Patient  [phone]    Dr Alexia Fuller    Patient called stating she has 4 left of the Enoxaparin (Lovenox) 40 mg injection  Please call patient to advise if we will be refilling this

## 2019-04-03 DIAGNOSIS — G47.09 OTHER INSOMNIA: ICD-10-CM

## 2019-04-03 RX ORDER — ZOLPIDEM TARTRATE 10 MG/1
TABLET ORAL
Qty: 30 TABLET | Refills: 0 | Status: SHIPPED | OUTPATIENT
Start: 2019-04-03 | End: 2019-05-03 | Stop reason: SDUPTHER

## 2019-04-05 ENCOUNTER — APPOINTMENT (OUTPATIENT)
Dept: LAB | Facility: CLINIC | Age: 39
End: 2019-04-05
Payer: COMMERCIAL

## 2019-04-05 ENCOUNTER — LAB (OUTPATIENT)
Dept: LAB | Facility: CLINIC | Age: 39
End: 2019-04-05
Payer: COMMERCIAL

## 2019-04-05 ENCOUNTER — OFFICE VISIT (OUTPATIENT)
Dept: OBGYN CLINIC | Facility: CLINIC | Age: 39
End: 2019-04-05
Payer: COMMERCIAL

## 2019-04-05 ENCOUNTER — APPOINTMENT (OUTPATIENT)
Dept: RADIOLOGY | Facility: CLINIC | Age: 39
End: 2019-04-05
Payer: COMMERCIAL

## 2019-04-05 ENCOUNTER — OFFICE VISIT (OUTPATIENT)
Dept: INTERNAL MEDICINE CLINIC | Facility: CLINIC | Age: 39
End: 2019-04-05
Payer: COMMERCIAL

## 2019-04-05 VITALS
HEART RATE: 110 BPM | DIASTOLIC BLOOD PRESSURE: 80 MMHG | TEMPERATURE: 98 F | RESPIRATION RATE: 18 BRPM | OXYGEN SATURATION: 96 % | SYSTOLIC BLOOD PRESSURE: 126 MMHG

## 2019-04-05 VITALS
BODY MASS INDEX: 40.77 KG/M2 | HEART RATE: 116 BPM | SYSTOLIC BLOOD PRESSURE: 159 MMHG | DIASTOLIC BLOOD PRESSURE: 92 MMHG | HEIGHT: 65 IN

## 2019-04-05 DIAGNOSIS — K21.00 GASTROESOPHAGEAL REFLUX DISEASE WITH ESOPHAGITIS: ICD-10-CM

## 2019-04-05 DIAGNOSIS — S92.352D CLOSED FRACTURE OF BASE OF FIFTH METATARSAL BONE WITH ROUTINE HEALING, LEFT: ICD-10-CM

## 2019-04-05 DIAGNOSIS — S92.352D CLOSED FRACTURE OF BASE OF FIFTH METATARSAL BONE WITH ROUTINE HEALING, LEFT: Primary | ICD-10-CM

## 2019-04-05 DIAGNOSIS — N92.6 IRREGULAR PERIODS: ICD-10-CM

## 2019-04-05 DIAGNOSIS — S92.352G CLOSED FRACTURE OF BASE OF FIFTH METATARSAL BONE WITH DELAYED HEALING, LEFT: ICD-10-CM

## 2019-04-05 DIAGNOSIS — F41.9 ANXIETY: ICD-10-CM

## 2019-04-05 DIAGNOSIS — Z01.818 PRE-OP EXAMINATION: Primary | ICD-10-CM

## 2019-04-05 LAB
ANION GAP SERPL CALCULATED.3IONS-SCNC: 10 MMOL/L (ref 4–13)
ATRIAL RATE: 106 BPM
BACTERIA UR QL AUTO: ABNORMAL /HPF
BASOPHILS # BLD MANUAL: 0 THOUSAND/UL (ref 0–0.1)
BASOPHILS NFR MAR MANUAL: 0 % (ref 0–1)
BILIRUB UR QL STRIP: NEGATIVE
BUN SERPL-MCNC: 14 MG/DL (ref 5–25)
CALCIUM SERPL-MCNC: 9.5 MG/DL (ref 8.3–10.1)
CHLORIDE SERPL-SCNC: 102 MMOL/L (ref 100–108)
CLARITY UR: CLEAR
CO2 SERPL-SCNC: 26 MMOL/L (ref 21–32)
COLOR UR: YELLOW
CREAT SERPL-MCNC: 1.02 MG/DL (ref 0.6–1.3)
EOSINOPHIL # BLD MANUAL: 0.18 THOUSAND/UL (ref 0–0.4)
EOSINOPHIL NFR BLD MANUAL: 2 % (ref 0–6)
ERYTHROCYTE [DISTWIDTH] IN BLOOD BY AUTOMATED COUNT: 13.2 % (ref 11.6–15.1)
GFR SERPL CREATININE-BSD FRML MDRD: 71 ML/MIN/1.73SQ M
GLUCOSE SERPL-MCNC: 174 MG/DL (ref 65–140)
GLUCOSE UR STRIP-MCNC: NEGATIVE MG/DL
HCT VFR BLD AUTO: 41.4 % (ref 34.8–46.1)
HGB BLD-MCNC: 14 G/DL (ref 11.5–15.4)
HGB UR QL STRIP.AUTO: ABNORMAL
KETONES UR STRIP-MCNC: NEGATIVE MG/DL
LEUKOCYTE ESTERASE UR QL STRIP: NEGATIVE
LYMPHOCYTES # BLD AUTO: 1.68 THOUSAND/UL (ref 0.6–4.47)
LYMPHOCYTES # BLD AUTO: 19 % (ref 14–44)
MCH RBC QN AUTO: 29.6 PG (ref 26.8–34.3)
MCHC RBC AUTO-ENTMCNC: 33.8 G/DL (ref 31.4–37.4)
MCV RBC AUTO: 88 FL (ref 82–98)
MONOCYTES # BLD AUTO: 0.44 THOUSAND/UL (ref 0–1.22)
MONOCYTES NFR BLD: 5 % (ref 4–12)
NEUTROPHILS # BLD MANUAL: 6.09 THOUSAND/UL (ref 1.85–7.62)
NEUTS BAND NFR BLD MANUAL: 2 % (ref 0–8)
NEUTS SEG NFR BLD AUTO: 67 % (ref 43–75)
NITRITE UR QL STRIP: NEGATIVE
NON-SQ EPI CELLS URNS QL MICRO: ABNORMAL /HPF
NRBC BLD AUTO-RTO: 0 /100 WBCS
P AXIS: 54 DEGREES
PH UR STRIP.AUTO: 5.5 [PH]
PLATELET # BLD AUTO: 227 THOUSANDS/UL (ref 149–390)
PLATELET BLD QL SMEAR: ADEQUATE
PMV BLD AUTO: 9.4 FL (ref 8.9–12.7)
POTASSIUM SERPL-SCNC: 3.6 MMOL/L (ref 3.5–5.3)
PR INTERVAL: 150 MS
PROT UR STRIP-MCNC: NEGATIVE MG/DL
QRS AXIS: 81 DEGREES
QRSD INTERVAL: 74 MS
QT INTERVAL: 340 MS
QTC INTERVAL: 451 MS
RBC # BLD AUTO: 4.73 MILLION/UL (ref 3.81–5.12)
RBC #/AREA URNS AUTO: ABNORMAL /HPF
RBC MORPH BLD: NORMAL
SODIUM SERPL-SCNC: 138 MMOL/L (ref 136–145)
SP GR UR STRIP.AUTO: >=1.03 (ref 1–1.03)
T WAVE AXIS: 69 DEGREES
TOTAL CELLS COUNTED SPEC: 100
UROBILINOGEN UR QL STRIP.AUTO: 0.2 E.U./DL
VARIANT LYMPHS # BLD AUTO: 5 %
VENTRICULAR RATE: 106 BPM
WBC # BLD AUTO: 8.82 THOUSAND/UL (ref 4.31–10.16)
WBC #/AREA URNS AUTO: ABNORMAL /HPF

## 2019-04-05 PROCEDURE — 85027 COMPLETE CBC AUTOMATED: CPT

## 2019-04-05 PROCEDURE — 85007 BL SMEAR W/DIFF WBC COUNT: CPT

## 2019-04-05 PROCEDURE — 99213 OFFICE O/P EST LOW 20 MIN: CPT | Performed by: ORTHOPAEDIC SURGERY

## 2019-04-05 PROCEDURE — 80048 BASIC METABOLIC PNL TOTAL CA: CPT

## 2019-04-05 PROCEDURE — 93010 ELECTROCARDIOGRAM REPORT: CPT | Performed by: INTERNAL MEDICINE

## 2019-04-05 PROCEDURE — 99214 OFFICE O/P EST MOD 30 MIN: CPT | Performed by: INTERNAL MEDICINE

## 2019-04-05 PROCEDURE — 36415 COLL VENOUS BLD VENIPUNCTURE: CPT

## 2019-04-05 PROCEDURE — 73630 X-RAY EXAM OF FOOT: CPT

## 2019-04-05 PROCEDURE — 81001 URINALYSIS AUTO W/SCOPE: CPT | Performed by: INTERNAL MEDICINE

## 2019-04-05 PROCEDURE — 93005 ELECTROCARDIOGRAM TRACING: CPT

## 2019-04-05 RX ORDER — CHLORHEXIDINE GLUCONATE 4 G/100ML
SOLUTION TOPICAL DAILY PRN
Status: CANCELLED | OUTPATIENT
Start: 2019-04-05

## 2019-04-05 RX ORDER — CLINDAMYCIN PHOSPHATE 900 MG/50ML
900 INJECTION INTRAVENOUS ONCE
Status: CANCELLED | OUTPATIENT
Start: 2019-04-05 | End: 2019-04-05

## 2019-04-06 ENCOUNTER — ANESTHESIA EVENT (OUTPATIENT)
Dept: PERIOP | Facility: AMBULARY SURGERY CENTER | Age: 39
End: 2019-04-06
Payer: COMMERCIAL

## 2019-04-09 DIAGNOSIS — F41.9 ANXIETY: ICD-10-CM

## 2019-04-09 RX ORDER — SERTRALINE HYDROCHLORIDE 100 MG/1
TABLET, FILM COATED ORAL
Qty: 135 TABLET | Refills: 0 | Status: SHIPPED | OUTPATIENT
Start: 2019-04-09 | End: 2019-05-09 | Stop reason: SDUPTHER

## 2019-04-09 RX ORDER — ALPRAZOLAM 0.25 MG/1
TABLET ORAL
Qty: 30 TABLET | Refills: 0 | Status: SHIPPED | OUTPATIENT
Start: 2019-04-09 | End: 2019-05-09 | Stop reason: SDUPTHER

## 2019-04-10 ENCOUNTER — HOSPITAL ENCOUNTER (OUTPATIENT)
Facility: AMBULARY SURGERY CENTER | Age: 39
Setting detail: OUTPATIENT SURGERY
Discharge: HOME | End: 2019-04-10
Attending: ORTHOPAEDIC SURGERY | Admitting: ORTHOPAEDIC SURGERY
Payer: COMMERCIAL

## 2019-04-10 ENCOUNTER — ANESTHESIA (OUTPATIENT)
Dept: PERIOP | Facility: AMBULARY SURGERY CENTER | Age: 39
End: 2019-04-10
Payer: COMMERCIAL

## 2019-04-10 ENCOUNTER — APPOINTMENT (OUTPATIENT)
Dept: RADIOLOGY | Facility: AMBULARY SURGERY CENTER | Age: 39
End: 2019-04-10
Payer: COMMERCIAL

## 2019-04-10 VITALS
WEIGHT: 244 LBS | SYSTOLIC BLOOD PRESSURE: 118 MMHG | HEIGHT: 65 IN | BODY MASS INDEX: 40.65 KG/M2 | RESPIRATION RATE: 18 BRPM | OXYGEN SATURATION: 95 % | DIASTOLIC BLOOD PRESSURE: 57 MMHG | TEMPERATURE: 98 F | HEART RATE: 89 BPM

## 2019-04-10 VITALS — HEART RATE: 89 BPM | OXYGEN SATURATION: 98 % | SYSTOLIC BLOOD PRESSURE: 135 MMHG | DIASTOLIC BLOOD PRESSURE: 66 MMHG

## 2019-04-10 DIAGNOSIS — S92.352G CLOSED FRACTURE OF BASE OF FIFTH METATARSAL BONE WITH DELAYED HEALING, LEFT: Primary | ICD-10-CM

## 2019-04-10 LAB — EXT PREGNANCY TEST URINE: NEGATIVE

## 2019-04-10 PROCEDURE — C9290 INJ, BUPIVACAINE LIPOSOME: HCPCS | Performed by: ORTHOPAEDIC SURGERY

## 2019-04-10 PROCEDURE — 73620 X-RAY EXAM OF FOOT: CPT

## 2019-04-10 PROCEDURE — 81025 URINE PREGNANCY TEST: CPT | Performed by: ANESTHESIOLOGY

## 2019-04-10 PROCEDURE — C1713 ANCHOR/SCREW BN/BN,TIS/BN: HCPCS | Performed by: ORTHOPAEDIC SURGERY

## 2019-04-10 PROCEDURE — C1769 GUIDE WIRE: HCPCS | Performed by: ORTHOPAEDIC SURGERY

## 2019-04-10 PROCEDURE — 28485 OPTX METATARSAL FX EACH: CPT | Performed by: ORTHOPAEDIC SURGERY

## 2019-04-10 PROCEDURE — 20900 REMOVAL OF BONE FOR GRAFT: CPT | Performed by: ORTHOPAEDIC SURGERY

## 2019-04-10 DEVICE — IMPLANTABLE DEVICE: Type: IMPLANTABLE DEVICE | Site: FOOT | Status: FUNCTIONAL

## 2019-04-10 RX ORDER — CLINDAMYCIN PHOSPHATE 900 MG/50ML
900 INJECTION INTRAVENOUS ONCE
Status: COMPLETED | OUTPATIENT
Start: 2019-04-10 | End: 2019-04-10

## 2019-04-10 RX ORDER — SODIUM CHLORIDE, SODIUM LACTATE, POTASSIUM CHLORIDE, CALCIUM CHLORIDE 600; 310; 30; 20 MG/100ML; MG/100ML; MG/100ML; MG/100ML
50 INJECTION, SOLUTION INTRAVENOUS CONTINUOUS
Status: DISCONTINUED | OUTPATIENT
Start: 2019-04-10 | End: 2019-04-10 | Stop reason: HOSPADM

## 2019-04-10 RX ORDER — MIDAZOLAM HYDROCHLORIDE 1 MG/ML
INJECTION INTRAMUSCULAR; INTRAVENOUS AS NEEDED
Status: DISCONTINUED | OUTPATIENT
Start: 2019-04-10 | End: 2019-04-10 | Stop reason: SURG

## 2019-04-10 RX ORDER — DIPHENHYDRAMINE HYDROCHLORIDE 50 MG/ML
INJECTION INTRAMUSCULAR; INTRAVENOUS AS NEEDED
Status: DISCONTINUED | OUTPATIENT
Start: 2019-04-10 | End: 2019-04-10 | Stop reason: SURG

## 2019-04-10 RX ORDER — DIPHENHYDRAMINE HYDROCHLORIDE 50 MG/ML
12.5 INJECTION INTRAMUSCULAR; INTRAVENOUS ONCE AS NEEDED
Status: DISCONTINUED | OUTPATIENT
Start: 2019-04-10 | End: 2019-04-10 | Stop reason: HOSPADM

## 2019-04-10 RX ORDER — LIDOCAINE HYDROCHLORIDE 10 MG/ML
INJECTION, SOLUTION INFILTRATION; PERINEURAL AS NEEDED
Status: DISCONTINUED | OUTPATIENT
Start: 2019-04-10 | End: 2019-04-10 | Stop reason: SURG

## 2019-04-10 RX ORDER — ROPIVACAINE HYDROCHLORIDE 5 MG/ML
INJECTION, SOLUTION EPIDURAL; INFILTRATION; PERINEURAL
Status: COMPLETED | OUTPATIENT
Start: 2019-04-10 | End: 2019-04-10

## 2019-04-10 RX ORDER — MAGNESIUM HYDROXIDE 1200 MG/15ML
LIQUID ORAL AS NEEDED
Status: DISCONTINUED | OUTPATIENT
Start: 2019-04-10 | End: 2019-04-10 | Stop reason: HOSPADM

## 2019-04-10 RX ORDER — ONDANSETRON 2 MG/ML
4 INJECTION INTRAMUSCULAR; INTRAVENOUS ONCE AS NEEDED
Status: DISCONTINUED | OUTPATIENT
Start: 2019-04-10 | End: 2019-04-10 | Stop reason: HOSPADM

## 2019-04-10 RX ORDER — CHLORHEXIDINE GLUCONATE 4 G/100ML
SOLUTION TOPICAL DAILY PRN
Status: DISCONTINUED | OUTPATIENT
Start: 2019-04-10 | End: 2019-04-10 | Stop reason: HOSPADM

## 2019-04-10 RX ORDER — ONDANSETRON 4 MG/1
4 TABLET, FILM COATED ORAL EVERY 8 HOURS PRN
Qty: 20 TABLET | Refills: 0 | Status: SHIPPED | OUTPATIENT
Start: 2019-04-10 | End: 2020-01-14 | Stop reason: ALTCHOICE

## 2019-04-10 RX ORDER — BUPIVACAINE HYDROCHLORIDE 2.5 MG/ML
INJECTION, SOLUTION EPIDURAL; INFILTRATION; INTRACAUDAL AS NEEDED
Status: DISCONTINUED | OUTPATIENT
Start: 2019-04-10 | End: 2019-04-10 | Stop reason: HOSPADM

## 2019-04-10 RX ORDER — SCOLOPAMINE TRANSDERMAL SYSTEM 1 MG/1
1 PATCH, EXTENDED RELEASE TRANSDERMAL ONCE
Status: DISCONTINUED | OUTPATIENT
Start: 2019-04-10 | End: 2019-04-10 | Stop reason: HOSPADM

## 2019-04-10 RX ORDER — OXYCODONE HYDROCHLORIDE 5 MG/1
5 TABLET ORAL EVERY 4 HOURS PRN
Qty: 30 TABLET | Refills: 0 | Status: SHIPPED | OUTPATIENT
Start: 2019-04-10 | End: 2019-04-15

## 2019-04-10 RX ORDER — DEXAMETHASONE SODIUM PHOSPHATE 4 MG/ML
INJECTION, SOLUTION INTRA-ARTICULAR; INTRALESIONAL; INTRAMUSCULAR; INTRAVENOUS; SOFT TISSUE AS NEEDED
Status: DISCONTINUED | OUTPATIENT
Start: 2019-04-10 | End: 2019-04-10 | Stop reason: SURG

## 2019-04-10 RX ORDER — FENTANYL CITRATE/PF 50 MCG/ML
25 SYRINGE (ML) INJECTION
Status: DISCONTINUED | OUTPATIENT
Start: 2019-04-10 | End: 2019-04-10 | Stop reason: HOSPADM

## 2019-04-10 RX ORDER — OXYCODONE HYDROCHLORIDE 5 MG/1
5 TABLET ORAL EVERY 4 HOURS PRN
Qty: 30 TABLET | Refills: 0 | Status: SHIPPED | OUTPATIENT
Start: 2019-04-10 | End: 2019-04-10 | Stop reason: HOSPADM

## 2019-04-10 RX ORDER — SODIUM CHLORIDE 9 MG/ML
20 INJECTION, SOLUTION INTRAVENOUS CONTINUOUS
Status: DISCONTINUED | OUTPATIENT
Start: 2019-04-10 | End: 2019-04-10 | Stop reason: HOSPADM

## 2019-04-10 RX ORDER — ONDANSETRON 2 MG/ML
INJECTION INTRAMUSCULAR; INTRAVENOUS AS NEEDED
Status: DISCONTINUED | OUTPATIENT
Start: 2019-04-10 | End: 2019-04-10 | Stop reason: SURG

## 2019-04-10 RX ORDER — FENTANYL CITRATE 50 UG/ML
INJECTION, SOLUTION INTRAMUSCULAR; INTRAVENOUS AS NEEDED
Status: DISCONTINUED | OUTPATIENT
Start: 2019-04-10 | End: 2019-04-10 | Stop reason: SURG

## 2019-04-10 RX ORDER — PROPOFOL 10 MG/ML
INJECTION, EMULSION INTRAVENOUS AS NEEDED
Status: DISCONTINUED | OUTPATIENT
Start: 2019-04-10 | End: 2019-04-10 | Stop reason: SURG

## 2019-04-10 RX ADMIN — MIDAZOLAM HYDROCHLORIDE 2 MG: 1 INJECTION, SOLUTION INTRAMUSCULAR; INTRAVENOUS at 08:16

## 2019-04-10 RX ADMIN — CLINDAMYCIN PHOSPHATE 900 MG: 18 INJECTION, SOLUTION INTRAMUSCULAR; INTRAVENOUS at 09:00

## 2019-04-10 RX ADMIN — ONDANSETRON 4 MG: 2 INJECTION INTRAMUSCULAR; INTRAVENOUS at 09:53

## 2019-04-10 RX ADMIN — FENTANYL CITRATE 50 MCG: 50 INJECTION, SOLUTION INTRAMUSCULAR; INTRAVENOUS at 09:13

## 2019-04-10 RX ADMIN — PROPOFOL 200 MG: 10 INJECTION, EMULSION INTRAVENOUS at 09:02

## 2019-04-10 RX ADMIN — DEXAMETHASONE SODIUM PHOSPHATE 8 MG: 4 INJECTION, SOLUTION INTRAMUSCULAR; INTRAVENOUS at 09:10

## 2019-04-10 RX ADMIN — SCOPALAMINE 1 PATCH: 1 PATCH, EXTENDED RELEASE TRANSDERMAL at 08:35

## 2019-04-10 RX ADMIN — ROPIVACAINE HYDROCHLORIDE 20 ML: 5 INJECTION, SOLUTION EPIDURAL; INFILTRATION; PERINEURAL at 08:24

## 2019-04-10 RX ADMIN — LIDOCAINE HYDROCHLORIDE ANHYDROUS 50 MG: 10 INJECTION, SOLUTION INFILTRATION at 09:02

## 2019-04-10 RX ADMIN — SODIUM CHLORIDE, SODIUM LACTATE, POTASSIUM CHLORIDE, AND CALCIUM CHLORIDE: .6; .31; .03; .02 INJECTION, SOLUTION INTRAVENOUS at 08:59

## 2019-04-10 RX ADMIN — FENTANYL CITRATE 50 MCG: 50 INJECTION, SOLUTION INTRAMUSCULAR; INTRAVENOUS at 09:06

## 2019-04-10 RX ADMIN — DIPHENHYDRAMINE HYDROCHLORIDE 12.5 MG: 50 INJECTION, SOLUTION INTRAMUSCULAR; INTRAVENOUS at 09:10

## 2019-04-10 RX ADMIN — ROPIVACAINE HYDROCHLORIDE 15 ML: 5 INJECTION, SOLUTION EPIDURAL; INFILTRATION; PERINEURAL at 08:19

## 2019-05-02 ENCOUNTER — OFFICE VISIT (OUTPATIENT)
Dept: OBGYN CLINIC | Facility: CLINIC | Age: 39
End: 2019-05-02

## 2019-05-02 VITALS
HEIGHT: 65 IN | DIASTOLIC BLOOD PRESSURE: 88 MMHG | SYSTOLIC BLOOD PRESSURE: 139 MMHG | HEART RATE: 82 BPM | BODY MASS INDEX: 40.6 KG/M2

## 2019-05-02 DIAGNOSIS — S92.352D CLOSED FRACTURE OF BASE OF FIFTH METATARSAL BONE WITH ROUTINE HEALING, LEFT: Primary | ICD-10-CM

## 2019-05-02 PROCEDURE — 99024 POSTOP FOLLOW-UP VISIT: CPT | Performed by: ORTHOPAEDIC SURGERY

## 2019-05-03 ENCOUNTER — TELEPHONE (OUTPATIENT)
Dept: INTERNAL MEDICINE CLINIC | Facility: CLINIC | Age: 39
End: 2019-05-03

## 2019-05-03 ENCOUNTER — APPOINTMENT (OUTPATIENT)
Dept: LAB | Facility: AMBULARY SURGERY CENTER | Age: 39
End: 2019-05-03
Payer: COMMERCIAL

## 2019-05-03 DIAGNOSIS — G47.09 OTHER INSOMNIA: ICD-10-CM

## 2019-05-03 DIAGNOSIS — E78.5 HYPERLIPIDEMIA, UNSPECIFIED HYPERLIPIDEMIA TYPE: ICD-10-CM

## 2019-05-03 DIAGNOSIS — E55.9 VITAMIN D DEFICIENCY: ICD-10-CM

## 2019-05-03 DIAGNOSIS — E55.9 VITAMIN D DEFICIENCY: Primary | ICD-10-CM

## 2019-05-03 LAB
25(OH)D3 SERPL-MCNC: 15.1 NG/ML (ref 30–100)
ALBUMIN SERPL BCP-MCNC: 3.6 G/DL (ref 3.5–5)
ALP SERPL-CCNC: 101 U/L (ref 46–116)
ALT SERPL W P-5'-P-CCNC: 45 U/L (ref 12–78)
ANION GAP SERPL CALCULATED.3IONS-SCNC: 8 MMOL/L (ref 4–13)
AST SERPL W P-5'-P-CCNC: 35 U/L (ref 5–45)
BASOPHILS # BLD MANUAL: 0.08 THOUSAND/UL (ref 0–0.1)
BASOPHILS NFR MAR MANUAL: 1 % (ref 0–1)
BILIRUB SERPL-MCNC: 0.47 MG/DL (ref 0.2–1)
BUN SERPL-MCNC: 13 MG/DL (ref 5–25)
CALCIUM SERPL-MCNC: 8.7 MG/DL (ref 8.3–10.1)
CHLORIDE SERPL-SCNC: 104 MMOL/L (ref 100–108)
CHOLEST SERPL-MCNC: 265 MG/DL (ref 50–200)
CO2 SERPL-SCNC: 23 MMOL/L (ref 21–32)
CREAT SERPL-MCNC: 0.89 MG/DL (ref 0.6–1.3)
EOSINOPHIL # BLD MANUAL: 0.41 THOUSAND/UL (ref 0–0.4)
EOSINOPHIL NFR BLD MANUAL: 5 % (ref 0–6)
ERYTHROCYTE [DISTWIDTH] IN BLOOD BY AUTOMATED COUNT: 14 % (ref 11.6–15.1)
GFR SERPL CREATININE-BSD FRML MDRD: 84 ML/MIN/1.73SQ M
GLUCOSE P FAST SERPL-MCNC: 162 MG/DL (ref 65–99)
HCT VFR BLD AUTO: 38.7 % (ref 34.8–46.1)
HDLC SERPL-MCNC: 34 MG/DL (ref 40–60)
HGB BLD-MCNC: 12.7 G/DL (ref 11.5–15.4)
LYMPHOCYTES # BLD AUTO: 0.57 THOUSAND/UL (ref 0.6–4.47)
LYMPHOCYTES # BLD AUTO: 7 % (ref 14–44)
MCH RBC QN AUTO: 29.5 PG (ref 26.8–34.3)
MCHC RBC AUTO-ENTMCNC: 32.8 G/DL (ref 31.4–37.4)
MCV RBC AUTO: 90 FL (ref 82–98)
METAMYELOCYTES NFR BLD MANUAL: 2 % (ref 0–1)
MONOCYTES # BLD AUTO: 0.49 THOUSAND/UL (ref 0–1.22)
MONOCYTES NFR BLD: 6 % (ref 4–12)
MYELOCYTES NFR BLD MANUAL: 2 % (ref 0–1)
NEUTROPHILS # BLD MANUAL: 4.98 THOUSAND/UL (ref 1.85–7.62)
NEUTS BAND NFR BLD MANUAL: 2 % (ref 0–8)
NEUTS SEG NFR BLD AUTO: 59 % (ref 43–75)
NONHDLC SERPL-MCNC: 231 MG/DL
NRBC BLD AUTO-RTO: 0 /100 WBCS
PLATELET # BLD AUTO: 186 THOUSANDS/UL (ref 149–390)
PLATELET BLD QL SMEAR: ADEQUATE
PMV BLD AUTO: 9.6 FL (ref 8.9–12.7)
POLYCHROMASIA BLD QL SMEAR: PRESENT
POTASSIUM SERPL-SCNC: 3.8 MMOL/L (ref 3.5–5.3)
PROT SERPL-MCNC: 7.6 G/DL (ref 6.4–8.2)
RBC # BLD AUTO: 4.3 MILLION/UL (ref 3.81–5.12)
RBC MORPH BLD: PRESENT
SODIUM SERPL-SCNC: 135 MMOL/L (ref 136–145)
TRIGL SERPL-MCNC: 484 MG/DL
TSH SERPL DL<=0.05 MIU/L-ACNC: 2.39 UIU/ML (ref 0.36–3.74)
VARIANT LYMPHS # BLD AUTO: 16 %
WBC # BLD AUTO: 8.17 THOUSAND/UL (ref 4.31–10.16)

## 2019-05-03 PROCEDURE — 85027 COMPLETE CBC AUTOMATED: CPT

## 2019-05-03 PROCEDURE — 80061 LIPID PANEL: CPT

## 2019-05-03 PROCEDURE — 85007 BL SMEAR W/DIFF WBC COUNT: CPT

## 2019-05-03 PROCEDURE — 80053 COMPREHEN METABOLIC PANEL: CPT

## 2019-05-03 PROCEDURE — 36415 COLL VENOUS BLD VENIPUNCTURE: CPT

## 2019-05-03 PROCEDURE — 84443 ASSAY THYROID STIM HORMONE: CPT

## 2019-05-03 PROCEDURE — 82306 VITAMIN D 25 HYDROXY: CPT

## 2019-05-03 RX ORDER — ZOLPIDEM TARTRATE 10 MG/1
TABLET ORAL
Qty: 30 TABLET | Refills: 0 | Status: SHIPPED | OUTPATIENT
Start: 2019-05-03 | End: 2020-01-08 | Stop reason: SDUPTHER

## 2019-05-03 RX ORDER — ERGOCALCIFEROL 1.25 MG/1
50000 CAPSULE ORAL WEEKLY
Qty: 12 CAPSULE | Refills: 0 | Status: SHIPPED | OUTPATIENT
Start: 2019-05-03 | End: 2020-01-14 | Stop reason: ALTCHOICE

## 2019-05-09 DIAGNOSIS — F41.9 ANXIETY: ICD-10-CM

## 2019-05-09 RX ORDER — SERTRALINE HYDROCHLORIDE 100 MG/1
TABLET, FILM COATED ORAL
Qty: 45 TABLET | Refills: 0 | Status: SHIPPED | OUTPATIENT
Start: 2019-05-09 | End: 2020-01-08 | Stop reason: SDUPTHER

## 2019-05-09 RX ORDER — ALPRAZOLAM 0.25 MG/1
TABLET ORAL
Qty: 30 TABLET | Refills: 0 | Status: SHIPPED | OUTPATIENT
Start: 2019-05-09 | End: 2020-07-17 | Stop reason: SDUPTHER

## 2019-05-30 ENCOUNTER — OFFICE VISIT (OUTPATIENT)
Dept: OBGYN CLINIC | Facility: CLINIC | Age: 39
End: 2019-05-30

## 2019-05-30 ENCOUNTER — APPOINTMENT (OUTPATIENT)
Dept: RADIOLOGY | Facility: CLINIC | Age: 39
End: 2019-05-30
Payer: COMMERCIAL

## 2019-05-30 VITALS
SYSTOLIC BLOOD PRESSURE: 131 MMHG | BODY MASS INDEX: 40.6 KG/M2 | HEART RATE: 91 BPM | DIASTOLIC BLOOD PRESSURE: 89 MMHG | HEIGHT: 65 IN

## 2019-05-30 DIAGNOSIS — S92.352D CLOSED FRACTURE OF BASE OF FIFTH METATARSAL BONE WITH ROUTINE HEALING, LEFT: Primary | ICD-10-CM

## 2019-05-30 DIAGNOSIS — S92.352D CLOSED FRACTURE OF BASE OF FIFTH METATARSAL BONE WITH ROUTINE HEALING, LEFT: ICD-10-CM

## 2019-05-30 PROCEDURE — 99024 POSTOP FOLLOW-UP VISIT: CPT | Performed by: ORTHOPAEDIC SURGERY

## 2019-05-30 PROCEDURE — 73630 X-RAY EXAM OF FOOT: CPT

## 2019-06-26 ENCOUNTER — APPOINTMENT (EMERGENCY)
Dept: RADIOLOGY | Facility: HOSPITAL | Age: 39
End: 2019-06-26

## 2019-06-26 ENCOUNTER — HOSPITAL ENCOUNTER (EMERGENCY)
Facility: HOSPITAL | Age: 39
Discharge: HOME | End: 2019-06-26
Attending: EMERGENCY MEDICINE

## 2019-06-26 ENCOUNTER — APPOINTMENT (EMERGENCY)
Dept: CT IMAGING | Facility: HOSPITAL | Age: 39
End: 2019-06-26

## 2019-06-26 VITALS
DIASTOLIC BLOOD PRESSURE: 67 MMHG | WEIGHT: 248.24 LBS | RESPIRATION RATE: 18 BRPM | BODY MASS INDEX: 41.31 KG/M2 | HEART RATE: 94 BPM | TEMPERATURE: 97.6 F | SYSTOLIC BLOOD PRESSURE: 135 MMHG | OXYGEN SATURATION: 96 %

## 2019-06-26 DIAGNOSIS — K76.9 HEPATIC LESION: ICD-10-CM

## 2019-06-26 DIAGNOSIS — R10.9 ABDOMINAL PAIN: ICD-10-CM

## 2019-06-26 DIAGNOSIS — R16.2 HEPATOSPLENOMEGALY: ICD-10-CM

## 2019-06-26 DIAGNOSIS — R07.9 CHEST PAIN: Primary | ICD-10-CM

## 2019-06-26 LAB
ALBUMIN SERPL BCP-MCNC: 3.8 G/DL (ref 3.5–5)
ALP SERPL-CCNC: 102 U/L (ref 46–116)
ALT SERPL W P-5'-P-CCNC: 53 U/L (ref 12–78)
ANION GAP SERPL CALCULATED.3IONS-SCNC: 9 MMOL/L (ref 4–13)
AST SERPL W P-5'-P-CCNC: 47 U/L (ref 5–45)
ATRIAL RATE: 91 BPM
BASOPHILS # BLD MANUAL: 0.1 THOUSAND/UL (ref 0–0.1)
BASOPHILS NFR MAR MANUAL: 1 % (ref 0–1)
BILIRUB SERPL-MCNC: 0.4 MG/DL (ref 0.2–1)
BUN SERPL-MCNC: 9 MG/DL (ref 5–25)
CALCIUM SERPL-MCNC: 9 MG/DL (ref 8.3–10.1)
CHLORIDE SERPL-SCNC: 102 MMOL/L (ref 100–108)
CO2 SERPL-SCNC: 27 MMOL/L (ref 21–32)
CREAT SERPL-MCNC: 1.02 MG/DL (ref 0.6–1.3)
DEPRECATED D DIMER PPP: 303 NG/ML (FEU)
EOSINOPHIL # BLD MANUAL: 0.1 THOUSAND/UL (ref 0–0.4)
EOSINOPHIL NFR BLD MANUAL: 1 % (ref 0–6)
ERYTHROCYTE [DISTWIDTH] IN BLOOD BY AUTOMATED COUNT: 13.2 % (ref 11.6–15.1)
EXT PREG TEST URINE: NORMAL
EXT. CONTROL ED NAV: NORMAL
GFR SERPL CREATININE-BSD FRML MDRD: 71 ML/MIN/1.73SQ M
GLUCOSE SERPL-MCNC: 119 MG/DL (ref 65–140)
HCT VFR BLD AUTO: 40.7 % (ref 34.8–46.1)
HGB BLD-MCNC: 13.4 G/DL (ref 11.5–15.4)
LIPASE SERPL-CCNC: 163 U/L (ref 73–393)
LYMPHOCYTES # BLD AUTO: 2.52 THOUSAND/UL (ref 0.6–4.47)
LYMPHOCYTES # BLD AUTO: 24 % (ref 14–44)
MCH RBC QN AUTO: 29.5 PG (ref 26.8–34.3)
MCHC RBC AUTO-ENTMCNC: 32.9 G/DL (ref 31.4–37.4)
MCV RBC AUTO: 90 FL (ref 82–98)
MONOCYTES # BLD AUTO: 0.52 THOUSAND/UL (ref 0–1.22)
MONOCYTES NFR BLD: 5 % (ref 4–12)
NEUTROPHILS # BLD MANUAL: 7.24 THOUSAND/UL (ref 1.85–7.62)
NEUTS BAND NFR BLD MANUAL: 2 % (ref 0–8)
NEUTS SEG NFR BLD AUTO: 67 % (ref 43–75)
NRBC BLD AUTO-RTO: 0 /100 WBCS
P AXIS: 59 DEGREES
PLATELET # BLD AUTO: 237 THOUSANDS/UL (ref 149–390)
PLATELET BLD QL SMEAR: ADEQUATE
PMV BLD AUTO: 9.3 FL (ref 8.9–12.7)
POTASSIUM SERPL-SCNC: 4 MMOL/L (ref 3.5–5.3)
PR INTERVAL: 154 MS
PROT SERPL-MCNC: 7.7 G/DL (ref 6.4–8.2)
QRS AXIS: 73 DEGREES
QRSD INTERVAL: 64 MS
QT INTERVAL: 334 MS
QTC INTERVAL: 410 MS
RBC # BLD AUTO: 4.55 MILLION/UL (ref 3.81–5.12)
RBC MORPH BLD: NORMAL
SODIUM SERPL-SCNC: 138 MMOL/L (ref 136–145)
T WAVE AXIS: 70 DEGREES
TOTAL CELLS COUNTED SPEC: 100
TROPONIN I SERPL-MCNC: <0.02 NG/ML
TROPONIN I SERPL-MCNC: <0.02 NG/ML
VENTRICULAR RATE: 91 BPM
WBC # BLD AUTO: 10.49 THOUSAND/UL (ref 4.31–10.16)

## 2019-06-26 PROCEDURE — 96361 HYDRATE IV INFUSION ADD-ON: CPT

## 2019-06-26 PROCEDURE — 80053 COMPREHEN METABOLIC PANEL: CPT | Performed by: EMERGENCY MEDICINE

## 2019-06-26 PROCEDURE — 93005 ELECTROCARDIOGRAM TRACING: CPT

## 2019-06-26 PROCEDURE — 36415 COLL VENOUS BLD VENIPUNCTURE: CPT | Performed by: EMERGENCY MEDICINE

## 2019-06-26 PROCEDURE — 99285 EMERGENCY DEPT VISIT HI MDM: CPT | Performed by: EMERGENCY MEDICINE

## 2019-06-26 PROCEDURE — 96375 TX/PRO/DX INJ NEW DRUG ADDON: CPT

## 2019-06-26 PROCEDURE — 85027 COMPLETE CBC AUTOMATED: CPT | Performed by: EMERGENCY MEDICINE

## 2019-06-26 PROCEDURE — 96374 THER/PROPH/DIAG INJ IV PUSH: CPT

## 2019-06-26 PROCEDURE — 85379 FIBRIN DEGRADATION QUANT: CPT | Performed by: EMERGENCY MEDICINE

## 2019-06-26 PROCEDURE — 85007 BL SMEAR W/DIFF WBC COUNT: CPT | Performed by: EMERGENCY MEDICINE

## 2019-06-26 PROCEDURE — 71046 X-RAY EXAM CHEST 2 VIEWS: CPT

## 2019-06-26 PROCEDURE — 84484 ASSAY OF TROPONIN QUANT: CPT | Performed by: EMERGENCY MEDICINE

## 2019-06-26 PROCEDURE — 83690 ASSAY OF LIPASE: CPT | Performed by: EMERGENCY MEDICINE

## 2019-06-26 PROCEDURE — 99285 EMERGENCY DEPT VISIT HI MDM: CPT

## 2019-06-26 PROCEDURE — 81025 URINE PREGNANCY TEST: CPT | Performed by: EMERGENCY MEDICINE

## 2019-06-26 PROCEDURE — 93010 ELECTROCARDIOGRAM REPORT: CPT | Performed by: INTERNAL MEDICINE

## 2019-06-26 PROCEDURE — 71275 CT ANGIOGRAPHY CHEST: CPT

## 2019-06-26 PROCEDURE — 74177 CT ABD & PELVIS W/CONTRAST: CPT

## 2019-06-26 RX ORDER — ONDANSETRON 2 MG/ML
4 INJECTION INTRAMUSCULAR; INTRAVENOUS ONCE
Status: COMPLETED | OUTPATIENT
Start: 2019-06-26 | End: 2019-06-26

## 2019-06-26 RX ORDER — MORPHINE SULFATE 4 MG/ML
4 INJECTION, SOLUTION INTRAMUSCULAR; INTRAVENOUS ONCE
Status: COMPLETED | OUTPATIENT
Start: 2019-06-26 | End: 2019-06-26

## 2019-06-26 RX ORDER — DICYCLOMINE HCL 20 MG
20 TABLET ORAL 2 TIMES DAILY PRN
Qty: 10 TABLET | Refills: 0 | Status: SHIPPED | OUTPATIENT
Start: 2019-06-26 | End: 2019-07-12

## 2019-06-26 RX ORDER — ONDANSETRON 4 MG/1
4 TABLET, ORALLY DISINTEGRATING ORAL EVERY 8 HOURS PRN
Qty: 12 TABLET | Refills: 0 | Status: SHIPPED | OUTPATIENT
Start: 2019-06-26 | End: 2020-01-14 | Stop reason: ALTCHOICE

## 2019-06-26 RX ADMIN — IOHEXOL 100 ML: 350 INJECTION, SOLUTION INTRAVENOUS at 15:12

## 2019-06-26 RX ADMIN — ONDANSETRON 4 MG: 2 INJECTION INTRAMUSCULAR; INTRAVENOUS at 14:31

## 2019-06-26 RX ADMIN — SODIUM CHLORIDE 500 ML: 0.9 INJECTION, SOLUTION INTRAVENOUS at 14:26

## 2019-06-26 RX ADMIN — MORPHINE SULFATE 4 MG: 4 INJECTION INTRAVENOUS at 14:31

## 2019-07-12 ENCOUNTER — HOSPITAL ENCOUNTER (EMERGENCY)
Facility: HOSPITAL | Age: 39
Discharge: HOME | End: 2019-07-12
Attending: EMERGENCY MEDICINE

## 2019-07-12 ENCOUNTER — APPOINTMENT (EMERGENCY)
Dept: CT IMAGING | Facility: HOSPITAL | Age: 39
End: 2019-07-12

## 2019-07-12 VITALS
RESPIRATION RATE: 18 BRPM | TEMPERATURE: 97.5 F | SYSTOLIC BLOOD PRESSURE: 137 MMHG | BODY MASS INDEX: 39.99 KG/M2 | WEIGHT: 240.3 LBS | OXYGEN SATURATION: 99 % | DIASTOLIC BLOOD PRESSURE: 62 MMHG | HEART RATE: 61 BPM

## 2019-07-12 DIAGNOSIS — R11.2 NAUSEA & VOMITING: Primary | ICD-10-CM

## 2019-07-12 LAB
ALBUMIN SERPL BCP-MCNC: 3.7 G/DL (ref 3.5–5)
ALP SERPL-CCNC: 97 U/L (ref 46–116)
ALT SERPL W P-5'-P-CCNC: 48 U/L (ref 12–78)
ANION GAP SERPL CALCULATED.3IONS-SCNC: 11 MMOL/L (ref 4–13)
APTT PPP: 25 SECONDS (ref 23–37)
AST SERPL W P-5'-P-CCNC: 43 U/L (ref 5–45)
BACTERIA UR QL AUTO: ABNORMAL /HPF
BASOPHILS # BLD AUTO: 0.08 THOUSANDS/ΜL (ref 0–0.1)
BASOPHILS NFR BLD AUTO: 1 % (ref 0–1)
BILIRUB SERPL-MCNC: 0.5 MG/DL (ref 0.2–1)
BILIRUB UR QL STRIP: NEGATIVE
BUN SERPL-MCNC: 11 MG/DL (ref 5–25)
CALCIUM SERPL-MCNC: 9.4 MG/DL (ref 8.3–10.1)
CHLORIDE SERPL-SCNC: 103 MMOL/L (ref 100–108)
CLARITY UR: CLEAR
CO2 SERPL-SCNC: 26 MMOL/L (ref 21–32)
COLOR UR: YELLOW
CREAT SERPL-MCNC: 1.01 MG/DL (ref 0.6–1.3)
EOSINOPHIL # BLD AUTO: 0.47 THOUSAND/ΜL (ref 0–0.61)
EOSINOPHIL NFR BLD AUTO: 6 % (ref 0–6)
ERYTHROCYTE [DISTWIDTH] IN BLOOD BY AUTOMATED COUNT: 12.9 % (ref 11.6–15.1)
EXT PREG TEST URINE: NEGATIVE
EXT. CONTROL ED NAV: NORMAL
GFR SERPL CREATININE-BSD FRML MDRD: 72 ML/MIN/1.73SQ M
GLUCOSE SERPL-MCNC: 106 MG/DL (ref 65–140)
GLUCOSE UR STRIP-MCNC: NEGATIVE MG/DL
HCT VFR BLD AUTO: 41 % (ref 34.8–46.1)
HGB BLD-MCNC: 13.4 G/DL (ref 11.5–15.4)
HGB UR QL STRIP.AUTO: ABNORMAL
IMM GRANULOCYTES # BLD AUTO: 0.18 THOUSAND/UL (ref 0–0.2)
IMM GRANULOCYTES NFR BLD AUTO: 2 % (ref 0–2)
INR PPP: 1.03 (ref 0.84–1.19)
KETONES UR STRIP-MCNC: NEGATIVE MG/DL
LEUKOCYTE ESTERASE UR QL STRIP: ABNORMAL
LIPASE SERPL-CCNC: 109 U/L (ref 73–393)
LYMPHOCYTES # BLD AUTO: 2.43 THOUSANDS/ΜL (ref 0.6–4.47)
LYMPHOCYTES NFR BLD AUTO: 30 % (ref 14–44)
MCH RBC QN AUTO: 29.1 PG (ref 26.8–34.3)
MCHC RBC AUTO-ENTMCNC: 32.7 G/DL (ref 31.4–37.4)
MCV RBC AUTO: 89 FL (ref 82–98)
MONOCYTES # BLD AUTO: 0.48 THOUSAND/ΜL (ref 0.17–1.22)
MONOCYTES NFR BLD AUTO: 6 % (ref 4–12)
NEUTROPHILS # BLD AUTO: 4.42 THOUSANDS/ΜL (ref 1.85–7.62)
NEUTS SEG NFR BLD AUTO: 55 % (ref 43–75)
NITRITE UR QL STRIP: NEGATIVE
NON-SQ EPI CELLS URNS QL MICRO: ABNORMAL /HPF
NRBC BLD AUTO-RTO: 0 /100 WBCS
PH UR STRIP.AUTO: 6 [PH] (ref 4.5–8)
PLATELET # BLD AUTO: 244 THOUSANDS/UL (ref 149–390)
PMV BLD AUTO: 9.2 FL (ref 8.9–12.7)
POTASSIUM SERPL-SCNC: 3.5 MMOL/L (ref 3.5–5.3)
PROT SERPL-MCNC: 7.7 G/DL (ref 6.4–8.2)
PROT UR STRIP-MCNC: ABNORMAL MG/DL
PROTHROMBIN TIME: 12.9 SECONDS (ref 11.6–14.5)
RBC # BLD AUTO: 4.6 MILLION/UL (ref 3.81–5.12)
RBC #/AREA URNS AUTO: ABNORMAL /HPF
SODIUM SERPL-SCNC: 140 MMOL/L (ref 136–145)
SP GR UR STRIP.AUTO: 1.02 (ref 1–1.03)
UROBILINOGEN UR QL STRIP.AUTO: 0.2 E.U./DL
WBC # BLD AUTO: 8.06 THOUSAND/UL (ref 4.31–10.16)
WBC #/AREA URNS AUTO: ABNORMAL /HPF

## 2019-07-12 PROCEDURE — 81001 URINALYSIS AUTO W/SCOPE: CPT

## 2019-07-12 PROCEDURE — 74177 CT ABD & PELVIS W/CONTRAST: CPT

## 2019-07-12 PROCEDURE — 80053 COMPREHEN METABOLIC PANEL: CPT | Performed by: EMERGENCY MEDICINE

## 2019-07-12 PROCEDURE — 81025 URINE PREGNANCY TEST: CPT | Performed by: EMERGENCY MEDICINE

## 2019-07-12 PROCEDURE — 99284 EMERGENCY DEPT VISIT MOD MDM: CPT

## 2019-07-12 PROCEDURE — 85610 PROTHROMBIN TIME: CPT | Performed by: EMERGENCY MEDICINE

## 2019-07-12 PROCEDURE — 36415 COLL VENOUS BLD VENIPUNCTURE: CPT | Performed by: EMERGENCY MEDICINE

## 2019-07-12 PROCEDURE — 99284 EMERGENCY DEPT VISIT MOD MDM: CPT | Performed by: EMERGENCY MEDICINE

## 2019-07-12 PROCEDURE — C9113 INJ PANTOPRAZOLE SODIUM, VIA: HCPCS | Performed by: EMERGENCY MEDICINE

## 2019-07-12 PROCEDURE — 85730 THROMBOPLASTIN TIME PARTIAL: CPT | Performed by: EMERGENCY MEDICINE

## 2019-07-12 PROCEDURE — 96361 HYDRATE IV INFUSION ADD-ON: CPT

## 2019-07-12 PROCEDURE — 85025 COMPLETE CBC W/AUTO DIFF WBC: CPT | Performed by: EMERGENCY MEDICINE

## 2019-07-12 PROCEDURE — 96374 THER/PROPH/DIAG INJ IV PUSH: CPT

## 2019-07-12 PROCEDURE — 83690 ASSAY OF LIPASE: CPT | Performed by: EMERGENCY MEDICINE

## 2019-07-12 PROCEDURE — 96375 TX/PRO/DX INJ NEW DRUG ADDON: CPT

## 2019-07-12 RX ORDER — METOCLOPRAMIDE HYDROCHLORIDE 5 MG/ML
10 INJECTION INTRAMUSCULAR; INTRAVENOUS ONCE
Status: COMPLETED | OUTPATIENT
Start: 2019-07-12 | End: 2019-07-12

## 2019-07-12 RX ORDER — PANTOPRAZOLE SODIUM 40 MG/1
40 INJECTION, POWDER, FOR SOLUTION INTRAVENOUS ONCE
Status: COMPLETED | OUTPATIENT
Start: 2019-07-12 | End: 2019-07-12

## 2019-07-12 RX ORDER — PROMETHAZINE HYDROCHLORIDE 25 MG/ML
12.5 INJECTION, SOLUTION INTRAMUSCULAR; INTRAVENOUS ONCE
Status: DISCONTINUED | OUTPATIENT
Start: 2019-07-12 | End: 2019-07-12

## 2019-07-12 RX ORDER — METOCLOPRAMIDE 10 MG/1
10 TABLET ORAL EVERY 6 HOURS
Qty: 30 TABLET | Refills: 0 | Status: SHIPPED | OUTPATIENT
Start: 2019-07-12 | End: 2020-01-14 | Stop reason: ALTCHOICE

## 2019-07-12 RX ORDER — DICYCLOMINE HCL 20 MG
20 TABLET ORAL 2 TIMES DAILY
Qty: 20 TABLET | Refills: 0 | Status: SHIPPED | OUTPATIENT
Start: 2019-07-12 | End: 2020-01-14 | Stop reason: ALTCHOICE

## 2019-07-12 RX ORDER — DICYCLOMINE HCL 20 MG
20 TABLET ORAL ONCE
Status: COMPLETED | OUTPATIENT
Start: 2019-07-12 | End: 2019-07-12

## 2019-07-12 RX ORDER — MORPHINE SULFATE 4 MG/ML
4 INJECTION, SOLUTION INTRAMUSCULAR; INTRAVENOUS ONCE
Status: COMPLETED | OUTPATIENT
Start: 2019-07-12 | End: 2019-07-12

## 2019-07-12 RX ORDER — ONDANSETRON 2 MG/ML
4 INJECTION INTRAMUSCULAR; INTRAVENOUS ONCE
Status: COMPLETED | OUTPATIENT
Start: 2019-07-12 | End: 2019-07-12

## 2019-07-12 RX ORDER — ONDANSETRON 4 MG/1
4 TABLET, FILM COATED ORAL EVERY 8 HOURS PRN
Qty: 12 TABLET | Refills: 0 | Status: SHIPPED | OUTPATIENT
Start: 2019-07-12 | End: 2019-07-12 | Stop reason: ALTCHOICE

## 2019-07-12 RX ADMIN — SODIUM CHLORIDE 1000 ML: 0.9 INJECTION, SOLUTION INTRAVENOUS at 14:31

## 2019-07-12 RX ADMIN — MORPHINE SULFATE 4 MG: 4 INJECTION INTRAVENOUS at 14:38

## 2019-07-12 RX ADMIN — METOCLOPRAMIDE 10 MG: 5 INJECTION, SOLUTION INTRAMUSCULAR; INTRAVENOUS at 16:53

## 2019-07-12 RX ADMIN — PANTOPRAZOLE SODIUM 40 MG: 40 INJECTION, POWDER, FOR SOLUTION INTRAVENOUS at 14:41

## 2019-07-12 RX ADMIN — IOHEXOL 100 ML: 350 INJECTION, SOLUTION INTRAVENOUS at 16:07

## 2019-07-12 RX ADMIN — ONDANSETRON 4 MG: 2 INJECTION INTRAMUSCULAR; INTRAVENOUS at 14:35

## 2019-07-12 RX ADMIN — DICYCLOMINE HYDROCHLORIDE 20 MG: 20 TABLET ORAL at 14:35

## 2019-07-12 NOTE — ED PROVIDER NOTES
History  Chief Complaint   Patient presents with    Vomiting     Vomiting for x3 days  RUQ abdominal pain x3 days  74-year-old female comes in for nausea vomiting and abdominal pain  Patient states for the last 3 days she has been a unable held anything down  She complains of right upper quadrant abdominal pain  Patient has a history of cholecystectomy and has a history of GERD and gastritis and also an ulcer  Patient has not seen GI in some time  She does state that she has been taking her Protonix  No fever or chills no diarrhea      History provided by:  Patient   used: No    Vomiting   Severity:  Severe  Duration:  3 days  Timing:  Constant  Quality:  Stomach contents  Progression:  Worsening  Chronicity:  Recurrent  Recent urination:  Decreased  Ineffective treatments:  Ice chips and liquids  Associated symptoms: abdominal pain    Associated symptoms: no arthralgias, no cough, no diarrhea, no fever and no headaches    Risk factors: no alcohol use, no sick contacts and no suspect food intake        Prior to Admission Medications   Prescriptions Last Dose Informant Patient Reported? Taking? ALPRAZolam (XANAX) 0 25 mg tablet   No Yes   Sig: TAKE ONE TABLET BY MOUTH EVERY DAY AS NEEDED FOR ANXIETY   Cetirizine HCl (ZYRTEC ALLERGY) 10 MG CAPS  Self Yes Yes   Sig: Take 10 mg by mouth     albuterol (PROVENTIL HFA,VENTOLIN HFA) 90 mcg/act inhaler  Self No Yes   Sig: Inhale 2 puffs every 6 (six) hours as needed for wheezing   ergocalciferol (VITAMIN D2) 50,000 units   No Yes   Sig: Take 1 capsule (50,000 Units total) by mouth once a week   omeprazole (PriLOSEC) 20 mg delayed release capsule  Self No Yes   Sig: Take 1 capsule (20 mg total) by mouth daily   ondansetron (ZOFRAN) 4 mg tablet Not Taking at Unknown time  No No   Sig: Take 1 tablet (4 mg total) by mouth every 8 (eight) hours as needed for nausea or vomiting   Patient not taking: Reported on 7/12/2019   ondansetron (ZOFRAN-ODT) 4 mg disintegrating tablet   No No   Sig: Take 1 tablet (4 mg total) by mouth every 8 (eight) hours as needed for nausea or vomiting for up to 4 days   sertraline (ZOLOFT) 100 mg tablet   No Yes   Sig: TAKE 1 AND 1/2 TABLETS BY MOUTH EVERY DAY   zolpidem (AMBIEN) 10 mg tablet   No Yes   Sig: TAKE 1/2 TO 1 TABLET BY MOUTH AT BEDTIME AS NEEDED      Facility-Administered Medications: None       Past Medical History:   Diagnosis Date    Anxiety     Depression     Fibroid     GERD (gastroesophageal reflux disease)     History of pulmonary embolus (PE)     Iliotibial band syndrome     last assessed - [id number]    Mitral valve prolapse     Obesity     PONV (postoperative nausea and vomiting)     Patient requests to be premedicated for N/V    Wears glasses     for reading       Past Surgical History:   Procedure Laterality Date    CHOLECYSTECTOMY      ESOPHAGOGASTRODUODENOSCOPY N/A 5/31/2018    Procedure: ESOPHAGOGASTRODUODENOSCOPY (EGD); Surgeon: Dante Vogel MD;  [facility]: [facility]; Service: Gastroenterology    GALLBLADDER SURGERY      KNEE SURGERY Left     growth plate fx left knee    KNEE SURGERY Right     KS OPEN TREATMENT METATARSAL FRACTURE EACH Left 4/10/2019    Procedure: OPEN REDUCTION W/ INTERNAL FIXATION (ORIF) FOOT, Left 5th metatarsal fracture open reduction internal fixation with calcaneal bone graft and bone marrow aspirate concentrate;  Surgeon: Jean Gonsales MD;  Location: [facility];  Service: Orthopedics    TUBAL LIGATION      WISDOM TOOTH EXTRACTION         Family History   Adopted: Yes   Problem Relation Age of Onset    No Known Problems Mother     Alcohol abuse Neg Hx     Substance Abuse Neg Hx     Mental illness Neg Hx     Depression Neg Hx      I have reviewed and agree with the history as documented      Social History     Tobacco Use    Smoking status: Never Smoker    Smokeless tobacco: Never Used   Substance Use Topics    Alcohol use: Yes     Frequency: Monthly or less     Drinks per session: 1 or 2     Binge frequency: Never     Comment: occasional; Special occasions, rarely consumes alcohol - per [employer]    Drug use: No        Review of Systems   Constitutional: Negative for fatigue and fever  HENT: Negative for congestion and ear pain  Eyes: Negative for discharge and redness  Respiratory: Negative for apnea, cough, shortness of breath and wheezing  Cardiovascular: Negative for chest pain  Gastrointestinal: Positive for abdominal pain and vomiting  Negative for diarrhea  Endocrine: Negative for cold intolerance and polydipsia  Genitourinary: Negative for difficulty urinating and hematuria  Musculoskeletal: Negative for arthralgias and back pain  Skin: Negative for color change and rash  Allergic/Immunologic: Negative for environmental allergies and immunocompromised state  Neurological: Negative for numbness and headaches  Hematological: Negative for adenopathy  Does not bruise/bleed easily  Psychiatric/Behavioral: Negative for agitation and behavioral problems  Physical Exam  Physical Exam   Constitutional: She is oriented to person, place, and time  Vital signs are normal  She appears well-developed and well-nourished  Non-toxic appearance  She appears distressed  HENT:   Head: Normocephalic and atraumatic  Right Ear: Tympanic membrane and external ear normal    Left Ear: Tympanic membrane and external ear normal    Nose: Nose normal  No rhinorrhea, sinus tenderness or nasal deformity  Mouth/Throat: Uvula is midline and oropharynx is clear and moist  Normal dentition  Eyes: Pupils are equal, round, and reactive to light  Conjunctivae, EOM and lids are normal  Right eye exhibits no discharge  Left eye exhibits no discharge  Neck: Trachea normal and normal range of motion  Neck supple  No JVD present  Carotid bruit is not present  Cardiovascular: Normal rate, regular rhythm, intact distal pulses and normal pulses    No extrasystoles are present  PMI is not displaced  Pulmonary/Chest: Effort normal and breath sounds normal  No accessory muscle usage  No respiratory distress  She has no wheezes  She has no rhonchi  She has no rales  Abdominal: Soft  Normal appearance  She exhibits no mass  Bowel sounds are decreased  There is tenderness in the right upper quadrant, epigastric area and left upper quadrant  There is no rigidity, no rebound and no guarding  Musculoskeletal:        Right shoulder: She exhibits normal range of motion, no bony tenderness, no swelling and no deformity  Cervical back: Normal  She exhibits normal range of motion, no tenderness, no bony tenderness and no deformity  Lymphadenopathy:     She has no cervical adenopathy  She has no axillary adenopathy  Neurological: She is alert and oriented to person, place, and time  She has normal strength and normal reflexes  No cranial nerve deficit or sensory deficit  GCS eye subscore is 4  GCS verbal subscore is 5  GCS motor subscore is 6  Skin: Skin is warm and dry  No rash noted  Psychiatric: She has a normal mood and affect  Her speech is normal and behavior is normal    Nursing note and vitals reviewed        Vital Signs  ED Triage Vitals   Temperature Pulse Respirations Blood Pressure SpO2   07/12/19 1413 07/12/19 1412 07/12/19 1412 07/12/19 1412 07/12/19 1412   97 5 °F (36 4 °C) 84 18 126/71 100 %      Temp Source Heart Rate Source Patient Position - Orthostatic VS BP Location FiO2 (%)   07/12/19 1413 07/12/19 1412 07/12/19 1412 07/12/19 1412 --   Oral Monitor Lying Right arm       Pain Score       07/12/19 1412       8           Vitals:    07/12/19 1412 07/12/19 1623   BP: 126/71 137/62   Pulse: 84 61   Patient Position - Orthostatic VS: Lying Lying         Visual Acuity      ED Medications  Medications   sodium chloride 0 9 % bolus 1,000 mL (1,000 mL Intravenous New Bag 7/12/19 1431)   ondansetron (ZOFRAN) injection 4 mg (4 mg Intravenous Given 7/12/19 1435)   pantoprazole (PROTONIX) injection 40 mg (40 mg Intravenous Given 7/12/19 1441)   morphine (PF) 4 mg/mL injection 4 mg (4 mg Intravenous Given 7/12/19 1438)   dicyclomine (BENTYL) tablet 20 mg (20 mg Oral Given 7/12/19 1435)   iohexol (OMNIPAQUE) 350 MG/ML injection (MULTI-DOSE) 100 mL (100 mL Intravenous Given 7/12/19 1607)   metoclopramide (REGLAN) injection 10 mg (10 mg Intravenous Given 7/12/19 1653)       Diagnostic Studies  Results Reviewed     Procedure Component Value Units Date/Time    Urine Microscopic [[id number]]  (Abnormal) Collected:  07/12/19 1548    Lab Status:  Final result Specimen:  Urine, Clean Catch Updated:  07/12/19 1602     RBC, UA 4-10 /hpf      WBC, UA 0-5 /hpf      Epithelial Cells Occasional /hpf      Bacteria, UA Occasional /hpf     POCT pregnancy, urine [[id number]]  (Normal) Resulted:  07/12/19 1544    Lab Status:  Final result Updated:  07/12/19 1544     EXT PREG TEST UR (Ref: Negative) Negative     Control valid    ED Urine Macroscopic [[id number]]  (Abnormal) Collected:  07/12/19 1548    Lab Status:  Final result Specimen:  Urine Updated:  07/12/19 1539     Color, UA Yellow     Clarity, UA Clear     pH, UA 6 0     Leukocytes, UA Trace     Nitrite, UA Negative     Protein, UA Trace mg/dl      Glucose, UA Negative mg/dl      Ketones, UA Negative mg/dl      Urobilinogen, UA 0 2 E U /dl      Bilirubin, UA Negative     Blood, UA Moderate     Specific Gravity, UA 1 025    Narrative:       CLINITEK RESULT    Comprehensive metabolic panel [[id number]] Collected:  07/12/19 1430    Lab Status:  Final result Specimen:  Blood from Arm, Left Updated:  07/12/19 1455     Sodium 140 mmol/L      Potassium 3 5 mmol/L      Chloride 103 mmol/L      CO2 26 mmol/L      ANION GAP 11 mmol/L      BUN 11 mg/dL      Creatinine 1 01 mg/dL      Glucose 106 mg/dL      Calcium 9 4 mg/dL      AST 43 U/L      ALT 48 U/L      Alkaline Phosphatase 97 U/L      Total Protein 7 7 g/dL      Albumin 3 7 g/dL      Total Bilirubin 0 50 mg/dL      eGFR 72 ml/min/1 73sq m     Narrative:       National Kidney Disease Foundation guidelines for Chronic Kidney Disease (CKD):     Stage 1 with normal or high GFR (GFR > 90 mL/min/1 73 square meters)    Stage 2 Mild CKD (GFR = 60-89 mL/min/1 73 square meters)    Stage 3A Moderate CKD (GFR = 45-59 mL/min/1 73 square meters)    Stage 3B Moderate CKD (GFR = 30-44 mL/min/1 73 square meters)    Stage 4 Severe CKD (GFR = 15-29 mL/min/1 73 square meters)    Stage 5 End Stage CKD (GFR <15 mL/min/1 73 square meters)  Note: GFR calculation is accurate only with a steady state creatinine    Lipase [[id number]]  (Normal) Collected:  07/12/19 1430    Lab Status:  Final result Specimen:  Blood from Arm, Left Updated:  07/12/19 1455     Lipase 109 u/L     Protime-INR [[id number]]  (Normal) Collected:  07/12/19 1430    Lab Status:  Final result Specimen:  Blood from Arm, Left Updated:  07/12/19 1449     Protime 12 9 seconds      INR 1 03    APTT [[id number]]  (Normal) Collected:  07/12/19 1430    Lab Status:  Final result Specimen:  Blood from Arm, Left Updated:  07/12/19 1449     PTT 25 seconds     CBC and differential [[id number]] Collected:  07/12/19 1430    Lab Status:  Final result Specimen:  Blood from Arm, Left Updated:  07/12/19 1445     WBC 8 06 Thousand/uL      RBC 4 60 Million/uL      Hemoglobin 13 4 g/dL      Hematocrit 41 0 %      MCV 89 fL      MCH 29 1 pg      MCHC 32 7 g/dL      RDW 12 9 %      MPV 9 2 fL      Platelets 618 Thousands/uL      nRBC 0 /100 WBCs      Neutrophils Relative 55 %      Immat GRANS % 2 %      Lymphocytes Relative 30 %      Monocytes Relative 6 %      Eosinophils Relative 6 %      Basophils Relative 1 %      Neutrophils Absolute 4 42 Thousands/µL      Immature Grans Absolute 0 18 Thousand/uL      Lymphocytes Absolute 2 43 Thousands/µL      Monocytes Absolute 0 48 Thousand/µL      Eosinophils Absolute 0 47 Thousand/µL      Basophils Absolute 0 08 Thousands/µL                  CT abdomen pelvis with contrast   Final Result by Silvia Hannon MD (07/12 4655)      1  No acute inflammatory process in the abdomen or pelvis  2   Severe hepatic steatosis with numerous small hyperdense lesions scattered throughout the liver  These are indeterminate though are stable from at least December 2018  The 8 mm lesion at the dome is stable from 2017  While may represent benign    lesion such as hemangiomata, other lesions including hepatic adenomata are not excluded  Given the increased number of visible lesions since 2017 and presence of underlying liver disease, a follow-up MRI is recommended  3   Nonspecific peripheral basilar groundglass opacities, similar to the recent prior CT though not present on earlier CTs  This may represent sequelae of small airways inflammatory disease  Correlation for pulmonary symptoms recommended  The study was marked in EPIC for significant notification        Workstation performed: [id number]                    Procedures  Procedures       ED Course                               MDM  Number of Diagnoses or Management Options  Nausea & vomiting: new and requires workup     Amount and/or Complexity of Data Reviewed  Clinical lab tests: reviewed and ordered  Tests in the radiology section of CPT®: ordered and reviewed  Tests in the medicine section of CPT®: ordered and reviewed  Review and summarize past medical records: yes  Discuss the patient with other providers: yes  Independent visualization of images, tracings, or specimens: yes    Patient Progress  Patient progress: stable      Disposition  Final diagnoses:   Nausea & vomiting     Time reflects when diagnosis was documented in both MDM as applicable and the Disposition within this note     Time User Action Codes Description Comment    7/12/2019  4:43 PM Geovani ACEVEDO Add [R11 2] Nausea & vomiting       ED Disposition     ED Disposition Condition Date/Time Comment    Discharge Stable Fri Jul 12, 2019  4:43 PM Renee Pierce discharge to home/self care  Follow-up Information     Follow up With Specialties Details Why Contact Info    Zulma Espinal MD Internal Medicine Schedule an appointment as soon as possible for a visit   [address]  [address] [facility] [address] [place] [address]      Mars Emanule MD Gastroenterology Schedule an appointment as soon as possible for a visit   [address] [facility] [address] [place] [address]  [phone]            Patient's Medications   Discharge Prescriptions    DICYCLOMINE (BENTYL) 20 MG TABLET    Take 1 tablet (20 mg total) by mouth 2 (two) times a day       Start Date: 7/12/2019 End Date: --       Order Dose: 20 mg       Quantity: 20 tablet    Refills: 0    METOCLOPRAMIDE (REGLAN) 10 MG TABLET    Take 1 tablet (10 mg total) by mouth every 6 (six) hours       Start Date: 7/12/2019 End Date: --       Order Dose: 10 mg       Quantity: 30 tablet    Refills: 0     No discharge procedures on file      ED Provider  Electronically Signed by           Keysha Phelan DO  07/12/19 3000

## 2019-10-18 ENCOUNTER — TELEPHONE (OUTPATIENT)
Dept: INTERNAL MEDICINE CLINIC | Facility: CLINIC | Age: 39
End: 2019-10-18

## 2019-10-18 NOTE — TELEPHONE ENCOUNTER
Please schedule annual physical with me or Demetris Goodrich  Needs to have repeat imaging for the liver, since CT last July

## 2020-01-08 DIAGNOSIS — F41.9 ANXIETY: ICD-10-CM

## 2020-01-08 DIAGNOSIS — G47.09 OTHER INSOMNIA: ICD-10-CM

## 2020-01-08 RX ORDER — ZOLPIDEM TARTRATE 10 MG/1
5-10 TABLET ORAL
Qty: 30 TABLET | Refills: 0 | Status: SHIPPED | OUTPATIENT
Start: 2020-01-08 | End: 2020-02-11 | Stop reason: SDUPTHER

## 2020-01-08 RX ORDER — SERTRALINE HYDROCHLORIDE 100 MG/1
150 TABLET, FILM COATED ORAL DAILY
Qty: 45 TABLET | Refills: 1 | Status: SHIPPED | OUTPATIENT
Start: 2020-01-08 | End: 2020-03-16

## 2020-01-14 ENCOUNTER — OFFICE VISIT (OUTPATIENT)
Dept: INTERNAL MEDICINE CLINIC | Facility: CLINIC | Age: 40
End: 2020-01-14
Payer: COMMERCIAL

## 2020-01-14 VITALS
OXYGEN SATURATION: 98 % | BODY MASS INDEX: 37.19 KG/M2 | DIASTOLIC BLOOD PRESSURE: 84 MMHG | HEART RATE: 87 BPM | SYSTOLIC BLOOD PRESSURE: 116 MMHG | TEMPERATURE: 99.2 F | HEIGHT: 65 IN | WEIGHT: 223.2 LBS | RESPIRATION RATE: 18 BRPM

## 2020-01-14 DIAGNOSIS — E78.2 MIXED HYPERLIPIDEMIA: ICD-10-CM

## 2020-01-14 DIAGNOSIS — E66.01 CLASS 3 SEVERE OBESITY DUE TO EXCESS CALORIES WITHOUT SERIOUS COMORBIDITY WITH BODY MASS INDEX (BMI) OF 40.0 TO 44.9 IN ADULT (HCC): ICD-10-CM

## 2020-01-14 DIAGNOSIS — K21.00 GASTROESOPHAGEAL REFLUX DISEASE WITH ESOPHAGITIS: ICD-10-CM

## 2020-01-14 DIAGNOSIS — F41.9 ANXIETY: ICD-10-CM

## 2020-01-14 DIAGNOSIS — J06.9 UPPER RESPIRATORY TRACT INFECTION, UNSPECIFIED TYPE: Primary | ICD-10-CM

## 2020-01-14 DIAGNOSIS — G47.09 OTHER INSOMNIA: ICD-10-CM

## 2020-01-14 PROBLEM — S92.352G: Status: RESOLVED | Noted: 2019-03-08 | Resolved: 2020-01-14

## 2020-01-14 PROBLEM — S92.352D: Status: RESOLVED | Noted: 2019-04-05 | Resolved: 2020-01-14

## 2020-01-14 PROCEDURE — 3008F BODY MASS INDEX DOCD: CPT | Performed by: INTERNAL MEDICINE

## 2020-01-14 PROCEDURE — 94640 AIRWAY INHALATION TREATMENT: CPT | Performed by: INTERNAL MEDICINE

## 2020-01-14 PROCEDURE — 99214 OFFICE O/P EST MOD 30 MIN: CPT | Performed by: INTERNAL MEDICINE

## 2020-01-14 RX ORDER — FLUTICASONE PROPIONATE 50 MCG
1 SPRAY, SUSPENSION (ML) NASAL DAILY
Qty: 16 G | Refills: 0 | Status: SHIPPED | OUTPATIENT
Start: 2020-01-14 | End: 2021-09-14 | Stop reason: SDUPTHER

## 2020-01-14 RX ORDER — BENZONATATE 100 MG/1
100 CAPSULE ORAL 3 TIMES DAILY PRN
Qty: 30 CAPSULE | Refills: 0 | Status: SHIPPED | OUTPATIENT
Start: 2020-01-14 | End: 2020-01-21 | Stop reason: SDUPTHER

## 2020-01-14 RX ORDER — ALBUTEROL SULFATE 2.5 MG/3ML
2.5 SOLUTION RESPIRATORY (INHALATION) ONCE
Status: COMPLETED | OUTPATIENT
Start: 2020-01-14 | End: 2020-01-14

## 2020-01-14 RX ADMIN — ALBUTEROL SULFATE 2.5 MG: 2.5 SOLUTION RESPIRATORY (INHALATION) at 14:39

## 2020-01-14 NOTE — LETTER
January 14, 2020     Patient: Natalie Median   YOB: 1980   Date of Visit: 1/14/2020       To Whom it May Concern:    Gideon Rowley is under my professional care  She was seen in my office on 1/14/2020  Please excuse her from work from 1/13 to 1/15/20  She may return to work on 1/16/20  If you have any questions or concerns, please don't hesitate to call           Sincerely,          Chuckie Birch MD        CC: No Recipients

## 2020-01-14 NOTE — PROGRESS NOTES
Assessment/Plan:    Anxiety  Stable on sertraline  Takes zolpidem prn only  GERD (gastroesophageal reflux disease)  Takes PPI as needed  Class 3 severe obesity due to excess calories without serious comorbidity with body mass index (BMI) of 40 0 to 44 9 in adult [facility])  Lost 20 lbs since one year ago  Irregular periods  Keep appointment with gynecology  Hyperlipidemia  Repeat lipids due  Diagnoses and all orders for this visit:    Upper respiratory tract infection, unspecified type  Comments:  Start cough medication, nasal sprays, monitor for fevers  Orders:  -     Mini neb; Future  -     albuterol inhalation solution 2 5 mg  -     benzonatate (TESSALON PERLES) 100 mg capsule; Take 1 capsule (100 mg total) by mouth 3 (three) times a day as needed for cough  -     fluticasone (FLONASE) 50 mcg/act nasal spray; 1 spray into each nostril daily    Anxiety    Other insomnia    Class 3 severe obesity due to excess calories without serious comorbidity with body mass index (BMI) of 40 0 to 44 9 in adult ([facility])    Gastroesophageal reflux disease with esophagitis    Mixed hyperlipidemia    Other orders  -     Mini neb      Follow up in 6 months or as needed  Subjective:      Patient ID: Ozzy Portillo is a 42 y o  female  Brielle complains of cough and cold symptoms which started yesterday  Cough is frequent, no phlegm or wheezing, no chest pain or shortness of breath  She does have nasal congestion and postnasal drip  She has been taking over-the-counter [employer] medication as needed  She denies any fever or chills  No GI symptoms  No sick contacts  She reports left foot has been all right most of the time  She reports her father suddenly  last spring  She has been good lately, has not been taking her Ambien regularly      The following portions of the patient's history were reviewed and updated as appropriate: allergies, current medications, past medical history, past social history and problem list     Review of Systems   Constitutional: Negative for appetite change, fatigue and fever  HENT: Positive for congestion and rhinorrhea  Negative for ear pain and postnasal drip  Eyes: Negative for visual disturbance  Respiratory: Positive for cough  Negative for chest tightness, shortness of breath and wheezing  Cardiovascular: Negative for chest pain  Gastrointestinal: Negative for abdominal pain, constipation, diarrhea, nausea and vomiting  Genitourinary: Negative for dysuria  Musculoskeletal: Negative for arthralgias  Skin: Negative for rash and wound  Neurological: Negative for dizziness and headaches  Psychiatric/Behavioral: Positive for sleep disturbance  Negative for dysphoric mood  The patient is not nervous/anxious  Objective:      /84   Pulse 87   Temp 99 2 °F (37 3 °C) (Oral)   Resp 18   Ht 5' 5" (1 651 m)   Wt 101 kg (223 lb 3 2 oz)   SpO2 98%   BMI 37 14 kg/m²          Physical Exam   Constitutional: She appears well-developed and well-nourished  HENT:   Head: Normocephalic and atraumatic  Right Ear: Tympanic membrane, external ear and ear canal normal    Left Ear: Tympanic membrane, external ear and ear canal normal    Nose: Rhinorrhea present  Mouth/Throat: Mucous membranes are normal    Eyes: Pupils are equal, round, and reactive to light  Conjunctivae are normal    Cardiovascular: Normal rate, regular rhythm and normal heart sounds  Pulmonary/Chest: Effort normal and breath sounds normal  She has no wheezes  She has no rales  Abdominal: Bowel sounds are normal    Lymphadenopathy:     She has no cervical adenopathy  Neurological: She is alert  Skin: Skin is warm  No rash noted  Nursing note and vitals reviewed            Mini neb  Performed by: Melanie Christina MD  Authorized by: Melanie Christina MD     Treatment 1:   Pre-Procedure     Symptoms:  Cough    Lung Sounds:  Clear    Medication Administered: Albuterol 2 5 mg  Post-Procedure     Symptoms:  Cough    Lung sounds:  Clear    No subjective improvement

## 2020-01-20 ENCOUNTER — TELEPHONE (OUTPATIENT)
Dept: INTERNAL MEDICINE CLINIC | Facility: CLINIC | Age: 40
End: 2020-01-20

## 2020-01-20 NOTE — TELEPHONE ENCOUNTER
Pt  Is still coughing-feels much bettter  She needs her work note extended because she didn't go to work last [employer]  And Fri  She is going back to work today  She can have her daughter p/u her note

## 2020-01-21 ENCOUNTER — APPOINTMENT (EMERGENCY)
Dept: CT IMAGING | Facility: HOSPITAL | Age: 40
End: 2020-01-21
Payer: COMMERCIAL

## 2020-01-21 ENCOUNTER — HOSPITAL ENCOUNTER (EMERGENCY)
Facility: HOSPITAL | Age: 40
Discharge: HOME | End: 2020-01-21
Attending: EMERGENCY MEDICINE | Admitting: EMERGENCY MEDICINE
Payer: COMMERCIAL

## 2020-01-21 ENCOUNTER — APPOINTMENT (EMERGENCY)
Dept: RADIOLOGY | Facility: HOSPITAL | Age: 40
End: 2020-01-21
Payer: COMMERCIAL

## 2020-01-21 VITALS
DIASTOLIC BLOOD PRESSURE: 75 MMHG | OXYGEN SATURATION: 95 % | RESPIRATION RATE: 18 BRPM | HEIGHT: 64 IN | BODY MASS INDEX: 37.56 KG/M2 | TEMPERATURE: 98.2 F | HEART RATE: 99 BPM | WEIGHT: 220 LBS | SYSTOLIC BLOOD PRESSURE: 145 MMHG

## 2020-01-21 DIAGNOSIS — J06.9 UPPER RESPIRATORY TRACT INFECTION, UNSPECIFIED TYPE: ICD-10-CM

## 2020-01-21 DIAGNOSIS — R10.9 ABDOMINAL PAIN: ICD-10-CM

## 2020-01-21 DIAGNOSIS — R05.9 COUGH: ICD-10-CM

## 2020-01-21 DIAGNOSIS — J20.9 ACUTE BRONCHITIS: Primary | ICD-10-CM

## 2020-01-21 LAB
ALBUMIN SERPL BCP-MCNC: 3.7 G/DL (ref 3.5–5)
ALP SERPL-CCNC: 114 U/L (ref 46–116)
ALT SERPL W P-5'-P-CCNC: 24 U/L (ref 12–78)
ANION GAP SERPL CALCULATED.3IONS-SCNC: 10 MMOL/L (ref 4–13)
AST SERPL W P-5'-P-CCNC: 32 U/L (ref 5–45)
ATRIAL RATE: 89 BPM
BASOPHILS # BLD AUTO: 0.07 THOUSANDS/ΜL (ref 0–0.1)
BASOPHILS NFR BLD AUTO: 1 % (ref 0–1)
BILIRUB SERPL-MCNC: 0.41 MG/DL (ref 0.2–1)
BILIRUB UR QL STRIP: NEGATIVE
BUN SERPL-MCNC: 13 MG/DL (ref 5–25)
CALCIUM SERPL-MCNC: 9.4 MG/DL (ref 8.3–10.1)
CHLORIDE SERPL-SCNC: 102 MMOL/L (ref 100–108)
CLARITY UR: CLEAR
CO2 SERPL-SCNC: 24 MMOL/L (ref 21–32)
COLOR UR: YELLOW
CREAT SERPL-MCNC: 1.01 MG/DL (ref 0.6–1.3)
EOSINOPHIL # BLD AUTO: 0.3 THOUSAND/ΜL (ref 0–0.61)
EOSINOPHIL NFR BLD AUTO: 3 % (ref 0–6)
ERYTHROCYTE [DISTWIDTH] IN BLOOD BY AUTOMATED COUNT: 13.2 % (ref 11.6–15.1)
EXT PREG TEST URINE: NEGATIVE
EXT. CONTROL ED NAV: NORMAL
FLUAV RNA NPH QL NAA+PROBE: NORMAL
FLUBV RNA NPH QL NAA+PROBE: NORMAL
GFR SERPL CREATININE-BSD FRML MDRD: 71 ML/MIN/1.73SQ M
GLUCOSE SERPL-MCNC: 121 MG/DL (ref 65–140)
GLUCOSE UR STRIP-MCNC: NEGATIVE MG/DL
HCT VFR BLD AUTO: 43 % (ref 34.8–46.1)
HGB BLD-MCNC: 13.8 G/DL (ref 11.5–15.4)
HGB UR QL STRIP.AUTO: NEGATIVE
IMM GRANULOCYTES # BLD AUTO: 0.08 THOUSAND/UL (ref 0–0.2)
IMM GRANULOCYTES NFR BLD AUTO: 1 % (ref 0–2)
KETONES UR STRIP-MCNC: NEGATIVE MG/DL
LEUKOCYTE ESTERASE UR QL STRIP: NEGATIVE
LIPASE SERPL-CCNC: 143 U/L (ref 73–393)
LYMPHOCYTES # BLD AUTO: 2.89 THOUSANDS/ΜL (ref 0.6–4.47)
LYMPHOCYTES NFR BLD AUTO: 33 % (ref 14–44)
MCH RBC QN AUTO: 28.5 PG (ref 26.8–34.3)
MCHC RBC AUTO-ENTMCNC: 32.1 G/DL (ref 31.4–37.4)
MCV RBC AUTO: 89 FL (ref 82–98)
MONOCYTES # BLD AUTO: 0.42 THOUSAND/ΜL (ref 0.17–1.22)
MONOCYTES NFR BLD AUTO: 5 % (ref 4–12)
NEUTROPHILS # BLD AUTO: 4.95 THOUSANDS/ΜL (ref 1.85–7.62)
NEUTS SEG NFR BLD AUTO: 57 % (ref 43–75)
NITRITE UR QL STRIP: NEGATIVE
NRBC BLD AUTO-RTO: 0 /100 WBCS
P AXIS: 51 DEGREES
PH UR STRIP.AUTO: 5 [PH]
PLATELET # BLD AUTO: 290 THOUSANDS/UL (ref 149–390)
PMV BLD AUTO: 9.5 FL (ref 8.9–12.7)
POTASSIUM SERPL-SCNC: 4.7 MMOL/L (ref 3.5–5.3)
PR INTERVAL: 142 MS
PROT SERPL-MCNC: 8.4 G/DL (ref 6.4–8.2)
PROT UR STRIP-MCNC: NEGATIVE MG/DL
QRS AXIS: 50 DEGREES
QRSD INTERVAL: 78 MS
QT INTERVAL: 344 MS
QTC INTERVAL: 409 MS
RBC # BLD AUTO: 4.84 MILLION/UL (ref 3.81–5.12)
RSV RNA NPH QL NAA+PROBE: NORMAL
SODIUM SERPL-SCNC: 136 MMOL/L (ref 136–145)
SP GR UR STRIP.AUTO: 1.02 (ref 1–1.03)
T WAVE AXIS: 55 DEGREES
UROBILINOGEN UR QL STRIP.AUTO: 0.2 E.U./DL
VENTRICULAR RATE: 85 BPM
WBC # BLD AUTO: 8.71 THOUSAND/UL (ref 4.31–10.16)

## 2020-01-21 PROCEDURE — 85025 COMPLETE CBC W/AUTO DIFF WBC: CPT | Performed by: PHYSICIAN ASSISTANT

## 2020-01-21 PROCEDURE — 36415 COLL VENOUS BLD VENIPUNCTURE: CPT | Performed by: PHYSICIAN ASSISTANT

## 2020-01-21 PROCEDURE — 80053 COMPREHEN METABOLIC PANEL: CPT | Performed by: PHYSICIAN ASSISTANT

## 2020-01-21 PROCEDURE — 74177 CT ABD & PELVIS W/CONTRAST: CPT

## 2020-01-21 PROCEDURE — 94640 AIRWAY INHALATION TREATMENT: CPT

## 2020-01-21 PROCEDURE — 81003 URINALYSIS AUTO W/O SCOPE: CPT | Performed by: PHYSICIAN ASSISTANT

## 2020-01-21 PROCEDURE — 96360 HYDRATION IV INFUSION INIT: CPT

## 2020-01-21 PROCEDURE — 83690 ASSAY OF LIPASE: CPT | Performed by: PHYSICIAN ASSISTANT

## 2020-01-21 PROCEDURE — 99284 EMERGENCY DEPT VISIT MOD MDM: CPT

## 2020-01-21 PROCEDURE — 96361 HYDRATE IV INFUSION ADD-ON: CPT

## 2020-01-21 PROCEDURE — 93010 ELECTROCARDIOGRAM REPORT: CPT | Performed by: INTERNAL MEDICINE

## 2020-01-21 PROCEDURE — 99284 EMERGENCY DEPT VISIT MOD MDM: CPT | Performed by: PHYSICIAN ASSISTANT

## 2020-01-21 PROCEDURE — 71046 X-RAY EXAM CHEST 2 VIEWS: CPT

## 2020-01-21 PROCEDURE — 93005 ELECTROCARDIOGRAM TRACING: CPT

## 2020-01-21 PROCEDURE — 87631 RESP VIRUS 3-5 TARGETS: CPT | Performed by: PHYSICIAN ASSISTANT

## 2020-01-21 PROCEDURE — 81025 URINE PREGNANCY TEST: CPT | Performed by: PHYSICIAN ASSISTANT

## 2020-01-21 RX ORDER — BENZONATATE 100 MG/1
100 CAPSULE ORAL 3 TIMES DAILY PRN
Qty: 12 CAPSULE | Refills: 0 | Status: SHIPPED | OUTPATIENT
Start: 2020-01-21 | End: 2020-01-24

## 2020-01-21 RX ORDER — ALBUTEROL SULFATE 2.5 MG/3ML
5 SOLUTION RESPIRATORY (INHALATION) ONCE
Status: COMPLETED | OUTPATIENT
Start: 2020-01-21 | End: 2020-01-21

## 2020-01-21 RX ORDER — AZITHROMYCIN 250 MG/1
TABLET, FILM COATED ORAL
Qty: 6 TABLET | Refills: 0 | Status: SHIPPED | OUTPATIENT
Start: 2020-01-21 | End: 2020-01-25

## 2020-01-21 RX ORDER — ALBUTEROL SULFATE 90 UG/1
2 AEROSOL, METERED RESPIRATORY (INHALATION) EVERY 4 HOURS PRN
Qty: 1 INHALER | Refills: 0 | Status: SHIPPED | OUTPATIENT
Start: 2020-01-21 | End: 2020-03-19 | Stop reason: SDUPTHER

## 2020-01-21 RX ADMIN — IOHEXOL 100 ML: 350 INJECTION, SOLUTION INTRAVENOUS at 11:44

## 2020-01-21 RX ADMIN — IPRATROPIUM BROMIDE 0.5 MG: 0.5 SOLUTION RESPIRATORY (INHALATION) at 10:03

## 2020-01-21 RX ADMIN — ALBUTEROL SULFATE 5 MG: 2.5 SOLUTION RESPIRATORY (INHALATION) at 10:03

## 2020-01-21 RX ADMIN — SODIUM CHLORIDE 1000 ML: 0.9 INJECTION, SOLUTION INTRAVENOUS at 10:17

## 2020-01-21 NOTE — ED NOTES
Génesis CARDOSO at 1 [facility] [address] [place] [address]  01/21/20 4602

## 2020-01-21 NOTE — ED NOTES
Urine dip analyzer broken per charge RN MANUEL   Ordered urine microscopic to send to [facility] in place     [address], RN  01/21/20 1111

## 2020-02-11 DIAGNOSIS — G47.09 OTHER INSOMNIA: ICD-10-CM

## 2020-02-11 RX ORDER — ZOLPIDEM TARTRATE 10 MG/1
5-10 TABLET ORAL
Qty: 30 TABLET | Refills: 0 | Status: SHIPPED | OUTPATIENT
Start: 2020-02-11 | End: 2020-03-16 | Stop reason: SDUPTHER

## 2020-03-16 DIAGNOSIS — F41.9 ANXIETY: ICD-10-CM

## 2020-03-16 DIAGNOSIS — G47.09 OTHER INSOMNIA: ICD-10-CM

## 2020-03-16 RX ORDER — ZOLPIDEM TARTRATE 10 MG/1
5-10 TABLET ORAL
Qty: 30 TABLET | Refills: 0 | Status: SHIPPED | OUTPATIENT
Start: 2020-03-16 | End: 2020-04-16 | Stop reason: SDUPTHER

## 2020-03-16 RX ORDER — SERTRALINE HYDROCHLORIDE 100 MG/1
TABLET, FILM COATED ORAL
Qty: 135 TABLET | Refills: 1 | Status: SHIPPED | OUTPATIENT
Start: 2020-03-16 | End: 2020-05-18

## 2020-03-18 ENCOUNTER — OFFICE VISIT (OUTPATIENT)
Dept: INTERNAL MEDICINE CLINIC | Facility: CLINIC | Age: 40
End: 2020-03-18
Payer: COMMERCIAL

## 2020-03-18 VITALS
OXYGEN SATURATION: 95 % | HEART RATE: 125 BPM | HEIGHT: 64 IN | TEMPERATURE: 98.1 F | WEIGHT: 220 LBS | DIASTOLIC BLOOD PRESSURE: 86 MMHG | BODY MASS INDEX: 37.56 KG/M2 | SYSTOLIC BLOOD PRESSURE: 127 MMHG

## 2020-03-18 DIAGNOSIS — R68.89 FLU-LIKE SYMPTOMS: Primary | ICD-10-CM

## 2020-03-18 PROCEDURE — 99213 OFFICE O/P EST LOW 20 MIN: CPT | Performed by: NURSE PRACTITIONER

## 2020-03-18 PROCEDURE — 87631 RESP VIRUS 3-5 TARGETS: CPT | Performed by: NURSE PRACTITIONER

## 2020-03-18 PROCEDURE — 1036F TOBACCO NON-USER: CPT | Performed by: NURSE PRACTITIONER

## 2020-03-18 PROCEDURE — 3008F BODY MASS INDEX DOCD: CPT | Performed by: NURSE PRACTITIONER

## 2020-03-18 NOTE — LETTER
March 18, 2020     Patient: Merced Rivera   YOB: 1980   Date of Visit: 3/18/2020       To Whom it May Concern:    Dave Foley is under my professional care  She was seen in my office on 3/18/2020  She may return to work on 3/25/2020  If you have any questions or concerns, please don't hesitate to call           Sincerely,          Ferdinand Bustos

## 2020-03-18 NOTE — PROGRESS NOTES
Assessment/Plan:    Flu PCR pending  Increase oral fluids, symptomatic treatment including ibuprofen for fever/bodyaches, robitussin OTC for cough  Advised to stay in and away from others if possible  Good handwashing  Call if symptoms worsen or do not improve over the next 3-5 days  Diagnoses and all orders for this visit:    Flu-like symptoms  -     Influenza A/B and RSV PCR; Future  -     Influenza A/B and RSV PCR          Subjective:      Patient ID: Rudy Mccabe is a 42 y o  female  Here today with cough   Started 2-3 days ago   She has a dry cough, nasal congestion/rhinorrhea, headache, fever around 101, body aches, sore throat, and diarrhea   She feels mild shortness of breath with activity   She has been taking OTC cold medication   No recent travel or sick contacts                The following portions of the patient's history were reviewed and updated as appropriate: allergies, current medications, past family history, past medical history, past social history, past surgical history and problem list     Review of Systems   Constitutional: Positive for chills, fatigue and fever  Negative for activity change and appetite change  HENT: Positive for congestion, rhinorrhea and sore throat  Negative for ear pain, sinus pressure and sinus pain  Respiratory: Positive for cough and shortness of breath  Negative for chest tightness and wheezing  Cardiovascular: Negative for chest pain, palpitations and leg swelling  Gastrointestinal: Positive for diarrhea  Negative for nausea and vomiting  Genitourinary: Negative for difficulty urinating  Musculoskeletal: Positive for myalgias  Neurological: Positive for headaches  Negative for dizziness, weakness and light-headedness           Objective:      /86   Pulse (!) 125   Temp 98 1 °F (36 7 °C)   Ht 5' 4" (1 626 m)   Wt 99 8 kg (220 lb)   SpO2 95%   BMI 37 76 kg/m²          Physical Exam   Constitutional: She is oriented to person, place, and time  She appears ill  HENT:   Head: Normocephalic and atraumatic  Right Ear: External ear normal    Left Ear: External ear normal    Mouth/Throat: Mucous membranes are normal  Posterior oropharyngeal erythema present  Eyes: Pupils are equal, round, and reactive to light  Conjunctivae are normal    Neck: No thyromegaly present  Cardiovascular: Normal rate, regular rhythm and normal heart sounds  Pulmonary/Chest: Effort normal and breath sounds normal    Lymphadenopathy:     She has no cervical adenopathy  Neurological: She is alert and oriented to person, place, and time  Skin: Skin is warm and dry  Psychiatric: She has a normal mood and affect  Her behavior is normal    Vitals reviewed

## 2020-03-19 ENCOUNTER — TELEPHONE (OUTPATIENT)
Dept: INTERNAL MEDICINE CLINIC | Facility: CLINIC | Age: 40
End: 2020-03-19

## 2020-03-19 DIAGNOSIS — R05.9 COUGH: ICD-10-CM

## 2020-03-19 LAB
FLUAV RNA NPH QL NAA+PROBE: NORMAL
FLUBV RNA NPH QL NAA+PROBE: NORMAL
RSV RNA NPH QL NAA+PROBE: NORMAL

## 2020-03-19 RX ORDER — ALBUTEROL SULFATE 90 UG/1
2 AEROSOL, METERED RESPIRATORY (INHALATION) EVERY 4 HOURS PRN
Qty: 1 INHALER | Refills: 0 | Status: SHIPPED | OUTPATIENT
Start: 2020-03-19 | End: 2020-07-29

## 2020-03-20 ENCOUNTER — TELEPHONE (OUTPATIENT)
Dept: INTERNAL MEDICINE CLINIC | Facility: CLINIC | Age: 40
End: 2020-03-20

## 2020-04-03 ENCOUNTER — HOSPITAL ENCOUNTER (EMERGENCY)
Facility: HOSPITAL | Age: 40
Discharge: HOME | End: 2020-04-03
Attending: EMERGENCY MEDICINE | Admitting: EMERGENCY MEDICINE
Payer: COMMERCIAL

## 2020-04-03 VITALS
DIASTOLIC BLOOD PRESSURE: 67 MMHG | BODY MASS INDEX: 38.26 KG/M2 | HEART RATE: 75 BPM | WEIGHT: 222.88 LBS | RESPIRATION RATE: 18 BRPM | OXYGEN SATURATION: 98 % | TEMPERATURE: 97.4 F | SYSTOLIC BLOOD PRESSURE: 125 MMHG

## 2020-04-03 DIAGNOSIS — K08.89 TOOTHACHE: Primary | ICD-10-CM

## 2020-04-03 PROCEDURE — 99283 EMERGENCY DEPT VISIT LOW MDM: CPT | Performed by: EMERGENCY MEDICINE

## 2020-04-03 PROCEDURE — 64450 NJX AA&/STRD OTHER PN/BRANCH: CPT | Performed by: EMERGENCY MEDICINE

## 2020-04-03 PROCEDURE — 99282 EMERGENCY DEPT VISIT SF MDM: CPT

## 2020-04-03 RX ORDER — BUPIVACAINE HYDROCHLORIDE 5 MG/ML
5 INJECTION, SOLUTION EPIDURAL; INTRACAUDAL ONCE
Status: COMPLETED | OUTPATIENT
Start: 2020-04-03 | End: 2020-04-03

## 2020-04-03 RX ORDER — HYDROCODONE BITARTRATE AND ACETAMINOPHEN 5; 325 MG/1; MG/1
1 TABLET ORAL EVERY 6 HOURS PRN
Qty: 10 TABLET | Refills: 0 | Status: SHIPPED | OUTPATIENT
Start: 2020-04-03 | End: 2020-06-15

## 2020-04-03 RX ADMIN — BUPIVACAINE HYDROCHLORIDE 5 ML: 5 INJECTION, SOLUTION EPIDURAL; INTRACAUDAL at 17:04

## 2020-04-16 DIAGNOSIS — G47.09 OTHER INSOMNIA: ICD-10-CM

## 2020-04-16 RX ORDER — ZOLPIDEM TARTRATE 10 MG/1
5-10 TABLET ORAL
Qty: 30 TABLET | Refills: 0 | Status: SHIPPED | OUTPATIENT
Start: 2020-04-16 | End: 2020-05-18

## 2020-05-18 DIAGNOSIS — F41.9 ANXIETY: ICD-10-CM

## 2020-05-18 DIAGNOSIS — G47.09 OTHER INSOMNIA: ICD-10-CM

## 2020-05-18 RX ORDER — SERTRALINE HYDROCHLORIDE 100 MG/1
TABLET, FILM COATED ORAL
Qty: 135 TABLET | Refills: 1 | Status: SHIPPED | OUTPATIENT
Start: 2020-05-18 | End: 2020-09-09 | Stop reason: SDUPTHER

## 2020-05-18 RX ORDER — ZOLPIDEM TARTRATE 10 MG/1
TABLET ORAL
Qty: 30 TABLET | Refills: 0 | Status: SHIPPED | OUTPATIENT
Start: 2020-05-18 | End: 2020-06-19

## 2020-06-15 ENCOUNTER — ANNUAL EXAM (OUTPATIENT)
Dept: OBGYN CLINIC | Facility: CLINIC | Age: 40
End: 2020-06-15
Payer: COMMERCIAL

## 2020-06-15 VITALS
SYSTOLIC BLOOD PRESSURE: 120 MMHG | DIASTOLIC BLOOD PRESSURE: 70 MMHG | TEMPERATURE: 98.3 F | WEIGHT: 226 LBS | HEIGHT: 64 IN | BODY MASS INDEX: 38.58 KG/M2

## 2020-06-15 DIAGNOSIS — Z01.419 WELL FEMALE EXAM WITH ROUTINE GYNECOLOGICAL EXAM: Primary | ICD-10-CM

## 2020-06-15 DIAGNOSIS — N93.9 ABNORMAL UTERINE BLEEDING (AUB): ICD-10-CM

## 2020-06-15 DIAGNOSIS — Z12.4 ENCOUNTER FOR SCREENING FOR MALIGNANT NEOPLASM OF CERVIX: ICD-10-CM

## 2020-06-15 PROCEDURE — 87624 HPV HI-RISK TYP POOLED RSLT: CPT | Performed by: OBSTETRICS & GYNECOLOGY

## 2020-06-15 PROCEDURE — 99395 PREV VISIT EST AGE 18-39: CPT | Performed by: OBSTETRICS & GYNECOLOGY

## 2020-06-15 PROCEDURE — G0145 SCR C/V CYTO,THINLAYER,RESCR: HCPCS | Performed by: OBSTETRICS & GYNECOLOGY

## 2020-06-18 ENCOUNTER — HOSPITAL ENCOUNTER (OUTPATIENT)
Dept: ULTRASOUND IMAGING | Facility: HOSPITAL | Age: 40
Discharge: HOME | End: 2020-06-18
Attending: OBSTETRICS & GYNECOLOGY
Payer: COMMERCIAL

## 2020-06-18 DIAGNOSIS — G47.09 OTHER INSOMNIA: ICD-10-CM

## 2020-06-18 DIAGNOSIS — N93.9 ABNORMAL UTERINE BLEEDING (AUB): ICD-10-CM

## 2020-06-18 LAB
HPV HR 12 DNA CVX QL NAA+PROBE: NEGATIVE
HPV16 DNA CVX QL NAA+PROBE: NEGATIVE
HPV18 DNA CVX QL NAA+PROBE: NEGATIVE

## 2020-06-18 PROCEDURE — 76830 TRANSVAGINAL US NON-OB: CPT

## 2020-06-18 PROCEDURE — 76856 US EXAM PELVIC COMPLETE: CPT

## 2020-06-19 RX ORDER — ZOLPIDEM TARTRATE 10 MG/1
TABLET ORAL
Qty: 30 TABLET | Refills: 0 | Status: SHIPPED | OUTPATIENT
Start: 2020-06-19 | End: 2020-07-17 | Stop reason: SDUPTHER

## 2020-06-23 ENCOUNTER — APPOINTMENT (EMERGENCY)
Dept: RADIOLOGY | Facility: HOSPITAL | Age: 40
End: 2020-06-23
Payer: COMMERCIAL

## 2020-06-23 ENCOUNTER — HOSPITAL ENCOUNTER (EMERGENCY)
Facility: HOSPITAL | Age: 40
Discharge: HOME | End: 2020-06-23
Attending: EMERGENCY MEDICINE | Admitting: EMERGENCY MEDICINE
Payer: COMMERCIAL

## 2020-06-23 ENCOUNTER — APPOINTMENT (EMERGENCY)
Dept: CT IMAGING | Facility: HOSPITAL | Age: 40
End: 2020-06-23
Payer: COMMERCIAL

## 2020-06-23 ENCOUNTER — TELEPHONE (OUTPATIENT)
Dept: INTERNAL MEDICINE CLINIC | Facility: CLINIC | Age: 40
End: 2020-06-23

## 2020-06-23 VITALS
OXYGEN SATURATION: 96 % | DIASTOLIC BLOOD PRESSURE: 84 MMHG | BODY MASS INDEX: 39.24 KG/M2 | RESPIRATION RATE: 20 BRPM | WEIGHT: 228.62 LBS | HEART RATE: 72 BPM | SYSTOLIC BLOOD PRESSURE: 138 MMHG

## 2020-06-23 DIAGNOSIS — K52.9 GASTROENTERITIS: ICD-10-CM

## 2020-06-23 DIAGNOSIS — R07.89 ATYPICAL CHEST PAIN: Primary | ICD-10-CM

## 2020-06-23 LAB
ALBUMIN SERPL BCP-MCNC: 3.8 G/DL (ref 3.5–5)
ALP SERPL-CCNC: 97 U/L (ref 46–116)
ALT SERPL W P-5'-P-CCNC: 25 U/L (ref 12–78)
ANION GAP SERPL CALCULATED.3IONS-SCNC: 9 MMOL/L (ref 4–13)
AST SERPL W P-5'-P-CCNC: 19 U/L (ref 5–45)
BACTERIA UR QL AUTO: ABNORMAL /HPF
BASOPHILS # BLD AUTO: 0.06 THOUSANDS/ΜL (ref 0–0.1)
BASOPHILS NFR BLD AUTO: 1 % (ref 0–1)
BILIRUB SERPL-MCNC: 0.37 MG/DL (ref 0.2–1)
BILIRUB UR QL STRIP: NEGATIVE
BUN SERPL-MCNC: 10 MG/DL (ref 5–25)
CALCIUM SERPL-MCNC: 9.1 MG/DL (ref 8.3–10.1)
CHLORIDE SERPL-SCNC: 101 MMOL/L (ref 100–108)
CLARITY UR: CLEAR
CO2 SERPL-SCNC: 24 MMOL/L (ref 21–32)
COLOR UR: YELLOW
CREAT SERPL-MCNC: 1.06 MG/DL (ref 0.6–1.3)
D DIMER PPP FEU-MCNC: 0.53 UG/ML FEU
EOSINOPHIL # BLD AUTO: 0.29 THOUSAND/ΜL (ref 0–0.61)
EOSINOPHIL NFR BLD AUTO: 4 % (ref 0–6)
ERYTHROCYTE [DISTWIDTH] IN BLOOD BY AUTOMATED COUNT: 13.2 % (ref 11.6–15.1)
GFR SERPL CREATININE-BSD FRML MDRD: 67 ML/MIN/1.73SQ M
GLUCOSE SERPL-MCNC: 134 MG/DL (ref 65–140)
GLUCOSE UR STRIP-MCNC: NEGATIVE MG/DL
HCT VFR BLD AUTO: 40.9 % (ref 34.8–46.1)
HGB BLD-MCNC: 13.2 G/DL (ref 11.5–15.4)
HGB UR QL STRIP.AUTO: ABNORMAL
IMM GRANULOCYTES # BLD AUTO: 0.17 THOUSAND/UL (ref 0–0.2)
IMM GRANULOCYTES NFR BLD AUTO: 2 % (ref 0–2)
KETONES UR STRIP-MCNC: NEGATIVE MG/DL
LEUKOCYTE ESTERASE UR QL STRIP: NEGATIVE
LIPASE SERPL-CCNC: 140 U/L (ref 73–393)
LYMPHOCYTES # BLD AUTO: 2.04 THOUSANDS/ΜL (ref 0.6–4.47)
LYMPHOCYTES NFR BLD AUTO: 25 % (ref 14–44)
MCH RBC QN AUTO: 27.9 PG (ref 26.8–34.3)
MCHC RBC AUTO-ENTMCNC: 32.3 G/DL (ref 31.4–37.4)
MCV RBC AUTO: 87 FL (ref 82–98)
MONOCYTES # BLD AUTO: 0.52 THOUSAND/ΜL (ref 0.17–1.22)
MONOCYTES NFR BLD AUTO: 6 % (ref 4–12)
NEUTROPHILS # BLD AUTO: 5.02 THOUSANDS/ΜL (ref 1.85–7.62)
NEUTS SEG NFR BLD AUTO: 62 % (ref 43–75)
NITRITE UR QL STRIP: NEGATIVE
NON-SQ EPI CELLS URNS QL MICRO: ABNORMAL /HPF
NRBC BLD AUTO-RTO: 0 /100 WBCS
PH UR STRIP.AUTO: 6 [PH]
PLATELET # BLD AUTO: 254 THOUSANDS/UL (ref 149–390)
PMV BLD AUTO: 9.2 FL (ref 8.9–12.7)
POTASSIUM SERPL-SCNC: 3.4 MMOL/L (ref 3.5–5.3)
PROT SERPL-MCNC: 7.8 G/DL (ref 6.4–8.2)
PROT UR STRIP-MCNC: NEGATIVE MG/DL
RBC # BLD AUTO: 4.73 MILLION/UL (ref 3.81–5.12)
RBC #/AREA URNS AUTO: ABNORMAL /HPF
SARS-COV-2 RNA RESP QL NAA+PROBE: NEGATIVE
SODIUM SERPL-SCNC: 134 MMOL/L (ref 136–145)
SP GR UR STRIP.AUTO: 1.02 (ref 1–1.03)
TROPONIN I SERPL-MCNC: <0.02 NG/ML
TROPONIN I SERPL-MCNC: <0.02 NG/ML
UROBILINOGEN UR QL STRIP.AUTO: 0.2 E.U./DL
WBC # BLD AUTO: 8.1 THOUSAND/UL (ref 4.31–10.16)
WBC #/AREA URNS AUTO: ABNORMAL /HPF

## 2020-06-23 PROCEDURE — 83690 ASSAY OF LIPASE: CPT | Performed by: EMERGENCY MEDICINE

## 2020-06-23 PROCEDURE — 36415 COLL VENOUS BLD VENIPUNCTURE: CPT | Performed by: EMERGENCY MEDICINE

## 2020-06-23 PROCEDURE — 96361 HYDRATE IV INFUSION ADD-ON: CPT

## 2020-06-23 PROCEDURE — 96375 TX/PRO/DX INJ NEW DRUG ADDON: CPT

## 2020-06-23 PROCEDURE — 99284 EMERGENCY DEPT VISIT MOD MDM: CPT | Performed by: EMERGENCY MEDICINE

## 2020-06-23 PROCEDURE — 96376 TX/PRO/DX INJ SAME DRUG ADON: CPT

## 2020-06-23 PROCEDURE — 85379 FIBRIN DEGRADATION QUANT: CPT | Performed by: EMERGENCY MEDICINE

## 2020-06-23 PROCEDURE — 81001 URINALYSIS AUTO W/SCOPE: CPT

## 2020-06-23 PROCEDURE — 71045 X-RAY EXAM CHEST 1 VIEW: CPT

## 2020-06-23 PROCEDURE — 96374 THER/PROPH/DIAG INJ IV PUSH: CPT

## 2020-06-23 PROCEDURE — 84484 ASSAY OF TROPONIN QUANT: CPT | Performed by: EMERGENCY MEDICINE

## 2020-06-23 PROCEDURE — 99285 EMERGENCY DEPT VISIT HI MDM: CPT

## 2020-06-23 PROCEDURE — 87635 SARS-COV-2 COVID-19 AMP PRB: CPT | Performed by: EMERGENCY MEDICINE

## 2020-06-23 PROCEDURE — 71275 CT ANGIOGRAPHY CHEST: CPT

## 2020-06-23 PROCEDURE — 85025 COMPLETE CBC W/AUTO DIFF WBC: CPT | Performed by: EMERGENCY MEDICINE

## 2020-06-23 PROCEDURE — 80053 COMPREHEN METABOLIC PANEL: CPT | Performed by: EMERGENCY MEDICINE

## 2020-06-23 PROCEDURE — 93005 ELECTROCARDIOGRAM TRACING: CPT

## 2020-06-23 RX ORDER — ONDANSETRON 2 MG/ML
4 INJECTION INTRAMUSCULAR; INTRAVENOUS ONCE
Status: COMPLETED | OUTPATIENT
Start: 2020-06-23 | End: 2020-06-23

## 2020-06-23 RX ORDER — METOCLOPRAMIDE HYDROCHLORIDE 5 MG/ML
10 INJECTION INTRAMUSCULAR; INTRAVENOUS ONCE
Status: COMPLETED | OUTPATIENT
Start: 2020-06-23 | End: 2020-06-23

## 2020-06-23 RX ORDER — MORPHINE SULFATE 4 MG/ML
4 INJECTION, SOLUTION INTRAMUSCULAR; INTRAVENOUS ONCE
Status: COMPLETED | OUTPATIENT
Start: 2020-06-23 | End: 2020-06-23

## 2020-06-23 RX ORDER — ONDANSETRON 4 MG/1
4 TABLET, FILM COATED ORAL EVERY 6 HOURS
Qty: 12 TABLET | Refills: 0 | Status: SHIPPED | OUTPATIENT
Start: 2020-06-23 | End: 2021-04-22 | Stop reason: ALTCHOICE

## 2020-06-23 RX ORDER — MAGNESIUM HYDROXIDE/ALUMINUM HYDROXICE/SIMETHICONE 120; 1200; 1200 MG/30ML; MG/30ML; MG/30ML
30 SUSPENSION ORAL ONCE
Status: COMPLETED | OUTPATIENT
Start: 2020-06-23 | End: 2020-06-23

## 2020-06-23 RX ADMIN — ONDANSETRON 4 MG: 2 INJECTION INTRAMUSCULAR; INTRAVENOUS at 13:39

## 2020-06-23 RX ADMIN — FAMOTIDINE 20 MG: 10 INJECTION, SOLUTION INTRAVENOUS at 12:59

## 2020-06-23 RX ADMIN — ONDANSETRON 4 MG: 2 INJECTION INTRAMUSCULAR; INTRAVENOUS at 16:02

## 2020-06-23 RX ADMIN — SODIUM CHLORIDE 1000 ML: 0.9 INJECTION, SOLUTION INTRAVENOUS at 12:58

## 2020-06-23 RX ADMIN — IOHEXOL 85 ML: 350 INJECTION, SOLUTION INTRAVENOUS at 14:15

## 2020-06-23 RX ADMIN — MORPHINE SULFATE 4 MG: 4 INJECTION INTRAVENOUS at 12:59

## 2020-06-23 RX ADMIN — MORPHINE SULFATE 4 MG: 4 INJECTION INTRAVENOUS at 13:39

## 2020-06-23 RX ADMIN — ALUMINUM HYDROXIDE, MAGNESIUM HYDROXIDE, AND SIMETHICONE 30 ML: 200; 200; 20 SUSPENSION ORAL at 12:58

## 2020-06-23 RX ADMIN — METOCLOPRAMIDE HYDROCHLORIDE 10 MG: 5 INJECTION INTRAMUSCULAR; INTRAVENOUS at 12:59

## 2020-06-24 DIAGNOSIS — B37.9 CANDIDA ALBICANS INFECTION: Primary | ICD-10-CM

## 2020-06-24 LAB
ATRIAL RATE: 76 BPM
ATRIAL RATE: 79 BPM
LAB AP GYN PRIMARY INTERPRETATION: NORMAL
Lab: NORMAL
P AXIS: 45 DEGREES
P AXIS: 45 DEGREES
PATH INTERP SPEC-IMP: NORMAL
PR INTERVAL: 140 MS
PR INTERVAL: 152 MS
QRS AXIS: 55 DEGREES
QRS AXIS: 58 DEGREES
QRSD INTERVAL: 78 MS
QRSD INTERVAL: 78 MS
QT INTERVAL: 366 MS
QT INTERVAL: 386 MS
QTC INTERVAL: 419 MS
QTC INTERVAL: 434 MS
T WAVE AXIS: 54 DEGREES
T WAVE AXIS: 61 DEGREES
VENTRICULAR RATE: 76 BPM
VENTRICULAR RATE: 79 BPM

## 2020-06-24 PROCEDURE — 93010 ELECTROCARDIOGRAM REPORT: CPT | Performed by: INTERNAL MEDICINE

## 2020-06-24 RX ORDER — FLUCONAZOLE 150 MG/1
150 TABLET ORAL ONCE
Qty: 1 TABLET | Refills: 0 | Status: SHIPPED | OUTPATIENT
Start: 2020-06-24 | End: 2020-06-24

## 2020-06-28 ENCOUNTER — APPOINTMENT (EMERGENCY)
Dept: ULTRASOUND IMAGING | Facility: HOSPITAL | Age: 40
End: 2020-06-28
Payer: COMMERCIAL

## 2020-06-28 ENCOUNTER — HOSPITAL ENCOUNTER (EMERGENCY)
Facility: HOSPITAL | Age: 40
Discharge: HOME | End: 2020-06-28
Attending: EMERGENCY MEDICINE
Payer: COMMERCIAL

## 2020-06-28 VITALS
HEIGHT: 65 IN | OXYGEN SATURATION: 96 % | RESPIRATION RATE: 18 BRPM | DIASTOLIC BLOOD PRESSURE: 69 MMHG | BODY MASS INDEX: 38.57 KG/M2 | WEIGHT: 231.48 LBS | TEMPERATURE: 97.8 F | SYSTOLIC BLOOD PRESSURE: 150 MMHG | HEART RATE: 74 BPM

## 2020-06-28 DIAGNOSIS — I80.8 SUPERFICIAL THROMBOPHLEBITIS OF LEFT UPPER EXTREMITY: Primary | ICD-10-CM

## 2020-06-28 LAB — DEPRECATED AT III PPP: 96 % OF NORMAL (ref 92–136)

## 2020-06-28 PROCEDURE — 85306 CLOT INHIBIT PROT S FREE: CPT | Performed by: EMERGENCY MEDICINE

## 2020-06-28 PROCEDURE — 85300 ANTITHROMBIN III ACTIVITY: CPT | Performed by: EMERGENCY MEDICINE

## 2020-06-28 PROCEDURE — 85705 THROMBOPLASTIN INHIBITION: CPT | Performed by: EMERGENCY MEDICINE

## 2020-06-28 PROCEDURE — 85303 CLOT INHIBIT PROT C ACTIVITY: CPT | Performed by: EMERGENCY MEDICINE

## 2020-06-28 PROCEDURE — 85613 RUSSELL VIPER VENOM DILUTED: CPT | Performed by: EMERGENCY MEDICINE

## 2020-06-28 PROCEDURE — 93971 EXTREMITY STUDY: CPT

## 2020-06-28 PROCEDURE — 85305 CLOT INHIBIT PROT S TOTAL: CPT | Performed by: EMERGENCY MEDICINE

## 2020-06-28 PROCEDURE — 81240 F2 GENE: CPT | Performed by: EMERGENCY MEDICINE

## 2020-06-28 PROCEDURE — 86146 BETA-2 GLYCOPROTEIN ANTIBODY: CPT | Performed by: EMERGENCY MEDICINE

## 2020-06-28 PROCEDURE — 81241 F5 GENE: CPT | Performed by: EMERGENCY MEDICINE

## 2020-06-28 PROCEDURE — 85670 THROMBIN TIME PLASMA: CPT | Performed by: EMERGENCY MEDICINE

## 2020-06-28 PROCEDURE — 86147 CARDIOLIPIN ANTIBODY EA IG: CPT | Performed by: EMERGENCY MEDICINE

## 2020-06-28 PROCEDURE — 36415 COLL VENOUS BLD VENIPUNCTURE: CPT | Performed by: EMERGENCY MEDICINE

## 2020-06-28 PROCEDURE — 99285 EMERGENCY DEPT VISIT HI MDM: CPT | Performed by: EMERGENCY MEDICINE

## 2020-06-28 PROCEDURE — 85732 THROMBOPLASTIN TIME PARTIAL: CPT | Performed by: EMERGENCY MEDICINE

## 2020-06-28 PROCEDURE — 99284 EMERGENCY DEPT VISIT MOD MDM: CPT

## 2020-06-28 RX ORDER — CEPHALEXIN 500 MG/1
500 CAPSULE ORAL 4 TIMES DAILY
Qty: 28 CAPSULE | Refills: 0 | Status: SHIPPED | OUTPATIENT
Start: 2020-06-28 | End: 2020-06-28 | Stop reason: SDUPTHER

## 2020-06-28 RX ORDER — CEPHALEXIN 250 MG/1
500 CAPSULE ORAL ONCE
Status: COMPLETED | OUTPATIENT
Start: 2020-06-28 | End: 2020-06-28

## 2020-06-28 RX ORDER — CEPHALEXIN 500 MG/1
500 CAPSULE ORAL 4 TIMES DAILY
Qty: 28 CAPSULE | Refills: 0 | Status: SHIPPED | OUTPATIENT
Start: 2020-06-28 | End: 2020-07-05

## 2020-06-28 RX ADMIN — RIVAROXABAN 10 MG: 10 TABLET, FILM COATED ORAL at 14:12

## 2020-06-28 RX ADMIN — CEPHALEXIN 500 MG: 250 CAPSULE ORAL at 14:12

## 2020-06-28 NOTE — ED PROVIDER NOTES
History  Chief Complaint   Patient presents with    Arm Pain     pt reports lump on left upper arm that she noticed yesterday that is painful and red, believes it is getting bigger     31-year-old female presents to the emergency department for evaluation left upper extremity pain, erythema, and warmth  Patient states she 1st noticed the area to be slightly tender yesterday afternoon she thought maybe she bumped into something but did not recall an injury  She states that the area become more progressively tender and the area of erythema has enlarged  Patient was in our hospital for evaluation of chest pain on June 23rd  At that time she had a left antecubital IV placed  Patient states that the IV site is slightly bruised but not tender  Patient does report a history of deep vein thrombosis while on oral birth control pills several years ago  Patient is concerned for deep vein thrombosis of the left upper extremity  She denies chest pain or shortness of breath  Patient is adopted and is unaware of her family history  History provided by:  Patient and medical records  Arm Pain   Location:  Left upper arm  Quality:  Sore and aching  Severity:  Moderate  Onset quality:  Gradual  Duration:  2 days  Timing:  Constant  Progression:  Worsening  Chronicity:  New  Context:  Recent IV catheter and left antecubital fossa  Relieved by:  Nothing  Worsened by:  Nothing  Ineffective treatments:  None  Associated symptoms: chest pain (Had chest pains that have resolved) and rash    Associated symptoms: no fever, no headaches, no loss of consciousness, no myalgias, no nausea, no shortness of breath and no vomiting    Risk factors:  Had chest pain workup that was unremarkable, slightly elevated D-dimer with a negative CTA of the chest      Prior to Admission Medications   Prescriptions Last Dose Informant Patient Reported? Taking?    ALPRAZolam (XANAX) 0 25 mg tablet  Self No Yes   Sig: TAKE ONE TABLET BY MOUTH EVERY DAY AS NEEDED FOR ANXIETY   Cetirizine HCl (ZYRTEC ALLERGY) 10 MG CAPS  Self Yes Yes   Sig: Take 10 mg by mouth  albuterol (PROVENTIL HFA,VENTOLIN HFA) 90 mcg/act inhaler   No Yes   Sig: Inhale 2 puffs every 4 (four) hours as needed for wheezing (cough)   fluticasone (FLONASE) 50 mcg/act nasal spray  Self No Yes   Si spray into each nostril daily   omeprazole (PriLOSEC) 20 mg delayed release capsule  Self No Yes   Sig: Take 1 capsule (20 mg total) by mouth daily   ondansetron (ZOFRAN) 4 mg tablet   No Yes   Sig: Take 1 tablet (4 mg total) by mouth every 6 (six) hours   sertraline (ZOLOFT) 100 mg tablet   No Yes   Sig: TAKE 1 AND 1/2 TABLET BY MOUTH ONCE DAILY   zolpidem (AMBIEN) 10 mg tablet   No Yes   Sig: TAKE 1/2 OR 1 TABLET BY MOUTH DAILY AT BEDTIME AS NEEDED FOR SLEEP      Facility-Administered Medications: None       Past Medical History:   Diagnosis Date    Anxiety     Depression     Fibroid     GERD (gastroesophageal reflux disease)     History of pulmonary embolus (PE)     Iliotibial band syndrome     last assessed - [id number]    Mitral valve prolapse     Obesity     PONV (postoperative nausea and vomiting)     Patient requests to be premedicated for N/V    Wears glasses     for reading       Past Surgical History:   Procedure Laterality Date    CHOLECYSTECTOMY      ESOPHAGOGASTRODUODENOSCOPY N/A 2018    Procedure: ESOPHAGOGASTRODUODENOSCOPY (EGD); Surgeon: Randee Cam MD;  [facility]: [facility];   Service: Gastroenterology    GALLBLADDER SURGERY      KNEE SURGERY Left     growth plate fx left knee    KNEE SURGERY Right     HI OPEN TREATMENT METATARSAL FRACTURE EACH Left 4/10/2019    Procedure: OPEN REDUCTION W/ INTERNAL FIXATION (ORIF) FOOT, Left 5th metatarsal fracture open reduction internal fixation with calcaneal bone graft and bone marrow aspirate concentrate;  Surgeon: Tatianna Buck MD;  Location: [facility];  Service: Orthopedics    [address] [place] [address] TOOTH EXTRACTION         Family History   Adopted: Yes   Problem Relation Age of Onset    No Known Problems Mother     Alcohol abuse Neg Hx     Substance Abuse Neg Hx     Mental illness Neg Hx     Depression Neg Hx      I have reviewed and agree with the history as documented  E-Cigarette/Vaping    E-Cigarette Use Never User      E-Cigarette/Vaping Substances    Nicotine No     THC No     CBD No     Flavoring No     Other No     Unknown No      Social History     Tobacco Use    Smoking status: Never Smoker    Smokeless tobacco: Never Used   Substance Use Topics    Alcohol use: Yes     Comment: social    Drug use: No       Review of Systems   Constitutional: Negative for fever  Respiratory: Negative for shortness of breath  Cardiovascular: Positive for chest pain (Had chest pains that have resolved)  Gastrointestinal: Negative for nausea and vomiting  Musculoskeletal: Negative for arthralgias and myalgias  Skin: Positive for rash  Neurological: Negative for loss of consciousness and headaches  All other systems reviewed and are negative  Physical Exam  Physical Exam   Constitutional: She is oriented to person, place, and time  She appears well-developed and well-nourished  She appears distressed  HENT:   Head: Normocephalic  Nose: Nose normal    Mouth/Throat: Oropharynx is clear and moist  No oropharyngeal exudate  Eyes: Pupils are equal, round, and reactive to light  Conjunctivae and EOM are normal    Neck: Normal range of motion  Neck supple  Cardiovascular: Normal rate, regular rhythm, normal heart sounds and intact distal pulses  Pulmonary/Chest: Effort normal and breath sounds normal    Abdominal: Soft  Bowel sounds are normal  She exhibits no distension  There is no tenderness  There is no rebound and no guarding  Musculoskeletal: Normal range of motion  She exhibits no edema or deformity  Left upper arm: She exhibits tenderness and swelling   She exhibits no bony tenderness and no deformity  Arms:  Lymphadenopathy:     She has no cervical adenopathy  Neurological: She is alert and oriented to person, place, and time  She has normal strength and normal reflexes  No cranial nerve deficit or sensory deficit  She exhibits normal muscle tone  Coordination and gait normal    Skin: Skin is warm, dry and intact  No rash noted  Psychiatric: She has a normal mood and affect  Her behavior is normal  Judgment and thought content normal    Nursing note and vitals reviewed        Vital Signs  ED Triage Vitals [06/28/20 1207]   Temperature Pulse Respirations Blood Pressure SpO2   97 8 °F (36 6 °C) 75 18 150/69 95 %      Temp Source Heart Rate Source Patient Position - Orthostatic VS BP Location FiO2 (%)   Oral Monitor Lying Right arm --      Pain Score       --           Vitals:    06/28/20 1245 06/28/20 1300 06/28/20 1315 06/28/20 1330   BP:       Pulse: 78 80 70 74   Patient Position - Orthostatic VS:             Visual Acuity      ED Medications  Medications   rivaroxaban (XARELTO) tablet 10 mg (10 mg Oral Given 6/28/20 1412)   cephalexin (KEFLEX) capsule 500 mg (500 mg Oral Given 6/28/20 1412)       Diagnostic Studies  Results Reviewed     Procedure Component Value Units Date/Time    Lupus anticoagulant [[id number]] Collected:  06/28/20 1350    Lab Status:  Final result Specimen:  Blood from Arm, Left Updated:  07/01/20 0806     PTT Lupus Anticoagulant 30 9 sec      Dilute Viper Venom Time 42 3 sec      DILUTE PROTHROMBIN TIME(DPT) 36 8 sec      THROMBIN TIME (DRVW) 17 6 sec      DPT CONFIRM RATIO 1 03 Ratio      LUPUS REFLEX INTERPRETATION Comment:    Narrative:       Performed at:  [address]  [id number]  : Tamar Kyle MD, Phone:  [phone]    Protein S Antigen, Total & Free [[id number]] Collected:  06/28/20 1350    Lab Status:  Final result Specimen:  Blood from Arm, Left Updated:  07/01/20 8184 Protein S Ag, Total 144 %      Protein S Ag, Free 136 %     Narrative:       Performed at:  [address], [place]  [id number]  : Margie Mendieta MD, Phone:  [phone]    Beta-2 glycoprotein antibodies [[id number]] Collected:  06/28/20 1350    Lab Status:  Final result Specimen:  Blood from Arm, Left Updated:  06/30/20 1605     Beta-2 Glyco 1 IgG <9 GPI IgG units      Beta-2 Glyco 1 IgA <9 GPI IgA units      Beta-2 Glyco 1 IgM <9 GPI IgM units     Narrative:       Performed at:  [place] [address] [employer] [address]  [id number]  : Margie Mendieta MD, Phone:  [phone]    Protein S activity [[id number]] Collected:  06/28/20 1350    Lab Status:  Final result Specimen:  Blood from Arm, Left Updated:  06/30/20 1405     Protein S Activity 93 %     Narrative:       Performed at:  [address]  [id number]  : Margie Mendieta MD, Phone:  [phone]    Cardiolipin antibody [[id number]] Collected:  06/28/20 1350    Lab Status:  Final result Specimen:  Blood from Arm, Left Updated:  06/29/20 1605     Anticardiolipin IgA <9 APL U/mL      Anticardiolipin IgG <9 GPL U/mL      Anticardiolipin IgM <9 MPL U/mL     Narrative:       Performed at:  [address] [place] [address]  [id number]  : Nidhi Galeana MD, Phone:  [phone]    Protein C activity [[id number]]  (Abnormal) Collected:  06/28/20 1350    Lab Status:  Final result Specimen:  Blood from Arm, Left Updated:  06/29/20 1252     Protein C Activity >150 % of Normal     Antithrombin III Activity [[id number]]  (Normal) Collected:  06/28/20 1350    Lab Status:  Final result Specimen:  Blood from Arm, Left Updated:  06/28/20 1609     AntiThrombIN III Activity 96 % of Normal     Factor 5 leiden [[id number]] Collected:  06/28/20 1350    Lab Status:   In process Specimen:  Blood from Arm, Left Updated:  06/28/20 2850 Prothrombin gene mutation [[id number]] Collected:  06/28/20 1350    Lab Status: In process Specimen:  Blood from Arm, Left Updated:  06/28/20 1357                 VAS upper limb venous duplex scan, unilateral/limited   Final Result by Ryan Phelan MD (06/29 1144)                 Procedures  Procedures         ED Course  ED Course as of Jul 02 1546   Sun Jun 28, 2020   1317 Preliminary left upper extremity venous duplex demonstrates approximately 5 cm of superficial thrombophlebitis                                                MDM  Number of Diagnoses or Management Options  Superficial thrombophlebitis of left upper extremity: new and requires workup     Amount and/or Complexity of Data Reviewed  Clinical lab tests: ordered and reviewed  Tests in the radiology section of CPT®: ordered and reviewed  Tests in the medicine section of CPT®: reviewed  Decide to obtain previous medical records or to obtain history from someone other than the patient: yes  Independent visualization of images, tracings, or specimens: yes    Risk of Complications, Morbidity, and/or Mortality  General comments: 44-year-old female presents with left arm pain 3 days after having an IV placed in that upper extremity  Patient's duplex is negative for deep vein thrombosis but does demonstrate a significantly large segment (>5cm) of superficial venous thrombosis  Patient will be anticoagulated with Xarelto  She has tolerated medication in past   She was provided with a prescription for 10 mg for 40 days  Patient's hypercoagulability panel was sent and pending at the time of discharge  Patient to follow-up with PCP      Patient Progress  Patient progress: stable        Disposition  Final diagnoses:   Superficial thrombophlebitis of left upper extremity     Time reflects when diagnosis was documented in both MDM as applicable and the Disposition within this note     Time User Action Codes Description Comment    6/28/2020  1:39 PM Zena Albert Add [I80 8] Superficial thrombophlebitis of left upper extremity       ED Disposition     ED Disposition Condition Date/Time Comment    Discharge Stable Sun Jun 28, 2020  1:39 PM Kelly Mak discharge to home/self care              Follow-up Information     Follow up With Specialties Details Why Contact Info    Addison Nick MD Internal Medicine Schedule an appointment as soon as possible for a visit in 1 day For recheck of current symptoms [address] [place] [address],[address]  [address] [place] [address]  [phone]            Discharge Medication List as of 6/28/2020  2:35 PM      START taking these medications    Details   rivaroxaban (XARELTO) 10 mg tablet Take 1 tablet (10 mg total) by mouth daily, Starting Mon 6/29/2020, Until Thu 8/13/2020, Normal         CONTINUE these medications which have CHANGED    Details   cephalexin (Keflex) 500 mg capsule Take 1 capsule (500 mg total) by mouth 4 (four) times a day for 7 days, Starting Sun 6/28/2020, Until Sun 7/5/2020, Normal         CONTINUE these medications which have NOT CHANGED    Details   albuterol (PROVENTIL HFA,VENTOLIN HFA) 90 mcg/act inhaler Inhale 2 puffs every 4 (four) hours as needed for wheezing (cough), Starting Thu 3/19/2020, Normal      ALPRAZolam (XANAX) 0 25 mg tablet TAKE ONE TABLET BY MOUTH EVERY DAY AS NEEDED FOR ANXIETY, Normal      Cetirizine HCl (ZYRTEC ALLERGY) 10 MG CAPS Take 10 mg by mouth , Historical Med      fluticasone (FLONASE) 50 mcg/act nasal spray 1 spray into each nostril daily, Starting Tue 1/14/2020, Normal      omeprazole (PriLOSEC) 20 mg delayed release capsule Take 1 capsule (20 mg total) by mouth daily, Starting Wed 1/23/2019, Normal      ondansetron (ZOFRAN) 4 mg tablet Take 1 tablet (4 mg total) by mouth every 6 (six) hours, Starting Tue 6/23/2020, Normal      sertraline (ZOLOFT) 100 mg tablet TAKE 1 AND 1/2 TABLET BY MOUTH ONCE DAILY, Normal      zolpidem (AMBIEN) 10 mg tablet TAKE 1/2 OR 1 TABLET BY MOUTH DAILY AT BEDTIME AS NEEDED FOR SLEEP, Normal           No discharge procedures on file      PDMP Review       Value Time User    PDMP Reviewed  Yes 6/19/2020 12:46 PM Lali Cook MD          ED Provider  Electronically Signed by           Magdi Ortega DO  07/02/20 6761

## 2020-06-29 LAB
CARDIOLIPIN IGA SER IA-ACNC: <9 APL U/ML (ref 0–11)
CARDIOLIPIN IGG SER IA-ACNC: <9 GPL U/ML (ref 0–14)
CARDIOLIPIN IGM SER IA-ACNC: <9 MPL U/ML (ref 0–12)
PROT C AG ACT/NOR PPP IA: >150 % OF NORMAL (ref 60–150)

## 2020-06-29 PROCEDURE — 93971 EXTREMITY STUDY: CPT | Performed by: SURGERY

## 2020-06-30 LAB
B2 GLYCOPROT1 IGA SER-ACNC: <9 GPI IGA UNITS (ref 0–25)
B2 GLYCOPROT1 IGG SER-ACNC: <9 GPI IGG UNITS (ref 0–20)
B2 GLYCOPROT1 IGM SER-ACNC: <9 GPI IGM UNITS (ref 0–32)
PROT S ACT/NOR PPP: 93 % (ref 63–140)

## 2020-07-01 LAB
APTT SCREEN TO CONFIRM RATIO: 1.03 RATIO (ref 0–1.4)
CONFIRM APTT/NORMAL: 36.8 SEC (ref 0–55)
LA PPP-IMP: NORMAL
PROT S ACT/NOR PPP: 136 % (ref 57–157)
PROT S PPP-ACNC: 144 % (ref 60–150)
SCREEN APTT: 30.9 SEC (ref 0–51.9)
SCREEN DRVVT: 42.3 SEC (ref 0–47)
THROMBIN TIME: 17.6 SEC (ref 0–23)

## 2020-07-07 LAB — F5 GENE MUT ANL BLD/T: NORMAL

## 2020-07-08 LAB — F2 GENE MUT ANL BLD/T: NORMAL

## 2020-07-13 ENCOUNTER — OFFICE VISIT (OUTPATIENT)
Dept: OBGYN CLINIC | Facility: CLINIC | Age: 40
End: 2020-07-13
Payer: COMMERCIAL

## 2020-07-13 VITALS
DIASTOLIC BLOOD PRESSURE: 76 MMHG | SYSTOLIC BLOOD PRESSURE: 124 MMHG | WEIGHT: 225 LBS | BODY MASS INDEX: 37.49 KG/M2 | HEIGHT: 65 IN

## 2020-07-13 DIAGNOSIS — Z86.711 HISTORY OF PULMONARY EMBOLUS (PE): ICD-10-CM

## 2020-07-13 DIAGNOSIS — N92.6 IRREGULAR PERIODS: ICD-10-CM

## 2020-07-13 DIAGNOSIS — N93.9 ABNORMAL UTERINE BLEEDING (AUB): Primary | ICD-10-CM

## 2020-07-13 PROCEDURE — 1036F TOBACCO NON-USER: CPT | Performed by: OBSTETRICS & GYNECOLOGY

## 2020-07-13 PROCEDURE — 3008F BODY MASS INDEX DOCD: CPT | Performed by: OBSTETRICS & GYNECOLOGY

## 2020-07-13 PROCEDURE — 99214 OFFICE O/P EST MOD 30 MIN: CPT | Performed by: OBSTETRICS & GYNECOLOGY

## 2020-07-13 NOTE — H&P (VIEW-ONLY)
Assessment/Plan  Diagnoses and all orders for this visit:    Abnormal uterine bleeding (AUB)    Irregular periods    History of pulmonary embolus (PE)    Plan to proceed to [facility]  with hysteroscopy with resection of uterine pathology and endometrial ablation  Will send chart to surgical scheduling team   Will contact patient with date  Will try to get patient into the operating room within the next few weeks  Patient has an appointment with her PCP on Friday, will send message to advise in regards to Xarelto  It feels is likely her bleeding is currently worse due to her Xarelto therapy  We discussed not doing tissue sampling prior to proceeding to the operating room and there is a small risk of endometrial hyperplasia  Patient understands that if hyperplasia is identified, she may need to undergo hysterectomy and she is okay with this  We will send tissue sampling prior to performing the ablation  Risk benefits and alternatives to the procedure were discussed with the patient, consent signed  All questions answered to apparent satisfaction  Patient is okay with blood transfusion if necessary  Betsy Plascencia is a 42 y o  female here for a follow-up visit  She was seen in June for her annual exam and complained of periods every 2 to 3 weeks better heavier than normal   Pelvic ultrasound was ordered which was done on June 18th  Ultrasound showed a normal-sized uterus with a small fibroid and increased vascularity of the lining of the uterus which may represent a polyp  Patient was therefore brought in for possible endometrial biopsy and discussion of treatment options  Patient was recently started on Xarelto  She has a history of pulmonary embolism and was seen in the ER the end of June and treated for superficial thrombophlebitis  Her prior pulmonary embolism happened well on hormonal contraception  She is therefore not a candidate for hormonal control of her cycles    Patient is currently bleeding and states the bleeding is extremely heavy  This is likely due to her recently starting Xarelto  Again our treatment options are limited due to her inability to take hormonal contraception due to her history of pulmonary embolism  I discussed D&C with hysteroscopy with resection of uterine pathology and possible endometrial ablation  Patient is bleeding heavily today therefore we did not attempt an endometrial biopsy  We did discuss that if we proceed with the above-mentioned procedure and she does indeed have atypical cells of the endometrial lining, she may need a hysterectomy  Patient is agreeable to proceeding with D&C with resection of uterine pathology and endometrial ablation in the near future  She understands that if underlying abnormal or pregnant meds malignant or malignant cells are found on pathology, she will need to undergo further surgery with hysterectomy  Patient had a tubal ligation and does not desire future pregnancy  The risks benefits and alternatives of the surgical procedure of D&C with hysteroscopy with resection of uterine pathology and endometrial ablation were discussed with the patient  All questions were answered  Patient would like to proceed  Patient has appoint with her PCP Dr Warden Alves this Friday and I will send a message along for consultation and management of Xarelto in regards to the [facility]       Patient Active Problem List   Diagnosis    Iron deficiency anemia    Anxiety    Homocysteinemia ([facility] [address] )    Hyperlipidemia    Insomnia    Migraine headache    Class 3 severe obesity due to excess calories without serious comorbidity with body mass index (BMI) of 40 0 to 44 9 in adult [facility])    Patellofemoral dysfunction    Pulmonary embolism (HCC)    Vitamin D deficiency    Acute gastritis without hemorrhage    Epigastric abdominal pain    Gastric erosion    Irregular periods    GERD (gastroesophageal reflux disease)         Gynecologic History  Patient's last menstrual period was 2020  Contraception: tubal ligation  Last Pap:  NILM, neg HPV    Obstetric History  OB History    Para Term  AB Living   1 1 1   0 1   SAB TAB Ectopic Multiple Live Births           1      # Outcome Date GA Lbr Arturo/2nd Weight Sex Delivery Anes PTL Lv   1 Term 03    F Vag-Spont   SHAHEED         Past Medical History:   Diagnosis Date    Anxiety     Depression     Fibroid     GERD (gastroesophageal reflux disease)     History of pulmonary embolus (PE)     Iliotibial band syndrome     last assessed - [id number]    Mitral valve prolapse     Obesity     PONV (postoperative nausea and vomiting)     Patient requests to be premedicated for N/V    Wears glasses     for reading       Past Surgical History:   Procedure Laterality Date    CHOLECYSTECTOMY      ESOPHAGOGASTRODUODENOSCOPY N/A 2018    Procedure: ESOPHAGOGASTRODUODENOSCOPY (EGD); Surgeon: Meenu Cartwright MD;  [facility];   Service: Gastroenterology    GALLBLADDER SURGERY      KNEE SURGERY Left     growth plate fx left knee    KNEE SURGERY Right     CT OPEN TREATMENT METATARSAL FRACTURE EACH Left 4/10/2019    Procedure: OPEN REDUCTION W/ INTERNAL FIXATION (ORIF) FOOT, Left 5th metatarsal fracture open reduction internal fixation with calcaneal bone graft and bone marrow aspirate concentrate;  Surgeon: Francisco Parkinson MD;  [facility];  Service: Orthopedics    TUBAL LIGATION      WISDOM TOOTH EXTRACTION           Family History   Adopted: Yes   Problem Relation Age of Onset    No Known Problems Mother     Alcohol abuse Neg Hx     Substance Abuse Neg Hx     Mental illness Neg Hx     Depression Neg Hx        Social History     Socioeconomic History    Marital status: /Civil Union     Spouse name: Not on file    Number of children: 1    Years of education: Not on file    Highest education level: Not on file   Occupational History    Occupation: working [employer]     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   Social Needs    Financial resource strain: Not on file    Food insecurity:     Worry: Not on file     Inability: Not on file   [email or web address] needs:     Medical: Not on file     Non-medical: Not on file   Tobacco Use    Smoking status: Never Smoker    Smokeless tobacco: Never Used   Substance and Sexual Activity    Alcohol use: Yes     Comment: social    Drug use: No    Sexual activity: Yes     Partners: Male     Birth control/protection: Female Sterilization   Lifestyle    Physical activity:     Days per week: Not on file     Minutes per session: Not on file    Stress: Not on file   Relationships    Social connections:     Talks on phone: Not on file     Gets together: Not on file     Attends Taoism service: Not on file     Active member of club or organization: Not on file     Attends meetings of clubs or organizations: Not on file     Relationship status: Not on file    Intimate partner violence:     Fear of current or ex partner: Not on file     Emotionally abused: Not on file     Physically abused: Not on file     Forced sexual activity: Not on file   Other Topics Concern    Not on file   Social History Narrative    Daily coffee consumption (1 Cups/Day) 3x a week    Parentage - 1 daughter with ex     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, cardiology office           Allergies  Penicillin g    Medications    Current Outpatient Medications:     albuterol (PROVENTIL HFA,VENTOLIN HFA) 90 mcg/act inhaler, Inhale 2 puffs every 4 (four) hours as needed for wheezing (cough), Disp: 1 Inhaler, Rfl: 0    ALPRAZolam (XANAX) 0 25 mg tablet, TAKE ONE TABLET BY MOUTH EVERY DAY AS NEEDED FOR ANXIETY, Disp: 30 tablet, Rfl: 0    Cetirizine HCl (ZYRTEC ALLERGY) 10 MG CAPS, Take 10 mg by mouth , Disp: , Rfl:     fluticasone (FLONASE) 50 mcg/act nasal spray, 1 spray into each nostril daily, Disp: 16 g, Rfl: 0   omeprazole (PriLOSEC) 20 mg delayed release capsule, Take 1 capsule (20 mg total) by mouth daily, Disp: 90 capsule, Rfl: 0    ondansetron (ZOFRAN) 4 mg tablet, Take 1 tablet (4 mg total) by mouth every 6 (six) hours, Disp: 12 tablet, Rfl: 0    rivaroxaban (XARELTO) 10 mg tablet, Take 1 tablet (10 mg total) by mouth daily, Disp: 44 tablet, Rfl: 0    sertraline (ZOLOFT) 100 mg tablet, TAKE 1 AND 1/2 TABLET BY MOUTH ONCE DAILY, Disp: 135 tablet, Rfl: 1    zolpidem (AMBIEN) 10 mg tablet, TAKE 1/2 OR 1 TABLET BY MOUTH DAILY AT BEDTIME AS NEEDED FOR SLEEP, Disp: 30 tablet, Rfl: 0      Review of Systems  Review of Systems   Constitutional: Negative for chills, fever and unexpected weight change  Respiratory: Negative for cough and shortness of breath  Cardiovascular: Negative for chest pain and palpitations  Gastrointestinal: Negative for abdominal distention, abdominal pain, blood in stool, constipation, nausea and vomiting  Genitourinary: Positive for menstrual problem and vaginal bleeding  Negative for difficulty urinating, dyspareunia, dysuria, flank pain, genital sores, hematuria, pelvic pain, urgency, vaginal discharge and vaginal pain  Neurological: Negative for headaches  Objective     /76   Ht 5' 5" (1 651 m)   Wt 102 kg (225 lb)   LMP 07/12/2020   BMI 37 44 kg/m²       Physical Exam   Constitutional: She is oriented to person, place, and time  She appears well-developed and well-nourished  Cardiovascular: Normal rate and regular rhythm  Pulmonary/Chest: Effort normal  No respiratory distress  Abdominal: Soft  Neurological: She is alert and oriented to person, place, and time  Psychiatric: She has a normal mood and affect  Her behavior is normal    Vitals reviewed  : normal sized uterus, measuring 8 4 cm on ultrasound on June 18th  Right mural fundal leiomyoma measuring 2 cm  Endometrium is 5 mm and appears hypervascular  Normal appearing ovaries

## 2020-07-13 NOTE — PROGRESS NOTES
Assessment/Plan  Diagnoses and all orders for this visit:    Abnormal uterine bleeding (AUB)    Irregular periods    History of pulmonary embolus (PE)    Plan to proceed to [facility]  with hysteroscopy with resection of uterine pathology and endometrial ablation  Will send chart to surgical scheduling team   Will contact patient with date  Will try to get patient into the operating room within the next few weeks  Patient has an appointment with her PCP on Friday, will send message to advise in regards to Xarelto  It feels is likely her bleeding is currently worse due to her Xarelto therapy  We discussed not doing tissue sampling prior to proceeding to the operating room and there is a small risk of endometrial hyperplasia  Patient understands that if hyperplasia is identified, she may need to undergo hysterectomy and she is okay with this  We will send tissue sampling prior to performing the ablation  Risk benefits and alternatives to the procedure were discussed with the patient, consent signed  All questions answered to apparent satisfaction  Patient is okay with blood transfusion if necessary  Lilian Santos is a 42 y o  female here for a follow-up visit  She was seen in June for her annual exam and complained of periods every 2 to 3 weeks better heavier than normal   Pelvic ultrasound was ordered which was done on June 18th  Ultrasound showed a normal-sized uterus with a small fibroid and increased vascularity of the lining of the uterus which may represent a polyp  Patient was therefore brought in for possible endometrial biopsy and discussion of treatment options  Patient was recently started on Xarelto  She has a history of pulmonary embolism and was seen in the ER the end of June and treated for superficial thrombophlebitis  Her prior pulmonary embolism happened well on hormonal contraception  She is therefore not a candidate for hormonal control of her cycles    Patient is currently bleeding and states the bleeding is extremely heavy  This is likely due to her recently starting Xarelto  Again our treatment options are limited due to her inability to take hormonal contraception due to her history of pulmonary embolism  I discussed D&C with hysteroscopy with resection of uterine pathology and possible endometrial ablation  Patient is bleeding heavily today therefore we did not attempt an endometrial biopsy  We did discuss that if we proceed with the above-mentioned procedure and she does indeed have atypical cells of the endometrial lining, she may need a hysterectomy  Patient is agreeable to proceeding with D&C with resection of uterine pathology and endometrial ablation in the near future  She understands that if underlying abnormal or pregnant meds malignant or malignant cells are found on pathology, she will need to undergo further surgery with hysterectomy  Patient had a tubal ligation and does not desire future pregnancy  The risks benefits and alternatives of the surgical procedure of D&C with hysteroscopy with resection of uterine pathology and endometrial ablation were discussed with the patient  All questions were answered  Patient would like to proceed  Patient has appoint with her PCP Dr Meaghan Pires this Friday and I will send a message along for consultation and management of Xarelto in regards to the [facility]       Patient Active Problem List   Diagnosis    Iron deficiency anemia    Anxiety    Homocysteinemia ([facility] [address] )    Hyperlipidemia    Insomnia    Migraine headache    Class 3 severe obesity due to excess calories without serious comorbidity with body mass index (BMI) of 40 0 to 44 9 in adult [facility])    Patellofemoral dysfunction    Pulmonary embolism (HCC)    Vitamin D deficiency    Acute gastritis without hemorrhage    Epigastric abdominal pain    Gastric erosion    Irregular periods    GERD (gastroesophageal reflux disease)         Gynecologic History  Patient's last menstrual period was 2020  Contraception: tubal ligation  Last Pap:  NILM, neg HPV    Obstetric History  OB History    Para Term  AB Living   1 1 1   0 1   SAB TAB Ectopic Multiple Live Births           1      # Outcome Date GA Lbr Arturo/2nd Weight Sex Delivery Anes PTL Lv   1 Term 03    F Vag-Spont   SHAHEED         Past Medical History:   Diagnosis Date    Anxiety     Depression     Fibroid     GERD (gastroesophageal reflux disease)     History of pulmonary embolus (PE)     Iliotibial band syndrome     last assessed - [id number]    Mitral valve prolapse     Obesity     PONV (postoperative nausea and vomiting)     Patient requests to be premedicated for N/V    Wears glasses     for reading       Past Surgical History:   Procedure Laterality Date    CHOLECYSTECTOMY      ESOPHAGOGASTRODUODENOSCOPY N/A 2018    Procedure: ESOPHAGOGASTRODUODENOSCOPY (EGD); Surgeon: Juliana Blanca MD;  [facility];   Service: Gastroenterology    GALLBLADDER SURGERY      KNEE SURGERY Left     growth plate fx left knee    KNEE SURGERY Right     TX OPEN TREATMENT METATARSAL FRACTURE EACH Left 4/10/2019    Procedure: OPEN REDUCTION W/ INTERNAL FIXATION (ORIF) FOOT, Left 5th metatarsal fracture open reduction internal fixation with calcaneal bone graft and bone marrow aspirate concentrate;  Surgeon: Sarath Minor MD;  [facility];  Service: Orthopedics    TUBAL LIGATION      WISDOM TOOTH EXTRACTION           Family History   Adopted: Yes   Problem Relation Age of Onset    No Known Problems Mother     Alcohol abuse Neg Hx     Substance Abuse Neg Hx     Mental illness Neg Hx     Depression Neg Hx        Social History     Socioeconomic History    Marital status: /Civil Union     Spouse name: Not on file    Number of children: 1    Years of education: Not on file    Highest education level: Not on file   Occupational History    Occupation: working [employer]     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   Social Needs    Financial resource strain: Not on file    Food insecurity:     Worry: Not on file     Inability: Not on file   [employer] needs:     Medical: Not on file     Non-medical: Not on file   Tobacco Use    Smoking status: Never Smoker    Smokeless tobacco: Never Used   Substance and Sexual Activity    Alcohol use: Yes     Comment: social    Drug use: No    Sexual activity: Yes     Partners: Male     Birth control/protection: Female Sterilization   Lifestyle    Physical activity:     Days per week: Not on file     Minutes per session: Not on file    Stress: Not on file   Relationships    Social connections:     Talks on phone: Not on file     Gets together: Not on file     Attends Mandaeism service: Not on file     Active member of club or organization: Not on file     Attends meetings of clubs or organizations: Not on file     Relationship status: Not on file    Intimate partner violence:     Fear of current or ex partner: Not on file     Emotionally abused: Not on file     Physically abused: Not on file     Forced sexual activity: Not on file   Other Topics Concern    Not on file   Social History Narrative    Daily coffee consumption (1 Cups/Day) 3x a week    Parentage - 1 daughter with ex     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, cardiology office           Allergies  Penicillin g    Medications    Current Outpatient Medications:     albuterol (PROVENTIL HFA,VENTOLIN HFA) 90 mcg/act inhaler, Inhale 2 puffs every 4 (four) hours as needed for wheezing (cough), Disp: 1 Inhaler, Rfl: 0    ALPRAZolam (XANAX) 0 25 mg tablet, TAKE ONE TABLET BY MOUTH EVERY DAY AS NEEDED FOR ANXIETY, Disp: 30 tablet, Rfl: 0    Cetirizine HCl (ZYRTEC ALLERGY) 10 MG CAPS, Take 10 mg by mouth , Disp: , Rfl:     fluticasone (FLONASE) 50 mcg/act nasal spray, 1 spray into each nostril daily, Disp: 16 g, Rfl: 0   omeprazole (PriLOSEC) 20 mg delayed release capsule, Take 1 capsule (20 mg total) by mouth daily, Disp: 90 capsule, Rfl: 0    ondansetron (ZOFRAN) 4 mg tablet, Take 1 tablet (4 mg total) by mouth every 6 (six) hours, Disp: 12 tablet, Rfl: 0    rivaroxaban (XARELTO) 10 mg tablet, Take 1 tablet (10 mg total) by mouth daily, Disp: 44 tablet, Rfl: 0    sertraline (ZOLOFT) 100 mg tablet, TAKE 1 AND 1/2 TABLET BY MOUTH ONCE DAILY, Disp: 135 tablet, Rfl: 1    zolpidem (AMBIEN) 10 mg tablet, TAKE 1/2 OR 1 TABLET BY MOUTH DAILY AT BEDTIME AS NEEDED FOR SLEEP, Disp: 30 tablet, Rfl: 0      Review of Systems  Review of Systems   Constitutional: Negative for chills, fever and unexpected weight change  Respiratory: Negative for cough and shortness of breath  Cardiovascular: Negative for chest pain and palpitations  Gastrointestinal: Negative for abdominal distention, abdominal pain, blood in stool, constipation, nausea and vomiting  Genitourinary: Positive for menstrual problem and vaginal bleeding  Negative for difficulty urinating, dyspareunia, dysuria, flank pain, genital sores, hematuria, pelvic pain, urgency, vaginal discharge and vaginal pain  Neurological: Negative for headaches  Objective     /76   Ht 5' 5" (1 651 m)   Wt 102 kg (225 lb)   LMP 07/12/2020   BMI 37 44 kg/m²       Physical Exam   Constitutional: She is oriented to person, place, and time  She appears well-developed and well-nourished  Cardiovascular: Normal rate and regular rhythm  Pulmonary/Chest: Effort normal  No respiratory distress  Abdominal: Soft  Neurological: She is alert and oriented to person, place, and time  Psychiatric: She has a normal mood and affect  Her behavior is normal    Vitals reviewed  : normal sized uterus, measuring 8 4 cm on ultrasound on June 18th  Right mural fundal leiomyoma measuring 2 cm  Endometrium is 5 mm and appears hypervascular  Normal appearing ovaries

## 2020-07-14 ENCOUNTER — TELEPHONE (OUTPATIENT)
Dept: OBGYN CLINIC | Facility: CLINIC | Age: 40
End: 2020-07-14

## 2020-07-14 NOTE — TELEPHONE ENCOUNTER
----- Message from Cindy Ravi MA sent at 7/14/2020  1:23 PM EDT -----  Regarding: surgery dates   Left VM for pt to call office to discuss surgery dates   Can offer Mon 08/03/20 [address] 7:30am     ----- Message -----  From: Harper Meyer DO  Sent: 1/76/4259   7:05 PM EDT  To: Cindy Ravi MA    This patient needs D&C with hysteroscopy and resection of uterine pathology and endometrial ablation hopefully within the next couple weeks  Please find out with available, can do 730  Patient has appoint with PCP on Friday and I will message her regarding her Xarelto therapy  I checked ASC on her consent  Consent is on your desk  THANK YOU!

## 2020-07-15 ENCOUNTER — PREP FOR PROCEDURE (OUTPATIENT)
Dept: OBGYN CLINIC | Facility: CLINIC | Age: 40
End: 2020-07-15

## 2020-07-15 DIAGNOSIS — Z86.711 HISTORY OF PULMONARY EMBOLUS (PE): ICD-10-CM

## 2020-07-15 DIAGNOSIS — N84.0 ENDOMETRIAL POLYP: ICD-10-CM

## 2020-07-15 DIAGNOSIS — N93.9 ABNORMAL UTERINE BLEEDING (AUB): Primary | ICD-10-CM

## 2020-07-15 RX ORDER — SODIUM CHLORIDE, SODIUM LACTATE, POTASSIUM CHLORIDE, CALCIUM CHLORIDE 600; 310; 30; 20 MG/100ML; MG/100ML; MG/100ML; MG/100ML
125 INJECTION, SOLUTION INTRAVENOUS CONTINUOUS
Status: CANCELLED | OUTPATIENT
Start: 2020-07-15

## 2020-07-16 ENCOUNTER — TELEPHONE (OUTPATIENT)
Dept: OBGYN CLINIC | Facility: CLINIC | Age: 40
End: 2020-07-16

## 2020-07-16 NOTE — TELEPHONE ENCOUNTER
Called pts insurance to see if pt needs prior auth for surgery on 08/03/20    Using codes  (D+C Hysteroscopy) and 30181 (Endometrial Ablation)         NO PA REQUIRED   Call Ref # [id number]

## 2020-07-17 ENCOUNTER — TELEPHONE (OUTPATIENT)
Dept: INTERNAL MEDICINE CLINIC | Facility: CLINIC | Age: 40
End: 2020-07-17

## 2020-07-17 ENCOUNTER — OFFICE VISIT (OUTPATIENT)
Dept: INTERNAL MEDICINE CLINIC | Facility: CLINIC | Age: 40
End: 2020-07-17
Payer: COMMERCIAL

## 2020-07-17 VITALS
DIASTOLIC BLOOD PRESSURE: 82 MMHG | TEMPERATURE: 98.7 F | SYSTOLIC BLOOD PRESSURE: 112 MMHG | WEIGHT: 221 LBS | OXYGEN SATURATION: 98 % | HEIGHT: 65 IN | BODY MASS INDEX: 36.82 KG/M2 | HEART RATE: 99 BPM

## 2020-07-17 DIAGNOSIS — F41.9 ANXIETY: ICD-10-CM

## 2020-07-17 DIAGNOSIS — N93.9 ABNORMAL UTERINE BLEEDING (AUB): ICD-10-CM

## 2020-07-17 DIAGNOSIS — I80.8 SUPERFICIAL THROMBOPHLEBITIS OF LEFT UPPER EXTREMITY: Primary | ICD-10-CM

## 2020-07-17 DIAGNOSIS — G47.09 OTHER INSOMNIA: ICD-10-CM

## 2020-07-17 DIAGNOSIS — Z86.711 HISTORY OF PULMONARY EMBOLUS (PE): ICD-10-CM

## 2020-07-17 PROCEDURE — 99214 OFFICE O/P EST MOD 30 MIN: CPT | Performed by: NURSE PRACTITIONER

## 2020-07-17 PROCEDURE — 1036F TOBACCO NON-USER: CPT | Performed by: NURSE PRACTITIONER

## 2020-07-17 PROCEDURE — 3008F BODY MASS INDEX DOCD: CPT | Performed by: NURSE PRACTITIONER

## 2020-07-17 RX ORDER — ALPRAZOLAM 0.25 MG/1
0.25 TABLET ORAL DAILY PRN
Qty: 30 TABLET | Refills: 0 | Status: SHIPPED | OUTPATIENT
Start: 2020-07-17 | End: 2020-08-17

## 2020-07-17 RX ORDER — ZOLPIDEM TARTRATE 10 MG/1
TABLET ORAL
Qty: 30 TABLET | Refills: 0 | Status: SHIPPED | OUTPATIENT
Start: 2020-07-17 | End: 2020-08-17

## 2020-07-17 NOTE — ASSESSMENT & PLAN NOTE
Clarified with patient where and when she had her last colonoscopy and PAP smear done. Sent record request to [facility] care system. While on the phone patient requested copies of her 12/11 and 12/20 office visits mailed to her mother's address (as she reports being there for the next 2 weeks). Below is reported address -  [facility]  [address] [place] [address]   Thrombosis panel normal   No elevated risk of blood clots, provoked by previous IV     Continue xarelto, ok to stop 1 day prior to [place]  [place]  and continue off the medication

## 2020-07-17 NOTE — PROGRESS NOTES
Assessment/Plan:    Abnormal uterine bleeding (AUB)  Scheduled for D&C 8/3  Low risk for upcoming procedure, stop xarelto 1 day prior  Superficial thrombophlebitis of left upper extremity  Thrombosis panel normal   No elevated risk of blood clots, provoked by previous IV  Continue xarelto, ok to stop 1 day prior to [facility]  and continue off the medication       Insomnia  Refilled ambien  Uses PRN     Anxiety  Stable on zoloft  Xanax PRN  Diagnoses and all orders for this visit:    Superficial thrombophlebitis of left upper extremity    Abnormal uterine bleeding (AUB)    History of pulmonary embolus (PE)    Anxiety  -     ALPRAZolam (XANAX) 0 25 mg tablet; Take 1 tablet (0 25 mg total) by mouth daily as needed for anxiety    Other insomnia  -     zolpidem (AMBIEN) 10 mg tablet; Take 1/2 or 1 tablet by mouth daily at bedtime as needed for sleep          Subjective:      Patient ID: Zuleyma Rodriguez is a 42 y o  female  Here today for ER follow up  Brian Oropeza was seen in the ER on 6/28 with complaints of left upper arm pain, redness, and swelling   She had an IV there a few days prior due to another ER visit  Doppler showed superficial thrombophlebitis in the cephalic vein  It was documented by ER provider that thrombosis was about 5 cm     For that reason, she was started on xarelto   She was given a course of keflex as well which she completed     Today she reports improvement in pain and redness  Area is still swollen but improved     She has been having heavy menses which has worsened since starting xarelto  She is scheduled for a D&C on 8/3 with Dr Geo Hernandes                         The following portions of the patient's history were reviewed and updated as appropriate: allergies, current medications, past family history, past medical history, past social history, past surgical history and problem list     Review of Systems   Constitutional: Negative for activity change, appetite change, fatigue and fever    Respiratory: Negative for cough and shortness of breath  Cardiovascular: Negative for chest pain, palpitations and leg swelling  Gastrointestinal: Negative for abdominal pain  Genitourinary: Negative for difficulty urinating  Skin: Negative for color change and rash  Mild tenderness and swelling in left upper arm    Neurological: Negative for dizziness and headaches  Objective:      /82   Pulse 99   Temp 98 7 °F (37 1 °C)   Ht 5' 5" (1 651 m)   Wt 100 kg (221 lb)   LMP 07/12/2020   SpO2 98%   BMI 36 78 kg/m²          Physical Exam   Constitutional: She is oriented to person, place, and time  She appears well-developed and well-nourished  HENT:   Head: Normocephalic and atraumatic  Eyes: Pupils are equal, round, and reactive to light  Conjunctivae are normal    Neck: No thyromegaly present  Cardiovascular: Normal rate, regular rhythm, normal heart sounds and intact distal pulses  Pulses:       Radial pulses are 2+ on the right side, and 2+ on the left side  Pulmonary/Chest: Effort normal and breath sounds normal    Abdominal: Soft  Bowel sounds are normal    Musculoskeletal: Normal range of motion  She exhibits no edema  Lymphadenopathy:     She has no cervical adenopathy  Neurological: She is alert and oriented to person, place, and time  Skin: Skin is warm and dry  Mild tenderness above the left antecubital area, mild swelling, no erythema    Psychiatric: She has a normal mood and affect  Her behavior is normal    Vitals reviewed

## 2020-07-17 NOTE — TELEPHONE ENCOUNTER
----- Message from [facility] sent at 7/17/2020  1:01 PM EDT -----  Please let her know I discussed with Dr Givens Mask  You can continue xarelto until 1 day prior to the surgery then stop completely  I also sent xanax and ambien

## 2020-07-22 ENCOUNTER — ANESTHESIA EVENT (OUTPATIENT)
Dept: PERIOP | Facility: AMBULARY SURGERY CENTER | Age: 40
End: 2020-07-22
Payer: COMMERCIAL

## 2020-07-22 NOTE — TELEPHONE ENCOUNTER
AUGUSTA Escobar             Good Morning,   I had called [facility] on 07/16/20    Called pts insurance to see if pt needs prior auth for surgery on 08/03/20    Using codes 17322 (D+C Hysteroscopy) and 94177 (Endometrial Ablation)           NO PA REQUIRED   Call Ref # [id number]     I called [facility] again today @ [phone] and I told them that I was told on 07/16/20 that a PA was not required but then the hospital double checked yesterday and was told PA is required  Using codes 99940 (D+C Hysteroscopy) and 55982 (Endometrial Ablation)   Per the Rep  Kimberly Hutchinson PA REQUIRED   Call Ref # Bernadette cindy stated that because the surgery is taking place at the [address] [place] [address] PA is not required, If the surgery would take place at the main hospital setting than a PA would be required  Please let me know if you need anything else  Thanks!          Previous Messages      ----- Message -----   From: Claudia Atkins   Sent: 7/21/2020   4:19 PM EDT   To: Ivette Orozco MA   Subject: AUTH NEEDED                                       Good afternoon     I am working ahead  PT is coming in for OP surgery 8/3 and auth is needed with [facility]  If you get a decision ID of auth not required call because their portal is showing old COVID status for [facility] per ref#[id number] anna ref Eloina Ascencio  Let me know if you have any questions! Thanks!

## 2020-07-24 DIAGNOSIS — Z86.711 HISTORY OF PULMONARY EMBOLUS (PE): ICD-10-CM

## 2020-07-24 DIAGNOSIS — N84.0 ENDOMETRIAL POLYP: ICD-10-CM

## 2020-07-24 DIAGNOSIS — N93.9 ABNORMAL UTERINE BLEEDING (AUB): ICD-10-CM

## 2020-07-24 PROCEDURE — U0003 INFECTIOUS AGENT DETECTION BY NUCLEIC ACID (DNA OR RNA); SEVERE ACUTE RESPIRATORY SYNDROME CORONAVIRUS 2 (SARS-COV-2) (CORONAVIRUS DISEASE [COVID-19]), AMPLIFIED PROBE TECHNIQUE, MAKING USE OF HIGH THROUGHPUT TECHNOLOGIES AS DESCRIBED BY CMS-2020-01-R: HCPCS

## 2020-07-25 LAB — SARS-COV-2 RNA SPEC QL NAA+PROBE: NOT DETECTED

## 2020-07-28 NOTE — TELEPHONE ENCOUNTER
Good Morning,     Called pts insurance to initiate prior auth for out pt  surgery on 91/96/00 with Dr Len Corey   Using codes 48317 (D+C Hysteroscopy) and 78676 (Novasure Ablation)   Clinicals faxed for review today to [phone] [address] [place] [address] # [id number]     I will update you once approval or denial is given  Thank you  Previous Messages      ----- Message -----   From: Fritz Chawla   Sent: 7/23/2020  11:04 AM EDT   To: Ang Rosario MA, Randee Dee, *   Subject: RE: [facility] [place] everyone,   The any OP or surgery admit surgery is taking place in a hospital setting  We do not have any [facility] in our network for these services  When calling for prior auth please let insurance know this will be preformed in OP hospital setting for surgery case, so in this case prior auth will need to be obtained  Thank you     ----- Message -----   From: Melissa Acosta   Sent: 7/22/2020  11:16 AM EDT   To: Ang Rosario MA, Fritz Chawla, *   Subject: RE: [facility]iles                                   Thanks Maribeth,     I had this same response from another rep last week and I checked with [facility]  He said it has to be done as [address]  I added him on here  He is out today but maybe he can double check on this but this is what he told me last week in regards to the same questions  I'm sure he will respond tomorrow if you want to wait to submit

## 2020-07-28 NOTE — TELEPHONE ENCOUNTER
[employer] called to discuss determination of PA  Out Pt surgery on 11/09/98 with Dr Jeannie Cervantes using codes 77720 and 91913 are APPROVED     [facility] # [id number]

## 2020-07-29 ENCOUNTER — TELEPHONE (OUTPATIENT)
Dept: OBGYN CLINIC | Facility: CLINIC | Age: 40
End: 2020-07-29

## 2020-07-29 NOTE — PRE-PROCEDURE INSTRUCTIONS
Pre-Surgery Instructions:   Medication Instructions    ALPRAZolam (XANAX) 0 25 mg tablet Instructed patient per Anesthesia Guidelines   Cetirizine HCl (ZYRTEC ALLERGY) 10 MG CAPS Instructed patient per Anesthesia Guidelines   fluticasone (FLONASE) 50 mcg/act nasal spray Instructed patient per Anesthesia Guidelines   omeprazole (PriLOSEC) 20 mg delayed release capsule Instructed patient per Anesthesia Guidelines   ondansetron (ZOFRAN) 4 mg tablet Instructed patient per Anesthesia Guidelines   rivaroxaban (XARELTO) 10 mg tablet Instructed patient per Anesthesia Guidelines   sertraline (ZOLOFT) 100 mg tablet Instructed patient per Anesthesia Guidelines   zolpidem (AMBIEN) 10 mg tablet Instructed patient per Anesthesia Guidelines      Pre-op medication, and showering instructions with antibacteral soap reviewed

## 2020-07-29 NOTE — TELEPHONE ENCOUNTER
Patient calling about surgery on Monday wants to know if she needs to use Hibiclens  Advised she can  a bottle in office  Please use 1/2 bottle night before surgery and 1/2 bottle morning of surgery (full body from neck down) verbalized understanding  She will  at

## 2020-07-29 NOTE — TELEPHONE ENCOUNTER
States she is having right sided abdominal pain radiates around to back  She is also having heavy bleeding where is is saturating a pad an hour  She is scheduled for D&C, ablation and resection of tumor/fibroid  States at times she does get hot and sweaty but no fever  Denies chills, SOB, palpations or chest pain  She is dizzy at times  Would like to know if she can have work note for today through Tuesday  Note needs to say what procedure she is having done  She is taking Xarelto  Routing to provider to write note and advice

## 2020-08-02 NOTE — ANESTHESIA PREPROCEDURE EVALUATION
Procedure:  DILATATION AND CURETTAGE (D&C) WITH HYSTEROSCOPY (MYOSURE) (N/A Uterus)  HYSTEROSCOPY WITH  RESECTION UTERINE TUMOR/FIBROID (N/A Uterus)  ABLATION ENDOMETRIAL NOVASURE (N/A Uterus)    Relevant Problems   ANESTHESIA   (+) Motion sickness (mild)   (+) PONV (postoperative nausea and vomiting)      CARDIO   (+) Hyperlipidemia      ENDO  BMI 37      GI/HEPATIC   (+) GERD (gastroesophageal reflux disease)   (+) Gastric erosion      GYN   (-) Currently pregnant      HEMATOLOGY  Hx PE  Hx UE DVT 1 mo ago, treated with xarelto (held 2 days)   (+) Hypercoagulable state (HCC)   (+) Iron deficiency anemia (Resolved)      NEURO/PSYCH   (+) Anxiety   (+) History of pulmonary embolus (PE)      PULMONARY   (-) Smoking   (-) URI (upper respiratory infection)        Physical Exam    Airway    Mallampati score: II  TM Distance: >3 FB  Neck ROM: full     Dental       Cardiovascular      Pulmonary      Other Findings        Anesthesia Plan  ASA Score- 2     Anesthesia Type- general with ASA Monitors  Additional Monitors:   Airway Plan: LMA  Comment: TIVA for hx PONV  Plan Factors-Exercise tolerance (METS): >4 METS  Chart reviewed  EKG reviewed  Existing labs reviewed  Patient summary reviewed  Patient is not a current smoker  Induction- intravenous  Postoperative Plan-     Informed Consent- Anesthetic plan and risks discussed with patient  I personally reviewed this patient with the CRNA  Discussed and agreed on the Anesthesia Plan with the CRNA          Lab Results   Component Value Date    WBC 8 10 06/23/2020    HGB 13 2 06/23/2020     06/23/2020     Lab Results   Component Value Date    SODIUM 134 (L) 06/23/2020    K 3 4 (L) 06/23/2020    BUN 10 06/23/2020    CREATININE 1 06 06/23/2020    EGFR 67 06/23/2020    GLUCOSE 118 05/11/2014     COVID neg 7/24

## 2020-08-03 ENCOUNTER — ANESTHESIA (OUTPATIENT)
Dept: PERIOP | Facility: AMBULARY SURGERY CENTER | Age: 40
End: 2020-08-03
Payer: COMMERCIAL

## 2020-08-03 ENCOUNTER — HOSPITAL ENCOUNTER (OUTPATIENT)
Facility: AMBULARY SURGERY CENTER | Age: 40
Setting detail: OUTPATIENT SURGERY
Discharge: HOME | End: 2020-08-03
Attending: OBSTETRICS & GYNECOLOGY | Admitting: OBSTETRICS & GYNECOLOGY
Payer: COMMERCIAL

## 2020-08-03 VITALS
HEART RATE: 74 BPM | OXYGEN SATURATION: 96 % | RESPIRATION RATE: 18 BRPM | SYSTOLIC BLOOD PRESSURE: 115 MMHG | BODY MASS INDEX: 36.99 KG/M2 | DIASTOLIC BLOOD PRESSURE: 63 MMHG | HEIGHT: 65 IN | TEMPERATURE: 97.5 F | WEIGHT: 222 LBS

## 2020-08-03 DIAGNOSIS — Z98.890 S/P DILATION AND CURETTAGE: Primary | ICD-10-CM

## 2020-08-03 DIAGNOSIS — Z86.711 HISTORY OF PULMONARY EMBOLUS (PE): ICD-10-CM

## 2020-08-03 DIAGNOSIS — N84.0 ENDOMETRIAL POLYP: ICD-10-CM

## 2020-08-03 DIAGNOSIS — N93.9 ABNORMAL UTERINE BLEEDING (AUB): ICD-10-CM

## 2020-08-03 PROBLEM — R11.2 PONV (POSTOPERATIVE NAUSEA AND VOMITING): Status: ACTIVE | Noted: 2020-08-03

## 2020-08-03 PROBLEM — D68.59 HYPERCOAGULABLE STATE (HCC): Status: ACTIVE | Noted: 2020-08-03

## 2020-08-03 PROBLEM — T75.3XXA MOTION SICKNESS: Status: ACTIVE | Noted: 2020-08-03

## 2020-08-03 LAB
EXT PREGNANCY TEST URINE: NEGATIVE
EXT. CONTROL: NORMAL

## 2020-08-03 PROCEDURE — 88305 TISSUE EXAM BY PATHOLOGIST: CPT | Performed by: PATHOLOGY

## 2020-08-03 PROCEDURE — 81025 URINE PREGNANCY TEST: CPT | Performed by: STUDENT IN AN ORGANIZED HEALTH CARE EDUCATION/TRAINING PROGRAM

## 2020-08-03 PROCEDURE — 58563 HYSTEROSCOPY ABLATION: CPT | Performed by: OBSTETRICS & GYNECOLOGY

## 2020-08-03 RX ORDER — MAGNESIUM HYDROXIDE 1200 MG/15ML
LIQUID ORAL AS NEEDED
Status: DISCONTINUED | OUTPATIENT
Start: 2020-08-03 | End: 2020-08-03 | Stop reason: HOSPADM

## 2020-08-03 RX ORDER — ACETAMINOPHEN 325 MG/1
650 TABLET ORAL EVERY 6 HOURS PRN
Qty: 30 TABLET | Refills: 0 | Status: SHIPPED | OUTPATIENT
Start: 2020-08-03

## 2020-08-03 RX ORDER — METOCLOPRAMIDE HYDROCHLORIDE 5 MG/ML
10 INJECTION INTRAMUSCULAR; INTRAVENOUS ONCE AS NEEDED
Status: DISCONTINUED | OUTPATIENT
Start: 2020-08-03 | End: 2020-08-03 | Stop reason: HOSPADM

## 2020-08-03 RX ORDER — ACETAMINOPHEN 325 MG/1
650 TABLET ORAL EVERY 6 HOURS PRN
Status: DISCONTINUED | OUTPATIENT
Start: 2020-08-03 | End: 2020-08-03 | Stop reason: HOSPADM

## 2020-08-03 RX ORDER — SODIUM CHLORIDE, SODIUM LACTATE, POTASSIUM CHLORIDE, CALCIUM CHLORIDE 600; 310; 30; 20 MG/100ML; MG/100ML; MG/100ML; MG/100ML
125 INJECTION, SOLUTION INTRAVENOUS CONTINUOUS
Status: DISCONTINUED | OUTPATIENT
Start: 2020-08-03 | End: 2020-08-03 | Stop reason: HOSPADM

## 2020-08-03 RX ORDER — PROPOFOL 10 MG/ML
INJECTION, EMULSION INTRAVENOUS CONTINUOUS PRN
Status: DISCONTINUED | OUTPATIENT
Start: 2020-08-03 | End: 2020-08-03

## 2020-08-03 RX ORDER — FENTANYL CITRATE/PF 50 MCG/ML
25 SYRINGE (ML) INJECTION
Status: DISCONTINUED | OUTPATIENT
Start: 2020-08-03 | End: 2020-08-03 | Stop reason: HOSPADM

## 2020-08-03 RX ORDER — KETOROLAC TROMETHAMINE 30 MG/ML
INJECTION, SOLUTION INTRAMUSCULAR; INTRAVENOUS AS NEEDED
Status: DISCONTINUED | OUTPATIENT
Start: 2020-08-03 | End: 2020-08-03

## 2020-08-03 RX ORDER — IBUPROFEN 600 MG/1
600 TABLET ORAL EVERY 6 HOURS PRN
Qty: 30 TABLET | Refills: 0 | Status: SHIPPED | OUTPATIENT
Start: 2020-08-03

## 2020-08-03 RX ORDER — MIDAZOLAM HYDROCHLORIDE 2 MG/2ML
INJECTION, SOLUTION INTRAMUSCULAR; INTRAVENOUS AS NEEDED
Status: DISCONTINUED | OUTPATIENT
Start: 2020-08-03 | End: 2020-08-03

## 2020-08-03 RX ORDER — SODIUM CHLORIDE, SODIUM LACTATE, POTASSIUM CHLORIDE, CALCIUM CHLORIDE 600; 310; 30; 20 MG/100ML; MG/100ML; MG/100ML; MG/100ML
75 INJECTION, SOLUTION INTRAVENOUS CONTINUOUS
Status: DISCONTINUED | OUTPATIENT
Start: 2020-08-03 | End: 2020-08-03 | Stop reason: HOSPADM

## 2020-08-03 RX ORDER — HYDROMORPHONE HCL/PF 1 MG/ML
0.2 SYRINGE (ML) INJECTION
Status: DISCONTINUED | OUTPATIENT
Start: 2020-08-03 | End: 2020-08-03 | Stop reason: HOSPADM

## 2020-08-03 RX ORDER — LIDOCAINE HYDROCHLORIDE 10 MG/ML
0.5 INJECTION, SOLUTION EPIDURAL; INFILTRATION; INTRACAUDAL; PERINEURAL ONCE AS NEEDED
Status: DISCONTINUED | OUTPATIENT
Start: 2020-08-03 | End: 2020-08-03 | Stop reason: HOSPADM

## 2020-08-03 RX ORDER — ALBUTEROL SULFATE 2.5 MG/3ML
2.5 SOLUTION RESPIRATORY (INHALATION) ONCE AS NEEDED
Status: DISCONTINUED | OUTPATIENT
Start: 2020-08-03 | End: 2020-08-03 | Stop reason: HOSPADM

## 2020-08-03 RX ORDER — IBUPROFEN 600 MG/1
600 TABLET ORAL EVERY 6 HOURS PRN
Status: DISCONTINUED | OUTPATIENT
Start: 2020-08-03 | End: 2020-08-03 | Stop reason: HOSPADM

## 2020-08-03 RX ORDER — DEXAMETHASONE SODIUM PHOSPHATE 10 MG/ML
INJECTION, SOLUTION INTRAMUSCULAR; INTRAVENOUS AS NEEDED
Status: DISCONTINUED | OUTPATIENT
Start: 2020-08-03 | End: 2020-08-03

## 2020-08-03 RX ORDER — SODIUM CHLORIDE, SODIUM LACTATE, POTASSIUM CHLORIDE, CALCIUM CHLORIDE 600; 310; 30; 20 MG/100ML; MG/100ML; MG/100ML; MG/100ML
INJECTION, SOLUTION INTRAVENOUS CONTINUOUS PRN
Status: DISCONTINUED | OUTPATIENT
Start: 2020-08-03 | End: 2020-08-03

## 2020-08-03 RX ORDER — LIDOCAINE HYDROCHLORIDE 10 MG/ML
INJECTION, SOLUTION EPIDURAL; INFILTRATION; INTRACAUDAL; PERINEURAL AS NEEDED
Status: DISCONTINUED | OUTPATIENT
Start: 2020-08-03 | End: 2020-08-03

## 2020-08-03 RX ORDER — ONDANSETRON 2 MG/ML
4 INJECTION INTRAMUSCULAR; INTRAVENOUS ONCE AS NEEDED
Status: DISCONTINUED | OUTPATIENT
Start: 2020-08-03 | End: 2020-08-03 | Stop reason: HOSPADM

## 2020-08-03 RX ORDER — ONDANSETRON 2 MG/ML
INJECTION INTRAMUSCULAR; INTRAVENOUS AS NEEDED
Status: DISCONTINUED | OUTPATIENT
Start: 2020-08-03 | End: 2020-08-03

## 2020-08-03 RX ORDER — FENTANYL CITRATE 50 UG/ML
INJECTION, SOLUTION INTRAMUSCULAR; INTRAVENOUS AS NEEDED
Status: DISCONTINUED | OUTPATIENT
Start: 2020-08-03 | End: 2020-08-03

## 2020-08-03 RX ORDER — PROPOFOL 10 MG/ML
INJECTION, EMULSION INTRAVENOUS AS NEEDED
Status: DISCONTINUED | OUTPATIENT
Start: 2020-08-03 | End: 2020-08-03

## 2020-08-03 RX ADMIN — PROPOFOL 140 MCG/KG/MIN: 10 INJECTION, EMULSION INTRAVENOUS at 07:36

## 2020-08-03 RX ADMIN — DEXAMETHASONE SODIUM PHOSPHATE 4 MG: 10 INJECTION, SOLUTION INTRAMUSCULAR; INTRAVENOUS at 07:35

## 2020-08-03 RX ADMIN — ONDANSETRON 4 MG: 2 INJECTION INTRAMUSCULAR; INTRAVENOUS at 07:32

## 2020-08-03 RX ADMIN — SODIUM CHLORIDE, SODIUM LACTATE, POTASSIUM CHLORIDE, AND CALCIUM CHLORIDE: .6; .31; .03; .02 INJECTION, SOLUTION INTRAVENOUS at 07:32

## 2020-08-03 RX ADMIN — FENTANYL CITRATE 25 MCG: 50 INJECTION, SOLUTION INTRAMUSCULAR; INTRAVENOUS at 08:02

## 2020-08-03 RX ADMIN — FENTANYL CITRATE 25 MCG: 50 INJECTION, SOLUTION INTRAMUSCULAR; INTRAVENOUS at 07:41

## 2020-08-03 RX ADMIN — FENTANYL CITRATE 25 MCG: 50 INJECTION, SOLUTION INTRAMUSCULAR; INTRAVENOUS at 07:36

## 2020-08-03 RX ADMIN — FENTANYL CITRATE 25 MCG: 50 INJECTION INTRAMUSCULAR; INTRAVENOUS at 08:47

## 2020-08-03 RX ADMIN — PROPOFOL 200 MG: 10 INJECTION, EMULSION INTRAVENOUS at 07:35

## 2020-08-03 RX ADMIN — LIDOCAINE HYDROCHLORIDE 50 MG: 10 INJECTION, SOLUTION EPIDURAL; INFILTRATION; INTRACAUDAL; PERINEURAL at 07:35

## 2020-08-03 RX ADMIN — FENTANYL CITRATE 50 MCG: 50 INJECTION, SOLUTION INTRAMUSCULAR; INTRAVENOUS at 07:39

## 2020-08-03 RX ADMIN — FENTANYL CITRATE 25 MCG: 50 INJECTION INTRAMUSCULAR; INTRAVENOUS at 08:43

## 2020-08-03 RX ADMIN — FENTANYL CITRATE 25 MCG: 50 INJECTION INTRAMUSCULAR; INTRAVENOUS at 08:39

## 2020-08-03 RX ADMIN — ACETAMINOPHEN 650 MG: 325 TABLET, FILM COATED ORAL at 09:28

## 2020-08-03 RX ADMIN — FENTANYL CITRATE 25 MCG: 50 INJECTION INTRAMUSCULAR; INTRAVENOUS at 08:36

## 2020-08-03 RX ADMIN — MIDAZOLAM HYDROCHLORIDE 2 MG: 1 INJECTION, SOLUTION INTRAMUSCULAR; INTRAVENOUS at 07:32

## 2020-08-03 RX ADMIN — KETOROLAC TROMETHAMINE 30 MG: 30 INJECTION, SOLUTION INTRAMUSCULAR at 08:08

## 2020-08-03 RX ADMIN — FENTANYL CITRATE 25 MCG: 50 INJECTION, SOLUTION INTRAMUSCULAR; INTRAVENOUS at 07:54

## 2020-08-03 NOTE — OP NOTE
OPERATIVE REPORT  PATIENT NAME: Long Hawkins    :  1980  MRN: [id number]  Pt Location: [facility] OR [facility] 05    SURGERY DATE: 8/3/2020    Surgeon(s) and Role:     * Laura Mccurdy DO - Primary     * Kira Mark MD - Assisting    Preop Diagnosis:  Abnormal uterine bleeding (AUB) [N93 9]  Endometrial polyp [N84 0]  History of pulmonary embolus (PE) [Z86 711]    Post-Op Diagnosis Codes:     * Abnormal uterine bleeding (AUB) [N93 9]     * Endometrial polyp [N84 0]     * History of pulmonary embolus (PE) [Z86 711]    Procedure(s) (LRB):  DILATATION AND CURETTAGE (D&C) WITH HYSTEROSCOPY (MYOSURE) (N/A)  HYSTEROSCOPY WITH  RESECTION UTERINE TUMOR/FIBROID (N/A)  ABLATION ENDOMETRIAL NOVASURE (N/A)    Specimen(s):  ID Type Source Tests Collected by Time Destination   1 : endometrial polyp and curettings Tissue Polyp, Cervical/Endometrial TISSUE EXAM Laura Frazier  [id number]        Estimated Blood Loss:   5cc    Drains:  * No LDAs found *    Anesthesia Type:   LMA    Operative Indications:  Abnormal uterine bleeding (AUB) [N93 9]  Endometrial polyp [N84 0]  History of pulmonary embolus (PE) [Z86 711]    Fluid deficit: 130cc    Operative Findings: On bimanual exam, the uterus is anteverted and of normal size and contour  There are no palpable adnexal masses  On speculum exam, the cervix was visualized with mild descensus  On hysteroscopic exam, the uterine cavity was visualized and the endometrial tissue was proliferative  There was noted to be a right fundal polyp  Complications:   None    Procedure and Technique: All risks, benefits, and alternatives to the procedure were discussed with the patient and she had the opportunity to ask questions  Informed consent was obtained  Description of Procedure    Patient was taken to the operating room were a time out was performed to confirm correct patient and correct procedure   Anesthesia administered via LMA and the patient was positioned on the OR table in the dorsal lithotomy position  All pressure points were padded and a haja hugger was placed to maintain control of core body temperature  A bimanual exam was performed and the uterus was noted to be anteverted, normal in size and consistency with no palpable adnexal masses or fullness  The patient was prepped and draped in the usual sterile fashion  Operative Technique    A straight catheter was introduced into the bladder, which was drained of 75cc of clear yellow urine  A weighted speculum was inserted into the vagina and a Christopher retractor was used to visualize the anterior lip of the cervix, which was then grasped with a single toothed tenaculum  The uterus was sounded to 8cm  The cervix was serially dilated to 23F using Gaytan dilators for introduction of the hysteroscope  Hysteroscope was introduced under direct visualization using normal saline solution as the distention media  Hysteroscope was advanced to the uterine fundus and the entire uterine cavity was inspected in a systematic manner  There was noted to be right fundal polyp  Hysteroscope was held steady while the Mode De Faire lite tissue removal device was advanced into the uterine cavity under direct visualization  The polyp was then resected away using the [facility] device until the base was flush with the surrounding endometrial tissue  Excellent hemostasis was noted  The hysteroscope and Myosure device was removed  Fluid deficit was noted to be 130cc      The NovaSure ablation device was inserted through the cervix and seated into the endometrial cavity  Device was deployed and rotated in all directions to maximize expansion of the bipolar electrode  Uterine length was determined to be 8 cm and uterine width was determined to be 3 cm  A test of the system was performed and the uterine cavity was insufflated with CO2 to ensure cavity integrity and proper placement of the device  No leakage of gas was appreciated  After successful testing, the Novasure system was activated and bipolar cauterization with a Moisture Transport Vacuum System facilitated ablation of the endometrium in approximately 53 seconds under 99 navarro of power  The Novasure system was completely retracted prior to it's removal from the uterine cavity  Inspection of the bipolar electrode showed evidence of burnt endometrial tissue  The single toothed tenaculum was removed from the anterior lip of the cervix  Good hemostasis was confirmed at the tenaculum puncture sites  Weighted speculum was then removed from the vagina  At the conclusion of the procedure, all needle, sponge, and instrument counts were noted to be correct x2  Patient tolerated the procedure well and was transferred to PACU in stable condition prior to discharge with follow up in 1-2 weeks  Dr Jeanette Kumar was present and participated in all key portions of the case        Patient Disposition:  PACU     SIGNATURE: Fani Bone MD  DATE: August 3, 2020  TIME: 8:19 AM

## 2020-08-03 NOTE — LETTER
Re: Rosalind Calloway      To Whom It May Concern:      Primo is under my care and may return to work on August 6, 2020  Please contact my office with any questions  Laura OWEN   [address] [place] [address] , Helio THORNTON and [facility]  [phone]

## 2020-08-03 NOTE — DISCHARGE INSTRUCTIONS
Dilation and Curettage   WHAT YOU NEED TO KNOW:   Dilation and curettage (D&C) is a procedure to remove tissue from the lining of your uterus  DISCHARGE INSTRUCTIONS:   Call 911 for any of the following:   · You have signs of an allergic reaction, such as hives, trouble breathing, or severe swelling  Seek care immediately if:   · You have heavy vaginal bleeding that soaks 1 pad in 1 hour for 2 hours in a row  · You have a fever higher than 100 4°F (38°C)  · You have abdominal cramps for more than 2 days  · Your pain does not get better, even after treatment  Contact your healthcare provider if:   · You have foul-smelling vaginal discharge  · You do not get your monthly period  · You feel depressed or anxious  · You feel very tired and weak  · You have questions or concerns about your condition or care  Medicines: You may need any of the following:  · Prescription pain medicine  may be given  Do not wait until the pain is severe before you take your medicine  Ask your healthcare provider how to take this medicine safely  · Antibiotics  help fight or prevent a bacterial infection  · Take your medicine as directed  Contact your healthcare provider if you think your medicine is not helping or if you have side effects  Tell him or her if you are allergic to any medicine  Keep a list of the medicines, vitamins, and herbs you take  Include the amounts, and when and why you take them  Bring the list or the pill bottles to follow-up visits  Carry your medicine list with you in case of an emergency  Self-care:   · Use sanitary pads if needed  You may have light bleeding for up to 2 weeks  Do not use tampons  Use sanitary pads instead  This will help prevent a vaginal infection  · Rest as needed  Slowly start to do more each day  Return to your daily activities as directed  · Do not have sex for at least 2 weeks after the procedure  This will help prevent an infection      · Use birth control right after your procedure  Your monthly period should start again in 4 to 8 weeks  During this time, you could still ovulate (release an egg)  Use birth control as directed to prevent pregnancy during this time  Follow up with your healthcare provider as directed:  Write down your questions so you remember to ask them during your visits  © 2017 [employer] Information is for End User's use only and may not be sold, redistributed or otherwise used for commercial purposes  All illustrations and images included in CareNotes® are the copyrighted property of [employer]  or Munir Rehman  The above information is an  only  It is not intended as medical advice for individual conditions or treatments  Talk to your doctor, nurse or pharmacist before following any medical regimen to see if it is safe and effective for you

## 2020-08-03 NOTE — ANESTHESIA POSTPROCEDURE EVALUATION
Post-Op Assessment Note    CV Status:  Stable  Pain Score: 0    Pain management: adequate     Mental Status:  Sleepy and arousable   Hydration Status:  Euvolemic   PONV Controlled:  Controlled   Airway Patency:  Patent      Post Op Vitals Reviewed: Yes      Staff: CRNA   Comments: Pt able to maintain own airway, VSS, report to PACU RN        No complications documented      /61 (08/03/20 0819)    Temp 97 7 °F (36 5 °C) (08/03/20 0819)    Pulse 83 (08/03/20 0819)   Resp 18 (08/03/20 0819)    SpO2 94 % (08/03/20 0819)

## 2020-08-03 NOTE — INTERVAL H&P NOTE
H&P reviewed  After examining the patient I find no changes in the patients condition since the H&P had been written      Vitals:    08/03/20 0651   BP: 117/80   Pulse: 81   Resp: 16   Temp: 97 6 °F (36 4 °C)   SpO2: 96%

## 2020-08-07 NOTE — RESULT ENCOUNTER NOTE
Tod Hannah  Your pathology report shows a benign polyp  I hope you are feeling well! Please contact the office at [phone] with any questions         Marisel

## 2020-08-16 DIAGNOSIS — F41.9 ANXIETY: ICD-10-CM

## 2020-08-16 DIAGNOSIS — G47.09 OTHER INSOMNIA: ICD-10-CM

## 2020-08-17 DIAGNOSIS — G47.09 OTHER INSOMNIA: ICD-10-CM

## 2020-08-17 RX ORDER — ALPRAZOLAM 0.25 MG/1
TABLET ORAL
Qty: 30 TABLET | Refills: 0 | Status: SHIPPED | OUTPATIENT
Start: 2020-08-17 | End: 2021-05-30 | Stop reason: SDUPTHER

## 2020-08-17 RX ORDER — ZOLPIDEM TARTRATE 10 MG/1
TABLET ORAL
Qty: 30 TABLET | Refills: 0 | OUTPATIENT
Start: 2020-08-17

## 2020-08-17 RX ORDER — ZOLPIDEM TARTRATE 10 MG/1
TABLET ORAL
Qty: 30 TABLET | Refills: 0 | Status: SHIPPED | OUTPATIENT
Start: 2020-08-17 | End: 2020-09-22

## 2020-09-09 ENCOUNTER — OFFICE VISIT (OUTPATIENT)
Dept: INTERNAL MEDICINE CLINIC | Facility: CLINIC | Age: 40
End: 2020-09-09
Payer: COMMERCIAL

## 2020-09-09 VITALS
BODY MASS INDEX: 38.07 KG/M2 | DIASTOLIC BLOOD PRESSURE: 76 MMHG | TEMPERATURE: 97.2 F | OXYGEN SATURATION: 98 % | HEIGHT: 64 IN | SYSTOLIC BLOOD PRESSURE: 118 MMHG | WEIGHT: 223 LBS | HEART RATE: 74 BPM

## 2020-09-09 DIAGNOSIS — F41.9 ANXIETY: Primary | ICD-10-CM

## 2020-09-09 DIAGNOSIS — G43.909 MIGRAINE WITHOUT STATUS MIGRAINOSUS, NOT INTRACTABLE, UNSPECIFIED MIGRAINE TYPE: ICD-10-CM

## 2020-09-09 PROCEDURE — 99213 OFFICE O/P EST LOW 20 MIN: CPT | Performed by: NURSE PRACTITIONER

## 2020-09-09 PROCEDURE — 1036F TOBACCO NON-USER: CPT | Performed by: NURSE PRACTITIONER

## 2020-09-09 RX ORDER — BUSPIRONE HYDROCHLORIDE 5 MG/1
5 TABLET ORAL 2 TIMES DAILY
Qty: 60 TABLET | Refills: 0 | Status: SHIPPED | OUTPATIENT
Start: 2020-09-09 | End: 2020-10-12 | Stop reason: SDUPTHER

## 2020-09-09 RX ORDER — SERTRALINE HYDROCHLORIDE 100 MG/1
200 TABLET, FILM COATED ORAL
Qty: 180 TABLET | Refills: 0 | Status: SHIPPED | OUTPATIENT
Start: 2020-09-09 | End: 2020-12-31 | Stop reason: SDUPTHER

## 2020-09-09 NOTE — ASSESSMENT & PLAN NOTE
Encouraged her to stay well hydrated  Stop excedrin migraine  May take ibuprofen as needed for headaches  Reevaluate in 2 weeks, if not improving will consider starting daily migraine treatment

## 2020-09-09 NOTE — ASSESSMENT & PLAN NOTE
Increase zoloft to 200 mg daily  Buspar twice daily- advised not to take with xanax  Start counseling     RTO in 2 weeks, she understands to call right away for any worsening symptoms

## 2020-09-09 NOTE — PROGRESS NOTES
Assessment/Plan:    Migraine headache  Encouraged her to stay well hydrated  Stop excedrin migraine  May take ibuprofen as needed for headaches  Reevaluate in 2 weeks, if not improving will consider starting daily migraine treatment  Anxiety  Increase zoloft to 200 mg daily  Buspar twice daily- advised not to take with xanax  Start counseling  RTO in 2 weeks, she understands to call right away for any worsening symptoms        Diagnoses and all orders for this visit:    Anxiety  -     sertraline (ZOLOFT) 100 mg tablet; Take 2 tablets (200 mg total) by mouth daily at bedtime  -     busPIRone (BUSPAR) 5 mg tablet; Take 1 tablet (5 mg total) by mouth 2 (two) times a day    Migraine without status migrainosus, not intractable, unspecified migraine type          Subjective:      Patient ID: Bibiana Patterson is a 42 y o  female  Here today with complaints of worsening depression/anxiety  This weekend she reports crying all weekend  She has not been motivated and has no energy  She feels like she's having a nervous breakdown    She did not grieve over her father's death and feels like that's causing her increased depression  She feels anxious and has had a few panic attacks  She has been taking her zoloft and using xanax at bedtime  She did get a contact for a counselor and plans to call this week   She denies suicidal thoughts    She also feels like she's had a migraine behind her right eye off and on for the last 3 weeks  She feels like symptoms are due to anxiety   She has nausea and occasional photophobia   She denies vision changes or coordination issues   She has been taking excedrin with intermittent relief               The following portions of the patient's history were reviewed and updated as appropriate: allergies, current medications, past family history, past medical history, past social history, past surgical history and problem list     Review of Systems   Constitutional: Negative for activity change, appetite change, fatigue and fever  Eyes: Positive for photophobia  Negative for visual disturbance  Respiratory: Negative for cough, chest tightness and shortness of breath  Cardiovascular: Negative for chest pain and palpitations  Gastrointestinal: Positive for nausea  Negative for abdominal pain and diarrhea  Vomited x 1 over the weekend    Genitourinary: Negative for difficulty urinating  Neurological: Positive for headaches  Negative for dizziness, syncope, speech difficulty, weakness and light-headedness  Psychiatric/Behavioral: Positive for decreased concentration, dysphoric mood and sleep disturbance  Negative for self-injury and suicidal ideas  The patient is nervous/anxious  Objective:      /76   Pulse 74   Temp (!) 97 2 °F (36 2 °C)   Ht 5' 4" (1 626 m)   Wt 101 kg (223 lb)   SpO2 98%   BMI 38 28 kg/m²          Physical Exam  Vitals signs reviewed  Constitutional:       Appearance: Normal appearance  HENT:      Head: Normocephalic and atraumatic  Eyes:      Conjunctiva/sclera: Conjunctivae normal       Pupils: Pupils are equal, round, and reactive to light  Cardiovascular:      Rate and Rhythm: Normal rate and regular rhythm  Heart sounds: Normal heart sounds  Pulmonary:      Effort: Pulmonary effort is normal       Breath sounds: Normal breath sounds  Skin:     General: Skin is warm and dry  Neurological:      Mental Status: She is alert and oriented to person, place, and time  Psychiatric:         Mood and Affect: Mood is anxious and depressed  Affect is tearful           Behavior: Behavior normal

## 2020-09-09 NOTE — LETTER
September 9, 2020     Patient: Sarah Parnell   YOB: 1980   Date of Visit: 9/9/2020       To Whom it May Concern:    Yenni Card is under my professional care  She was seen in my office on 9/9/2020  She may return to work on 9/9/2020       If you have any questions or concerns, please don't hesitate to call           Sincerely,          LESTER Pedersen        CC: No Recipients

## 2020-09-21 DIAGNOSIS — G47.09 OTHER INSOMNIA: ICD-10-CM

## 2020-09-21 DIAGNOSIS — G43.909 MIGRAINE WITHOUT STATUS MIGRAINOSUS, NOT INTRACTABLE, UNSPECIFIED MIGRAINE TYPE: Primary | ICD-10-CM

## 2020-09-21 NOTE — TELEPHONE ENCOUNTER
Pt  Said she discussed migraine med with Matteo Ramesh  Pt  Would like med please  She started Buspar two wks  Ago  Would like to start med for migraines now

## 2020-09-22 RX ORDER — TOPIRAMATE 25 MG/1
TABLET ORAL
Qty: 60 TABLET | Refills: 1 | Status: SHIPPED | OUTPATIENT
Start: 2020-09-22 | End: 2020-11-12 | Stop reason: SDUPTHER

## 2020-09-22 RX ORDER — ZOLPIDEM TARTRATE 10 MG/1
TABLET ORAL
Qty: 30 TABLET | Refills: 0 | Status: SHIPPED | OUTPATIENT
Start: 2020-09-22 | End: 2020-10-21 | Stop reason: SDUPTHER

## 2020-10-12 DIAGNOSIS — F41.9 ANXIETY: ICD-10-CM

## 2020-10-12 RX ORDER — BUSPIRONE HYDROCHLORIDE 5 MG/1
5 TABLET ORAL 2 TIMES DAILY
Qty: 60 TABLET | Refills: 0 | Status: SHIPPED | OUTPATIENT
Start: 2020-10-12 | End: 2020-11-12 | Stop reason: SDUPTHER

## 2020-10-21 DIAGNOSIS — G47.09 OTHER INSOMNIA: ICD-10-CM

## 2020-10-21 RX ORDER — ZOLPIDEM TARTRATE 10 MG/1
TABLET ORAL
Qty: 30 TABLET | Refills: 0 | Status: SHIPPED | OUTPATIENT
Start: 2020-10-21 | End: 2020-11-24 | Stop reason: SDUPTHER

## 2020-11-10 ENCOUNTER — HOSPITAL ENCOUNTER (EMERGENCY)
Facility: HOSPITAL | Age: 40
Discharge: HOME | End: 2020-11-11
Attending: EMERGENCY MEDICINE | Admitting: EMERGENCY MEDICINE
Payer: COMMERCIAL

## 2020-11-10 VITALS
OXYGEN SATURATION: 96 % | HEIGHT: 64 IN | WEIGHT: 219 LBS | DIASTOLIC BLOOD PRESSURE: 79 MMHG | SYSTOLIC BLOOD PRESSURE: 130 MMHG | HEART RATE: 81 BPM | BODY MASS INDEX: 37.39 KG/M2 | TEMPERATURE: 97.4 F | RESPIRATION RATE: 17 BRPM

## 2020-11-10 DIAGNOSIS — R11.2 NAUSEA AND VOMITING: ICD-10-CM

## 2020-11-10 DIAGNOSIS — N30.90 CYSTITIS: Primary | ICD-10-CM

## 2020-11-10 DIAGNOSIS — R82.90 ABNORMAL URINALYSIS: ICD-10-CM

## 2020-11-10 PROCEDURE — 99283 EMERGENCY DEPT VISIT LOW MDM: CPT

## 2020-11-11 ENCOUNTER — APPOINTMENT (EMERGENCY)
Dept: RADIOLOGY | Facility: HOSPITAL | Age: 40
End: 2020-11-11
Payer: COMMERCIAL

## 2020-11-11 LAB
ALBUMIN SERPL BCP-MCNC: 3.6 G/DL (ref 3.5–5)
ALP SERPL-CCNC: 94 U/L (ref 46–116)
ALT SERPL W P-5'-P-CCNC: 28 U/L (ref 12–78)
AMORPH URATE CRY URNS QL MICRO: ABNORMAL /HPF
ANION GAP SERPL CALCULATED.3IONS-SCNC: 9 MMOL/L (ref 4–13)
APTT PPP: 26 SECONDS (ref 23–37)
AST SERPL W P-5'-P-CCNC: 14 U/L (ref 5–45)
BACTERIA UR QL AUTO: ABNORMAL /HPF
BASOPHILS # BLD AUTO: 0.07 THOUSANDS/ΜL (ref 0–0.1)
BASOPHILS NFR BLD AUTO: 1 % (ref 0–1)
BILIRUB SERPL-MCNC: 0.27 MG/DL (ref 0.2–1)
BILIRUB UR QL STRIP: ABNORMAL
BUN SERPL-MCNC: 14 MG/DL (ref 5–25)
CALCIUM SERPL-MCNC: 8.9 MG/DL (ref 8.3–10.1)
CAOX CRY URNS QL MICRO: ABNORMAL /HPF
CHLORIDE SERPL-SCNC: 105 MMOL/L (ref 100–108)
CLARITY UR: ABNORMAL
CO2 SERPL-SCNC: 26 MMOL/L (ref 21–32)
COLOR UR: ABNORMAL
CREAT SERPL-MCNC: 1.04 MG/DL (ref 0.6–1.3)
EOSINOPHIL # BLD AUTO: 0.52 THOUSAND/ΜL (ref 0–0.61)
EOSINOPHIL NFR BLD AUTO: 5 % (ref 0–6)
ERYTHROCYTE [DISTWIDTH] IN BLOOD BY AUTOMATED COUNT: 13.8 % (ref 11.6–15.1)
EXT PREG TEST URINE: NEGATIVE
EXT. CONTROL ED NAV: NORMAL
GFR SERPL CREATININE-BSD FRML MDRD: 69 ML/MIN/1.73SQ M
GLUCOSE SERPL-MCNC: 105 MG/DL (ref 65–140)
GLUCOSE UR STRIP-MCNC: ABNORMAL MG/DL
HCT VFR BLD AUTO: 39.7 % (ref 34.8–46.1)
HGB BLD-MCNC: 12.8 G/DL (ref 11.5–15.4)
HGB UR QL STRIP.AUTO: ABNORMAL
IMM GRANULOCYTES # BLD AUTO: 0.17 THOUSAND/UL (ref 0–0.2)
IMM GRANULOCYTES NFR BLD AUTO: 2 % (ref 0–2)
INR PPP: 0.97 (ref 0.84–1.19)
KETONES UR STRIP-MCNC: ABNORMAL MG/DL
LEUKOCYTE ESTERASE UR QL STRIP: ABNORMAL
LYMPHOCYTES # BLD AUTO: 3.27 THOUSANDS/ΜL (ref 0.6–4.47)
LYMPHOCYTES NFR BLD AUTO: 28 % (ref 14–44)
MCH RBC QN AUTO: 27.9 PG (ref 26.8–34.3)
MCHC RBC AUTO-ENTMCNC: 32.2 G/DL (ref 31.4–37.4)
MCV RBC AUTO: 87 FL (ref 82–98)
MONOCYTES # BLD AUTO: 0.74 THOUSAND/ΜL (ref 0.17–1.22)
MONOCYTES NFR BLD AUTO: 6 % (ref 4–12)
NEUTROPHILS # BLD AUTO: 6.74 THOUSANDS/ΜL (ref 1.85–7.62)
NEUTS SEG NFR BLD AUTO: 58 % (ref 43–75)
NITRITE UR QL STRIP: ABNORMAL
NON-SQ EPI CELLS URNS QL MICRO: ABNORMAL /HPF
NRBC BLD AUTO-RTO: 0 /100 WBCS
PLATELET # BLD AUTO: 248 THOUSANDS/UL (ref 149–390)
PMV BLD AUTO: 9.3 FL (ref 8.9–12.7)
POTASSIUM SERPL-SCNC: 3.5 MMOL/L (ref 3.5–5.3)
PROT SERPL-MCNC: 7.5 G/DL (ref 6.4–8.2)
PROT UR STRIP-MCNC: ABNORMAL MG/DL
PROTHROMBIN TIME: 13 SECONDS (ref 11.6–14.5)
RBC # BLD AUTO: 4.58 MILLION/UL (ref 3.81–5.12)
RBC #/AREA URNS AUTO: ABNORMAL /HPF
SODIUM SERPL-SCNC: 140 MMOL/L (ref 136–145)
UROBILINOGEN UR QL STRIP.AUTO: ABNORMAL E.U./DL
WBC # BLD AUTO: 11.51 THOUSAND/UL (ref 4.31–10.16)
WBC #/AREA URNS AUTO: ABNORMAL /HPF

## 2020-11-11 PROCEDURE — 81001 URINALYSIS AUTO W/SCOPE: CPT | Performed by: PHYSICIAN ASSISTANT

## 2020-11-11 PROCEDURE — 85730 THROMBOPLASTIN TIME PARTIAL: CPT | Performed by: PHYSICIAN ASSISTANT

## 2020-11-11 PROCEDURE — 96374 THER/PROPH/DIAG INJ IV PUSH: CPT

## 2020-11-11 PROCEDURE — 96375 TX/PRO/DX INJ NEW DRUG ADDON: CPT

## 2020-11-11 PROCEDURE — 36415 COLL VENOUS BLD VENIPUNCTURE: CPT | Performed by: PHYSICIAN ASSISTANT

## 2020-11-11 PROCEDURE — 85610 PROTHROMBIN TIME: CPT | Performed by: PHYSICIAN ASSISTANT

## 2020-11-11 PROCEDURE — 85025 COMPLETE CBC W/AUTO DIFF WBC: CPT | Performed by: PHYSICIAN ASSISTANT

## 2020-11-11 PROCEDURE — 74018 RADEX ABDOMEN 1 VIEW: CPT

## 2020-11-11 PROCEDURE — 87086 URINE CULTURE/COLONY COUNT: CPT | Performed by: PHYSICIAN ASSISTANT

## 2020-11-11 PROCEDURE — 80053 COMPREHEN METABOLIC PANEL: CPT | Performed by: PHYSICIAN ASSISTANT

## 2020-11-11 PROCEDURE — 99284 EMERGENCY DEPT VISIT MOD MDM: CPT | Performed by: PHYSICIAN ASSISTANT

## 2020-11-11 PROCEDURE — 96361 HYDRATE IV INFUSION ADD-ON: CPT

## 2020-11-11 PROCEDURE — 81025 URINE PREGNANCY TEST: CPT | Performed by: PHYSICIAN ASSISTANT

## 2020-11-11 RX ORDER — ONDANSETRON 2 MG/ML
4 INJECTION INTRAMUSCULAR; INTRAVENOUS ONCE
Status: COMPLETED | OUTPATIENT
Start: 2020-11-11 | End: 2020-11-11

## 2020-11-11 RX ORDER — CEPHALEXIN 250 MG/1
500 CAPSULE ORAL ONCE
Status: COMPLETED | OUTPATIENT
Start: 2020-11-11 | End: 2020-11-11

## 2020-11-11 RX ORDER — CEPHALEXIN 500 MG/1
500 CAPSULE ORAL EVERY 12 HOURS SCHEDULED
Qty: 14 CAPSULE | Refills: 0 | Status: SHIPPED | OUTPATIENT
Start: 2020-11-11 | End: 2020-11-18

## 2020-11-11 RX ORDER — PHENAZOPYRIDINE HYDROCHLORIDE 100 MG/1
100 TABLET, FILM COATED ORAL ONCE
Status: COMPLETED | OUTPATIENT
Start: 2020-11-11 | End: 2020-11-11

## 2020-11-11 RX ORDER — KETOROLAC TROMETHAMINE 30 MG/ML
15 INJECTION, SOLUTION INTRAMUSCULAR; INTRAVENOUS ONCE
Status: COMPLETED | OUTPATIENT
Start: 2020-11-11 | End: 2020-11-11

## 2020-11-11 RX ORDER — PHENAZOPYRIDINE HYDROCHLORIDE 200 MG/1
200 TABLET, FILM COATED ORAL 3 TIMES DAILY
Qty: 6 TABLET | Refills: 0 | Status: SHIPPED | OUTPATIENT
Start: 2020-11-11 | End: 2020-11-13

## 2020-11-11 RX ORDER — ONDANSETRON 4 MG/1
4 TABLET, FILM COATED ORAL EVERY 6 HOURS
Qty: 12 TABLET | Refills: 0 | Status: SHIPPED | OUTPATIENT
Start: 2020-11-11 | End: 2021-04-22 | Stop reason: ALTCHOICE

## 2020-11-11 RX ORDER — IBUPROFEN 400 MG/1
400 TABLET ORAL EVERY 6 HOURS PRN
Qty: 12 TABLET | Refills: 0 | Status: SHIPPED | OUTPATIENT
Start: 2020-11-11 | End: 2021-07-14 | Stop reason: SDUPTHER

## 2020-11-11 RX ADMIN — KETOROLAC TROMETHAMINE 15 MG: 30 INJECTION, SOLUTION INTRAMUSCULAR at 00:33

## 2020-11-11 RX ADMIN — CEPHALEXIN 500 MG: 250 CAPSULE ORAL at 02:29

## 2020-11-11 RX ADMIN — FAMOTIDINE 20 MG: 10 INJECTION, SOLUTION INTRAVENOUS at 00:34

## 2020-11-11 RX ADMIN — ONDANSETRON 4 MG: 2 INJECTION INTRAMUSCULAR; INTRAVENOUS at 00:33

## 2020-11-11 RX ADMIN — PHENAZOPYRIDINE 100 MG: 100 TABLET ORAL at 02:28

## 2020-11-11 RX ADMIN — SODIUM CHLORIDE 1000 ML: 0.9 INJECTION, SOLUTION INTRAVENOUS at 00:33

## 2020-11-12 DIAGNOSIS — G43.909 MIGRAINE WITHOUT STATUS MIGRAINOSUS, NOT INTRACTABLE, UNSPECIFIED MIGRAINE TYPE: ICD-10-CM

## 2020-11-12 DIAGNOSIS — F41.9 ANXIETY: ICD-10-CM

## 2020-11-12 LAB — BACTERIA UR CULT: NORMAL

## 2020-11-12 RX ORDER — BUSPIRONE HYDROCHLORIDE 5 MG/1
5 TABLET ORAL 2 TIMES DAILY
Qty: 60 TABLET | Refills: 0 | Status: SHIPPED | OUTPATIENT
Start: 2020-11-12 | End: 2020-12-14 | Stop reason: SDUPTHER

## 2020-11-12 RX ORDER — TOPIRAMATE 25 MG/1
TABLET ORAL
Qty: 60 TABLET | Refills: 1 | Status: SHIPPED | OUTPATIENT
Start: 2020-11-12 | End: 2021-02-09 | Stop reason: SDUPTHER

## 2020-11-20 ENCOUNTER — APPOINTMENT (EMERGENCY)
Dept: CT IMAGING | Facility: HOSPITAL | Age: 40
End: 2020-11-20
Payer: COMMERCIAL

## 2020-11-20 ENCOUNTER — HOSPITAL ENCOUNTER (EMERGENCY)
Facility: HOSPITAL | Age: 40
Discharge: HOME | End: 2020-11-20
Attending: EMERGENCY MEDICINE | Admitting: EMERGENCY MEDICINE
Payer: COMMERCIAL

## 2020-11-20 VITALS
OXYGEN SATURATION: 98 % | BODY MASS INDEX: 38.22 KG/M2 | HEART RATE: 81 BPM | SYSTOLIC BLOOD PRESSURE: 130 MMHG | WEIGHT: 222.66 LBS | DIASTOLIC BLOOD PRESSURE: 73 MMHG | RESPIRATION RATE: 18 BRPM | TEMPERATURE: 98.2 F

## 2020-11-20 DIAGNOSIS — M54.9 BACK PAIN: Primary | ICD-10-CM

## 2020-11-20 LAB
ALBUMIN SERPL BCP-MCNC: 3.6 G/DL (ref 3.5–5)
ALP SERPL-CCNC: 94 U/L (ref 46–116)
ALT SERPL W P-5'-P-CCNC: 30 U/L (ref 12–78)
ANION GAP SERPL CALCULATED.3IONS-SCNC: 10 MMOL/L (ref 4–13)
AST SERPL W P-5'-P-CCNC: 23 U/L (ref 5–45)
BACTERIA UR QL AUTO: NORMAL /HPF
BASOPHILS # BLD MANUAL: 0 THOUSAND/UL (ref 0–0.1)
BASOPHILS NFR MAR MANUAL: 0 % (ref 0–1)
BILIRUB SERPL-MCNC: 0.34 MG/DL (ref 0.2–1)
BILIRUB UR QL STRIP: NEGATIVE
BUN SERPL-MCNC: 18 MG/DL (ref 5–25)
CALCIUM SERPL-MCNC: 8.9 MG/DL (ref 8.3–10.1)
CHLORIDE SERPL-SCNC: 103 MMOL/L (ref 100–108)
CLARITY UR: CLEAR
CO2 SERPL-SCNC: 22 MMOL/L (ref 21–32)
COLOR UR: YELLOW
CREAT SERPL-MCNC: 1.01 MG/DL (ref 0.6–1.3)
EOSINOPHIL # BLD MANUAL: 0.21 THOUSAND/UL (ref 0–0.4)
EOSINOPHIL NFR BLD MANUAL: 2 % (ref 0–6)
ERYTHROCYTE [DISTWIDTH] IN BLOOD BY AUTOMATED COUNT: 14.3 % (ref 11.6–15.1)
EXT PREG TEST URINE: NEGATIVE
EXT. CONTROL ED NAV: NORMAL
GFR SERPL CREATININE-BSD FRML MDRD: 71 ML/MIN/1.73SQ M
GLUCOSE SERPL-MCNC: 136 MG/DL (ref 65–140)
GLUCOSE UR STRIP-MCNC: NEGATIVE MG/DL
HCT VFR BLD AUTO: 41.3 % (ref 34.8–46.1)
HGB BLD-MCNC: 13 G/DL (ref 11.5–15.4)
HGB UR QL STRIP.AUTO: ABNORMAL
KETONES UR STRIP-MCNC: NEGATIVE MG/DL
LEUKOCYTE ESTERASE UR QL STRIP: NEGATIVE
LIPASE SERPL-CCNC: 150 U/L (ref 73–393)
LYMPHOCYTES # BLD AUTO: 3.3 THOUSAND/UL (ref 0.6–4.47)
LYMPHOCYTES # BLD AUTO: 32 % (ref 14–44)
MCH RBC QN AUTO: 27.8 PG (ref 26.8–34.3)
MCHC RBC AUTO-ENTMCNC: 31.5 G/DL (ref 31.4–37.4)
MCV RBC AUTO: 88 FL (ref 82–98)
METAMYELOCYTES NFR BLD MANUAL: 1 % (ref 0–1)
MONOCYTES # BLD AUTO: 0.41 THOUSAND/UL (ref 0–1.22)
MONOCYTES NFR BLD: 4 % (ref 4–12)
NEUTROPHILS # BLD MANUAL: 6.29 THOUSAND/UL (ref 1.85–7.62)
NEUTS SEG NFR BLD AUTO: 61 % (ref 43–75)
NITRITE UR QL STRIP: NEGATIVE
NON-SQ EPI CELLS URNS QL MICRO: NORMAL /HPF
NRBC BLD AUTO-RTO: 0 /100 WBCS
NRBC BLD AUTO-RTO: 1 /100 WBC (ref 0–2)
PH UR STRIP.AUTO: 6 [PH]
PLATELET # BLD AUTO: 241 THOUSANDS/UL (ref 149–390)
PLATELET BLD QL SMEAR: ADEQUATE
PMV BLD AUTO: 9.7 FL (ref 8.9–12.7)
POTASSIUM SERPL-SCNC: 4.4 MMOL/L (ref 3.5–5.3)
PROT SERPL-MCNC: 7.6 G/DL (ref 6.4–8.2)
PROT UR STRIP-MCNC: NEGATIVE MG/DL
RBC # BLD AUTO: 4.68 MILLION/UL (ref 3.81–5.12)
RBC #/AREA URNS AUTO: NORMAL /HPF
RBC MORPH BLD: NORMAL
SODIUM SERPL-SCNC: 135 MMOL/L (ref 136–145)
SP GR UR STRIP.AUTO: 1.02 (ref 1–1.03)
TOTAL CELLS COUNTED SPEC: 100
UROBILINOGEN UR QL STRIP.AUTO: 0.2 E.U./DL
WBC # BLD AUTO: 10.31 THOUSAND/UL (ref 4.31–10.16)
WBC #/AREA URNS AUTO: NORMAL /HPF

## 2020-11-20 PROCEDURE — 74177 CT ABD & PELVIS W/CONTRAST: CPT

## 2020-11-20 PROCEDURE — 85027 COMPLETE CBC AUTOMATED: CPT | Performed by: EMERGENCY MEDICINE

## 2020-11-20 PROCEDURE — 99284 EMERGENCY DEPT VISIT MOD MDM: CPT

## 2020-11-20 PROCEDURE — 81001 URINALYSIS AUTO W/SCOPE: CPT | Performed by: EMERGENCY MEDICINE

## 2020-11-20 PROCEDURE — 85007 BL SMEAR W/DIFF WBC COUNT: CPT | Performed by: EMERGENCY MEDICINE

## 2020-11-20 PROCEDURE — 80053 COMPREHEN METABOLIC PANEL: CPT | Performed by: EMERGENCY MEDICINE

## 2020-11-20 PROCEDURE — 81025 URINE PREGNANCY TEST: CPT | Performed by: EMERGENCY MEDICINE

## 2020-11-20 PROCEDURE — 99284 EMERGENCY DEPT VISIT MOD MDM: CPT | Performed by: EMERGENCY MEDICINE

## 2020-11-20 PROCEDURE — 36415 COLL VENOUS BLD VENIPUNCTURE: CPT | Performed by: EMERGENCY MEDICINE

## 2020-11-20 PROCEDURE — 83690 ASSAY OF LIPASE: CPT | Performed by: EMERGENCY MEDICINE

## 2020-11-20 PROCEDURE — G1004 CDSM NDSC: HCPCS

## 2020-11-20 PROCEDURE — 96374 THER/PROPH/DIAG INJ IV PUSH: CPT

## 2020-11-20 PROCEDURE — 96375 TX/PRO/DX INJ NEW DRUG ADDON: CPT

## 2020-11-20 PROCEDURE — 96361 HYDRATE IV INFUSION ADD-ON: CPT

## 2020-11-20 RX ORDER — ACETAMINOPHEN 325 MG/1
650 TABLET ORAL ONCE
Status: COMPLETED | OUTPATIENT
Start: 2020-11-20 | End: 2020-11-20

## 2020-11-20 RX ORDER — ACETAMINOPHEN 325 MG/1
650 TABLET ORAL EVERY 6 HOURS PRN
Qty: 30 TABLET | Refills: 0 | Status: SHIPPED | OUTPATIENT
Start: 2020-11-20 | End: 2020-11-25

## 2020-11-20 RX ORDER — ONDANSETRON 2 MG/ML
4 INJECTION INTRAMUSCULAR; INTRAVENOUS ONCE
Status: COMPLETED | OUTPATIENT
Start: 2020-11-20 | End: 2020-11-20

## 2020-11-20 RX ORDER — KETOROLAC TROMETHAMINE 30 MG/ML
15 INJECTION, SOLUTION INTRAMUSCULAR; INTRAVENOUS ONCE
Status: COMPLETED | OUTPATIENT
Start: 2020-11-20 | End: 2020-11-20

## 2020-11-20 RX ADMIN — ONDANSETRON 4 MG: 2 INJECTION INTRAMUSCULAR; INTRAVENOUS at 06:18

## 2020-11-20 RX ADMIN — KETOROLAC TROMETHAMINE 15 MG: 30 INJECTION, SOLUTION INTRAMUSCULAR at 06:18

## 2020-11-20 RX ADMIN — SODIUM CHLORIDE 1000 ML: 0.9 INJECTION, SOLUTION INTRAVENOUS at 06:34

## 2020-11-20 RX ADMIN — IOHEXOL 100 ML: 350 INJECTION, SOLUTION INTRAVENOUS at 06:28

## 2020-11-20 RX ADMIN — DICLOFENAC SODIUM 4 G: 10 GEL TOPICAL at 07:18

## 2020-11-20 RX ADMIN — ACETAMINOPHEN 650 MG: 325 TABLET, FILM COATED ORAL at 06:33

## 2020-11-23 ENCOUNTER — TELEPHONE (OUTPATIENT)
Dept: INTERNAL MEDICINE CLINIC | Facility: CLINIC | Age: 40
End: 2020-11-23

## 2020-11-24 DIAGNOSIS — G47.09 OTHER INSOMNIA: ICD-10-CM

## 2020-11-24 RX ORDER — ZOLPIDEM TARTRATE 10 MG/1
TABLET ORAL
Qty: 30 TABLET | Refills: 0 | Status: SHIPPED | OUTPATIENT
Start: 2020-11-24 | End: 2020-12-31 | Stop reason: SDUPTHER

## 2020-12-14 DIAGNOSIS — F41.9 ANXIETY: ICD-10-CM

## 2020-12-14 RX ORDER — BUSPIRONE HYDROCHLORIDE 5 MG/1
5 TABLET ORAL 2 TIMES DAILY
Qty: 60 TABLET | Refills: 0 | Status: SHIPPED | OUTPATIENT
Start: 2020-12-14 | End: 2021-01-18 | Stop reason: SDUPTHER

## 2020-12-31 DIAGNOSIS — F41.9 ANXIETY: ICD-10-CM

## 2020-12-31 DIAGNOSIS — G47.09 OTHER INSOMNIA: ICD-10-CM

## 2020-12-31 RX ORDER — SERTRALINE HYDROCHLORIDE 100 MG/1
200 TABLET, FILM COATED ORAL
Qty: 180 TABLET | Refills: 0 | Status: SHIPPED | OUTPATIENT
Start: 2020-12-31 | End: 2021-03-29

## 2020-12-31 RX ORDER — ZOLPIDEM TARTRATE 10 MG/1
TABLET ORAL
Qty: 30 TABLET | Refills: 0 | Status: SHIPPED | OUTPATIENT
Start: 2020-12-31 | End: 2021-02-08

## 2021-01-04 ENCOUNTER — TELEMEDICINE (OUTPATIENT)
Dept: INTERNAL MEDICINE CLINIC | Facility: CLINIC | Age: 41
End: 2021-01-04
Payer: COMMERCIAL

## 2021-01-04 VITALS — TEMPERATURE: 99.7 F | WEIGHT: 220 LBS | HEIGHT: 64 IN | BODY MASS INDEX: 37.56 KG/M2

## 2021-01-04 DIAGNOSIS — G43.909 MIGRAINE WITHOUT STATUS MIGRAINOSUS, NOT INTRACTABLE, UNSPECIFIED MIGRAINE TYPE: ICD-10-CM

## 2021-01-04 DIAGNOSIS — B34.9 VIRAL INFECTION, UNSPECIFIED: ICD-10-CM

## 2021-01-04 DIAGNOSIS — B34.9 VIRAL INFECTION, UNSPECIFIED: Primary | ICD-10-CM

## 2021-01-04 DIAGNOSIS — J01.00 ACUTE NON-RECURRENT MAXILLARY SINUSITIS: ICD-10-CM

## 2021-01-04 PROCEDURE — 3008F BODY MASS INDEX DOCD: CPT | Performed by: INTERNAL MEDICINE

## 2021-01-04 PROCEDURE — 99213 OFFICE O/P EST LOW 20 MIN: CPT | Performed by: INTERNAL MEDICINE

## 2021-01-04 PROCEDURE — U0003 INFECTIOUS AGENT DETECTION BY NUCLEIC ACID (DNA OR RNA); SEVERE ACUTE RESPIRATORY SYNDROME CORONAVIRUS 2 (SARS-COV-2) (CORONAVIRUS DISEASE [COVID-19]), AMPLIFIED PROBE TECHNIQUE, MAKING USE OF HIGH THROUGHPUT TECHNOLOGIES AS DESCRIBED BY CMS-2020-01-R: HCPCS | Performed by: INTERNAL MEDICINE

## 2021-01-04 NOTE — PROGRESS NOTES
COVID-19 Virtual Visit     Assessment/Plan:    Problem List Items Addressed This Visit        Cardiovascular and Mediastinum    Migraine headache      Other Visit Diagnoses     Viral infection, unspecified    -  Primary    Relevant Orders    Novel Coronavirus (COVID-19), PCR [facility] - Collected at [facility] or [facility]    Acute non-recurrent maxillary sinusitis             Disposition:     I referred patient to one of our centralized sites for a COVID-19 swab  Start self quarantine  If COVID tested negative, start antibiotics for sinusitis  Monitor for fevers, may continue with decongestant prn, Tylenol prn  I have spent 5 minutes directly with the patient  Greater than 50% of this time was spent in counseling/coordination of care regarding: instructions for management, patient and family education and impressions  Encounter provider Chuckie Birch MD    Provider located at [address] [place] [id number]    Recent Visits  No visits were found meeting these conditions  Showing recent visits within past 7 days and meeting all other requirements     Today's Visits  Date Type Provider Dept   01/04/21 Telemedicine Chuckie Birch, [address] [place] [address]   Showing today's visits and meeting all other requirements     Future Appointments  No visits were found meeting these conditions  Showing future appointments within next 150 days and meeting all other requirements      This virtual check-in was done via [employer] and patient was informed that this is a secure, HIPAA-compliant platform  She agrees to proceed  Patient agrees to participate in a virtual check in via telephone or video visit instead of presenting to the office to address urgent/immediate medical needs  Patient is aware this is a billable service  After connecting through [facility], the patient was identified by name and date of birth  Nydia Noyola was informed that this was a telemedicine visit and that the exam was being conducted confidentially over secure lines  My office door was closed  No one else was in the room  Nydia Noyola acknowledged consent and understanding of privacy and security of the telemedicine visit  I informed the patient that I have reviewed her record in Epic and presented the opportunity for her to ask any questions regarding the visit today  The patient agreed to participate  Subjective:   Nydia Noyola is a 47 y o  female who is concerned about COVID-19  Date of symptom onset: 1/2/2021    Patient's symptoms include fever, fatigue, nasal congestion, cough, nausea, diarrhea, myalgias and headache  Exposure:   Contact with a person who is under investigation (PUI) for or who is positive for COVID-19 within the last 14 days?: No    Hospitalized recently for fever and/or lower respiratory symptoms?: No      Currently a healthcare worker that is involved in direct patient care?: Yes      Works in a special setting where the risk of COVID-19 transmission may be high? (this may include long-term care, correctional and [facility] facilities; homeless shelters; assisted-living facilities and group homes ): No      Resident in a special setting where the risk of COVID-19 transmission may be high? (this may include long-term care, correctional and [facility] facilities; homeless shelters; assisted-living facilities and group homes ): No      Symptoms started about 2 days ago with nasal congestion, migraine headaches, nausea and an episode of vomiting  She had a low grade fever today of 99 7  She reports no nasal discharge, has bilateral ear discomfort, feels "stuffy "  She has been taking Tylenol as needed      Lab Results   Component Value Date    SARSCOV2 Not Detected 07/24/2020     Past Medical History:   Diagnosis Date    Anxiety     Depression     Fibroid     GERD (gastroesophageal reflux disease)  History of pulmonary embolus (PE)     Iliotibial band syndrome     last assessed - [id number]    Mitral valve prolapse     Obesity     Pneumonia     2018    PONV (postoperative nausea and vomiting)     Patient requests to be premedicated for N/V    Wears glasses     for reading     Past Surgical History:   Procedure Laterality Date    CHOLECYSTECTOMY      ESOPHAGOGASTRODUODENOSCOPY N/A 5/31/2018    Procedure: ESOPHAGOGASTRODUODENOSCOPY (EGD); Surgeon: Idalia Galvez MD;  [facility]; Service: Gastroenterology    GALLBLADDER SURGERY      KNEE SURGERY Left     growth plate fx left knee    KNEE SURGERY Right     FL HYSTEROSCOPY,W/ENDO BX N/A 8/3/2020    Procedure: DILATATION AND CURETTAGE (D&C) WITH HYSTEROSCOPY (MYOSURE);   Surgeon: Miguel Angel Mcgovern DO;  [facility];  Service: Gynecology    FL HYSTEROSCOPY,W/ENDO BX N/A 8/3/2020    Procedure: HYSTEROSCOPY WITH  RESECTION UTERINE TUMOR/FIBROID;  Surgeon: Miguel Angel Mcgovern DO;  [facility];  Service: Gynecology    FL HYSTEROSCOPY,W/ENDOMETRIAL ABLATION N/A 8/3/2020    Procedure: ABLATION ENDOMETRIAL Bahman Aquilino;  Surgeon: Miguel Angel Mcgovern DO;  [facility];  Service: Gynecology    FL OPEN TREATMENT METATARSAL FRACTURE EACH Left 4/10/2019    Procedure: OPEN REDUCTION W/ INTERNAL FIXATION (ORIF) FOOT, Left 5th metatarsal fracture open reduction internal fixation with calcaneal bone graft and bone marrow aspirate concentrate;  Surgeon: Isidra Rainey MD;  [facility];  Service: Orthopedics    TUBAL LIGATION      WISDOM TOOTH EXTRACTION       Current Outpatient Medications   Medication Sig Dispense Refill    acetaminophen (TYLENOL) 325 mg tablet Take 2 tablets (650 mg total) by mouth every 6 (six) hours as needed for mild pain 30 tablet 0    ALPRAZolam (XANAX) 0 25 mg tablet TAKE ONE TABLET BY MOUTH EVERY DAY AS NEEDEDFOR ANXIETY 30 tablet 0    busPIRone (BUSPAR) 5 mg tablet Take 1 tablet (5 mg total) by mouth 2 (two) times a day 60 tablet 0    Cetirizine HCl (ZYRTEC ALLERGY) 10 MG CAPS Take 10 mg by mouth   fluticasone (FLONASE) 50 mcg/act nasal spray 1 spray into each nostril daily 16 g 0    ibuprofen (MOTRIN) 600 mg tablet Take 1 tablet (600 mg total) by mouth every 6 (six) hours as needed for moderate pain (cramping) 30 tablet 0    omeprazole (PriLOSEC) 20 mg delayed release capsule Take 1 capsule (20 mg total) by mouth daily 90 capsule 0    ondansetron (ZOFRAN) 4 mg tablet Take 1 tablet (4 mg total) by mouth every 6 (six) hours 12 tablet 0    sertraline (ZOLOFT) 100 mg tablet Take 2 tablets (200 mg total) by mouth daily at bedtime 180 tablet 0    topiramate (TOPAMAX) 25 mg tablet Take 1 tab PO qHS x 1 week then bid 60 tablet 1    zolpidem (AMBIEN) 10 mg tablet Take 1/2 to 1 tab PO qHS prn sleep 30 tablet 0    diclofenac sodium (VOLTAREN) 1 % Apply 2 g topically 4 (four) times a day for 5 days 100 g 0    ibuprofen (MOTRIN) 400 mg tablet Take 1 tablet (400 mg total) by mouth every 6 (six) hours as needed for mild pain for up to 3 days 12 tablet 0    ondansetron (ZOFRAN) 4 mg tablet Take 1 tablet (4 mg total) by mouth every 6 (six) hours for 3 days 12 tablet 0    rivaroxaban (XARELTO) 10 mg tablet Take 1 tablet (10 mg total) by mouth daily 44 tablet 0     No current facility-administered medications for this visit  Allergies   Allergen Reactions    Penicillin G Hives       Review of Systems   Constitutional: Positive for fatigue and fever  HENT: Positive for congestion  Respiratory: Positive for cough  Gastrointestinal: Positive for diarrhea and nausea  Musculoskeletal: Positive for myalgias  Neurological: Positive for headaches  Objective:    Vitals:    01/04/21 0811   Temp: 99 7 °F (37 6 °C)   Weight: 99 8 kg (220 lb)   Height: 5' 4" (1 626 m)       Physical Exam  Constitutional:       General: She is not in acute distress  Appearance: Normal appearance   She is ill-appearing  HENT:      Head: Normocephalic and atraumatic  Eyes:      Pupils: Pupils are equal, round, and reactive to light  Pulmonary:      Effort: Pulmonary effort is normal  No respiratory distress  Neurological:      General: No focal deficit present  Mental Status: She is alert and oriented to person, place, and time  Psychiatric:         Mood and Affect: Mood normal          Behavior: Behavior normal        VIRTUAL VISIT DISCLAIMER    Yun Lashae acknowledges that she has consented to an online visit or consultation  She understands that the online visit is based solely on information provided by her, and that, in the absence of a face-to-face physical evaluation by the physician, the diagnosis she receives is both limited and provisional in terms of accuracy and completeness  This is not intended to replace a full medical face-to-face evaluation by the physician  Yun Bhardwaj understands and accepts these terms

## 2021-01-05 ENCOUNTER — TELEPHONE (OUTPATIENT)
Dept: INTERNAL MEDICINE CLINIC | Facility: CLINIC | Age: 41
End: 2021-01-05

## 2021-01-05 DIAGNOSIS — J01.00 ACUTE NON-RECURRENT MAXILLARY SINUSITIS: Primary | ICD-10-CM

## 2021-01-05 LAB — SARS-COV-2 RNA SPEC QL NAA+PROBE: NOT DETECTED

## 2021-01-05 RX ORDER — AZITHROMYCIN 250 MG/1
TABLET, FILM COATED ORAL
Qty: 6 TABLET | Refills: 0 | Status: SHIPPED | OUTPATIENT
Start: 2021-01-05 | End: 2021-01-09

## 2021-01-18 DIAGNOSIS — F41.9 ANXIETY: ICD-10-CM

## 2021-01-18 RX ORDER — BUSPIRONE HYDROCHLORIDE 5 MG/1
5 TABLET ORAL 2 TIMES DAILY
Qty: 60 TABLET | Refills: 1 | Status: SHIPPED | OUTPATIENT
Start: 2021-01-18 | End: 2021-03-25

## 2021-02-08 DIAGNOSIS — G47.09 OTHER INSOMNIA: ICD-10-CM

## 2021-02-08 RX ORDER — ZOLPIDEM TARTRATE 10 MG/1
TABLET ORAL
Qty: 30 TABLET | Refills: 0 | Status: SHIPPED | OUTPATIENT
Start: 2021-02-08 | End: 2021-03-12 | Stop reason: SDUPTHER

## 2021-02-09 DIAGNOSIS — G47.09 OTHER INSOMNIA: ICD-10-CM

## 2021-02-09 DIAGNOSIS — G43.909 MIGRAINE WITHOUT STATUS MIGRAINOSUS, NOT INTRACTABLE, UNSPECIFIED MIGRAINE TYPE: ICD-10-CM

## 2021-02-09 RX ORDER — TOPIRAMATE 25 MG/1
25 TABLET ORAL 2 TIMES DAILY
Qty: 60 TABLET | Refills: 2 | Status: SHIPPED | OUTPATIENT
Start: 2021-02-09 | End: 2021-07-14 | Stop reason: SDUPTHER

## 2021-02-09 RX ORDER — ZOLPIDEM TARTRATE 10 MG/1
TABLET ORAL
Qty: 30 TABLET | Refills: 0 | OUTPATIENT
Start: 2021-02-09

## 2021-03-05 ENCOUNTER — HOSPITAL ENCOUNTER (EMERGENCY)
Facility: HOSPITAL | Age: 41
Discharge: HOME | End: 2021-03-05
Attending: EMERGENCY MEDICINE
Payer: COMMERCIAL

## 2021-03-05 ENCOUNTER — APPOINTMENT (EMERGENCY)
Dept: RADIOLOGY | Facility: HOSPITAL | Age: 41
End: 2021-03-05
Payer: COMMERCIAL

## 2021-03-05 VITALS
WEIGHT: 224.65 LBS | DIASTOLIC BLOOD PRESSURE: 84 MMHG | HEART RATE: 82 BPM | BODY MASS INDEX: 37.43 KG/M2 | HEIGHT: 65 IN | SYSTOLIC BLOOD PRESSURE: 130 MMHG | RESPIRATION RATE: 16 BRPM | OXYGEN SATURATION: 98 % | TEMPERATURE: 98.6 F

## 2021-03-05 DIAGNOSIS — R07.89 ATYPICAL CHEST PAIN: Primary | ICD-10-CM

## 2021-03-05 LAB
ALBUMIN SERPL BCP-MCNC: 3.7 G/DL (ref 3.5–5)
ALP SERPL-CCNC: 103 U/L (ref 46–116)
ALT SERPL W P-5'-P-CCNC: 27 U/L (ref 12–78)
ANION GAP SERPL CALCULATED.3IONS-SCNC: 11 MMOL/L (ref 4–13)
APTT PPP: 27 SECONDS (ref 23–37)
AST SERPL W P-5'-P-CCNC: 11 U/L (ref 5–45)
BASOPHILS # BLD AUTO: 0.07 THOUSANDS/ΜL (ref 0–0.1)
BASOPHILS NFR BLD AUTO: 1 % (ref 0–1)
BILIRUB SERPL-MCNC: 0.24 MG/DL (ref 0.2–1)
BUN SERPL-MCNC: 13 MG/DL (ref 5–25)
CALCIUM SERPL-MCNC: 9 MG/DL (ref 8.3–10.1)
CHLORIDE SERPL-SCNC: 105 MMOL/L (ref 100–108)
CO2 SERPL-SCNC: 22 MMOL/L (ref 21–32)
CREAT SERPL-MCNC: 1.09 MG/DL (ref 0.6–1.3)
D DIMER PPP FEU-MCNC: <0.27 UG/ML FEU
EOSINOPHIL # BLD AUTO: 0.38 THOUSAND/ΜL (ref 0–0.61)
EOSINOPHIL NFR BLD AUTO: 4 % (ref 0–6)
ERYTHROCYTE [DISTWIDTH] IN BLOOD BY AUTOMATED COUNT: 13.4 % (ref 11.6–15.1)
GFR SERPL CREATININE-BSD FRML MDRD: 65 ML/MIN/1.73SQ M
GLUCOSE SERPL-MCNC: 118 MG/DL (ref 65–140)
HCT VFR BLD AUTO: 41.4 % (ref 34.8–46.1)
HGB BLD-MCNC: 13.2 G/DL (ref 11.5–15.4)
IMM GRANULOCYTES # BLD AUTO: 0.12 THOUSAND/UL (ref 0–0.2)
IMM GRANULOCYTES NFR BLD AUTO: 1 % (ref 0–2)
INR PPP: 0.94 (ref 0.84–1.19)
LIPASE SERPL-CCNC: 155 U/L (ref 73–393)
LYMPHOCYTES # BLD AUTO: 2.17 THOUSANDS/ΜL (ref 0.6–4.47)
LYMPHOCYTES NFR BLD AUTO: 24 % (ref 14–44)
MCH RBC QN AUTO: 28.1 PG (ref 26.8–34.3)
MCHC RBC AUTO-ENTMCNC: 31.9 G/DL (ref 31.4–37.4)
MCV RBC AUTO: 88 FL (ref 82–98)
MONOCYTES # BLD AUTO: 0.54 THOUSAND/ΜL (ref 0.17–1.22)
MONOCYTES NFR BLD AUTO: 6 % (ref 4–12)
NEUTROPHILS # BLD AUTO: 5.67 THOUSANDS/ΜL (ref 1.85–7.62)
NEUTS SEG NFR BLD AUTO: 64 % (ref 43–75)
NRBC BLD AUTO-RTO: 0 /100 WBCS
PLATELET # BLD AUTO: 248 THOUSANDS/UL (ref 149–390)
PMV BLD AUTO: 9.3 FL (ref 8.9–12.7)
POTASSIUM SERPL-SCNC: 3.9 MMOL/L (ref 3.5–5.3)
PROT SERPL-MCNC: 8 G/DL (ref 6.4–8.2)
PROTHROMBIN TIME: 12.7 SECONDS (ref 11.6–14.5)
RBC # BLD AUTO: 4.69 MILLION/UL (ref 3.81–5.12)
SODIUM SERPL-SCNC: 138 MMOL/L (ref 136–145)
TROPONIN I SERPL-MCNC: <0.02 NG/ML
WBC # BLD AUTO: 8.95 THOUSAND/UL (ref 4.31–10.16)

## 2021-03-05 PROCEDURE — 85610 PROTHROMBIN TIME: CPT | Performed by: EMERGENCY MEDICINE

## 2021-03-05 PROCEDURE — 85025 COMPLETE CBC W/AUTO DIFF WBC: CPT | Performed by: EMERGENCY MEDICINE

## 2021-03-05 PROCEDURE — 84484 ASSAY OF TROPONIN QUANT: CPT | Performed by: EMERGENCY MEDICINE

## 2021-03-05 PROCEDURE — 36415 COLL VENOUS BLD VENIPUNCTURE: CPT | Performed by: EMERGENCY MEDICINE

## 2021-03-05 PROCEDURE — 96374 THER/PROPH/DIAG INJ IV PUSH: CPT

## 2021-03-05 PROCEDURE — 85379 FIBRIN DEGRADATION QUANT: CPT | Performed by: EMERGENCY MEDICINE

## 2021-03-05 PROCEDURE — 93005 ELECTROCARDIOGRAM TRACING: CPT

## 2021-03-05 PROCEDURE — 99285 EMERGENCY DEPT VISIT HI MDM: CPT

## 2021-03-05 PROCEDURE — 99285 EMERGENCY DEPT VISIT HI MDM: CPT | Performed by: EMERGENCY MEDICINE

## 2021-03-05 PROCEDURE — 85730 THROMBOPLASTIN TIME PARTIAL: CPT | Performed by: EMERGENCY MEDICINE

## 2021-03-05 PROCEDURE — 83690 ASSAY OF LIPASE: CPT | Performed by: EMERGENCY MEDICINE

## 2021-03-05 PROCEDURE — 71045 X-RAY EXAM CHEST 1 VIEW: CPT

## 2021-03-05 PROCEDURE — 96361 HYDRATE IV INFUSION ADD-ON: CPT

## 2021-03-05 PROCEDURE — 80053 COMPREHEN METABOLIC PANEL: CPT | Performed by: EMERGENCY MEDICINE

## 2021-03-05 RX ORDER — SUCRALFATE ORAL 1 G/10ML
1 SUSPENSION ORAL
Qty: 1200 ML | Refills: 0 | Status: SHIPPED | OUTPATIENT
Start: 2021-03-05 | End: 2021-05-28

## 2021-03-05 RX ORDER — KETOROLAC TROMETHAMINE 30 MG/ML
15 INJECTION, SOLUTION INTRAMUSCULAR; INTRAVENOUS ONCE
Status: COMPLETED | OUTPATIENT
Start: 2021-03-05 | End: 2021-03-05

## 2021-03-05 RX ADMIN — SODIUM CHLORIDE 1000 ML: 0.9 INJECTION, SOLUTION INTRAVENOUS at 08:04

## 2021-03-05 RX ADMIN — KETOROLAC TROMETHAMINE 15 MG: 30 INJECTION, SOLUTION INTRAMUSCULAR at 08:08

## 2021-03-05 NOTE — ED PROVIDER NOTES
History  Chief Complaint   Patient presents with    Chest Pain     pt states has CP x4days radiates to R arm       History provided by:  Patient  Chest Pain  Pain location:  R chest  Pain quality: aching and radiating    Pain radiates to:  R arm, R shoulder and neck (Right arm right side of neck right shoulder and right flank and occasionally right leg  But does not radiate to the back)  Pain radiates to the back: no    Pain severity:  Moderate  Onset quality:  Gradual  Duration:  4 days  Timing:  Constant  Progression:  Worsening  Chronicity:  New  Context comment:  Patient says she has had chronic pains for many years even since she was a child throughout her chest this feels similar just more intense no etiology has been found has been constant and more intense over the past 4 days prompting ED visit  Relieved by:  None tried  Worsened by:  Nothing tried (None worsened with exertion not worsened with eating or drinking)  Ineffective treatments:  None tried  Associated symptoms: nausea    Associated symptoms: no abdominal pain, no cough, no diaphoresis, no dizziness, no fever, no headache, no numbness, no palpitations, no shortness of breath and not vomiting    Associated symptoms comment:  Mild nausea  , worse with eating, history of cholecystectomy, states this does not feel similar to the discomfort she had prior to cholecystectomy        No associated shortness of breath diaphoresis  Nonexertional     No bowel changes  No dysuria        Minimal cardiac risk factors patient is overweight states she has never been previously diagnosed hyperlipidemia hypercholesterolemia, she is adopted so family history is difficult to ascertain, no previous diagnosis of CAD  Prior to Admission Medications   Prescriptions Last Dose Informant Patient Reported? Taking?    ALPRAZolam (XANAX) 0 25 mg tablet  Self No No   Sig: TAKE ONE TABLET BY MOUTH EVERY DAY AS NEEDEDFOR ANXIETY   Cetirizine HCl (ZYRTEC ALLERGY) 10 MG CAPS Self Yes No   Sig: Take 10 mg by mouth     acetaminophen (TYLENOL) 325 mg tablet  Self No No   Sig: Take 2 tablets (650 mg total) by mouth every 6 (six) hours as needed for mild pain   busPIRone (BUSPAR) 5 mg tablet   No No   Sig: Take 1 tablet (5 mg total) by mouth 2 (two) times a day   diclofenac sodium (VOLTAREN) 1 %   No No   Sig: Apply 2 g topically 4 (four) times a day for 5 days   fluticasone (FLONASE) 50 mcg/act nasal spray  Self No No   Si spray into each nostril daily   ibuprofen (MOTRIN) 400 mg tablet   No No   Sig: Take 1 tablet (400 mg total) by mouth every 6 (six) hours as needed for mild pain for up to 3 days   ibuprofen (MOTRIN) 600 mg tablet  Self No No   Sig: Take 1 tablet (600 mg total) by mouth every 6 (six) hours as needed for moderate pain (cramping)   omeprazole (PriLOSEC) 20 mg delayed release capsule  Self No No   Sig: Take 1 capsule (20 mg total) by mouth daily   ondansetron (ZOFRAN) 4 mg tablet  Self No No   Sig: Take 1 tablet (4 mg total) by mouth every 6 (six) hours   ondansetron (ZOFRAN) 4 mg tablet   No No   Sig: Take 1 tablet (4 mg total) by mouth every 6 (six) hours for 3 days   rivaroxaban (XARELTO) 10 mg tablet  Self No No   Sig: Take 1 tablet (10 mg total) by mouth daily   sertraline (ZOLOFT) 100 mg tablet  Self No No   Sig: Take 2 tablets (200 mg total) by mouth daily at bedtime   topiramate (TOPAMAX) 25 mg tablet   No No   Sig: Take 1 tablet (25 mg total) by mouth 2 (two) times a day Take 1 tab PO qHS x 1 week then bid   zolpidem (AMBIEN) 10 mg tablet   No No   Sig: TAKE 1/2 TO 1 TABLET BY MOUTH AT BEDTIME AS NEEDED FOR SLEEP      Facility-Administered Medications: None       Past Medical History:   Diagnosis Date    Anxiety     Depression     Fibroid     GERD (gastroesophageal reflux disease)     History of pulmonary embolus (PE)     Iliotibial band syndrome     last assessed - [id number]    Mitral valve prolapse     Obesity     Pneumonia     2018    PONV (postoperative nausea and vomiting)     Patient requests to be premedicated for N/V    Wears glasses     for reading       Past Surgical History:   Procedure Laterality Date    CHOLECYSTECTOMY      ESOPHAGOGASTRODUODENOSCOPY N/A 5/31/2018    Procedure: ESOPHAGOGASTRODUODENOSCOPY (EGD); Surgeon: Tash Scott MD;  [facility]; Service: Gastroenterology    GALLBLADDER SURGERY      KNEE SURGERY Left     growth plate fx left knee    KNEE SURGERY Right     [place] HYSTEROSCOPY,W/ENDO BX N/A 8/3/2020    Procedure: DILATATION AND CURETTAGE (D&C) WITH HYSTEROSCOPY (MYOSURE); Surgeon: Brissa Plascencia DO;  [facility];  Service: Gynecology    [place] HYSTEROSCOPY,W/ENDO BX N/A 8/3/2020    Procedure: HYSTEROSCOPY WITH  RESECTION UTERINE TUMOR/FIBROID;  Surgeon: Brissa Plascencia DO;  [facility];  Service: Gynecology    [place] HYSTEROSCOPY,W/ENDOMETRIAL ABLATION N/A 8/3/2020    Procedure: ABLATION ENDOMETRIAL Tyrone Castleman;  Surgeon: Brissa Plascencia DO;  [facility];  Service: Gynecology    [place] OPEN TREATMENT METATARSAL FRACTURE EACH Left 4/10/2019    Procedure: OPEN REDUCTION W/ INTERNAL FIXATION (ORIF) FOOT, Left 5th metatarsal fracture open reduction internal fixation with calcaneal bone graft and bone marrow aspirate concentrate;  Surgeon: Fabi Hou MD;  [facility];  Service: Orthopedics    TUBAL LIGATION      WISDOM TOOTH EXTRACTION         Family History   Adopted: Yes   Problem Relation Age of Onset    No Known Problems Mother     Alcohol abuse Neg Hx     Substance Abuse Neg Hx     Mental illness Neg Hx     Depression Neg Hx      I have reviewed and agree with the history as documented      E-Cigarette/Vaping    E-Cigarette Use Never User      E-Cigarette/Vaping Substances    Nicotine Yes     THC No     CBD No      Social History     Tobacco Use    Smoking status: Light Tobacco Smoker     Packs/day: 0 25    Smokeless tobacco: Never Used   Substance Use Topics    Alcohol use: Yes     Frequency: Monthly or less     Drinks per session: 1 or 2     Comment: social    Drug use: No       Review of Systems   Constitutional: Negative for activity change, chills, diaphoresis and fever  HENT: Negative for congestion, sinus pressure and sore throat  Eyes: Negative for pain and visual disturbance  Respiratory: Negative for cough, chest tightness, shortness of breath, wheezing and stridor  Cardiovascular: Positive for chest pain  Negative for palpitations  Gastrointestinal: Positive for nausea  Negative for abdominal distention, abdominal pain, constipation, diarrhea and vomiting  Genitourinary: Negative for dysuria and frequency  Musculoskeletal: Negative for neck pain and neck stiffness  Skin: Negative for rash  Neurological: Negative for dizziness, speech difficulty, light-headedness, numbness and headaches  Physical Exam  Physical Exam  Vitals signs reviewed  Constitutional:       General: She is not in acute distress  Appearance: She is well-developed  She is not diaphoretic  Comments: Anxious, uncomfortable in appearance but nontoxic   HENT:      Head: Normocephalic and atraumatic  Right Ear: External ear normal       Left Ear: External ear normal       Nose: Nose normal    Eyes:      General:         Right eye: No discharge  Left eye: No discharge  Pupils: Pupils are equal, round, and reactive to light  Neck:      Musculoskeletal: Normal range of motion and neck supple  Trachea: No tracheal deviation  Cardiovascular:      Rate and Rhythm: Normal rate and regular rhythm  Heart sounds: Normal heart sounds  No murmur  Pulmonary:      Effort: Pulmonary effort is normal  No respiratory distress  Breath sounds: Normal breath sounds  No stridor  Abdominal:      General: There is no distension  Palpations: Abdomen is soft  Tenderness:  There is abdominal tenderness in the right upper quadrant, epigastric area and left upper quadrant  There is no guarding or rebound  Musculoskeletal: Normal range of motion  Skin:     General: Skin is warm and dry  Coloration: Skin is not pale  Findings: No erythema  Neurological:      General: No focal deficit present  Mental Status: She is alert and oriented to person, place, and time           Vital Signs  ED Triage Vitals [03/05/21 0744]   Temperature Pulse Respirations Blood Pressure SpO2   98 6 °F (37 °C) 82 16 130/84 98 %      Temp Source Heart Rate Source Patient Position - Orthostatic VS BP Location FiO2 (%)   Oral -- -- -- --      Pain Score       8           Vitals:    03/05/21 0744   BP: 130/84   Pulse: 82         Visual Acuity  Visual Acuity      Most Recent Value   L Pupil Size (mm)  2   R Pupil Size (mm)  2          ED Medications  Medications   sodium chloride 0 9 % bolus 1,000 mL (1,000 mL Intravenous New Bag 3/5/21 0804)   ketorolac (TORADOL) injection 15 mg (15 mg Intravenous Given 3/5/21 0808)       Diagnostic Studies  Results Reviewed     Procedure Component Value Units Date/Time    D-Dimer [[id number]]  (Normal) Collected: 03/05/21 0806    Lab Status: Final result Specimen: Blood from Arm, Left Updated: 03/05/21 0839     D-Dimer, Quant <0 27 ug/ml FEU     Troponin I [[id number]]  (Normal) Collected: 03/05/21 0806    Lab Status: Final result Specimen: Blood from Arm, Left Updated: 03/05/21 0839     Troponin I <0 02 ng/mL     Comprehensive metabolic panel [[id number]] Collected: 03/05/21 0806    Lab Status: Final result Specimen: Blood from Arm, Left Updated: 03/05/21 0836     Sodium 138 mmol/L      Potassium 3 9 mmol/L      Chloride 105 mmol/L      CO2 22 mmol/L      ANION GAP 11 mmol/L      BUN 13 mg/dL      Creatinine 1 09 mg/dL      Glucose 118 mg/dL      Calcium 9 0 mg/dL      AST 11 U/L      ALT 27 U/L      Alkaline Phosphatase 103 U/L      Total Protein 8 0 g/dL      Albumin 3 7 g/dL      Total Bilirubin 0 24 mg/dL eGFR 65 ml/min/1 73sq m     Narrative:      [place] guidelines for Chronic Kidney Disease (CKD):     Stage 1 with normal or high GFR (GFR > 90 mL/min/1 73 square meters)    Stage 2 Mild CKD (GFR = 60-89 mL/min/1 73 square meters)    Stage 3A Moderate CKD (GFR = 45-59 mL/min/1 73 square meters)    Stage 3B Moderate CKD (GFR = 30-44 mL/min/1 73 square meters)    Stage 4 Severe CKD (GFR = 15-29 mL/min/1 73 square meters)    Stage 5 End Stage CKD (GFR <15 mL/min/1 73 square meters)  Note: GFR calculation is accurate only with a steady state creatinine    Lipase [[id number]]  (Normal) Collected: 03/05/21 0806    Lab Status: Final result Specimen: Blood from Arm, Left Updated: 03/05/21 0836     Lipase 155 u/L     Protime-INR [[id number]]  (Normal) Collected: 03/05/21 0806    Lab Status: Final result Specimen: Blood from Arm, Left Updated: 03/05/21 0831     Protime 12 7 seconds      INR 0 94    APTT [[id number]]  (Normal) Collected: 03/05/21 0806    Lab Status: Final result Specimen: Blood from Arm, Left Updated: 03/05/21 0831     PTT 27 seconds     CBC and differential [[id number]] Collected: 03/05/21 0806    Lab Status: Final result Specimen: Blood from Arm, Left Updated: 03/05/21 0826     WBC 8 95 Thousand/uL      RBC 4 69 Million/uL      Hemoglobin 13 2 g/dL      Hematocrit 41 4 %      MCV 88 fL      MCH 28 1 pg      MCHC 31 9 g/dL      RDW 13 4 %      MPV 9 3 fL      Platelets 326 Thousands/uL      nRBC 0 /100 WBCs      Neutrophils Relative 64 %      Immat GRANS % 1 %      Lymphocytes Relative 24 %      Monocytes Relative 6 %      Eosinophils Relative 4 %      Basophils Relative 1 %      Neutrophils Absolute 5 67 Thousands/µL      Immature Grans Absolute 0 12 Thousand/uL      Lymphocytes Absolute 2 17 Thousands/µL      Monocytes Absolute 0 54 Thousand/µL      Eosinophils Absolute 0 38 Thousand/µL      Basophils Absolute 0 07 Thousands/µL                  XR chest 1 view portable   ED Interpretation by Myron Silveira DO (03/05 7096)   No acute pathology      Final Result by Aggie Wilson MD (03/05 1003)      No acute cardiopulmonary disease  Workstation performed: [id number]                    Procedures  ECG 12 Lead Documentation Only    Date/Time: 3/5/2021 7:55 AM  Performed by: Myron Silveira DO  Authorized by: Myron Silveira DO     ECG reviewed by me, the ED Provider: yes    Patient location:  ED  Previous ECG:     Previous ECG:  Compared to current    Comparison ECG info:  6 23 2020    Similarity:  No change  Interpretation:     Interpretation: non-specific    Rate:     ECG rate:  81    ECG rate assessment: normal    Rhythm:     Rhythm: sinus rhythm    Ectopy:     Ectopy: PAC    QRS:     QRS axis:  Normal    QRS intervals:  Normal  ST segments:     ST segments:  Normal  T waves:     T waves: normal               ED Course  ED Course as of Mar 05 0854   Fri Mar 05, 2021   0846 Lab work unremarkable, patient reports improvement, will discharge on Carafate, unclear exact etiology of her pain, upper follow-up with outpatient GI  HEART Risk Score      Most Recent Value   Heart Score Risk Calculator   History  0 Filed at: 03/05/2021 0847   ECG  0 Filed at: 03/05/2021 0847   Age  0 Filed at: 03/05/2021 0847   Risk Factors  1 Filed at: 03/05/2021 0847   Troponin  0 Filed at: 03/05/2021 0847   HEART Score  1 Filed at: 03/05/2021 0533                                    MDM  Number of Diagnoses or Management Options  Atypical chest pain: new and requires workup  Diagnosis management comments:     Initial ED assessment:  72-year-old female presents with chest pain, acute on chronic appears uncomfortable at time of examination is tender throughout the upper quadrant      Initial DDx includes but is not limited to:   Pancreatitis, pericarditis, pulmonary pathology such as atelectasis pulmonary embolism doubt pneumonia or any other infectious etiology due to no systemic symptoms   Possibly gastritis and/or duodenitis as she does history gastric erosion on EGD from 3 years ago    Initial ED plan:   Blood work including D-dimer, if elevated will proceed to CTA, lipase, ultimate disposition pending ED workup  Will give Toradol for discomfort        Final ED summary/disposition:   After evaluation and workup in the emergency department, workup unremarkable  Will discharge on Carafate, if it does not improve will follow up PCP        Amount and/or Complexity of Data Reviewed  Clinical lab tests: reviewed and ordered  Tests in the radiology section of CPT®: ordered and reviewed  Decide to obtain previous medical records or to obtain history from someone other than the patient: yes (Prescription drug monitoring program)  Review and summarize past medical records: yes  Independent visualization of images, tracings, or specimens: yes        Disposition  Final diagnoses:   Atypical chest pain     Time reflects when diagnosis was documented in both MDM as applicable and the Disposition within this note     Time User Action Codes Description Comment    3/5/2021  8:47 AM Selvin Acuña Add [R07 89] Atypical chest pain       ED Disposition     ED Disposition Condition Date/Time Comment    Discharge Stable Fri Mar 5, 2021  8:47 AM Dipti Mak discharge to home/self care  Follow-up Information     Follow up With Specialties Details Why Contact Info    Barak Ryder MD Internal Medicine   [address] [place] [address],[address]  [place] [address]  [phone]            Patient's Medications   Discharge Prescriptions    SUCRALFATE (CARAFATE) 1 G/10 ML SUSPENSION    Take 10 mL (1 g total) by mouth 4 (four) times a day (with meals and at bedtime)       Start Date: 3/5/2021  End Date: 4/4/2021       Order Dose: 1 g       Quantity: 1200 mL    Refills: 0     No discharge procedures on file      PDMP Review       Value Time User    PDMP Reviewed  Yes 3/5/2021  8:03 AM Robert Albarado Aimee [place]          ED Provider  Electronically Signed by           Jaun Sanchez DO  03/05/21 [place]

## 2021-03-06 LAB
ATRIAL RATE: 81 BPM
P AXIS: 48 DEGREES
PR INTERVAL: 142 MS
QRS AXIS: 92 DEGREES
QRSD INTERVAL: 78 MS
QT INTERVAL: 360 MS
QTC INTERVAL: 418 MS
T WAVE AXIS: 43 DEGREES
VENTRICULAR RATE: 81 BPM

## 2021-03-06 PROCEDURE — 93010 ELECTROCARDIOGRAM REPORT: CPT | Performed by: INTERNAL MEDICINE

## 2021-03-08 ENCOUNTER — TELEPHONE (OUTPATIENT)
Dept: INTERNAL MEDICINE CLINIC | Facility: CLINIC | Age: 41
End: 2021-03-08

## 2021-03-08 NOTE — TELEPHONE ENCOUNTER
Ask how she is feeling  Any more chest pain or abdominal pain? Can offer ER f/u appt with me or Stefani Mejia

## 2021-03-11 DIAGNOSIS — G47.09 OTHER INSOMNIA: ICD-10-CM

## 2021-03-12 DIAGNOSIS — G47.09 OTHER INSOMNIA: ICD-10-CM

## 2021-03-12 RX ORDER — ZOLPIDEM TARTRATE 10 MG/1
TABLET ORAL
Qty: 30 TABLET | Refills: 0 | Status: SHIPPED | OUTPATIENT
Start: 2021-03-12 | End: 2021-04-16

## 2021-03-12 RX ORDER — ZOLPIDEM TARTRATE 10 MG/1
TABLET ORAL
Qty: 30 TABLET | Refills: 0 | OUTPATIENT
Start: 2021-03-12

## 2021-03-25 DIAGNOSIS — F41.9 ANXIETY: ICD-10-CM

## 2021-03-25 RX ORDER — BUSPIRONE HYDROCHLORIDE 5 MG/1
TABLET ORAL
Qty: 60 TABLET | Refills: 0 | Status: SHIPPED | OUTPATIENT
Start: 2021-03-25 | End: 2021-05-30 | Stop reason: SDUPTHER

## 2021-03-29 DIAGNOSIS — F41.9 ANXIETY: ICD-10-CM

## 2021-03-29 RX ORDER — SERTRALINE HYDROCHLORIDE 100 MG/1
200 TABLET, FILM COATED ORAL
Qty: 180 TABLET | Refills: 0 | Status: SHIPPED | OUTPATIENT
Start: 2021-03-29 | End: 2021-05-20

## 2021-04-15 DIAGNOSIS — G47.09 OTHER INSOMNIA: ICD-10-CM

## 2021-04-16 RX ORDER — ZOLPIDEM TARTRATE 10 MG/1
TABLET ORAL
Qty: 30 TABLET | Refills: 0 | Status: SHIPPED | OUTPATIENT
Start: 2021-04-16 | End: 2021-05-20

## 2021-04-22 ENCOUNTER — TELEMEDICINE (OUTPATIENT)
Dept: INTERNAL MEDICINE CLINIC | Facility: CLINIC | Age: 41
End: 2021-04-22
Payer: COMMERCIAL

## 2021-04-22 DIAGNOSIS — R10.13 EPIGASTRIC ABDOMINAL PAIN: ICD-10-CM

## 2021-04-22 DIAGNOSIS — K21.00 GASTROESOPHAGEAL REFLUX DISEASE WITH ESOPHAGITIS WITHOUT HEMORRHAGE: Primary | ICD-10-CM

## 2021-04-22 DIAGNOSIS — F41.9 ANXIETY: ICD-10-CM

## 2021-04-22 DIAGNOSIS — K25.3 ACUTE GASTRIC EROSION: ICD-10-CM

## 2021-04-22 PROBLEM — R11.2 PONV (POSTOPERATIVE NAUSEA AND VOMITING): Status: RESOLVED | Noted: 2020-08-03 | Resolved: 2021-04-22

## 2021-04-22 PROBLEM — Z98.890 PONV (POSTOPERATIVE NAUSEA AND VOMITING): Status: RESOLVED | Noted: 2020-08-03 | Resolved: 2021-04-22

## 2021-04-22 PROCEDURE — 99213 OFFICE O/P EST LOW 20 MIN: CPT | Performed by: INTERNAL MEDICINE

## 2021-04-22 PROCEDURE — 1036F TOBACCO NON-USER: CPT | Performed by: INTERNAL MEDICINE

## 2021-04-22 RX ORDER — FAMOTIDINE 40 MG/1
40 TABLET, FILM COATED ORAL
Qty: 90 TABLET | Refills: 0 | Status: SHIPPED | OUTPATIENT
Start: 2021-04-22 | End: 2021-05-28

## 2021-04-22 RX ORDER — OMEPRAZOLE 20 MG/1
20 CAPSULE, DELAYED RELEASE ORAL EVERY MORNING
Qty: 90 CAPSULE | Refills: 0 | Status: SHIPPED | OUTPATIENT
Start: 2021-04-22 | End: 2021-05-28

## 2021-04-22 NOTE — PROGRESS NOTES
Virtual Regular Visit      Assessment/Plan:    Problem List Items Addressed This Visit        Digestive    Gastric erosion     Discussed repeat EGD, refer back to GI  Relevant Medications    omeprazole (PriLOSEC) 20 mg delayed release capsule    famotidine (PEPCID) 40 MG tablet    GERD (gastroesophageal reflux disease) - Primary     Instructed to take omeprazole 40 mg in AM, add famotidine qHS  Discussed bland diet, tolerating liquids  Relevant Medications    omeprazole (PriLOSEC) 20 mg delayed release capsule    famotidine (PEPCID) 40 MG tablet       Other    Anxiety     Stable, on sertraline  Takes zolpidem prn only  Epigastric abdominal pain               Reason for visit is   Chief Complaint   Patient presents with    Virtual Regular Visit        Encounter provider Claudette Nguyen MD    Provider located at [address] [place] [id number]      Recent Visits  No visits were found meeting these conditions  Showing recent visits within past 7 days and meeting all other requirements     Today's Visits  Date Type Provider Dept   04/22/21 Telemedicine Claudette Nguyen, [address] [facility]   Showing today's visits and meeting all other requirements     Future Appointments  No visits were found meeting these conditions  Showing future appointments within next 150 days and meeting all other requirements        The patient was identified by name and date of birth  Malvin Wang was informed that this is a telemedicine visit and that the visit is being conducted through [facility] and patient was informed that this is a secure, HIPAA-compliant platform  She agrees to proceed     My office door was closed  No one else was in the room  She acknowledged consent and understanding of privacy and security of the video platform   The patient has agreed to participate and understands they can discontinue the visit at any time  Patient is aware this is a billable service  Subjective  Daniel Sunshine is a 47 y o  female abdominal pain   She complains frequent nausea and abdominal pain for the past month  She feels she is unable to keep any foods down  She is able to tolerate liquids  She has cut back on coffee and soda since she has not been feeling well  She reports waking up twice at night, felt she was choking  She would spit out clear liquid, does not think it is phlegm  She moves her bowels regularly, no blood or mucus  She has been taking her omeprazole every night  She has no appetite, does not want to eat since it causes abdominal pain  She reports losing weight  She has not been taking additional over the counter medications  Past Medical History:   Diagnosis Date    Anxiety     Depression     Fibroid     GERD (gastroesophageal reflux disease)     History of pulmonary embolus (PE)     Iliotibial band syndrome     last assessed - [id number]    Mitral valve prolapse     Obesity     Pneumonia     2018    PONV (postoperative nausea and vomiting)     Patient requests to be premedicated for N/V    Wears glasses     for reading       Past Surgical History:   Procedure Laterality Date    CHOLECYSTECTOMY      ESOPHAGOGASTRODUODENOSCOPY N/A 5/31/2018    Procedure: ESOPHAGOGASTRODUODENOSCOPY (EGD); Surgeon: Berenice Reyna MD;  [facility]; Service: Gastroenterology    GALLBLADDER SURGERY      KNEE SURGERY Left     growth plate fx left knee    KNEE SURGERY Right     [place] HYSTEROSCOPY,W/ENDO BX N/A 8/3/2020    Procedure: DILATATION AND CURETTAGE (D&C) WITH HYSTEROSCOPY (MYOSURE);   Surgeon: Dayna Mack DO;  [facility];  Service: Gynecology    [place] HYSTEROSCOPY,W/ENDO BX N/A 8/3/2020    Procedure: HYSTEROSCOPY WITH  RESECTION UTERINE TUMOR/FIBROID;  Surgeon: Dayna Mack DO;  [facility];  Service: Gynecology    [place] HYSTEROSCOPY,W/ENDOMETRIAL ABLATION N/A 8/3/2020    Procedure: ABLATION ENDOMETRIAL NOVASURE;  Surgeon: Nallely Ness DO;  [facility];  Service: Gynecology    [place] OPEN TREATMENT METATARSAL FRACTURE EACH Left 4/10/2019    Procedure: OPEN REDUCTION W/ INTERNAL FIXATION (ORIF) FOOT, Left 5th metatarsal fracture open reduction internal fixation with calcaneal bone graft and bone marrow aspirate concentrate;  Surgeon: Brayden Victoria MD;  [facility];  Service: Orthopedics    TUBAL LIGATION      WISDOM TOOTH EXTRACTION         Current Outpatient Medications   Medication Sig Dispense Refill    acetaminophen (TYLENOL) 325 mg tablet Take 2 tablets (650 mg total) by mouth every 6 (six) hours as needed for mild pain 30 tablet 0    ALPRAZolam (XANAX) 0 25 mg tablet TAKE ONE TABLET BY MOUTH EVERY DAY AS NEEDEDFOR ANXIETY 30 tablet 0    busPIRone (BUSPAR) 5 mg tablet TAKE ONE TABLET BY MOUTH 2 TIMES A DAY 60 tablet 0    Cetirizine HCl (ZYRTEC ALLERGY) 10 MG CAPS Take 10 mg by mouth        diclofenac sodium (VOLTAREN) 1 % Apply 2 g topically 4 (four) times a day for 5 days 100 g 0    famotidine (PEPCID) 40 MG tablet Take 1 tablet (40 mg total) by mouth daily at bedtime as needed for indigestion 90 tablet 0    fluticasone (FLONASE) 50 mcg/act nasal spray 1 spray into each nostril daily 16 g 0    ibuprofen (MOTRIN) 400 mg tablet Take 1 tablet (400 mg total) by mouth every 6 (six) hours as needed for mild pain for up to 3 days 12 tablet 0    ibuprofen (MOTRIN) 600 mg tablet Take 1 tablet (600 mg total) by mouth every 6 (six) hours as needed for moderate pain (cramping) 30 tablet 0    omeprazole (PriLOSEC) 20 mg delayed release capsule Take 1 capsule (20 mg total) by mouth every morning 90 capsule 0    sertraline (ZOLOFT) 100 mg tablet TAKE 2 TABLETS (200 MG TOTAL) BY MOUTH DAILY AT BEDTIME 180 tablet 0    sucralfate (CARAFATE) 1 g/10 mL suspension Take 10 mL (1 g total) by mouth 4 (four) times a day (with meals and at bedtime) 1200 mL 0    topiramate (TOPAMAX) 25 mg tablet Take 1 tablet (25 mg total) by mouth 2 (two) times a day Take 1 tab PO qHS x 1 week then bid 60 tablet 2    zolpidem (AMBIEN) 10 mg tablet TAKE 1/2 TO 1 TABLET BY MOUTH AT BEDTIME AS NEEDED FOR SLEEP 30 tablet 0     No current facility-administered medications for this visit  Allergies   Allergen Reactions    Penicillin G Hives       Review of Systems   Constitutional: Positive for appetite change and fatigue  Negative for fever  Respiratory: Negative for cough  Gastrointestinal: Positive for abdominal pain, nausea and vomiting  Negative for blood in stool  Genitourinary: Negative for dysuria  Musculoskeletal: Negative for arthralgias  Psychiatric/Behavioral: Negative for sleep disturbance  The patient is not nervous/anxious  Video Exam    There were no vitals filed for this visit  Physical Exam  Constitutional:       General: She is not in acute distress  Appearance: She is not ill-appearing  HENT:      Head: Normocephalic and atraumatic  Eyes:      Pupils: Pupils are equal, round, and reactive to light  Pulmonary:      Effort: Pulmonary effort is normal  No respiratory distress  Neurological:      General: No focal deficit present  Mental Status: She is oriented to person, place, and time  Psychiatric:         Mood and Affect: Mood normal          Behavior: Behavior normal           I spent 5 minutes directly with the patient during this visit      [address] acknowledges that she has consented to an online visit or consultation  She understands that the online visit is based solely on information provided by her, and that, in the absence of a face-to-face physical evaluation by the physician, the diagnosis she receives is both limited and provisional in terms of accuracy and completeness   This is not intended to replace a full medical face-to-face evaluation by the physician  Malvin Wang understands and accepts these terms

## 2021-04-22 NOTE — ASSESSMENT & PLAN NOTE
Instructed to take omeprazole 40 mg in AM, add famotidine qHS  Discussed bland diet, tolerating liquids

## 2021-04-22 NOTE — PATIENT INSTRUCTIONS
Soft Diet   WHAT YOU NEED TO KNOW:   A soft diet is made up of foods that are soft and easy to chew and swallow  These foods may be chopped, ground, mashed, pureed, and moist  You may need to follow this diet if you have had certain types of surgery, such as head, neck, or stomach surgery  You may also need to follow this diet if you have problems with your teeth or mouth that make it hard for you to chew or swallow food  Your dietitian will tell you how to follow this diet and what consistency of liquids you may have  How to prepare soft food:   · Cut food into small pieces that are ½ inch or smaller in size because they are easier to swallow  · Use chicken broth, beef broth, gravy, or sauces to cook or moisten meats and vegetables  Cook vegetables until they are soft enough to be mashed with a fork  · Use a  to grind or puree foods to make them easier to chew and swallow  · Use fruit juice to blend fruit  · Strain soups that have pieces of meat or vegetables that are larger than ½ inch  Foods you can include:   · Breads, cereals, rice, and pasta:      ? Breads, muffins, pancakes, or waffles moistened with syrup, jelly, margarine or butter    ? Moist dry or cooked cereal    ? Macaroni, pasta, noodles, or rice    ? Saltine crackers moistened in soup or other liquid    · Fruits and vegetables:      ? Applesauce or canned fruit without seeds or skin    ? Cooked fruits or ripe, soft peeled fruits, such as bananas, peaches, or melon    ? Soft, well-cooked vegetables without seeds or skin    · Meat and other protein sources:      ? Poached, scrambled, or cooked eggs    ? Moist, tender meat, fish, or poultry that is ground or chopped into small pieces    ? Soups with small soft pieces of vegetables and meat    ?  Tofu or well-cooked, slightly mashed, moist legumes, such as baked beans    · Dairy:      ? Cheese (in sauces or melted in other dishes), cottage cheese, or ricotta cheese    ? Milk or milk drinks, milkshakes    ? Ice cream, sherbet, or frozen yogurt without fruit or nuts    ? Yogurt (plain or with soft fruits)    · Desserts:      ? Gelatin dessert with soft canned fruit, pudding, or custard    ? Fruit cobbler with soft breading or crumb mixture (no seeds or nuts), or fruit pie with soft bottom crust only    ? Soft, moist cake or cookie that has been moistened in milk, coffee, or other liquid    Foods to avoid:  Avoid any foods that are hard for you to chew or swallow, such as the following:  · Starches:      ? Dry bread, toast, crackers, and cereal    ? Cereal, cake, and breads with coconut, dried fruit, nuts, and other seeds    ? Corn, potato, and tortilla chips and taco shells    ? Breads with tough crusts, such as bagels, [place] bread, and sourdough bread    ? Popcorn    · Vegetables:      ? Corn and peas    ? Raw, hard vegetables that cannot be mashed easily, such as carrots, broccoli, cauliflower, and celery    ? Crisp fried vegetables, such as potatoes    · Fruits:      ? Raw, crisp fruits, such as apples and pears and dried fruit    ? Stringy fruits, such as pineapple and elizabeth    ? Cooked fruit with skin and seeds    · Dairy, meats, and protein foods:      ? Yogurt or ice cream with coconut, nuts, and granola    ? Dry meats (beef jerky) and tough meats (such as michele, sausage, hot dogs, and bratwurst)    ? Casseroles with large chunks of meat    ? Peanut butter (creamy and crunchy)    © Copyright [employer] Information is for End User's use only and may not be sold, redistributed or otherwise used for commercial purposes  All illustrations and images included in CareNotes® are the copyrighted property of A D A M , Inc  or [employer]i Dacosta  The above information is an  only  It is not intended as medical advice for individual conditions or treatments   Talk to your doctor, nurse or pharmacist before following any medical regimen to see if it is safe and effective for you

## 2021-04-23 ENCOUNTER — TELEPHONE (OUTPATIENT)
Dept: INTERNAL MEDICINE CLINIC | Facility: CLINIC | Age: 41
End: 2021-04-23

## 2021-04-23 NOTE — TELEPHONE ENCOUNTER
Pt  didn't go to work today  Said she was vomiting all night  Needs a note for work  She is returning to work on Monday  Pt 's daughter will pick it up  Call pt  when ready

## 2021-05-02 VITALS
HEART RATE: 88 BPM | BODY MASS INDEX: 35.78 KG/M2 | WEIGHT: 215 LBS | SYSTOLIC BLOOD PRESSURE: 127 MMHG | RESPIRATION RATE: 22 BRPM | DIASTOLIC BLOOD PRESSURE: 83 MMHG | TEMPERATURE: 97.5 F | OXYGEN SATURATION: 98 %

## 2021-05-02 LAB
BASOPHILS # BLD AUTO: 0.07 THOUSANDS/ΜL (ref 0–0.1)
BASOPHILS NFR BLD AUTO: 1 % (ref 0–1)
EOSINOPHIL # BLD AUTO: 0.42 THOUSAND/ΜL (ref 0–0.61)
EOSINOPHIL NFR BLD AUTO: 4 % (ref 0–6)
ERYTHROCYTE [DISTWIDTH] IN BLOOD BY AUTOMATED COUNT: 13.8 % (ref 11.6–15.1)
HCT VFR BLD AUTO: 40 % (ref 34.8–46.1)
HGB BLD-MCNC: 13.2 G/DL (ref 11.5–15.4)
IMM GRANULOCYTES # BLD AUTO: 0.11 THOUSAND/UL (ref 0–0.2)
IMM GRANULOCYTES NFR BLD AUTO: 1 % (ref 0–2)
LYMPHOCYTES # BLD AUTO: 3.15 THOUSANDS/ΜL (ref 0.6–4.47)
LYMPHOCYTES NFR BLD AUTO: 31 % (ref 14–44)
MCH RBC QN AUTO: 28.4 PG (ref 26.8–34.3)
MCHC RBC AUTO-ENTMCNC: 33 G/DL (ref 31.4–37.4)
MCV RBC AUTO: 86 FL (ref 82–98)
MONOCYTES # BLD AUTO: 0.61 THOUSAND/ΜL (ref 0.17–1.22)
MONOCYTES NFR BLD AUTO: 6 % (ref 4–12)
NEUTROPHILS # BLD AUTO: 5.67 THOUSANDS/ΜL (ref 1.85–7.62)
NEUTS SEG NFR BLD AUTO: 57 % (ref 43–75)
NRBC BLD AUTO-RTO: 0 /100 WBCS
PLATELET # BLD AUTO: 252 THOUSANDS/UL (ref 149–390)
PMV BLD AUTO: 9.4 FL (ref 8.9–12.7)
RBC # BLD AUTO: 4.65 MILLION/UL (ref 3.81–5.12)
WBC # BLD AUTO: 10.03 THOUSAND/UL (ref 4.31–10.16)

## 2021-05-02 PROCEDURE — 85025 COMPLETE CBC W/AUTO DIFF WBC: CPT | Performed by: EMERGENCY MEDICINE

## 2021-05-02 PROCEDURE — 80053 COMPREHEN METABOLIC PANEL: CPT | Performed by: EMERGENCY MEDICINE

## 2021-05-02 PROCEDURE — 36415 COLL VENOUS BLD VENIPUNCTURE: CPT

## 2021-05-02 PROCEDURE — 99284 EMERGENCY DEPT VISIT MOD MDM: CPT

## 2021-05-02 PROCEDURE — 93005 ELECTROCARDIOGRAM TRACING: CPT

## 2021-05-02 PROCEDURE — 81025 URINE PREGNANCY TEST: CPT | Performed by: EMERGENCY MEDICINE

## 2021-05-02 PROCEDURE — 83690 ASSAY OF LIPASE: CPT | Performed by: EMERGENCY MEDICINE

## 2021-05-03 ENCOUNTER — APPOINTMENT (EMERGENCY)
Dept: CT IMAGING | Facility: HOSPITAL | Age: 41
End: 2021-05-03
Payer: COMMERCIAL

## 2021-05-03 ENCOUNTER — HOSPITAL ENCOUNTER (EMERGENCY)
Facility: HOSPITAL | Age: 41
Discharge: HOME | End: 2021-05-03
Attending: EMERGENCY MEDICINE | Admitting: EMERGENCY MEDICINE
Payer: COMMERCIAL

## 2021-05-03 DIAGNOSIS — R10.9 ABDOMINAL PAIN: Primary | ICD-10-CM

## 2021-05-03 LAB
ALBUMIN SERPL BCP-MCNC: 4 G/DL (ref 3.5–5)
ALP SERPL-CCNC: 88 U/L (ref 46–116)
ALT SERPL W P-5'-P-CCNC: 27 U/L (ref 12–78)
ANION GAP SERPL CALCULATED.3IONS-SCNC: 11 MMOL/L (ref 4–13)
AST SERPL W P-5'-P-CCNC: 13 U/L (ref 5–45)
ATRIAL RATE: 89 BPM
BACTERIA UR QL AUTO: ABNORMAL /HPF
BILIRUB SERPL-MCNC: 0.21 MG/DL (ref 0.2–1)
BILIRUB UR QL STRIP: NEGATIVE
BUN SERPL-MCNC: 13 MG/DL (ref 5–25)
CALCIUM SERPL-MCNC: 9.1 MG/DL (ref 8.3–10.1)
CAOX CRY URNS QL MICRO: ABNORMAL /HPF
CHLORIDE SERPL-SCNC: 106 MMOL/L (ref 100–108)
CLARITY UR: ABNORMAL
CO2 SERPL-SCNC: 23 MMOL/L (ref 21–32)
COLOR UR: YELLOW
CREAT SERPL-MCNC: 1.08 MG/DL (ref 0.6–1.3)
EXT PREG TEST URINE: NEGATIVE
EXT. CONTROL ED NAV: NORMAL
GFR SERPL CREATININE-BSD FRML MDRD: 65 ML/MIN/1.73SQ M
GLUCOSE SERPL-MCNC: 111 MG/DL (ref 65–140)
GLUCOSE UR STRIP-MCNC: NEGATIVE MG/DL
HGB UR QL STRIP.AUTO: NEGATIVE
KETONES UR STRIP-MCNC: NEGATIVE MG/DL
LEUKOCYTE ESTERASE UR QL STRIP: NEGATIVE
LIPASE SERPL-CCNC: 128 U/L (ref 73–393)
NITRITE UR QL STRIP: NEGATIVE
NON-SQ EPI CELLS URNS QL MICRO: ABNORMAL /HPF
P AXIS: 66 DEGREES
PH UR STRIP.AUTO: 5.5 [PH] (ref 4.5–8)
POTASSIUM SERPL-SCNC: 3.8 MMOL/L (ref 3.5–5.3)
PR INTERVAL: 150 MS
PROT SERPL-MCNC: 8 G/DL (ref 6.4–8.2)
PROT UR STRIP-MCNC: ABNORMAL MG/DL
QRS AXIS: 80 DEGREES
QRSD INTERVAL: 82 MS
QT INTERVAL: 362 MS
QTC INTERVAL: 433 MS
RBC #/AREA URNS AUTO: ABNORMAL /HPF
SODIUM SERPL-SCNC: 140 MMOL/L (ref 136–145)
SP GR UR STRIP.AUTO: >=1.03 (ref 1–1.03)
T WAVE AXIS: 78 DEGREES
UROBILINOGEN UR QL STRIP.AUTO: 0.2 E.U./DL
VENTRICULAR RATE: 86 BPM
WBC #/AREA URNS AUTO: ABNORMAL /HPF

## 2021-05-03 PROCEDURE — 99284 EMERGENCY DEPT VISIT MOD MDM: CPT | Performed by: EMERGENCY MEDICINE

## 2021-05-03 PROCEDURE — 93010 ELECTROCARDIOGRAM REPORT: CPT | Performed by: INTERNAL MEDICINE

## 2021-05-03 PROCEDURE — 74177 CT ABD & PELVIS W/CONTRAST: CPT

## 2021-05-03 PROCEDURE — 96361 HYDRATE IV INFUSION ADD-ON: CPT

## 2021-05-03 PROCEDURE — 96372 THER/PROPH/DIAG INJ SC/IM: CPT

## 2021-05-03 PROCEDURE — G1004 CDSM NDSC: HCPCS

## 2021-05-03 PROCEDURE — 96374 THER/PROPH/DIAG INJ IV PUSH: CPT

## 2021-05-03 PROCEDURE — 81001 URINALYSIS AUTO W/SCOPE: CPT

## 2021-05-03 RX ORDER — ONDANSETRON 2 MG/ML
4 INJECTION INTRAMUSCULAR; INTRAVENOUS ONCE
Status: COMPLETED | OUTPATIENT
Start: 2021-05-03 | End: 2021-05-03

## 2021-05-03 RX ORDER — HALOPERIDOL 5 MG/ML
2.5 INJECTION INTRAMUSCULAR ONCE
Status: COMPLETED | OUTPATIENT
Start: 2021-05-03 | End: 2021-05-03

## 2021-05-03 RX ADMIN — HALOPERIDOL LACTATE 2.5 MG: 5 INJECTION, SOLUTION INTRAMUSCULAR at 00:57

## 2021-05-03 RX ADMIN — SODIUM CHLORIDE 1000 ML: 0.9 INJECTION, SOLUTION INTRAVENOUS at 00:56

## 2021-05-03 RX ADMIN — IOHEXOL 100 ML: 350 INJECTION, SOLUTION INTRAVENOUS at 01:46

## 2021-05-03 RX ADMIN — ONDANSETRON 4 MG: 2 INJECTION INTRAMUSCULAR; INTRAVENOUS at 00:56

## 2021-05-03 NOTE — Clinical Note
Pete Ear was seen and treated in our emergency department on 5/2/2021  Diagnosis:     Jasmyne Patel  may return to work on return date  She may return on this date: 05/04/2021         If you have any questions or concerns, please don't hesitate to call        Miguel Jean RN    ______________________________           _______________          _______________  Hospital Representative                              Date                                Time

## 2021-05-03 NOTE — ED PROVIDER NOTES
History  Chief Complaint   Patient presents with    Abdominal Pain     Pt reports mid and right abd pain x 2 weeks  + n/v - diarrhea  Denies other complaints  History provided by:  Patient   used: No    46 y/o female presented for evaluation of right-sided abdominal pain for the last 2 weeks  Moderate fairly constant, non-radiating  Associated with nausea/vomiting  No diarrhea or blood in stool  No urinary sx  No fever, chills  Hx of cholecystectomy, hysterectomy  Tender RLQ on exam  Plan labs, CT, symptomatic management  Prior to Admission Medications   Prescriptions Last Dose Informant Patient Reported? Taking? ALPRAZolam (XANAX) 0 25 mg tablet  Self No No   Sig: TAKE ONE TABLET BY MOUTH EVERY DAY AS NEEDEDFOR ANXIETY   Cetirizine HCl (ZYRTEC ALLERGY) 10 MG CAPS  Self Yes No   Sig: Take 10 mg by mouth     acetaminophen (TYLENOL) 325 mg tablet  Self No No   Sig: Take 2 tablets (650 mg total) by mouth every 6 (six) hours as needed for mild pain   busPIRone (BUSPAR) 5 mg tablet   No No   Sig: TAKE ONE TABLET BY MOUTH 2 TIMES A DAY   diclofenac sodium (VOLTAREN) 1 %   No No   Sig: Apply 2 g topically 4 (four) times a day for 5 days   famotidine (PEPCID) 40 MG tablet   No No   Sig: Take 1 tablet (40 mg total) by mouth daily at bedtime as needed for indigestion   fluticasone (FLONASE) 50 mcg/act nasal spray  Self No No   Si spray into each nostril daily   ibuprofen (MOTRIN) 400 mg tablet   No No   Sig: Take 1 tablet (400 mg total) by mouth every 6 (six) hours as needed for mild pain for up to 3 days   ibuprofen (MOTRIN) 600 mg tablet  Self No No   Sig: Take 1 tablet (600 mg total) by mouth every 6 (six) hours as needed for moderate pain (cramping)   omeprazole (PriLOSEC) 20 mg delayed release capsule   No No   Sig: Take 1 capsule (20 mg total) by mouth every morning   sucralfate (CARAFATE) 1 g/10 mL suspension   No No   Sig: Take 10 mL (1 g total) by mouth 4 (four) times a day (with meals and at bedtime)   topiramate (TOPAMAX) 25 mg tablet   No No   Sig: Take 1 tablet (25 mg total) by mouth 2 (two) times a day Take 1 tab PO qHS x 1 week then bid      Facility-Administered Medications: None       Past Medical History:   Diagnosis Date    Anxiety     Depression     Fibroid     GERD (gastroesophageal reflux disease)     History of pulmonary embolus (PE)     Iliotibial band syndrome     last assessed - [id number]    Mitral valve prolapse     Obesity     Pneumonia     2018    PONV (postoperative nausea and vomiting)     Patient requests to be premedicated for N/V    Wears glasses     for reading       Past Surgical History:   Procedure Laterality Date    CHOLECYSTECTOMY      ESOPHAGOGASTRODUODENOSCOPY N/A 5/31/2018    Procedure: ESOPHAGOGASTRODUODENOSCOPY (EGD); Surgeon: Savage Jurado MD;  [facility]; Service: Gastroenterology    GALLBLADDER SURGERY      KNEE SURGERY Left     growth plate fx left knee    KNEE SURGERY Right     WY HYSTEROSCOPY,W/ENDO BX N/A 8/3/2020    Procedure: DILATATION AND CURETTAGE (D&C) WITH HYSTEROSCOPY (MYOSURE);   Surgeon: Nolan Winkler DO;  [facility];  Service: Gynecology    WY HYSTEROSCOPY,W/ENDO BX N/A 8/3/2020    Procedure: HYSTEROSCOPY WITH  RESECTION UTERINE TUMOR/FIBROID;  Surgeon: Nolan Winkler DO;  [facility];  Service: Gynecology    WY HYSTEROSCOPY,W/ENDOMETRIAL ABLATION N/A 8/3/2020    Procedure: ABLATION ENDOMETRIAL Sanaz Belch;  Surgeon: Nolan Winkler DO;  [facility];  Service: Gynecology    WY OPEN TREATMENT METATARSAL FRACTURE EACH Left 4/10/2019    Procedure: OPEN REDUCTION W/ INTERNAL FIXATION (ORIF) FOOT, Left 5th metatarsal fracture open reduction internal fixation with calcaneal bone graft and bone marrow aspirate concentrate;  Surgeon: Kalin Valdez MD;  [facility];  Service: Orthopedics    TUBAL LIGATION      WISDOM TOOTH EXTRACTION         Family History Adopted: Yes   Problem Relation Age of Onset    No Known Problems Mother     Alcohol abuse Neg Hx     Substance Abuse Neg Hx     Mental illness Neg Hx     Depression Neg Hx      I have reviewed and agree with the history as documented  E-Cigarette/Vaping    E-Cigarette Use Never User      E-Cigarette/Vaping Substances    Nicotine Yes     THC No     CBD No      Social History     Tobacco Use    Smoking status: Former Smoker     Packs/day: 0 25    Smokeless tobacco: Never Used   Substance Use Topics    Alcohol use: Not Currently     Frequency: Monthly or less     Drinks per session: 1 or 2     Comment: social    Drug use: No       Review of Systems   Constitutional: Positive for appetite change  Negative for activity change and fever  Gastrointestinal: Positive for abdominal pain, nausea and vomiting  Negative for blood in stool and diarrhea  Genitourinary: Negative for difficulty urinating, dysuria and flank pain  Musculoskeletal: Negative for back pain and neck pain  Neurological: Negative for dizziness, light-headedness and headaches  All other systems reviewed and are negative  Physical Exam  Physical Exam  Vitals signs and nursing note reviewed  Constitutional:       Appearance: She is well-developed  HENT:      Head: Normocephalic and atraumatic  Cardiovascular:      Rate and Rhythm: Normal rate and regular rhythm  Pulmonary:      Effort: Pulmonary effort is normal  No respiratory distress  Abdominal:      General: There is no distension  Palpations: Abdomen is soft  Tenderness: There is no guarding or rebound  Hernia: No hernia is present  Comments: Mild RLQ tenderness  Skin:     General: Skin is warm and dry  Capillary Refill: Capillary refill takes less than 2 seconds  Neurological:      General: No focal deficit present  Mental Status: She is alert     Psychiatric:         Mood and Affect: Mood normal          Behavior: Behavior normal          Vital Signs  ED Triage Vitals [05/02/21 2320]   Temperature Pulse Respirations Blood Pressure SpO2   97 5 °F (36 4 °C) 88 22 127/83 98 %      Temp Source Heart Rate Source Patient Position - Orthostatic VS BP Location FiO2 (%)   Oral Monitor Sitting Left arm --      Pain Score       8           Vitals:    05/02/21 2320   BP: 127/83   Pulse: 88   Patient Position - Orthostatic VS: Sitting         Visual Acuity      ED Medications  Medications   sodium chloride 0 9 % bolus 1,000 mL (0 mL Intravenous Stopped 5/3/21 0246)   ondansetron (ZOFRAN) injection 4 mg (4 mg Intravenous Given 5/3/21 0056)   haloperidol lactate (HALDOL) injection 2 5 mg (2 5 mg Intramuscular Given 5/3/21 0057)   iohexol (OMNIPAQUE) 350 MG/ML injection (SINGLE-DOSE) 100 mL (100 mL Intravenous Given 5/3/21 0146)       Diagnostic Studies  Results Reviewed     Procedure Component Value Units Date/Time    Urine Microscopic [[id number]]  (Abnormal) Collected: 05/03/21 0015    Lab Status: Final result Specimen: Urine, Clean Catch Updated: 05/03/21 0054     RBC, UA 0-1 /hpf      WBC, UA 0-1 /hpf      Epithelial Cells Occasional /hpf      Bacteria, UA None Seen /hpf      Ca Oxalate Meagan, UA Innumerable /hpf     POCT pregnancy, urine [[id number]]  (Normal) Resulted: 05/03/21 0022    Lab Status: Final result Specimen: Urine Updated: 05/03/21 0022     EXT PREG TEST UR (Ref: Negative) negative     Control valid    Urine Macroscopic, POC [[id number]]  (Abnormal) Collected: 05/03/21 0015    Lab Status: Final result Specimen: Urine Updated: 05/03/21 0017     Color, UA Yellow     Clarity, UA Cloudy     pH, UA 5 5     Leukocytes, UA Negative     Nitrite, UA Negative     Protein, UA 30 (1+) mg/dl      Glucose, UA Negative mg/dl      Ketones, UA Negative mg/dl      Urobilinogen, UA 0 2 E U /dl      Bilirubin, UA Negative     Blood, UA Negative     Specific Gravity, UA >=1 030    Narrative:      CLINITEK RESULT    Comprehensive metabolic panel [[id number]] Collected: 05/02/21 2332    Lab Status: Final result Specimen: Blood from Arm, Left Updated: 05/03/21 0005     Sodium 140 mmol/L      Potassium 3 8 mmol/L      Chloride 106 mmol/L      CO2 23 mmol/L      ANION GAP 11 mmol/L      BUN 13 mg/dL      Creatinine 1 08 mg/dL      Glucose 111 mg/dL      Calcium 9 1 mg/dL      AST 13 U/L      ALT 27 U/L      Alkaline Phosphatase 88 U/L      Total Protein 8 0 g/dL      Albumin 4 0 g/dL      Total Bilirubin 0 21 mg/dL      eGFR 65 ml/min/1 73sq m     Narrative:      National Kidney Disease Foundation guidelines for Chronic Kidney Disease (CKD):     Stage 1 with normal or high GFR (GFR > 90 mL/min/1 73 square meters)    Stage 2 Mild CKD (GFR = 60-89 mL/min/1 73 square meters)    Stage 3A Moderate CKD (GFR = 45-59 mL/min/1 73 square meters)    Stage 3B Moderate CKD (GFR = 30-44 mL/min/1 73 square meters)    Stage 4 Severe CKD (GFR = 15-29 mL/min/1 73 square meters)    Stage 5 End Stage CKD (GFR <15 mL/min/1 73 square meters)  Note: GFR calculation is accurate only with a steady state creatinine    Lipase [[id number]]  (Normal) Collected: 05/02/21 2332    Lab Status: Final result Specimen: Blood from Arm, Left Updated: 05/03/21 0005     Lipase 128 u/L     CBC and differential [[id number]] Collected: 05/02/21 2332    Lab Status: Final result Specimen: Blood from Arm, Left Updated: 05/02/21 2349     WBC 10 03 Thousand/uL      RBC 4 65 Million/uL      Hemoglobin 13 2 g/dL      Hematocrit 40 0 %      MCV 86 fL      MCH 28 4 pg      MCHC 33 0 g/dL      RDW 13 8 %      MPV 9 4 fL      Platelets 569 Thousands/uL      nRBC 0 /100 WBCs      Neutrophils Relative 57 %      Immat GRANS % 1 %      Lymphocytes Relative 31 %      Monocytes Relative 6 %      Eosinophils Relative 4 %      Basophils Relative 1 %      Neutrophils Absolute 5 67 Thousands/µL      Immature Grans Absolute 0 11 Thousand/uL      Lymphocytes Absolute 3 15 Thousands/µL      Monocytes Absolute 0 61 Thousand/µL Eosinophils Absolute 0 42 Thousand/µL      Basophils Absolute 0 07 Thousands/µL                  CT abdomen pelvis with contrast   Final Result by Vicenta Cuellar MD (05/03 0200)      Normal appendix  No evidence of acute intra-abdominal or pelvic pathology            Workstation performed: [id number]                    Procedures  Procedures         ED Course                                           MDM  Number of Diagnoses or Management Options  Abdominal pain: new and requires workup  Diagnosis management comments: 46 y/o female with right-sided abdominal pain with nausea and vomiting for about 2 weeks  Labs unremarkable  Pending imaging  Amount and/or Complexity of Data Reviewed  Clinical lab tests: ordered and reviewed  Tests in the radiology section of CPT®: ordered  Discuss the patient with other providers: yes    Patient Progress  Patient progress: stable      Disposition  Final diagnoses:   Abdominal pain     Time reflects when diagnosis was documented in both MDM as applicable and the Disposition within this note     Time User Action Codes Description Comment    5/3/2021  2:23 AM Brittney Floyd, [address] [R10 9] Abdominal pain       ED Disposition     ED Disposition Condition Date/Time Comment    Discharge Stable Mon May 3, 2021  2:23 AM Bob Monday discharge to home/self care              Follow-up Information     Follow up With Specialties Details Why Contact Info    Addison Nick MD Internal Medicine In 1 day  [address] [place] [address],[address]  [place] [address]  [phone]            Discharge Medication List as of 5/3/2021  2:23 AM      CONTINUE these medications which have NOT CHANGED    Details   acetaminophen (TYLENOL) 325 mg tablet Take 2 tablets (650 mg total) by mouth every 6 (six) hours as needed for mild pain, Starting Mon 8/3/2020, Normal      ALPRAZolam (XANAX) 0 25 mg tablet TAKE ONE TABLET BY MOUTH EVERY DAY AS NEEDEDFOR ANXIETY, Normal      busPIRone (BUSPAR) 5 mg tablet TAKE ONE TABLET BY MOUTH 2 TIMES A DAY, Normal      Cetirizine HCl (ZYRTEC ALLERGY) 10 MG CAPS Take 10 mg by mouth , Historical Med      diclofenac sodium (VOLTAREN) 1 % Apply 2 g topically 4 (four) times a day for 5 days, Starting Fri 11/20/2020, Until Wed 11/25/2020, Print      famotidine (PEPCID) 40 MG tablet Take 1 tablet (40 mg total) by mouth daily at bedtime as needed for indigestion, Starting Thu 4/22/2021, Normal      fluticasone (FLONASE) 50 mcg/act nasal spray 1 spray into each nostril daily, Starting Tue 1/14/2020, Normal      ibuprofen (MOTRIN) 400 mg tablet Take 1 tablet (400 mg total) by mouth every 6 (six) hours as needed for mild pain for up to 3 days, Starting Wed 11/11/2020, Until Sat 11/14/2020, Normal      ibuprofen (MOTRIN) 600 mg tablet Take 1 tablet (600 mg total) by mouth every 6 (six) hours as needed for moderate pain (cramping), Starting Mon 8/3/2020, Normal      omeprazole (PriLOSEC) 20 mg delayed release capsule Take 1 capsule (20 mg total) by mouth every morning, Starting Thu 4/22/2021, Normal      sucralfate (CARAFATE) 1 g/10 mL suspension Take 10 mL (1 g total) by mouth 4 (four) times a day (with meals and at bedtime), Starting Fri 3/5/2021, Until Sun 4/4/2021, Normal      topiramate (TOPAMAX) 25 mg tablet Take 1 tablet (25 mg total) by mouth 2 (two) times a day Take 1 tab PO qHS x 1 week then bid, Starting Tue 2/9/2021, Normal      sertraline (ZOLOFT) 100 mg tablet TAKE 2 TABLETS (200 MG TOTAL) BY MOUTH DAILY AT BEDTIME, Starting Mon 3/29/2021, Normal      zolpidem (AMBIEN) 10 mg tablet TAKE 1/2 TO 1 TABLET BY MOUTH AT BEDTIME AS NEEDED FOR SLEEP, Normal           No discharge procedures on file      PDMP Review       Value Time User    PDMP Reviewed  Yes 5/20/2021  4:59 PM Deland MD [place]          ED Provider  Electronically Signed by           Jesse Calzada MD  05/24/21 6883

## 2021-05-05 ENCOUNTER — TELEPHONE (OUTPATIENT)
Dept: INTERNAL MEDICINE CLINIC | Facility: CLINIC | Age: 41
End: 2021-05-05

## 2021-05-05 NOTE — TELEPHONE ENCOUNTER
Are you taking the omeprazole 40 mg in the morning and famotidine at night? Start liquid diet (clear fluids, clear soup) for the next 24 hours to settle down the stomach  Do you need a work note?     Staff: please call GI if can move up appt

## 2021-05-05 NOTE — TELEPHONE ENCOUNTER
Spoke with patients daughter Moise Mercer patient was sick in the bathroom     Wants to know if we can extend the note to return to work on Monday 05/10 , because everyday she has tried to go in they send her home cause she is just too sick      Daughter will also be brining a paper in from work when she picks up note

## 2021-05-05 NOTE — TELEPHONE ENCOUNTER
Patient notified  Patient does take omeprazole and famotidine as discussed  Will start liquid diet  Yes she needs a work note for today      Got the appointment moved up to May 28th @ 11am

## 2021-05-20 DIAGNOSIS — G47.09 OTHER INSOMNIA: ICD-10-CM

## 2021-05-20 DIAGNOSIS — F41.9 ANXIETY: ICD-10-CM

## 2021-05-20 RX ORDER — ZOLPIDEM TARTRATE 10 MG/1
TABLET ORAL
Qty: 30 TABLET | Refills: 0 | Status: SHIPPED | OUTPATIENT
Start: 2021-05-20 | End: 2021-06-30 | Stop reason: SDUPTHER

## 2021-05-20 RX ORDER — SERTRALINE HYDROCHLORIDE 100 MG/1
TABLET, FILM COATED ORAL
Qty: 135 TABLET | Refills: 0 | Status: SHIPPED | OUTPATIENT
Start: 2021-05-20 | End: 2021-07-14 | Stop reason: SDUPTHER

## 2021-05-28 ENCOUNTER — OFFICE VISIT (OUTPATIENT)
Dept: GASTROENTEROLOGY | Facility: CLINIC | Age: 41
End: 2021-05-28
Payer: COMMERCIAL

## 2021-05-28 VITALS
WEIGHT: 216 LBS | HEIGHT: 65 IN | TEMPERATURE: 99 F | DIASTOLIC BLOOD PRESSURE: 80 MMHG | HEART RATE: 77 BPM | SYSTOLIC BLOOD PRESSURE: 120 MMHG | BODY MASS INDEX: 35.99 KG/M2

## 2021-05-28 DIAGNOSIS — R63.4 UNINTENTIONAL WEIGHT LOSS OF 5% BODY WEIGHT OR LESS WITHIN 1 MONTH: ICD-10-CM

## 2021-05-28 DIAGNOSIS — R19.4 CHANGE IN BOWEL HABITS: ICD-10-CM

## 2021-05-28 DIAGNOSIS — K59.00 CONSTIPATION, UNSPECIFIED CONSTIPATION TYPE: ICD-10-CM

## 2021-05-28 DIAGNOSIS — R10.11 RIGHT UPPER QUADRANT ABDOMINAL PAIN: Primary | ICD-10-CM

## 2021-05-28 PROBLEM — E66.9 OBESITY (BMI 30-39.9): Status: ACTIVE | Noted: 2021-05-28

## 2021-05-28 PROCEDURE — 3008F BODY MASS INDEX DOCD: CPT | Performed by: INTERNAL MEDICINE

## 2021-05-28 PROCEDURE — 1036F TOBACCO NON-USER: CPT | Performed by: INTERNAL MEDICINE

## 2021-05-28 PROCEDURE — 99204 OFFICE O/P NEW MOD 45 MIN: CPT | Performed by: INTERNAL MEDICINE

## 2021-05-28 RX ORDER — ONDANSETRON 4 MG/1
4 TABLET, ORALLY DISINTEGRATING ORAL EVERY 6 HOURS PRN
Qty: 20 TABLET | Refills: 0 | Status: SHIPPED | OUTPATIENT
Start: 2021-05-28 | End: 2021-07-14 | Stop reason: SDUPTHER

## 2021-05-28 RX ORDER — PROCHLORPERAZINE MALEATE 5 MG/1
TABLET ORAL
COMMUNITY
Start: 2021-04-28

## 2021-05-28 RX ORDER — POLYETHYLENE GLYCOL 3350 17 G/17G
POWDER, FOR SOLUTION ORAL
Qty: 238 G | Refills: 0 | Status: SHIPPED | OUTPATIENT
Start: 2021-05-28

## 2021-05-28 RX ORDER — DICYCLOMINE HCL 20 MG
20 TABLET ORAL
Qty: 120 TABLET | Refills: 5 | Status: SHIPPED | OUTPATIENT
Start: 2021-05-28 | End: 2021-07-14 | Stop reason: SDUPTHER

## 2021-05-28 NOTE — PROGRESS NOTES
[facility] - Outpatient Consultation  Sarah Parnell 47 y o  female MRN: [id number]  Encounter: [id number]      PCP: Walker Paget, MD  Referring: Walker Paget, MD  [address] [place] [address],[address]  [facility],  [address] [place] [address]      ASSESSMENT AND PLAN:      1  Right upper quadrant abdominal pain  2  Change in bowel habits  3  Unintentional weight loss of 5% body weight or less within 1 month  Therapeutic failure of Prilosec, Carafate, Pepcid  - Colonoscopy; Future  - EGD; Future, to evaluate for peptic ulcer disease, celiac, H pylori, assess for gastric outlet obstruction  - polyethylene glycol (GLYCOLAX) 17 GM/SCOOP powder; At 5pm take 5mgx2 dulcolax  At 6pm mix 238 g miralax in 64oz gatorade  Drink 8oz glass every 5 mins until 32oz finished  Drink remaining as rec  Dispense: 238 g; Refill: 0  - Pancreatic elastase, fecal; Future  - Calprotectin,Fecal; Future  - Celiac Disease Antibody Profile; Future  - C-reactive protein; Future  - Giardia antigen; Future  - dicyclomine (BENTYL) 20 mg tablet; Take 1 tablet (20 mg total) by mouth 4 (four) times a day (before meals and at bedtime)  Dispense: 120 tablet; Refill: 5  - ondansetron (ZOFRAN-ODT) 4 mg disintegrating tablet; Take 1 tablet (4 mg total) by mouth every 6 (six) hours as needed for nausea or vomiting  Dispense: 20 tablet; Refill: 0  - consider CTA, gastric emptying study if above evaluation is unrevealing  - consider inpatient admission if symptoms not improved    4  Constipation, unspecified constipation type  Failed OTC laxatives  - linaCLOtide 145 MCG CAPS; Take 1 capsule (145 mcg total) by mouth daily  Dispense: 30 capsule;  Refill: 5    ______________________________________________________________________    CC:  Chief Complaint   Patient presents with    Diarrhea    Nausea     with vomiting seen blood in it    Loss of Appetite     can't keep anything in    Abdominal Pain     really bad stomach pain       HPI: Patient is a 72-year-old female referred for diarrhea, nausea, loss of appetite, abdominal pain  She has migraine headaches, mitral valve prolapse, superficial thrombophlebitis of the left upper extremity, iliotibial band syndrome, abnormal uterine bleeding, anxiety, depression, HLD, vitamin-D deficiency, obesity with BMI of 36  She is s/p cholecystectomy  She complains of right upper quadrant, epigastric pain  Pain is an 8/10 in nature, which is aching, cramping in severity  It has worsened, and become constant over the last one month  Pain radiates to generalized abdomen  Pain is associated with decreased appetite, belching, nausea, and vomiting  She has change in bowel habits with constipation and bowel movements every 2-3 days  She has tried/failed multiple OTC laxatives including dulcolax, senna, miralax, and fiber  She has tried peptobismol and imodium without relief  She has frequent small volume stools with incomplete evacuation after eating sometimes  Pain is worse when she has not had a bowel movement for several days  No relief with Prilosec, Carafate, Pepcid  She was seen in the ER for her symptoms earlier this month, treated with Zofran, and underwent CT abdomen/pelvis without enteric contrast, without intra-abdominal abnormality  CT x 4 since June 2019  Previous CT with severe hepatic steatosis, fecal burden, hiatal hernia  Answers for HPI/ROS submitted by the patient on 5/27/2021   Abdominal pain  Chronicity: chronic  Onset: more than 1 month ago  Onset quality: gradual  Frequency: constantly  Progression since onset: rapidly worsening  Pain location: RUQ, epigastric region  Pain - numeric: 8/10  Pain quality: aching, cramping, sharp  Radiates to: RLQ, chest, right flank, pelvis  anorexia: Yes  arthralgias: No  belching: Yes  constipation: Yes  diarrhea: Yes  hematochezia: Yes  hematuria: No  melena:  Yes  myalgias: Yes  nausea: Yes  weight loss: Yes  vomiting: Yes  Aggravated by: certain positions, eating, vomiting  Relieved by: certain positions, recumbency  Diagnostic workup: CT scan      REVIEW OF SYSTEMS:    CONSTITUTIONAL: Denies any fever, chills, rigors, and weight loss  HEENT: No earache or tinnitus  Denies hearing loss or visual disturbances  CARDIOVASCULAR: No chest pain or palpitations  RESPIRATORY: Denies any cough, hemoptysis, shortness of breath or dyspnea on exertion  GASTROINTESTINAL: As noted in the History of Present Illness  GENITOURINARY: No problems with urination  Denies any hematuria or dysuria  NEUROLOGIC: No dizziness or vertigo, denies headaches  MUSCULOSKELETAL: Denies any muscle or joint pain  SKIN: Denies skin rashes or itching  ENDOCRINE: Denies excessive thirst  Denies intolerance to heat or cold  PSYCHOSOCIAL: Denies depression or anxiety  Denies any recent memory loss  Historical Information   Past Medical History:   Diagnosis Date    Anxiety     Depression     Fibroid     GERD (gastroesophageal reflux disease)     History of pulmonary embolus (PE)     Iliotibial band syndrome     last assessed - [id number]    Mitral valve prolapse     Obesity     Pneumonia     2018    PONV (postoperative nausea and vomiting)     Patient requests to be premedicated for N/V    Wears glasses     for reading     Past Surgical History:   Procedure Laterality Date    CHOLECYSTECTOMY      ESOPHAGOGASTRODUODENOSCOPY N/A 5/31/2018    Procedure: ESOPHAGOGASTRODUODENOSCOPY (EGD); Surgeon: Donnie Telles MD;  [facility]; Service: Gastroenterology    GALLBLADDER SURGERY      KNEE SURGERY Left     growth plate fx left knee    KNEE SURGERY Right     HI HYSTEROSCOPY,W/ENDO BX N/A 8/3/2020    Procedure: DILATATION AND CURETTAGE (D&C) WITH HYSTEROSCOPY (MYOSURE);   Surgeon: Hector Baker DO;  [facility];  Service: Gynecology    HI HYSTEROSCOPY,W/ENDO BX N/A 8/3/2020    Procedure: HYSTEROSCOPY WITH  RESECTION UTERINE TUMOR/FIBROID; Surgeon: Andrews Keys DO;  [facility];  Service: Gynecology    UT HYSTEROSCOPY,W/ENDOMETRIAL ABLATION N/A 8/3/2020    Procedure: ABLATION ENDOMETRIAL Loreli Magnolia;  Surgeon: Andrews Keys DO;  [facility];  Service: Gynecology    UT OPEN TREATMENT METATARSAL FRACTURE EACH Left 4/10/2019    Procedure: OPEN REDUCTION W/ INTERNAL FIXATION (ORIF) FOOT, Left 5th metatarsal fracture open reduction internal fixation with calcaneal bone graft and bone marrow aspirate concentrate;  Surgeon: Master Gomes MD;  [facility];  Service: Orthopedics    TUBAL LIGATION      UPPER GASTROINTESTINAL ENDOSCOPY      WISDOM TOOTH EXTRACTION       Social History   Social History     Substance and Sexual Activity   Alcohol Use Not Currently    Frequency: Monthly or less    Drinks per session: 1 or 2    Comment: social     Social History     Substance and Sexual Activity   Drug Use No     Social History     Tobacco Use   Smoking Status Former Smoker    Packs/day: 0 25   Smokeless Tobacco Never Used     Family History   Adopted: Yes   Problem Relation Age of Onset    No Known Problems Mother     Alcohol abuse Neg Hx     Substance Abuse Neg Hx     Mental illness Neg Hx     Depression Neg Hx        Meds/Allergies       Current Outpatient Medications:     acetaminophen (TYLENOL) 325 mg tablet    ALPRAZolam (XANAX) 0 25 mg tablet    busPIRone (BUSPAR) 5 mg tablet    Cetirizine HCl (ZYRTEC ALLERGY) 10 MG CAPS    famotidine (PEPCID) 40 MG tablet    omeprazole (PriLOSEC) 20 mg delayed release capsule    sertraline (ZOLOFT) 100 mg tablet    sucralfate (CARAFATE) 1 g/10 mL suspension    topiramate (TOPAMAX) 25 mg tablet    zolpidem (AMBIEN) 10 mg tablet    diclofenac sodium (VOLTAREN) 1 %    fluticasone (FLONASE) 50 mcg/act nasal spray    ibuprofen (MOTRIN) 400 mg tablet    ibuprofen (MOTRIN) 600 mg tablet    prochlorperazine (COMPAZINE) 5 mg tablet    Allergies   Allergen Reactions  Penicillin G Hives           Objective     Blood pressure 120/80, pulse 77, temperature 99 °F (37 2 °C), temperature source Tympanic, height 5' 5" (1 651 m), weight 98 kg (216 lb)  Body mass index is 35 94 kg/m²  PHYSICAL EXAM:      General Appearance:   Alert, cooperative, no distress   HEENT:   Normocephalic, atraumatic, anicteric  Neck:  Supple, symmetrical, trachea midline   Lungs:   Clear to auscultation bilaterally; no rales, rhonchi or wheezing; respirations unlabored    Heart[de-identified]   Regular rate and rhythm; no murmur, rub, or gallop  Abdomen:   Soft, +ruq/generalized abdominal pain to palpation, non-distended; normal bowel sounds; no masses, no organomegaly    Genitalia:   Deferred    Rectal:   Deferred    Extremities:  No cyanosis, clubbing or edema    Pulses:  2+ and symmetric    Skin:  No jaundice, rashes, or lesions    Lymph nodes:  No palpable cervical lymphadenopathy        Lab Results:     Lab Results   Component Value Date    WBC 10 03 05/02/2021    HGB 13 2 05/02/2021    HCT 40 0 05/02/2021    MCV 86 05/02/2021     05/02/2021       Lab Results   Component Value Date     05/11/2014    K 3 8 05/02/2021     05/02/2021    CO2 23 05/02/2021    ANIONGAP 5 05/11/2014    BUN 13 05/02/2021    CREATININE 1 08 05/02/2021    GLUCOSE 118 05/11/2014    GLUF 162 (H) 05/03/2019    CALCIUM 9 1 05/02/2021    AST 13 05/02/2021    ALT 27 05/02/2021    ALKPHOS 88 05/02/2021    PROT 7 6 05/11/2014    BILITOT 0 19 (L) 05/11/2014    EGFR 65 05/02/2021       Lab Results   Component Value Date    INR 0 94 03/05/2021    INR 0 97 11/11/2020    INR 1 03 07/12/2019    PROTIME 12 7 03/05/2021    PROTIME 13 0 11/11/2020    PROTIME 12 9 07/12/2019         Radiology Results:   Ct Abdomen Pelvis With Contrast    Result Date: 5/3/2021  Narrative: CT ABDOMEN AND PELVIS WITH IV CONTRAST INDICATION:   RLQ abdominal pain, appendicitis suspected (Age => 16y) right sided abd pain  COMPARISON:  None   TECHNIQUE: CT examination of the abdomen and pelvis was performed  Axial, sagittal, and coronal 2D reformatted images were created from the source data and submitted for interpretation  Radiation dose length product (DLP) for this visit:  968 mGy-cm   This examination, like all CT scans performed in the [facility], was performed utilizing techniques to minimize radiation dose exposure, including the use of iterative reconstruction and automated exposure control  IV Contrast:  100 mL of iohexol (OMNIPAQUE) Enteric Contrast:  Enteric contrast was not administered  FINDINGS: ABDOMEN LOWER CHEST:  No clinically significant abnormality identified in the visualized lower chest  LIVER/BILIARY TREE:  Unremarkable  GALLBLADDER:  Gallbladder is surgically absent  SPLEEN:  Unremarkable  PANCREAS:  Unremarkable  ADRENAL GLANDS:  Unremarkable  KIDNEYS/URETERS:  Unremarkable  No hydronephrosis  STOMACH AND BOWEL:  Unremarkable  APPENDIX:  A normal appendix was visualized  ABDOMINOPELVIC CAVITY:  No ascites  No pneumoperitoneum  No lymphadenopathy  VESSELS:  Unremarkable for patient's age  PELVIS REPRODUCTIVE ORGANS:  Unremarkable for patient's age  URINARY BLADDER:  Unremarkable  ABDOMINAL WALL/INGUINAL REGIONS:  Unremarkable  OSSEOUS STRUCTURES:  No acute fracture or destructive osseous lesion  Impression: Normal appendix  No evidence of acute intra-abdominal or pelvic pathology Workstation performed: [id number]       Portions of the record may have been created with voice recognition software  Occasional wrong word or "sound a like" substitutions may have occurred due to the inherent limitations of voice recognition software  Read the chart carefully and recognize, using context, where substitutions have occurred

## 2021-05-28 NOTE — PATIENT INSTRUCTIONS
EGD/colon scheduled for Thursday, 7/29/21, at [facility], with Dr Dorinda Kim  Mirtomx/Dulcolax prep instructions provided to patient in office by Layo Sequeira

## 2021-05-30 DIAGNOSIS — F41.9 ANXIETY: ICD-10-CM

## 2021-06-01 ENCOUNTER — TELEPHONE (OUTPATIENT)
Dept: OTHER | Facility: OTHER | Age: 41
End: 2021-06-01

## 2021-06-01 ENCOUNTER — HOSPITAL ENCOUNTER (EMERGENCY)
Facility: HOSPITAL | Age: 41
Discharge: HOME | End: 2021-06-01
Attending: EMERGENCY MEDICINE
Payer: COMMERCIAL

## 2021-06-01 VITALS
WEIGHT: 216 LBS | DIASTOLIC BLOOD PRESSURE: 58 MMHG | BODY MASS INDEX: 35.99 KG/M2 | SYSTOLIC BLOOD PRESSURE: 102 MMHG | OXYGEN SATURATION: 95 % | HEIGHT: 65 IN | TEMPERATURE: 98.4 F | HEART RATE: 64 BPM | RESPIRATION RATE: 16 BRPM

## 2021-06-01 DIAGNOSIS — R10.9 ABDOMINAL PAIN: Primary | ICD-10-CM

## 2021-06-01 LAB
ALBUMIN SERPL BCP-MCNC: 3.9 G/DL (ref 3.5–5)
ALP SERPL-CCNC: 83 U/L (ref 46–116)
ALT SERPL W P-5'-P-CCNC: 29 U/L (ref 12–78)
AMPHETAMINES SERPL QL SCN: NEGATIVE
ANION GAP SERPL CALCULATED.3IONS-SCNC: 12 MMOL/L (ref 4–13)
AST SERPL W P-5'-P-CCNC: 25 U/L (ref 5–45)
BACTERIA UR QL AUTO: NORMAL /HPF
BARBITURATES UR QL: NEGATIVE
BASOPHILS # BLD AUTO: 0.07 THOUSANDS/ΜL (ref 0–0.1)
BASOPHILS NFR BLD AUTO: 1 % (ref 0–1)
BENZODIAZ UR QL: NEGATIVE
BILIRUB SERPL-MCNC: 0.31 MG/DL (ref 0.2–1)
BILIRUB UR QL STRIP: NEGATIVE
BUN SERPL-MCNC: 9 MG/DL (ref 5–25)
CALCIUM SERPL-MCNC: 9.1 MG/DL (ref 8.3–10.1)
CHLORIDE SERPL-SCNC: 108 MMOL/L (ref 100–108)
CLARITY UR: CLEAR
CO2 SERPL-SCNC: 21 MMOL/L (ref 21–32)
COCAINE UR QL: NEGATIVE
COLOR UR: YELLOW
CREAT SERPL-MCNC: 1.06 MG/DL (ref 0.6–1.3)
EOSINOPHIL # BLD AUTO: 0.46 THOUSAND/ΜL (ref 0–0.61)
EOSINOPHIL NFR BLD AUTO: 4 % (ref 0–6)
ERYTHROCYTE [DISTWIDTH] IN BLOOD BY AUTOMATED COUNT: 13.7 % (ref 11.6–15.1)
EXT PREG TEST URINE: NEGATIVE
EXT. CONTROL ED NAV: NORMAL
GFR SERPL CREATININE-BSD FRML MDRD: 67 ML/MIN/1.73SQ M
GLUCOSE SERPL-MCNC: 97 MG/DL (ref 65–140)
GLUCOSE UR STRIP-MCNC: NEGATIVE MG/DL
HCT VFR BLD AUTO: 40.7 % (ref 34.8–46.1)
HGB BLD-MCNC: 13.3 G/DL (ref 11.5–15.4)
HGB UR QL STRIP.AUTO: ABNORMAL
IMM GRANULOCYTES # BLD AUTO: 0.24 THOUSAND/UL (ref 0–0.2)
IMM GRANULOCYTES NFR BLD AUTO: 2 % (ref 0–2)
KETONES UR STRIP-MCNC: NEGATIVE MG/DL
LACTATE SERPL-SCNC: 1 MMOL/L (ref 0.5–2)
LEUKOCYTE ESTERASE UR QL STRIP: NEGATIVE
LIPASE SERPL-CCNC: 88 U/L (ref 73–393)
LYMPHOCYTES # BLD AUTO: 2.28 THOUSANDS/ΜL (ref 0.6–4.47)
LYMPHOCYTES NFR BLD AUTO: 21 % (ref 14–44)
MCH RBC QN AUTO: 27.9 PG (ref 26.8–34.3)
MCHC RBC AUTO-ENTMCNC: 32.7 G/DL (ref 31.4–37.4)
MCV RBC AUTO: 86 FL (ref 82–98)
METHADONE UR QL: NEGATIVE
MONOCYTES # BLD AUTO: 0.62 THOUSAND/ΜL (ref 0.17–1.22)
MONOCYTES NFR BLD AUTO: 6 % (ref 4–12)
NEUTROPHILS # BLD AUTO: 7.14 THOUSANDS/ΜL (ref 1.85–7.62)
NEUTS SEG NFR BLD AUTO: 66 % (ref 43–75)
NITRITE UR QL STRIP: NEGATIVE
NON-SQ EPI CELLS URNS QL MICRO: NORMAL /HPF
NRBC BLD AUTO-RTO: 0 /100 WBCS
OPIATES UR QL SCN: NEGATIVE
OXYCODONE+OXYMORPHONE UR QL SCN: NEGATIVE
PCP UR QL: NEGATIVE
PH UR STRIP.AUTO: 6 [PH] (ref 4.5–8)
PLATELET # BLD AUTO: 259 THOUSANDS/UL (ref 149–390)
PMV BLD AUTO: 9.5 FL (ref 8.9–12.7)
POTASSIUM SERPL-SCNC: 4.2 MMOL/L (ref 3.5–5.3)
PROT SERPL-MCNC: 8 G/DL (ref 6.4–8.2)
PROT UR STRIP-MCNC: NEGATIVE MG/DL
RBC # BLD AUTO: 4.76 MILLION/UL (ref 3.81–5.12)
RBC #/AREA URNS AUTO: NORMAL /HPF
SODIUM SERPL-SCNC: 141 MMOL/L (ref 136–145)
SP GR UR STRIP.AUTO: 1.02 (ref 1–1.03)
THC UR QL: POSITIVE
URATE CRY URNS QL MICRO: NORMAL /HPF
UROBILINOGEN UR QL STRIP.AUTO: 0.2 E.U./DL
WBC # BLD AUTO: 10.81 THOUSAND/UL (ref 4.31–10.16)
WBC #/AREA URNS AUTO: NORMAL /HPF

## 2021-06-01 PROCEDURE — 96375 TX/PRO/DX INJ NEW DRUG ADDON: CPT

## 2021-06-01 PROCEDURE — 81025 URINE PREGNANCY TEST: CPT | Performed by: EMERGENCY MEDICINE

## 2021-06-01 PROCEDURE — 81001 URINALYSIS AUTO W/SCOPE: CPT

## 2021-06-01 PROCEDURE — 99284 EMERGENCY DEPT VISIT MOD MDM: CPT

## 2021-06-01 PROCEDURE — 36415 COLL VENOUS BLD VENIPUNCTURE: CPT | Performed by: EMERGENCY MEDICINE

## 2021-06-01 PROCEDURE — 83605 ASSAY OF LACTIC ACID: CPT | Performed by: EMERGENCY MEDICINE

## 2021-06-01 PROCEDURE — 80307 DRUG TEST PRSMV CHEM ANLYZR: CPT | Performed by: EMERGENCY MEDICINE

## 2021-06-01 PROCEDURE — 96361 HYDRATE IV INFUSION ADD-ON: CPT

## 2021-06-01 PROCEDURE — 83690 ASSAY OF LIPASE: CPT | Performed by: EMERGENCY MEDICINE

## 2021-06-01 PROCEDURE — 85025 COMPLETE CBC W/AUTO DIFF WBC: CPT | Performed by: EMERGENCY MEDICINE

## 2021-06-01 PROCEDURE — 80053 COMPREHEN METABOLIC PANEL: CPT | Performed by: EMERGENCY MEDICINE

## 2021-06-01 PROCEDURE — 99285 EMERGENCY DEPT VISIT HI MDM: CPT | Performed by: EMERGENCY MEDICINE

## 2021-06-01 PROCEDURE — 93005 ELECTROCARDIOGRAM TRACING: CPT

## 2021-06-01 PROCEDURE — 96374 THER/PROPH/DIAG INJ IV PUSH: CPT

## 2021-06-01 RX ORDER — ALPRAZOLAM 0.25 MG/1
0.25 TABLET ORAL DAILY PRN
Qty: 30 TABLET | Refills: 0 | Status: SHIPPED | OUTPATIENT
Start: 2021-06-01 | End: 2021-07-14 | Stop reason: SDUPTHER

## 2021-06-01 RX ORDER — MORPHINE SULFATE 4 MG/ML
4 INJECTION, SOLUTION INTRAMUSCULAR; INTRAVENOUS ONCE
Status: COMPLETED | OUTPATIENT
Start: 2021-06-01 | End: 2021-06-01

## 2021-06-01 RX ORDER — BUSPIRONE HYDROCHLORIDE 5 MG/1
5 TABLET ORAL 3 TIMES DAILY PRN
Qty: 90 TABLET | Refills: 0 | Status: SHIPPED | OUTPATIENT
Start: 2021-06-01 | End: 2021-07-14 | Stop reason: SDUPTHER

## 2021-06-01 RX ORDER — HALOPERIDOL 5 MG/ML
5 INJECTION INTRAMUSCULAR ONCE
Status: COMPLETED | OUTPATIENT
Start: 2021-06-01 | End: 2021-06-01

## 2021-06-01 RX ADMIN — HALOPERIDOL LACTATE 5 MG: 5 INJECTION, SOLUTION INTRAMUSCULAR at 11:13

## 2021-06-01 RX ADMIN — SODIUM CHLORIDE 1000 ML: 0.9 INJECTION, SOLUTION INTRAVENOUS at 09:22

## 2021-06-01 RX ADMIN — MORPHINE SULFATE 4 MG: 4 INJECTION INTRAVENOUS at 09:54

## 2021-06-01 RX ADMIN — FAMOTIDINE 20 MG: 10 INJECTION, SOLUTION INTRAVENOUS at 09:54

## 2021-06-01 NOTE — Clinical Note
Christian Tovar was seen and treated in our emergency department on 6/1/2021  Diagnosis:     Brett Martinez  is off the rest of the shift today  She may return on this date: If you have any questions or concerns, please don't hesitate to call        Ebony Lui MD    ______________________________           _______________          _______________  Hospital Representative                              Date                                Time

## 2021-06-01 NOTE — ED NOTES
Pt drove here but advised she needs to call for a ride for discharge   Awaiting discharge instructions and IV removal       Jen Romero RN  06/01/21 [id number]

## 2021-06-01 NOTE — Clinical Note
Yenni Card was seen and treated in our emergency department on 6/1/2021  Diagnosis:     Nette Agosto  is off the rest of the shift today  She may return on this date: If you have any questions or concerns, please don't hesitate to call        Archie Martinez MD    ______________________________           _______________          _______________  Hospital Representative                              Date                                Time

## 2021-06-01 NOTE — TELEPHONE ENCOUNTER
[phone]/Brielle Mak  84/Pt is having severe stomach pain, diarrhea and vomiting  Pt was advised on  by Dr Juanita Griffin to call on-call if symptoms worsened  Dr Raj Rodriguez was paged at [phone]    Please allow 20-30 mins for the provider to call you back  If you do not hear from the provider, please call us back

## 2021-06-01 NOTE — TELEPHONE ENCOUNTER
Hx abdominal pain, change in bowel habits/ constipation, unintentional weight loss  Patient spoke to [place] 6:38 am to report severe abdominal pain, diarrhea and vomiting  She did not receive follow up call so she sent my chart message at 8:16 am       I spoke to patient regarding symptoms  She had office visit on Friday and discussed on going symptoms -abdominal pain and constipation and started bentyl, linzess and miralax on Friday  She woke up yesterday morning with increased abdominal pain- 9/10 constant sharp pain, diarrhea and n/v   4 loose BMs yesterday/ no blood or mucous and persistent nausea/ vomited 5 times  Denies fever  Taking prilosec, carafate, bentlyl, zofran, linzess and miralax with no improvement  Not able to tolerate po fluids/ solids today  Patient was on her way to [facility] during phone encounter  Instructed patient to hold miralax and linzess until evaluated and diarrhea resolves

## 2021-06-01 NOTE — DISCHARGE INSTRUCTIONS
Your exam and testing was very reassuring  Your symptoms improved with the treatment in the ER  Continue bland diet and medications prescribed by GI, avoid cannabis, and follow up with the GI office (Dr Hanna Niño)  If you have worsening symptoms, inability to eat or drink, high fever, or any new concerns please don't hesitate to return to the ER  We are always here and always happy to re-evaluate!

## 2021-06-03 LAB
ATRIAL RATE: 70 BPM
P AXIS: 60 DEGREES
PR INTERVAL: 154 MS
QRS AXIS: 90 DEGREES
QRSD INTERVAL: 86 MS
QT INTERVAL: 398 MS
QTC INTERVAL: 421 MS
T WAVE AXIS: 62 DEGREES
VENTRICULAR RATE: 67 BPM

## 2021-06-03 PROCEDURE — 93010 ELECTROCARDIOGRAM REPORT: CPT | Performed by: INTERNAL MEDICINE

## 2021-06-05 NOTE — ED PROVIDER NOTES
History  Chief Complaint   Patient presents with    Abdominal Pain     onset for several months     46 yo female with h/o anxiety/depression, GERD p/w several months of abdominal pain, mostly RUQ/epigastrium, and constipation that is worse than baseline  Pt reports assocaited unintentional weight loss and difficulty with tolerating PO at home  Denies street drug/ETOH use/daily NSAID use/f/c, recent antibiotics, travel or sick contacts  Pt was seen for same by GI 5/28/2021 who recommended glyclolax, bentyl, and zofran to help with symptoms  They are currently working patient up with outpatient labs  Pt reports only mild improvement of symptoms  Reports pt now having loose stools, denies GI bleeding and does endorse flatus  Denies  symptoms  History provided by:  Medical records and patient   used: No    Abdominal Pain  Pain location:  RUQ and epigastric  Pain quality: aching    Pain radiates to:  Does not radiate  Pain severity:  Moderate  Onset quality:  Gradual  Duration:  8 weeks  Timing:  Constant  Progression:  Unchanged  Chronicity:  Recurrent  Context: not alcohol use, not diet changes, not eating, not medication withdrawal, not sick contacts, not suspicious food intake and not trauma    Relieved by:  Nothing  Worsened by:  Eating  Ineffective treatments:  Antacids  Associated symptoms: anorexia, constipation, flatus, nausea and vomiting    Associated symptoms: no chest pain, no chills, no diarrhea, no dysuria, no fever, no hematemesis, no hematochezia, no hematuria, no melena, no shortness of breath, no sore throat, no vaginal bleeding and no vaginal discharge    Risk factors: obesity    Risk factors: no alcohol abuse, has not had multiple surgeries, not pregnant and no recent hospitalization        Prior to Admission Medications   Prescriptions Last Dose Informant Patient Reported? Taking?    Cetirizine HCl (ZYRTEC ALLERGY) 10 MG CAPS  Self Yes No   Sig: Take 10 mg by mouth    acetaminophen (TYLENOL) 325 mg tablet  Self No No   Sig: Take 2 tablets (650 mg total) by mouth every 6 (six) hours as needed for mild pain   busPIRone (BUSPAR) 5 mg tablet   No No   Sig: Take 1 tablet (5 mg total) by mouth 3 (three) times a day as needed (anxiety)   diclofenac sodium (VOLTAREN) 1 %   No No   Sig: Apply 2 g topically 4 (four) times a day for 5 days   dicyclomine (BENTYL) 20 mg tablet   No No   Sig: Take 1 tablet (20 mg total) by mouth 4 (four) times a day (before meals and at bedtime)   fluticasone (FLONASE) 50 mcg/act nasal spray  Self No No   Si spray into each nostril daily   Patient not taking: Reported on 2021   ibuprofen (MOTRIN) 400 mg tablet   No No   Sig: Take 1 tablet (400 mg total) by mouth every 6 (six) hours as needed for mild pain for up to 3 days   ibuprofen (MOTRIN) 600 mg tablet  Self No No   Sig: Take 1 tablet (600 mg total) by mouth every 6 (six) hours as needed for moderate pain (cramping)   Patient not taking: Reported on 2021   linaCLOtide 145 MCG CAPS   No No   Sig: Take 1 capsule (145 mcg total) by mouth daily   ondansetron (ZOFRAN-ODT) 4 mg disintegrating tablet   No No   Sig: Take 1 tablet (4 mg total) by mouth every 6 (six) hours as needed for nausea or vomiting   polyethylene glycol (GLYCOLAX) 17 GM/SCOOP powder   No No   Sig: At 5pm take 5mgx2 dulcolax  At 6pm mix 238 g miralax in 64oz gatorade  Drink 8oz glass every 5 mins until 32oz finished   Drink remaining as rec    prochlorperazine (COMPAZINE) 5 mg tablet   Yes No   sertraline (ZOLOFT) 100 mg tablet   No No   Sig: TAKE 1 AND 1/2 TABLET BY MOUTH ONCE DAILY   topiramate (TOPAMAX) 25 mg tablet   No No   Sig: Take 1 tablet (25 mg total) by mouth 2 (two) times a day Take 1 tab PO qHS x 1 week then bid   zolpidem (AMBIEN) 10 mg tablet   No No   Sig: TAKE 1/2 TO 1 TABLET BY MOUTH AT BEDTIME AS NEEDED FOR SLEEP      Facility-Administered Medications: None       Past Medical History:   Diagnosis Date  Anxiety     Depression     Fibroid     GERD (gastroesophageal reflux disease)     History of pulmonary embolus (PE)     Iliotibial band syndrome     last assessed - [id number]    Mitral valve prolapse     Obesity     Pneumonia     2018    PONV (postoperative nausea and vomiting)     Patient requests to be premedicated for N/V    Wears glasses     for reading       Past Surgical History:   Procedure Laterality Date    CHOLECYSTECTOMY      ESOPHAGOGASTRODUODENOSCOPY N/A 5/31/2018    Procedure: ESOPHAGOGASTRODUODENOSCOPY (EGD); Surgeon: Katherin Matson MD;  [facility]; Service: Gastroenterology    GALLBLADDER SURGERY      KNEE SURGERY Left     growth plate fx left knee    KNEE SURGERY Right     OR HYSTEROSCOPY,W/ENDO BX N/A 8/3/2020    Procedure: DILATATION AND CURETTAGE (D&C) WITH HYSTEROSCOPY (MYOSURE);   Surgeon: Jacobo Goldberg, DO;  [facility];  Service: Gynecology    OR HYSTEROSCOPY,W/ENDO BX N/A 8/3/2020    Procedure: HYSTEROSCOPY WITH  RESECTION UTERINE TUMOR/FIBROID;  Surgeon: Jacobo Goldberg, DO;  [facility];  Service: Gynecology    OR HYSTEROSCOPY,W/ENDOMETRIAL ABLATION N/A 8/3/2020    Procedure: ABLATION ENDOMETRIAL Iver Rang;  Surgeon: Jacobo Goldberg, DO;  [facility];  Service: Gynecology    OR OPEN TREATMENT METATARSAL FRACTURE EACH Left 4/10/2019    Procedure: OPEN REDUCTION W/ INTERNAL FIXATION (ORIF) FOOT, Left 5th metatarsal fracture open reduction internal fixation with calcaneal bone graft and bone marrow aspirate concentrate;  Surgeon: Reymundo Lee MD;  [facility];  Service: Orthopedics    TUBAL LIGATION      UPPER GASTROINTESTINAL ENDOSCOPY      WISDOM TOOTH EXTRACTION         Family History   Adopted: Yes   Problem Relation Age of Onset    No Known Problems Mother     Alcohol abuse Neg Hx     Substance Abuse Neg Hx     Mental illness Neg Hx     Depression Neg Hx      I have reviewed and agree with the history as documented  E-Cigarette/Vaping    E-Cigarette Use Never User      E-Cigarette/Vaping Substances    Nicotine Yes     THC No     CBD No      Social History     Tobacco Use    Smoking status: Never Smoker    Smokeless tobacco: Never Used   Substance Use Topics    Alcohol use: Not Currently     Frequency: Monthly or less     Drinks per session: 1 or 2     Comment: social    Drug use: No       Review of Systems   Constitutional: Negative for chills and fever  HENT: Negative for rhinorrhea and sore throat  Respiratory: Negative for shortness of breath  Cardiovascular: Negative for chest pain  Gastrointestinal: Positive for abdominal pain, anorexia, constipation, flatus, nausea and vomiting  Negative for diarrhea, hematemesis, hematochezia and melena  Genitourinary: Negative for dysuria, hematuria, vaginal bleeding and vaginal discharge  Musculoskeletal: Negative for myalgias  Skin: Negative for rash  Neurological: Negative for dizziness  All other systems reviewed and are negative  Physical Exam  Physical Exam  Vitals signs and nursing note reviewed  Constitutional:       Appearance: She is well-developed  She is not diaphoretic  HENT:      Head: Normocephalic and atraumatic  Eyes:      General: No scleral icterus  Conjunctiva/sclera: Conjunctivae normal    Neck:      Musculoskeletal: Normal range of motion  Cardiovascular:      Rate and Rhythm: Normal rate and regular rhythm  Heart sounds: Normal heart sounds  No murmur  Pulmonary:      Effort: Pulmonary effort is normal  No respiratory distress  Breath sounds: Normal breath sounds  Abdominal:      Palpations: Abdomen is soft  Tenderness: There is generalized abdominal tenderness  There is no right CVA tenderness, left CVA tenderness, guarding or rebound  Musculoskeletal: Normal range of motion  General: No deformity  Skin:     General: Skin is warm and dry        Capillary Refill: Capillary refill takes less than 2 seconds  Neurological:      Mental Status: She is alert and oriented to person, place, and time  Psychiatric:         Mood and Affect: Mood is anxious  Behavior: Behavior normal          Thought Content:  Thought content normal          Judgment: Judgment normal          Vital Signs  ED Triage Vitals [06/01/21 0901]   Temperature Pulse Respirations Blood Pressure SpO2   98 4 °F (36 9 °C) 85 16 134/71 97 %      Temp Source Heart Rate Source Patient Position - Orthostatic VS BP Location FiO2 (%)   Oral Monitor Lying Right arm --      Pain Score       9           Vitals:    06/01/21 0901 06/01/21 1115 06/01/21 1130   BP: 134/71 126/74 102/58   Pulse: 85 69 64   Patient Position - Orthostatic VS: Lying Lying          Visual Acuity      ED Medications  Medications   sodium chloride 0 9 % bolus 1,000 mL (0 mL Intravenous Stopped 6/1/21 1113)   famotidine (PEPCID) injection 20 mg (20 mg Intravenous Given 6/1/21 0954)   morphine (PF) 4 mg/mL injection 4 mg (4 mg Intravenous Given 6/1/21 0954)   haloperidol lactate (HALDOL) injection 5 mg (5 mg Intravenous Given 6/1/21 1113)       Diagnostic Studies  Results Reviewed     Procedure Component Value Units Date/Time    Lipase [[id number]]  (Normal) Collected: 06/01/21 0921    Lab Status: Final result Specimen: Blood from Arm, Left Updated: 06/01/21 1005     Lipase 88 u/L     Comprehensive metabolic panel [[id number]] Collected: 06/01/21 0921    Lab Status: Final result Specimen: Blood from Arm, Left Updated: 06/01/21 1005     Sodium 141 mmol/L      Potassium 4 2 mmol/L      Chloride 108 mmol/L      CO2 21 mmol/L      ANION GAP 12 mmol/L      BUN 9 mg/dL      Creatinine 1 06 mg/dL      Glucose 97 mg/dL      Calcium 9 1 mg/dL      AST 25 U/L      ALT 29 U/L      Alkaline Phosphatase 83 U/L      Total Protein 8 0 g/dL      Albumin 3 9 g/dL      Total Bilirubin 0 31 mg/dL      eGFR 67 ml/min/1 73sq m     Narrative:      National Kid[employer] Foundation guidelines for Chronic Kidney Disease (CKD):     Stage 1 with normal or high GFR (GFR > 90 mL/min/1 73 square meters)    Stage 2 Mild CKD (GFR = 60-89 mL/min/1 73 square meters)    Stage 3A Moderate CKD (GFR = 45-59 mL/min/1 73 square meters)    Stage 3B Moderate CKD (GFR = 30-44 mL/min/1 73 square meters)    Stage 4 Severe CKD (GFR = 15-29 mL/min/1 73 square meters)    Stage 5 End Stage CKD (GFR <15 mL/min/1 73 square meters)  Note: GFR calculation is accurate only with a steady state creatinine    Lactic acid [[id number]]  (Normal) Collected: 06/01/21 0921    Lab Status: Final result Specimen: Blood from Arm, Left Updated: 06/01/21 0953     LACTIC ACID 1 0 mmol/L     Narrative:      Result may be elevated if tourniquet was used during collection  Rapid drug screen, urine [[id number]]  (Abnormal) Collected: 06/01/21 0915    Lab Status: Final result Specimen: Urine, Other Updated: 06/01/21 0938     Amph/Meth UR Negative     Barbiturate Ur Negative     Benzodiazepine Urine Negative     Cocaine Urine Negative     Methadone Urine Negative     Opiate Urine Negative     PCP Ur Negative     THC Urine Positive     Oxycodone Urine Negative    Narrative:      Presumptive report  If requested, specimen will be sent to reference lab for confirmation  FOR MEDICAL PURPOSES ONLY  IF CONFIRMATION NEEDED PLEASE CONTACT THE LAB WITHIN 5 DAYS      Drug Screen Cutoff Levels:  AMPHETAMINE/METHAMPHETAMINES  1000 ng/mL  BARBITURATES     200 ng/mL  BENZODIAZEPINES     200 ng/mL  COCAINE      300 ng/mL  METHADONE      300 ng/mL  OPIATES      300 ng/mL  PHENCYCLIDINE     25 ng/mL  THC       50 ng/mL  OXYCODONE      100 ng/mL    Urine Microscopic [[id number]] Collected: 06/01/21 0913    Lab Status: Final result Specimen: Urine, Other Updated: 06/01/21 0937     RBC, UA None Seen /hpf      WBC, UA None Seen /hpf      Epithelial Cells Occasional /hpf      Bacteria, UA Occasional /hpf      Uric Acid Meagan, UA Occasional /hpf     CBC and differential [[id number]]  (Abnormal) Collected: 06/01/21 0921    Lab Status: Final result Specimen: Blood from Arm, Left Updated: 06/01/21 0935     WBC 10 81 Thousand/uL      RBC 4 76 Million/uL      Hemoglobin 13 3 g/dL      Hematocrit 40 7 %      MCV 86 fL      MCH 27 9 pg      MCHC 32 7 g/dL      RDW 13 7 %      MPV 9 5 fL      Platelets 697 Thousands/uL      nRBC 0 /100 WBCs      Neutrophils Relative 66 %      Immat GRANS % 2 %      Lymphocytes Relative 21 %      Monocytes Relative 6 %      Eosinophils Relative 4 %      Basophils Relative 1 %      Neutrophils Absolute 7 14 Thousands/µL      Immature Grans Absolute 0 24 Thousand/uL      Lymphocytes Absolute 2 28 Thousands/µL      Monocytes Absolute 0 62 Thousand/µL      Eosinophils Absolute 0 46 Thousand/µL      Basophils Absolute 0 07 Thousands/µL     POCT pregnancy, urine [[id number]]  (Normal) Resulted: 06/01/21 0923    Lab Status: Final result Updated: 06/01/21 0923     EXT PREG TEST UR (Ref: Negative) Negative     Control Valid    Urine Macroscopic, POC [[id number]]  (Abnormal) Collected: 06/01/21 0913    Lab Status: Final result Specimen: Urine Updated: 06/01/21 0914     Color, UA Yellow     Clarity, UA Clear     pH, UA 6 0     Leukocytes, UA Negative     Nitrite, UA Negative     Protein, UA Negative mg/dl      Glucose, UA Negative mg/dl      Ketones, UA Negative mg/dl      Urobilinogen, UA 0 2 E U /dl      Bilirubin, UA Negative     Blood, UA Trace     Specific [place], UA 1 025    Narrative:      CLINITEK RESULT                 No orders to display              Procedures  Procedures         ED Course  ED Course as of Jun 05 0708   Tue Jun 01, 2021   0911 GI Note 5/28/2021 - ASSESSMENT AND PLAN:       1  Right upper quadrant abdominal pain  2  Change in bowel habits  3  Unintentional weight loss of 5% body weight or less within 1 month  Therapeutic failure of Prilosec, Carafate, Pepcid  - Colonoscopy; Future  - EGD;  Future, to evaluate for peptic ulcer disease, celiac, H pylori, assess for gastric outlet obstruction  - polyethylene glycol (GLYCOLAX) 17 GM/SCOOP powder; At 5pm take 5mgx2 dulcolax  At 6pm mix 238 g miralax in 64oz gatorade  Drink 8oz glass every 5 mins until 32oz finished  Drink remaining as rec  Dispense: 238 g; Refill: 0  - Pancreatic elastase, fecal; Future  - Calprotectin,Fecal; Future  - Celiac Disease Antibody Profile; Future  - C-reactive protein; Future  - Giardia antigen; Future  - dicyclomine (BENTYL) 20 mg tablet; Take 1 tablet (20 mg total) by mouth 4 (four) times a day (before meals and at bedtime)  Dispense: 120 tablet; Refill: 5  - ondansetron (ZOFRAN-ODT) 4 mg disintegrating tablet; Take 1 tablet (4 mg total) by mouth every 6 (six) hours as needed for nausea or vomiting  Dispense: 20 tablet; Refill: 0  - consider CTA, gastric emptying study if above evaluation is unrevealing  - consider inpatient admission if symptoms not improved     4  Constipation, unspecified constipation type  Failed OTC laxatives  - linaCLOtide 145 MCG CAPS; Take 1 capsule (145 mcg total) by mouth daily  Dispense: 30 capsule; Refill: 5         0924 Not pregnant   PREGNANCY TEST URINE: Negative   0924 Trace blood, otherwise unremarkable   Urine Macroscopic, POC(!)   8806 Minimally elevated WBC, reassuring diff, otherwise normal     CBC and differential(!)   0952 Positive for THC   Rapid drug screen, urine(!)   1004 wnl   LACTIC ACID: 1 0   1015 wnl   Comprehensive metabolic panel   4631 Pt with continued symptoms, given positive urine will treat with Haldol for possible cannabis hyperemesis or CVS and re-eval   If continued symptoms will consider admission  1150 Improved pain with ED treatment, pt comfortable with discharge and continued follow up with GI  Advised patient if returned or progressive symptoms, can return to ER at any point                                                 MDM  Number of Diagnoses or Management Options  Abdominal pain:   Diagnosis management comments: 48 yo female with several months of abdominal pain  Exam reassuring, pain distractible without s/s peritonitis  Labs reassuring  UDS notable for +THC  Improved symptoms with ED treatment, pt feels comfortable with discharge and continuation of GI recommendations and follow up with them  RTER precautions discussed and documented on discharge paperwork, pt and family endorsed good understanding of reasons to return  Amount and/or Complexity of Data Reviewed  Clinical lab tests: ordered and reviewed  Tests in the medicine section of CPT®: ordered and reviewed  Review and summarize past medical records: yes    Risk of Complications, Morbidity, and/or Mortality  Presenting problems: moderate  Diagnostic procedures: moderate  Management options: moderate    Patient Progress  Patient progress: improved      Disposition  Final diagnoses:   Abdominal pain     Time reflects when diagnosis was documented in both MDM as applicable and the Disposition within this note     Time User Action Codes Description Comment    6/1/2021 11:51 AM Vera Pink Add [R10 9] Abdominal pain       ED Disposition     ED Disposition Condition Date/Time Comment    Discharge Stable Tue Jun 1, 2021 11:53 AM Marko Mak discharge to home/self care              Follow-up Information     Follow up With Specialties Details Why Contact Info    Yoli Martin MD Internal Medicine Schedule an appointment as soon as possible for a visit   [address] [place] [address],[address]  [address] [facility]      Bernardino Cuellar MD Gastroenterology Schedule an appointment as soon as possible for a visit   [address] [place] [address] Arcenio Funes [address] [facility]  [phone]            Discharge Medication List as of 6/1/2021 11:59 AM      START taking these medications    Details   busPIRone (BUSPAR) 5 mg tablet Take 1 tablet (5 mg total) by mouth 3 (three) times a day as needed (anxiety), Starting Tue 6/1/2021, Normal         CONTINUE these medications which have NOT CHANGED    Details   acetaminophen (TYLENOL) 325 mg tablet Take 2 tablets (650 mg total) by mouth every 6 (six) hours as needed for mild pain, Starting Mon 8/3/2020, Normal      Cetirizine HCl (ZYRTEC ALLERGY) 10 MG CAPS Take 10 mg by mouth , Historical Med      diclofenac sodium (VOLTAREN) 1 % Apply 2 g topically 4 (four) times a day for 5 days, Starting Fri 11/20/2020, Until Wed 11/25/2020, Print      dicyclomine (BENTYL) 20 mg tablet Take 1 tablet (20 mg total) by mouth 4 (four) times a day (before meals and at bedtime), Starting Fri 5/28/2021, Normal      fluticasone (FLONASE) 50 mcg/act nasal spray 1 spray into each nostril daily, Starting Tue 1/14/2020, Normal      ibuprofen (MOTRIN) 400 mg tablet Take 1 tablet (400 mg total) by mouth every 6 (six) hours as needed for mild pain for up to 3 days, Starting Wed 11/11/2020, Until Sat 11/14/2020, Normal      ibuprofen (MOTRIN) 600 mg tablet Take 1 tablet (600 mg total) by mouth every 6 (six) hours as needed for moderate pain (cramping), Starting Mon 8/3/2020, Normal      linaCLOtide 145 MCG CAPS Take 1 capsule (145 mcg total) by mouth daily, Starting Fri 5/28/2021, Until Sun 6/27/2021, Normal      ondansetron (ZOFRAN-ODT) 4 mg disintegrating tablet Take 1 tablet (4 mg total) by mouth every 6 (six) hours as needed for nausea or vomiting, Starting Fri 5/28/2021, Normal      polyethylene glycol (GLYCOLAX) 17 GM/SCOOP powder At 5pm take 5mgx2 dulcolax  At 6pm mix 238 g miralax in 64oz gatorade  Drink 8oz glass every 5 mins until 32oz finished   Drink remaining as rec , Normal      prochlorperazine (COMPAZINE) 5 mg tablet Starting Wed 4/28/2021, Historical Med      sertraline (ZOLOFT) 100 mg tablet TAKE 1 AND 1/2 TABLET BY MOUTH ONCE DAILY, Normal      topiramate (TOPAMAX) 25 mg tablet Take 1 tablet (25 mg total) by mouth 2 (two) times a day Take 1 tab PO qHS x 1 week then bid, Starting Tue 2/9/2021, Normal      zolpidem (AMBIEN) 10 mg tablet TAKE 1/2 TO 1 TABLET BY MOUTH AT BEDTIME AS NEEDED FOR SLEEP, Normal      ALPRAZolam (XANAX) 0 25 mg tablet TAKE ONE TABLET BY MOUTH EVERY DAY AS NEEDEDFOR ANXIETY, Normal           No discharge procedures on file      PDMP Review       Value Time User    PDMP Reviewed  Yes 6/1/2021  6:15 PM Alexandra Fierro MD          ED Provider  Electronically Signed by           Gregoria Brown MD  06/05/21 0876

## 2021-06-11 ENCOUNTER — TELEPHONE (OUTPATIENT)
Dept: INTERNAL MEDICINE CLINIC | Facility: CLINIC | Age: 41
End: 2021-06-11

## 2021-06-11 NOTE — TELEPHONE ENCOUNTER
Patient informed some improvement has met with GI set up with colonoscopy and EMG  Patient informed still vomiting with intermittent abdominal pain, pain level today a 4 of 10

## 2021-06-29 ENCOUNTER — TELEMEDICINE (OUTPATIENT)
Dept: INTERNAL MEDICINE CLINIC | Facility: CLINIC | Age: 41
End: 2021-06-29
Payer: COMMERCIAL

## 2021-06-29 DIAGNOSIS — B34.9 VIRAL INFECTION, UNSPECIFIED: Primary | ICD-10-CM

## 2021-06-29 PROCEDURE — 99213 OFFICE O/P EST LOW 20 MIN: CPT | Performed by: NURSE PRACTITIONER

## 2021-06-29 PROCEDURE — U0005 INFEC AGEN DETEC AMPLI PROBE: HCPCS | Performed by: NURSE PRACTITIONER

## 2021-06-29 PROCEDURE — U0003 INFECTIOUS AGENT DETECTION BY NUCLEIC ACID (DNA OR RNA); SEVERE ACUTE RESPIRATORY SYNDROME CORONAVIRUS 2 (SARS-COV-2) (CORONAVIRUS DISEASE [COVID-19]), AMPLIFIED PROBE TECHNIQUE, MAKING USE OF HIGH THROUGHPUT TECHNOLOGIES AS DESCRIBED BY CMS-2020-01-R: HCPCS | Performed by: NURSE PRACTITIONER

## 2021-06-29 PROCEDURE — 1036F TOBACCO NON-USER: CPT | Performed by: NURSE PRACTITIONER

## 2021-06-29 NOTE — LETTER
June 29, 2021     Patient: Jeffrey Raines   YOB: 1980   Date of Visit: 6/29/2021       To Whom it May Concern:    Lisette Cao is under my professional care  She was seen in my office on 6/29/2021  She is currently under investigation for COVID  Please excuse her from work until further notice  If you have any questions or concerns, please don't hesitate to call           Sincerely,          Wandy Lopez

## 2021-06-29 NOTE — PROGRESS NOTES
COVID-19 Outpatient Progress Note    Assessment/Plan:    Problem List Items Addressed This Visit     None      Visit Diagnoses     Viral infection, unspecified    -  Primary    Relevant Orders    Novel Coronavirus (Covid-19),PCR [facility] - Collected at Mobile Vans or [facility]         Disposition:     I referred patient to one of our centralized sites for a COVID-19 swab  Check for COVID  Self quarantine until results  Continue symptomatic treatment  I have spent 8 minutes directly with the patient  Greater than 50% of this time was spent in counseling/coordination of care regarding: instructions for management and patient and family education  Encounter provider [facility]    Provider located at [address] [place] [id number]    Recent Visits  No visits were found meeting these conditions  Showing recent visits within past 7 days and meeting all other requirements  Today's Visits  Date Type Provider Dept   06/29/21 [facility] 123, [address] Internal Med   Showing today's visits and meeting all other requirements  Future Appointments  No visits were found meeting these conditions  Showing future appointments within next 150 days and meeting all other requirements     This virtual check-in was done via [employer] and patient was informed that this is a secure, HIPAA-compliant platform  She agrees to proceed  Patient agrees to participate in a virtual check in via telephone or video visit instead of presenting to the office to address urgent/immediate medical needs  Patient is aware this is a billable service  After connecting through [facility], the patient was identified by name and date of birth  Noe Hyde was informed that this was a telemedicine visit and that the exam was being conducted confidentially over secure lines  My office door was closed   No one else was in the room  Briana Barrera acknowledged consent and understanding of privacy and security of the telemedicine visit  I informed the patient that I have reviewed her record in Epic and presented the opportunity for her to ask any questions regarding the visit today  The patient agreed to participate  Subjective:   Briana Barrera is a 47 y o  female who is concerned about COVID-19  Patient's symptoms include fever, fatigue, malaise, nasal congestion, rhinorrhea, sore throat, anosmia, cough, shortness of breath, myalgias and headache  Patient denies chills, loss of taste, chest tightness, nausea, vomiting and diarrhea  Date of symptom onset: 6/27/2021    Exposure:   Contact with a person who is under investigation (PUI) for or who is positive for COVID-19 within the last 14 days?: No    Hospitalized recently for fever and/or lower respiratory symptoms?: No      Currently a healthcare worker that is involved in direct patient care?: No      Works in a special setting where the risk of COVID-19 transmission may be high? (this may include long-term care, correctional and snf facilities; homeless shelters; assisted-living facilities and group homes ): No      Resident in a special setting where the risk of COVID-19 transmission may be high? (this may include long-term care, correctional and snf facilities; homeless shelters; assisted-living facilities and group homes ): No      Cookie Longoria started with symptoms 2 days ago  Thought it was allergies originally but now getting worse  Today she started with a fever, nasal congestion, sore throat, dry cough, headache and body aches  Mild shortness of breath  Take OTC cold medication  Had COVID vaccines in February           Lab Results   Component Value Date    SARSCOV2 Not Detected 01/04/2021     Past Medical History:   Diagnosis Date    Anxiety     Depression     Fibroid     GERD (gastroesophageal reflux disease)     History of pulmonary embolus (PE)     Iliotibial band syndrome     last assessed - [id number]    Mitral valve prolapse     Obesity     Pneumonia     2018    PONV (postoperative nausea and vomiting)     Patient requests to be premedicated for N/V    Wears glasses     for reading     Past Surgical History:   Procedure Laterality Date    CHOLECYSTECTOMY      ESOPHAGOGASTRODUODENOSCOPY N/A 5/31/2018    Procedure: ESOPHAGOGASTRODUODENOSCOPY (EGD); Surgeon: Griffin Montilla MD;  [facility]; Service: Gastroenterology    GALLBLADDER SURGERY      KNEE SURGERY Left     growth plate fx left knee    KNEE SURGERY Right     SD HYSTEROSCOPY,W/ENDO BX N/A 8/3/2020    Procedure: DILATATION AND CURETTAGE (D&C) WITH HYSTEROSCOPY (MYOSURE);   Surgeon: Manjeet Grijalva DO;  [facility];  Service: Gynecology    SD HYSTEROSCOPY,W/ENDO BX N/A 8/3/2020    Procedure: HYSTEROSCOPY WITH  RESECTION UTERINE TUMOR/FIBROID;  Surgeon: Manjeet Grijalva DO;  [facility];  Service: Gynecology    SD HYSTEROSCOPY,W/ENDOMETRIAL ABLATION N/A 8/3/2020    Procedure: ABLATION ENDOMETRIAL [address];  Surgeon: Manjeet Grijalva DO;  [facility];  Service: Gynecology    SD OPEN TREATMENT METATARSAL FRACTURE EACH Left 4/10/2019    Procedure: OPEN REDUCTION W/ INTERNAL FIXATION (ORIF) FOOT, Left 5th metatarsal fracture open reduction internal fixation with calcaneal bone graft and bone marrow aspirate concentrate;  Surgeon: Yehuda Rojo MD;  [facility];  Service: Orthopedics    TUBAL LIGATION      UPPER GASTROINTESTINAL ENDOSCOPY      WISDOM TOOTH EXTRACTION       Current Outpatient Medications   Medication Sig Dispense Refill    acetaminophen (TYLENOL) 325 mg tablet Take 2 tablets (650 mg total) by mouth every 6 (six) hours as needed for mild pain 30 tablet 0    ALPRAZolam (XANAX) 0 25 mg tablet Take 1 tablet (0 25 mg total) by mouth daily as needed for anxiety 30 tablet 0    busPIRone (BUSPAR) 5 mg tablet Take 1 tablet (5 mg total) by mouth 3 (three) times a day as needed (anxiety) 90 tablet 0    Cetirizine HCl (ZYRTEC ALLERGY) 10 MG CAPS Take 10 mg by mouth   diclofenac sodium (VOLTAREN) 1 % Apply 2 g topically 4 (four) times a day for 5 days 100 g 0    dicyclomine (BENTYL) 20 mg tablet Take 1 tablet (20 mg total) by mouth 4 (four) times a day (before meals and at bedtime) 120 tablet 5    fluticasone (FLONASE) 50 mcg/act nasal spray 1 spray into each nostril daily (Patient not taking: Reported on 5/28/2021) 16 g 0    ibuprofen (MOTRIN) 400 mg tablet Take 1 tablet (400 mg total) by mouth every 6 (six) hours as needed for mild pain for up to 3 days 12 tablet 0    ibuprofen (MOTRIN) 600 mg tablet Take 1 tablet (600 mg total) by mouth every 6 (six) hours as needed for moderate pain (cramping) (Patient not taking: Reported on 5/28/2021) 30 tablet 0    linaCLOtide 145 MCG CAPS Take 1 capsule (145 mcg total) by mouth daily 30 capsule 5    omeprazole (PriLOSEC) 20 mg delayed release capsule Take 40 mg by mouth      ondansetron (ZOFRAN-ODT) 4 mg disintegrating tablet Take 1 tablet (4 mg total) by mouth every 6 (six) hours as needed for nausea or vomiting 20 tablet 0    polyethylene glycol (GLYCOLAX) 17 GM/SCOOP powder At 5pm take 5mgx2 dulcolax  At 6pm mix 238 g miralax in 64oz gatorade  Drink 8oz glass every 5 mins until 32oz finished  Drink remaining as rec  238 g 0    prochlorperazine (COMPAZINE) 5 mg tablet       sertraline (ZOLOFT) 100 mg tablet TAKE 1 AND 1/2 TABLET BY MOUTH ONCE DAILY 135 tablet 0    topiramate (TOPAMAX) 25 mg tablet Take 1 tablet (25 mg total) by mouth 2 (two) times a day Take 1 tab PO qHS x 1 week then bid 60 tablet 2    zolpidem (AMBIEN) 10 mg tablet TAKE 1/2 TO 1 TABLET BY MOUTH AT BEDTIME AS NEEDED FOR SLEEP 30 tablet 0     No current facility-administered medications for this visit       Allergies   Allergen Reactions    Penicillin PATRICIA Hives       Review of Systems   Constitutional: Positive for fatigue and fever  Negative for chills  HENT: Positive for congestion, rhinorrhea and sore throat  Respiratory: Positive for cough and shortness of breath  Negative for chest tightness  Gastrointestinal: Negative for diarrhea, nausea and vomiting  Musculoskeletal: Positive for myalgias  Neurological: Positive for headaches  Objective: There were no vitals filed for this visit  Physical Exam  Constitutional:       Appearance: She is well-developed  HENT:      Head: Normocephalic  Eyes:      Pupils: Pupils are equal, round, and reactive to light  Pulmonary:      Effort: Pulmonary effort is normal    Neurological:      Mental Status: She is alert  Psychiatric:         Behavior: Behavior normal        VIRTUAL VISIT DISCLAIMER    Brielle Avalos acknowledges that she has consented to an online visit or consultation  She understands that the online visit is based solely on information provided by her, and that, in the absence of a face-to-face physical evaluation by the physician, the diagnosis she receives is both limited and provisional in terms of accuracy and completeness  This is not intended to replace a full medical face-to-face evaluation by the physician  Silver Dow understands and accepts these terms

## 2021-06-30 DIAGNOSIS — G47.09 OTHER INSOMNIA: ICD-10-CM

## 2021-06-30 RX ORDER — ZOLPIDEM TARTRATE 10 MG/1
5-10 TABLET ORAL
Qty: 30 TABLET | Refills: 0 | Status: SHIPPED | OUTPATIENT
Start: 2021-06-30 | End: 2021-08-07 | Stop reason: SDUPTHER

## 2021-07-26 ENCOUNTER — APPOINTMENT (OUTPATIENT)
Dept: LAB | Facility: CLINIC | Age: 41
End: 2021-07-26
Payer: COMMERCIAL

## 2021-07-26 DIAGNOSIS — R10.11 RIGHT UPPER QUADRANT ABDOMINAL PAIN: ICD-10-CM

## 2021-07-26 DIAGNOSIS — R19.4 CHANGE IN BOWEL HABITS: ICD-10-CM

## 2021-07-26 DIAGNOSIS — R63.4 UNINTENTIONAL WEIGHT LOSS OF 5% BODY WEIGHT OR LESS WITHIN 1 MONTH: ICD-10-CM

## 2021-07-26 LAB — CRP SERPL QL: 5.9 MG/L

## 2021-07-26 PROCEDURE — 82784 ASSAY IGA/IGD/IGG/IGM EACH: CPT

## 2021-07-26 PROCEDURE — 36415 COLL VENOUS BLD VENIPUNCTURE: CPT

## 2021-07-26 PROCEDURE — 86255 FLUORESCENT ANTIBODY SCREEN: CPT

## 2021-07-26 PROCEDURE — 86140 C-REACTIVE PROTEIN: CPT

## 2021-07-26 PROCEDURE — 83516 IMMUNOASSAY NONANTIBODY: CPT

## 2021-07-27 ENCOUNTER — APPOINTMENT (OUTPATIENT)
Dept: LAB | Facility: CLINIC | Age: 41
End: 2021-07-27
Payer: COMMERCIAL

## 2021-07-27 DIAGNOSIS — R19.4 CHANGE IN BOWEL HABITS: ICD-10-CM

## 2021-07-27 DIAGNOSIS — R10.11 RIGHT UPPER QUADRANT ABDOMINAL PAIN: ICD-10-CM

## 2021-07-27 DIAGNOSIS — R63.4 UNINTENTIONAL WEIGHT LOSS OF 5% BODY WEIGHT OR LESS WITHIN 1 MONTH: ICD-10-CM

## 2021-07-27 LAB
ENDOMYSIUM IGA SER QL: NEGATIVE
GLIADIN PEPTIDE IGA SER-ACNC: 5 UNITS (ref 0–19)
GLIADIN PEPTIDE IGG SER-ACNC: 2 UNITS (ref 0–19)
IGA SERPL-MCNC: 206 MG/DL (ref 87–352)
TTG IGA SER-ACNC: <2 U/ML (ref 0–3)
TTG IGG SER-ACNC: <2 U/ML (ref 0–5)

## 2021-07-27 PROCEDURE — 87329 GIARDIA AG IA: CPT

## 2021-07-27 PROCEDURE — 82656 EL-1 FECAL QUAL/SEMIQ: CPT

## 2021-07-27 PROCEDURE — 83993 ASSAY FOR CALPROTECTIN FECAL: CPT

## 2021-07-28 ENCOUNTER — TELEPHONE (OUTPATIENT)
Dept: GASTROENTEROLOGY | Facility: HOSPITAL | Age: 41
End: 2021-07-28

## 2021-07-28 LAB — G LAMBLIA AG STL QL IA: NEGATIVE

## 2021-07-29 ENCOUNTER — HOSPITAL ENCOUNTER (OUTPATIENT)
Dept: GASTROENTEROLOGY | Facility: HOSPITAL | Age: 41
Setting detail: OUTPATIENT SURGERY
Discharge: HOME | End: 2021-07-29
Attending: INTERNAL MEDICINE | Admitting: INTERNAL MEDICINE
Payer: COMMERCIAL

## 2021-07-29 ENCOUNTER — ANESTHESIA (OUTPATIENT)
Dept: GASTROENTEROLOGY | Facility: HOSPITAL | Age: 41
End: 2021-07-29

## 2021-07-29 ENCOUNTER — ANESTHESIA EVENT (OUTPATIENT)
Dept: GASTROENTEROLOGY | Facility: HOSPITAL | Age: 41
End: 2021-07-29

## 2021-07-29 ENCOUNTER — TELEPHONE (OUTPATIENT)
Dept: GASTROENTEROLOGY | Facility: HOSPITAL | Age: 41
End: 2021-07-29

## 2021-07-29 VITALS
SYSTOLIC BLOOD PRESSURE: 122 MMHG | HEART RATE: 70 BPM | BODY MASS INDEX: 35.77 KG/M2 | OXYGEN SATURATION: 98 % | TEMPERATURE: 97.4 F | DIASTOLIC BLOOD PRESSURE: 62 MMHG | RESPIRATION RATE: 20 BRPM | HEIGHT: 64 IN | WEIGHT: 209.5 LBS

## 2021-07-29 DIAGNOSIS — R63.4 UNINTENTIONAL WEIGHT LOSS OF 5% BODY WEIGHT OR LESS WITHIN 1 MONTH: ICD-10-CM

## 2021-07-29 DIAGNOSIS — R19.4 CHANGE IN BOWEL HABITS: ICD-10-CM

## 2021-07-29 DIAGNOSIS — R10.11 RIGHT UPPER QUADRANT ABDOMINAL PAIN: ICD-10-CM

## 2021-07-29 LAB
CALPROTECTIN STL-MCNT: <16 UG/G (ref 0–120)
ELASTASE PANC STL-MCNT: >500 UG ELAST./G

## 2021-07-29 PROCEDURE — 43239 EGD BIOPSY SINGLE/MULTIPLE: CPT | Performed by: INTERNAL MEDICINE

## 2021-07-29 PROCEDURE — 45385 COLONOSCOPY W/LESION REMOVAL: CPT | Performed by: INTERNAL MEDICINE

## 2021-07-29 PROCEDURE — 88305 TISSUE EXAM BY PATHOLOGIST: CPT | Performed by: PATHOLOGY

## 2021-07-29 PROCEDURE — 45380 COLONOSCOPY AND BIOPSY: CPT | Performed by: INTERNAL MEDICINE

## 2021-07-29 RX ORDER — LIDOCAINE HYDROCHLORIDE 10 MG/ML
0.5 INJECTION, SOLUTION EPIDURAL; INFILTRATION; INTRACAUDAL; PERINEURAL ONCE AS NEEDED
Status: DISCONTINUED | OUTPATIENT
Start: 2021-07-29 | End: 2021-08-02 | Stop reason: HOSPADM

## 2021-07-29 RX ORDER — SODIUM CHLORIDE, SODIUM LACTATE, POTASSIUM CHLORIDE, CALCIUM CHLORIDE 600; 310; 30; 20 MG/100ML; MG/100ML; MG/100ML; MG/100ML
125 INJECTION, SOLUTION INTRAVENOUS CONTINUOUS
Status: DISCONTINUED | OUTPATIENT
Start: 2021-07-29 | End: 2021-08-02 | Stop reason: HOSPADM

## 2021-07-29 RX ORDER — LIDOCAINE HYDROCHLORIDE 10 MG/ML
INJECTION, SOLUTION EPIDURAL; INFILTRATION; INTRACAUDAL; PERINEURAL AS NEEDED
Status: DISCONTINUED | OUTPATIENT
Start: 2021-07-29 | End: 2021-07-29

## 2021-07-29 RX ORDER — PROPOFOL 10 MG/ML
INJECTION, EMULSION INTRAVENOUS AS NEEDED
Status: DISCONTINUED | OUTPATIENT
Start: 2021-07-29 | End: 2021-07-29

## 2021-07-29 RX ORDER — ONDANSETRON 2 MG/ML
INJECTION INTRAMUSCULAR; INTRAVENOUS AS NEEDED
Status: DISCONTINUED | OUTPATIENT
Start: 2021-07-29 | End: 2021-07-29

## 2021-07-29 RX ORDER — PROPOFOL 10 MG/ML
INJECTION, EMULSION INTRAVENOUS CONTINUOUS PRN
Status: DISCONTINUED | OUTPATIENT
Start: 2021-07-29 | End: 2021-07-29

## 2021-07-29 RX ORDER — SIMETHICONE 20 MG/.3ML
EMULSION ORAL CODE/TRAUMA/SEDATION MEDICATION
Status: COMPLETED | OUTPATIENT
Start: 2021-07-29 | End: 2021-07-29

## 2021-07-29 RX ORDER — FENTANYL CITRATE 50 UG/ML
INJECTION, SOLUTION INTRAMUSCULAR; INTRAVENOUS AS NEEDED
Status: DISCONTINUED | OUTPATIENT
Start: 2021-07-29 | End: 2021-07-29

## 2021-07-29 RX ADMIN — PROPOFOL 100 MG: 10 INJECTION, EMULSION INTRAVENOUS at 10:34

## 2021-07-29 RX ADMIN — LIDOCAINE HYDROCHLORIDE 50 MG: 10 INJECTION, SOLUTION EPIDURAL; INFILTRATION; INTRACAUDAL at 10:34

## 2021-07-29 RX ADMIN — ONDANSETRON 4 MG: 2 INJECTION INTRAMUSCULAR; INTRAVENOUS at 09:25

## 2021-07-29 RX ADMIN — SODIUM CHLORIDE, SODIUM LACTATE, POTASSIUM CHLORIDE, AND CALCIUM CHLORIDE 125 ML/HR: .6; .31; .03; .02 INJECTION, SOLUTION INTRAVENOUS at 09:02

## 2021-07-29 RX ADMIN — SODIUM CHLORIDE, SODIUM LACTATE, POTASSIUM CHLORIDE, AND CALCIUM CHLORIDE: .6; .31; .03; .02 INJECTION, SOLUTION INTRAVENOUS at 10:31

## 2021-07-29 RX ADMIN — PROPOFOL 50 MG: 10 INJECTION, EMULSION INTRAVENOUS at 10:36

## 2021-07-29 RX ADMIN — Medication 40 MG: at 10:47

## 2021-07-29 RX ADMIN — FENTANYL CITRATE 50 MCG: 50 INJECTION, SOLUTION INTRAMUSCULAR; INTRAVENOUS at 10:34

## 2021-07-29 RX ADMIN — PROPOFOL 120 MCG/KG/MIN: 10 INJECTION, EMULSION INTRAVENOUS at 10:35

## 2021-07-29 NOTE — H&P
History and Physical - [facility]  Gregoria Batista 47 y o  female MRN: [id number]                  HPI: Gregoria Batista is a 47y o  year old female who presents for abdominal pain, change in bowel habits, unintentional weight loss      REVIEW OF SYSTEMS: Per the HPI, and otherwise unremarkable  Historical Information   Past Medical History:   Diagnosis Date    Anxiety     Depression     Fibroid     GERD (gastroesophageal reflux disease)     History of pulmonary embolus (PE)     Iliotibial band syndrome     last assessed - [id number]    Mitral valve prolapse     Obesity     Pneumonia     2018    PONV (postoperative nausea and vomiting)     Patient requests to be premedicated for N/V    Wears glasses     for reading     Past Surgical History:   Procedure Laterality Date    CHOLECYSTECTOMY      ESOPHAGOGASTRODUODENOSCOPY N/A 5/31/2018    Procedure: ESOPHAGOGASTRODUODENOSCOPY (EGD); Surgeon: Bertha Forde MD;  [facility]; Service: Gastroenterology    GALLBLADDER SURGERY      KNEE SURGERY Left     growth plate fx left knee    KNEE SURGERY Right     OK HYSTEROSCOPY,W/ENDO BX N/A 8/3/2020    Procedure: DILATATION AND CURETTAGE (D&C) WITH HYSTEROSCOPY (MYOSURE);   Surgeon: Leamon Barthel, DO;  [facility];  Service: Gynecology    OK HYSTEROSCOPY,W/ENDO BX N/A 8/3/2020    Procedure: HYSTEROSCOPY WITH  RESECTION UTERINE TUMOR/FIBROID;  Surgeon: Leamon Barthel, DO;  [facility];  Service: Gynecology    OK HYSTEROSCOPY,W/ENDOMETRIAL ABLATION N/A 8/3/2020    Procedure: ABLATION ENDOMETRIAL Kylah Tony;  Surgeon: Leamon Barthel, DO;  [facility];  Service: Gynecology    OK OPEN TREATMENT METATARSAL FRACTURE EACH Left 4/10/2019    Procedure: OPEN REDUCTION W/ INTERNAL FIXATION (ORIF) FOOT, Left 5th metatarsal fracture open reduction internal fixation with calcaneal bone graft and bone marrow aspirate concentrate;  Surgeon: Katherin Yen MD;  [facility];  Service: Orthopedics    TUBAL LIGATION      UPPER GASTROINTESTINAL ENDOSCOPY      WISDOM TOOTH EXTRACTION       Social History   Social History     Substance and Sexual Activity   Alcohol Use Not Currently     Social History     Substance and Sexual Activity   Drug Use Yes    Types: Marijuana    Comment: medical card     Social History     Tobacco Use   Smoking Status Never Smoker   Smokeless Tobacco Never Used     Family History   Adopted: Yes   Problem Relation Age of Onset    No Known Problems Mother     Alcohol abuse Neg Hx     Substance Abuse Neg Hx     Mental illness Neg Hx     Depression Neg Hx        Meds/Allergies       Current Outpatient Medications:     acetaminophen (TYLENOL) 325 mg tablet    ALPRAZolam (XANAX) 0 25 mg tablet    busPIRone (BUSPAR) 5 mg tablet    Cetirizine HCl (ZYRTEC ALLERGY) 10 MG CAPS    diclofenac sodium (VOLTAREN) 1 %    dicyclomine (BENTYL) 20 mg tablet    fluticasone (FLONASE) 50 mcg/act nasal spray    ibuprofen (MOTRIN) 600 mg tablet    linaCLOtide 145 MCG CAPS    omeprazole (PriLOSEC) 20 mg delayed release capsule    ondansetron (ZOFRAN-ODT) 4 mg disintegrating tablet    polyethylene glycol (GLYCOLAX) 17 GM/SCOOP powder    prochlorperazine (COMPAZINE) 5 mg tablet    sertraline (ZOLOFT) 100 mg tablet    topiramate (TOPAMAX) 25 mg tablet    zolpidem (AMBIEN) 10 mg tablet    Current Facility-Administered Medications:     lactated ringers infusion, 125 mL/hr, Intravenous, Continuous, 125 mL/hr at 07/29/21 0902    lidocaine (PF) (XYLOCAINE-MPF) 1 % injection 0 5 mL, 0 5 mL, Infiltration, Once PRN    Facility-Administered Medications Ordered in Other Encounters:     ondansetron (ZOFRAN) injection, , Intravenous, PRN, 4 mg at 07/29/21 0925    Allergies   Allergen Reactions    Penicillin G Hives       Objective     /76   Pulse 64   Temp 97 7 °F (36 5 °C) (Temporal)   Resp 15   Ht 5' 4" (1 626 m)   Wt 95 kg (209 lb 8 oz)   SpO2 97%   BMI 35 96 kg/m²       PHYSICAL EXAM    Gen: NAD  Head: NCAT  CV: RRR  CHEST: Clear  ABD: soft, NT/ND  EXT: no edema      ASSESSMENT/PLAN:  This is a 47y o  year old female here for EGD and colonoscopy, and she is stable and optimized for her procedure

## 2021-07-29 NOTE — ANESTHESIA PREPROCEDURE EVALUATION
Procedure:  COLONOSCOPY  EGD    Relevant Problems   ANESTHESIA   (+) Motion sickness (mild)      CARDIO   (+) Hyperlipidemia   (-) Chest pain   (-) BAUM (dyspnea on exertion)      ENDO  BMI 37      GI/HEPATIC   (+) GERD (gastroesophageal reflux disease)   (+) Gastric erosion      HEMATOLOGY  Hx PE  Hx UE DVT      NEURO/PSYCH   (+) Anxiety   (+) History of pulmonary embolus (PE)      PULMONARY   (-) Shortness of breath   (-) Smoking   (-) URI (upper respiratory infection)        Physical Exam    Airway    Mallampati score: II  TM Distance: >3 FB  Neck ROM: full     Dental   No notable dental hx     Cardiovascular      Pulmonary      Other Findings        Anesthesia Plan  ASA Score- 2     Anesthesia Type- IV sedation with anesthesia with ASA Monitors  Additional Monitors:   Airway Plan:           Plan Factors-Exercise tolerance (METS): >4 METS  Chart reviewed  EKG reviewed  Existing labs reviewed  Patient is not a current smoker  Induction- intravenous  Postoperative Plan-     Informed Consent- Anesthetic plan and risks discussed with patient  I personally reviewed this patient with the CRNA  Discussed and agreed on the Anesthesia Plan with the CRNA              Lab Results   Component Value Date    WBC 10 81 (H) 06/01/2021    HGB 13 3 06/01/2021     06/01/2021     Lab Results   Component Value Date    SODIUM 141 06/01/2021    K 4 2 06/01/2021    BUN 9 06/01/2021    CREATININE 1 06 06/01/2021    EGFR 67 06/01/2021    GLUCOSE 118 05/11/2014

## 2021-07-30 ENCOUNTER — NURSE TRIAGE (OUTPATIENT)
Dept: OTHER | Facility: OTHER | Age: 41
End: 2021-07-30

## 2021-07-30 NOTE — TELEPHONE ENCOUNTER
Regarding: Stomach Pain  ----- Message from Howell Card sent at 7/30/2021  8:42 AM EDT -----  " I had stomach pain last night however they told me I would feel perfectly normal today "

## 2021-07-30 NOTE — TELEPHONE ENCOUNTER
Thank you  If patient is feeling better today, this is good news  Please advise pt to monitor for any returning pain and let us know

## 2021-07-30 NOTE — TELEPHONE ENCOUNTER
Reason for Disposition   Abdominal pain is a chronic symptom (recurrent or ongoing AND present > 4 weeks)    Answer Assessment - Initial Assessment Questions  1  LOCATION: "Where does it hurt?"       Center of abdomen    2  RADIATION: "Does the pain shoot anywhere else?" (e g , chest, back)      No    3  ONSET: "When did the pain begin?" (e g , minutes, hours or days ago)       Last night but just resolved over 30 mins ago  4  SUDDEN: "Gradual or sudden onset?"      Gradual    5  PATTERN "Does the pain come and go, or is it constant?"     - If constant: "Is it getting better, staying the same, or worsening?"       (Note: Constant means the pain never goes away completely; most serious pain is constant and it progresses)      - If intermittent: "How long does it last?" "Do you have pain now?"      (Note: Intermittent means the pain goes away completely between bouts)      Says it was constant last night but went away this morning  6  SEVERITY: "How bad is the pain?"  (e g , Scale 1-10; mild, moderate, or severe)    - MILD (1-3): doesn't interfere with normal activities, abdomen soft and not tender to touch     - MODERATE (4-7): interferes with normal activities or awakens from sleep, tender to touch     - SEVERE (8-10): excruciating pain, doubled over, unable to do any normal activities       No pain at this time  Is thinking she was feeling gas pain  7  RECURRENT SYMPTOM: "Have you ever had this type of stomach pain before?" If Yes, ask: "When was the last time?" and "What happened that time?"       Yes    8  CAUSE: "What do you think is causing the stomach pain?"      Pt had EGD and colonoscopy last night  9  RELIEVING/AGGRAVATING FACTORS: "What makes it better or worse?" (e g , movement, antacids, bowel movement)      Says that getting up and walking around made her feel better  10  OTHER SYMPTOMS: "Has there been any vomiting, diarrhea, constipation, or urine problems?"        No fever   Is drinking fluids and urinating normally  No vomiting or diarrhea       11  PREGNANCY: "Is there any chance you are pregnant?" "When was your last menstrual period?"        No     Protocols used: ABDOMINAL PAIN - [facility]

## 2021-08-07 DIAGNOSIS — G47.09 OTHER INSOMNIA: ICD-10-CM

## 2021-08-09 RX ORDER — ZOLPIDEM TARTRATE 10 MG/1
5-10 TABLET ORAL
Qty: 30 TABLET | Refills: 0 | Status: SHIPPED | OUTPATIENT
Start: 2021-08-09 | End: 2021-09-13 | Stop reason: SDUPTHER

## 2021-08-17 DIAGNOSIS — F41.9 ANXIETY: ICD-10-CM

## 2021-08-18 RX ORDER — ALPRAZOLAM 0.25 MG/1
0.25 TABLET ORAL DAILY PRN
Qty: 30 TABLET | Refills: 0 | Status: SHIPPED | OUTPATIENT
Start: 2021-08-18 | End: 2021-09-20 | Stop reason: SDUPTHER

## 2021-08-24 ENCOUNTER — RA CDI HCC (OUTPATIENT)
Dept: OTHER | Facility: HOSPITAL | Age: 41
End: 2021-08-24

## 2021-08-24 NOTE — PROGRESS NOTES
[facility] [employer]  coding opportunities          Number of diagnosis code(s) already on the problem list added to FYI fla                     Patients insurance company: [employer] (Medicare Advantage and Commercial)     Visit status: Patient canceled the appointment     Provider never responded to [facility] [employer]  coding request     [facility] [employer]  coding opportunities          Number of diagnosis code(s) already on the problem list added to  fla                Found on active problem  list - please assess using MEAT for  billing or resolve into hx as appropriate  E72 11 Homocysteinemia ([facility] [employer] )       Patients insurance company: [employer] ([employer])

## 2021-08-31 ENCOUNTER — TELEPHONE (OUTPATIENT)
Dept: OTHER | Facility: OTHER | Age: 41
End: 2021-08-31

## 2021-08-31 NOTE — TELEPHONE ENCOUNTER
Pt had 2 apt scheduled for today but had to cancel due to work  Pt will call back later to reschedule

## 2021-09-14 ENCOUNTER — TELEPHONE (OUTPATIENT)
Dept: INTERNAL MEDICINE CLINIC | Facility: CLINIC | Age: 41
End: 2021-09-14

## 2021-09-14 ENCOUNTER — OFFICE VISIT (OUTPATIENT)
Dept: INTERNAL MEDICINE CLINIC | Facility: CLINIC | Age: 41
End: 2021-09-14
Payer: COMMERCIAL

## 2021-09-14 VITALS
OXYGEN SATURATION: 97 % | SYSTOLIC BLOOD PRESSURE: 120 MMHG | TEMPERATURE: 98 F | HEART RATE: 76 BPM | BODY MASS INDEX: 35.92 KG/M2 | DIASTOLIC BLOOD PRESSURE: 76 MMHG | WEIGHT: 210.4 LBS | HEIGHT: 64 IN

## 2021-09-14 DIAGNOSIS — J01.10 ACUTE NON-RECURRENT FRONTAL SINUSITIS: Primary | ICD-10-CM

## 2021-09-14 PROCEDURE — 99213 OFFICE O/P EST LOW 20 MIN: CPT | Performed by: NURSE PRACTITIONER

## 2021-09-14 PROCEDURE — 3008F BODY MASS INDEX DOCD: CPT | Performed by: NURSE PRACTITIONER

## 2021-09-14 RX ORDER — DOXYCYCLINE 100 MG/1
100 CAPSULE ORAL 2 TIMES DAILY
Qty: 14 CAPSULE | Refills: 0 | Status: SHIPPED | OUTPATIENT
Start: 2021-09-14 | End: 2021-09-21

## 2021-09-14 NOTE — TELEPHONE ENCOUNTER
Pt  at pharmacy stating she was supposed to p/u Flonase also-not ordered  Please send order to [facility]

## 2021-09-14 NOTE — LETTER
September 14, 2021     Patient: Juan José Rg   YOB: 1980   Date of Visit: 9/14/2021       To Whom it May Concern:    Shanae Boston is under my professional care  She was seen in my office on 9/14/2021  She may return to work on 9/15/2021  If you have any questions or concerns, please don't hesitate to call           Sincerely,          Prince Guallpa

## 2021-09-14 NOTE — PROGRESS NOTES
Assessment/Plan:    Take antibiotics as prescribed with food  Start flonase 1 spray in each nostril daily in PM, saline nasal spray in AM       Diagnoses and all orders for this visit:    Acute non-recurrent frontal sinusitis  -     doxycycline monohydrate (MONODOX) 100 mg capsule; Take 1 capsule (100 mg total) by mouth 2 (two) times a day for 7 days          Subjective:      Patient ID: Melia Simpson is a 47 y o  female  Here today with complaints of a right earache   Symptoms started 2 weeks ago   She's had associated rhinorrhea, sinus pressure, and mild cough  She feels her sinus pressure and ear pain have worsened the last few days  She denies fever or GI symptoms  No shortness of breath  She is fully vaccinated against COVID  No known exposures  The following portions of the patient's history were reviewed and updated as appropriate: allergies, current medications, past family history, past medical history, past social history, past surgical history and problem list     Review of Systems   Constitutional: Positive for fatigue  Negative for activity change, appetite change and fever  HENT: Positive for congestion, ear pain, hearing loss, postnasal drip, sinus pressure and sinus pain  Negative for ear discharge, facial swelling and sore throat  Respiratory: Positive for cough  Negative for chest tightness, shortness of breath and wheezing  Cardiovascular: Negative for chest pain  Gastrointestinal: Negative for abdominal pain, diarrhea, nausea and vomiting  Musculoskeletal: Negative for myalgias  Neurological: Positive for headaches  Negative for dizziness and light-headedness  Objective:      /76   Pulse 76   Temp 98 °F (36 7 °C)   Ht 5' 4" (1 626 m)   Wt 95 4 kg (210 lb 6 4 oz)   SpO2 97%   BMI 36 12 kg/m²          Physical Exam  Vitals reviewed  Constitutional:       Appearance: Normal appearance  HENT:      Head: Normocephalic and atraumatic  Right Ear: Tympanic membrane, ear canal and external ear normal       Left Ear: Tympanic membrane, ear canal and external ear normal       Mouth/Throat:      Pharynx: No posterior oropharyngeal erythema  Eyes:      Conjunctiva/sclera: Conjunctivae normal    Cardiovascular:      Rate and Rhythm: Normal rate  Heart sounds: Normal heart sounds  Pulmonary:      Effort: Pulmonary effort is normal       Breath sounds: Normal breath sounds  Lymphadenopathy:      Cervical: No cervical adenopathy  Skin:     General: Skin is warm and dry  Neurological:      Mental Status: She is alert and oriented to person, place, and time     Psychiatric:         Mood and Affect: Mood normal          Behavior: Behavior normal

## 2021-09-20 DIAGNOSIS — F41.9 ANXIETY: ICD-10-CM

## 2021-09-20 RX ORDER — ALPRAZOLAM 0.25 MG/1
0.25 TABLET ORAL DAILY PRN
Qty: 30 TABLET | Refills: 0 | Status: SHIPPED | OUTPATIENT
Start: 2021-09-20 | End: 2021-10-18 | Stop reason: SDUPTHER

## 2021-10-13 DIAGNOSIS — G43.909 MIGRAINE WITHOUT STATUS MIGRAINOSUS, NOT INTRACTABLE, UNSPECIFIED MIGRAINE TYPE: ICD-10-CM

## 2021-10-13 DIAGNOSIS — F41.9 ANXIETY: ICD-10-CM

## 2021-10-13 RX ORDER — SERTRALINE HYDROCHLORIDE 100 MG/1
150 TABLET, FILM COATED ORAL DAILY
Qty: 135 TABLET | Refills: 0 | Status: SHIPPED | OUTPATIENT
Start: 2021-10-13 | End: 2022-01-24 | Stop reason: SDUPTHER

## 2021-10-13 RX ORDER — TOPIRAMATE 25 MG/1
25 TABLET ORAL 2 TIMES DAILY
Qty: 60 TABLET | Refills: 0 | Status: SHIPPED | OUTPATIENT
Start: 2021-10-13 | End: 2021-11-22 | Stop reason: SDUPTHER

## 2021-10-18 DIAGNOSIS — G47.09 OTHER INSOMNIA: ICD-10-CM

## 2021-10-18 DIAGNOSIS — F41.9 ANXIETY: ICD-10-CM

## 2021-10-18 RX ORDER — ZOLPIDEM TARTRATE 10 MG/1
5-10 TABLET ORAL
Qty: 30 TABLET | Refills: 0 | Status: SHIPPED | OUTPATIENT
Start: 2021-10-18 | End: 2021-11-22 | Stop reason: SDUPTHER

## 2021-10-18 RX ORDER — ALPRAZOLAM 0.25 MG/1
0.25 TABLET ORAL DAILY PRN
Qty: 30 TABLET | Refills: 0 | Status: SHIPPED | OUTPATIENT
Start: 2021-10-18 | End: 2021-11-22 | Stop reason: SDUPTHER

## 2021-11-10 ENCOUNTER — TELEMEDICINE (OUTPATIENT)
Dept: INTERNAL MEDICINE CLINIC | Facility: CLINIC | Age: 41
End: 2021-11-10
Payer: COMMERCIAL

## 2021-11-10 VITALS — TEMPERATURE: 97.7 F | BODY MASS INDEX: 34.84 KG/M2 | WEIGHT: 203 LBS

## 2021-11-10 DIAGNOSIS — G43.909 MIGRAINE WITHOUT STATUS MIGRAINOSUS, NOT INTRACTABLE, UNSPECIFIED MIGRAINE TYPE: ICD-10-CM

## 2021-11-10 DIAGNOSIS — J20.9 ACUTE BRONCHITIS, UNSPECIFIED ORGANISM: Primary | ICD-10-CM

## 2021-11-10 DIAGNOSIS — K21.00 GASTROESOPHAGEAL REFLUX DISEASE WITH ESOPHAGITIS WITHOUT HEMORRHAGE: ICD-10-CM

## 2021-11-10 DIAGNOSIS — K25.3 ACUTE GASTRIC EROSION: ICD-10-CM

## 2021-11-10 PROCEDURE — 99213 OFFICE O/P EST LOW 20 MIN: CPT | Performed by: INTERNAL MEDICINE

## 2021-11-10 RX ORDER — OMEPRAZOLE 40 MG/1
40 CAPSULE, DELAYED RELEASE ORAL DAILY
Qty: 90 CAPSULE | Refills: 0 | Status: SHIPPED | OUTPATIENT
Start: 2021-11-10 | End: 2022-03-27 | Stop reason: SDUPTHER

## 2021-11-10 RX ORDER — AZITHROMYCIN 250 MG/1
TABLET, FILM COATED ORAL
Qty: 6 TABLET | Refills: 0 | Status: SHIPPED | OUTPATIENT
Start: 2021-11-10 | End: 2021-11-15

## 2021-11-15 ENCOUNTER — OFFICE VISIT (OUTPATIENT)
Dept: GASTROENTEROLOGY | Facility: CLINIC | Age: 41
End: 2021-11-15
Payer: COMMERCIAL

## 2021-11-15 VITALS
DIASTOLIC BLOOD PRESSURE: 70 MMHG | WEIGHT: 207 LBS | TEMPERATURE: 98.3 F | HEIGHT: 64 IN | SYSTOLIC BLOOD PRESSURE: 116 MMHG | HEART RATE: 77 BPM | BODY MASS INDEX: 35.34 KG/M2 | OXYGEN SATURATION: 98 %

## 2021-11-15 DIAGNOSIS — K59.00 CONSTIPATION, UNSPECIFIED CONSTIPATION TYPE: ICD-10-CM

## 2021-11-15 DIAGNOSIS — R68.81 EARLY SATIETY: Primary | ICD-10-CM

## 2021-11-15 PROCEDURE — 3008F BODY MASS INDEX DOCD: CPT | Performed by: PHYSICIAN ASSISTANT

## 2021-11-15 PROCEDURE — 1036F TOBACCO NON-USER: CPT | Performed by: PHYSICIAN ASSISTANT

## 2021-11-15 PROCEDURE — 99214 OFFICE O/P EST MOD 30 MIN: CPT | Performed by: PHYSICIAN ASSISTANT

## 2021-12-15 DIAGNOSIS — F41.9 ANXIETY: ICD-10-CM

## 2021-12-16 ENCOUNTER — TELEPHONE (OUTPATIENT)
Dept: INTERNAL MEDICINE CLINIC | Facility: CLINIC | Age: 41
End: 2021-12-16

## 2021-12-16 RX ORDER — BUSPIRONE HYDROCHLORIDE 5 MG/1
5 TABLET ORAL 3 TIMES DAILY PRN
Qty: 90 TABLET | Refills: 0 | Status: SHIPPED | OUTPATIENT
Start: 2021-12-16 | End: 2022-02-07 | Stop reason: SDUPTHER

## 2021-12-22 DIAGNOSIS — F41.9 ANXIETY: ICD-10-CM

## 2021-12-22 DIAGNOSIS — G47.09 OTHER INSOMNIA: ICD-10-CM

## 2021-12-23 RX ORDER — ZOLPIDEM TARTRATE 10 MG/1
5-10 TABLET ORAL
Qty: 30 TABLET | Refills: 0 | Status: SHIPPED | OUTPATIENT
Start: 2021-12-23 | End: 2022-01-24 | Stop reason: SDUPTHER

## 2021-12-23 RX ORDER — ALPRAZOLAM 0.25 MG/1
0.25 TABLET ORAL DAILY PRN
Qty: 30 TABLET | Refills: 0 | Status: SHIPPED | OUTPATIENT
Start: 2021-12-23 | End: 2022-01-24 | Stop reason: SDUPTHER

## 2021-12-28 ENCOUNTER — TELEPHONE (OUTPATIENT)
Dept: INTERNAL MEDICINE CLINIC | Facility: CLINIC | Age: 41
End: 2021-12-28

## 2021-12-30 ENCOUNTER — TELEPHONE (OUTPATIENT)
Dept: INTERNAL MEDICINE CLINIC | Facility: CLINIC | Age: 41
End: 2021-12-30

## 2022-01-03 ENCOUNTER — TELEPHONE (OUTPATIENT)
Dept: INTERNAL MEDICINE CLINIC | Facility: CLINIC | Age: 42
End: 2022-01-03

## 2022-01-05 ENCOUNTER — RA CDI HCC (OUTPATIENT)
Dept: OTHER | Facility: HOSPITAL | Age: 42
End: 2022-01-05

## 2022-01-05 NOTE — TELEPHONE ENCOUNTER
Patient was unable to return to work on 1/4 per employer because she still had a cough  Please revise return to work note with new date of Thursday 1/6

## 2022-01-05 NOTE — PROGRESS NOTES
The following dx found on active problem list - please assess using MEAT for  billing    Pulmonary embolism ([facility] [address] ) [I26 99]    [facility]          Number of diagnosis code(s) already on the problem list added to FYI fla                     Patients insurance company: [employer] (Medicare Advantage and Commercial)             [facility]  [facility]          Number of diagnosis code(s) already on the problem list added to [employer] fla                  Number of suggestions NOT actually used: 1     Patients insurance company: [employer] ([employer])     Visit status: Patient canceled the appointment

## 2022-01-06 DIAGNOSIS — G43.909 MIGRAINE WITHOUT STATUS MIGRAINOSUS, NOT INTRACTABLE, UNSPECIFIED MIGRAINE TYPE: ICD-10-CM

## 2022-01-06 RX ORDER — TOPIRAMATE 25 MG/1
25 TABLET ORAL 2 TIMES DAILY
Qty: 60 TABLET | Refills: 0 | Status: SHIPPED | OUTPATIENT
Start: 2022-01-06 | End: 2022-02-07 | Stop reason: SDUPTHER

## 2022-01-24 DIAGNOSIS — G47.09 OTHER INSOMNIA: ICD-10-CM

## 2022-01-24 DIAGNOSIS — F41.9 ANXIETY: ICD-10-CM

## 2022-01-24 RX ORDER — SERTRALINE HYDROCHLORIDE 100 MG/1
150 TABLET, FILM COATED ORAL DAILY
Qty: 45 TABLET | Refills: 0 | Status: SHIPPED | OUTPATIENT
Start: 2022-01-24 | End: 2022-02-24 | Stop reason: SDUPTHER

## 2022-01-24 RX ORDER — ALPRAZOLAM 0.25 MG/1
0.25 TABLET ORAL DAILY PRN
Qty: 30 TABLET | Refills: 0 | Status: SHIPPED | OUTPATIENT
Start: 2022-01-24 | End: 2022-02-24 | Stop reason: SDUPTHER

## 2022-01-24 RX ORDER — ZOLPIDEM TARTRATE 10 MG/1
5-10 TABLET ORAL
Qty: 30 TABLET | Refills: 0 | Status: SHIPPED | OUTPATIENT
Start: 2022-01-24 | End: 2022-02-24 | Stop reason: SDUPTHER

## 2022-01-26 NOTE — ED PROVIDER NOTES
Chief complaint:   Chief Complaint   Patient presents with   • Gyn Exam     last pap 2/14/19 normal HPV neg, mammo 5/14/21 normal   • Office Visit     no concerns today   • Refill Request     patient is wondering if you would refill a cream for psoriasis       Vitals:  Visit Vitals  /70 (BP Location: LUE - Left upper extremity, Patient Position: Sitting, Cuff Size: Regular)   Ht 5' 5\" (1.651 m)   Wt 74.4 kg (164 lb 1.6 oz)   LMP  (LMP Unknown) Comment: last period 02/2021 or 03/2021   BMI 27.31 kg/m²       HISTORY OF PRESENT ILLNESS     HPI     51 year old female here for annual exam.     LMP around 11 months.      Other significant problems:  Patient Active Problem List    Diagnosis Date Noted   • Abnormal mammogram 09/12/2016     Priority: Low     2/22/2016 screening BI-RADS 0.   2/26 ultrasound done BI-RADS 4.    3/4/2016 biopsy left breast Left breast, core biopsies:     -  Benign breast parenchyma with columnar cell change and florid ductal     hyperplasia, with associated microcalcifications.     -  No atypia or carcinoma is identified.     9/9/2016 BI-RADS 3. Follow-up in 6 months  3/17/2017:  BI-RADS 2.      • Psoriasis 04/21/2016     Priority: Low       PAST MEDICAL, FAMILY AND SOCIAL HISTORY     Medications:  Current Outpatient Medications   Medication   • triamcinolone (ARISTOCORT) 0.1 % cream   • pimecrolimus (ELIDEL) 1 % cream   • tacrolimus (PROTOPIC) 0.1 % ointment     No current facility-administered medications for this visit.       Allergies:  ALLERGIES:   Allergen Reactions   • Codeine GI UPSET       Past Medical  History/Surgeries:  Past Medical History:   Diagnosis Date   • History of abnormal cervical Pap smear age 31 or 32   • Psoriasis, guttate        Past Surgical History:   Procedure Laterality Date   • Appendectomy     • Breast biopsy      3/4/2016   • Colposcopy,loop electrd cervix excis  age 31 or 32   • Vaginal delivery  2004       Family History:  Family History   Problem  History  Chief Complaint   Patient presents with    URI     Pt  reports coughing so long that it hurts my chest and makes me "feel lightheaded " Pt  reports congestion and chills  Ongoing for 1 week          History provided by:  Patient  URI   Presenting symptoms: congestion, cough, fatigue and rhinorrhea    Presenting symptoms: no ear pain, no facial pain, no fever and no sore throat    Congestion:     Location:  Nasal and chest    Interferes with sleep: yes      Interferes with eating/drinking: yes    Cough:     Cough characteristics:  Dry    Sputum characteristics:  Nondescript    Severity:  Moderate    Timing:  Intermittent    Progression:  Waxing and waning    Chronicity:  New  Fatigue:     Severity:  Moderate    Timing:  Constant  Severity:  Mild  Timing:  Intermittent  Chronicity:  New  Relieved by:  None tried  Worsened by:  Nothing  Ineffective treatments:  None tried  Associated symptoms: myalgias    Associated symptoms: no arthralgias, no headaches, no neck pain, no sinus pain, no sneezing, no swollen glands and no wheezing    Risk factors: not elderly, no chronic cardiac disease, no chronic kidney disease, no chronic respiratory disease, no diabetes mellitus, no immunosuppression, no recent illness, no recent travel and no sick contacts    Abdominal Pain   Pain location:  Epigastric  Pain quality: cramping    Pain radiates to:  Does not radiate  Pain severity:  Moderate  Onset quality:  Gradual  Duration:  2 days  Timing:  Intermittent  Progression:  Waxing and waning  Chronicity:  New  Context: recent illness    Context: not alcohol use, not awakening from sleep, not diet changes, not eating, not laxative use, not medication withdrawal, not previous surgeries, not recent sexual activity, not recent travel, not retching, not sick contacts, not suspicious food intake and not trauma    Relieved by:  Nothing  Worsened by:  Nothing  Ineffective treatments:  None tried  Associated symptoms: anorexia, cough, Relation Age of Onset   • Allergies Brother    • Allergies Daughter    • Psoriasis Father    • Stroke Maternal Grandmother    • Arthritis Maternal Grandmother    • Cancer Maternal Grandfather         Thyroid cancer   • Substance abuse Maternal Grandfather    • Cancer Paternal Grandmother         skin   • Myocardial Infarction Paternal Grandfather        Social History:  Social History     Tobacco Use   • Smoking status: Never Smoker   • Smokeless tobacco: Never Used   Substance Use Topics   • Alcohol use: Yes     Comment: wine every 2-3 weeks       REVIEW OF SYSTEMS     Review of Systems   Constitutional: Negative for activity change and appetite change.   HENT: Negative for congestion and dental problem.    Eyes: Negative for discharge and itching.   Respiratory: Negative for apnea and chest tightness.    Cardiovascular: Negative for chest pain.   Gastrointestinal: Negative for abdominal distention.   Endocrine: Negative for cold intolerance and heat intolerance.   Genitourinary: Negative for difficulty urinating and dyspareunia.   Musculoskeletal: Negative for arthralgias and back pain.   Allergic/Immunologic: Negative for environmental allergies and food allergies.   Psychiatric/Behavioral: Negative for agitation and behavioral problems.       PHYSICAL EXAM     Physical Exam  Exam conducted with a chaperone present.   Constitutional:       Appearance: Normal appearance. She is normal weight.   HENT:      Head: Normocephalic and atraumatic.   Eyes:      General: No scleral icterus.        Right eye: No discharge.         Left eye: No discharge.   Cardiovascular:      Rate and Rhythm: Normal rate.   Chest:   Breasts: Breasts are symmetrical.      Right: No swelling, bleeding, inverted nipple, mass, nipple discharge, skin change or tenderness.      Left: No swelling, bleeding, inverted nipple, mass, nipple discharge, skin change or tenderness.       Abdominal:      General: There is no distension.      Palpations:  fatigue and nausea    Associated symptoms: no belching, no chest pain, no chills, no constipation, no diarrhea, no dysuria, no fever, no flatus, no hematemesis, no hematochezia, no hematuria, no melena, no sore throat and no vomiting    Cough:     Cough characteristics:  Harsh    Sputum characteristics:  Nondescript    Severity:  Moderate    Onset quality:  Gradual    Duration:  1 week    Timing:  Intermittent    Progression:  Waxing and waning    Chronicity:  New  Fatigue:     Severity:  Moderate    Duration:  1 week    Timing:  Constant  Nausea:     Severity:  Mild  Risk factors: obesity    Risk factors: no alcohol abuse, no aspirin use, not elderly, has not had multiple surgeries, no NSAID use and no recent hospitalization        Prior to Admission Medications   Prescriptions Last Dose Informant Patient Reported? Taking? ALPRAZolam (XANAX) 0 25 mg tablet  Self No Yes   Sig: TAKE ONE TABLET BY MOUTH EVERY DAY AS NEEDED FOR ANXIETY   Cetirizine HCl (ZYRTEC ALLERGY) 10 MG CAPS  Self Yes Yes   Sig: Take 10 mg by mouth     albuterol (PROVENTIL HFA,VENTOLIN HFA) 90 mcg/act inhaler  Self No No   Sig: Inhale 2 puffs every 6 (six) hours as needed for wheezing   albuterol (PROVENTIL HFA,VENTOLIN HFA) 90 mcg/act inhaler   No No   Sig: Inhale 2 puffs every 4 (four) hours as needed for wheezing (cough)   benzonatate (TESSALON PERLES) 100 mg capsule   No Yes   Sig: Take 1 capsule (100 mg total) by mouth 3 (three) times a day as needed for cough   benzonatate (TESSALON PERLES) 100 mg capsule   No No   Sig: Take 1 capsule (100 mg total) by mouth 3 (three) times a day as needed for cough for up to 3 days   fluticasone (FLONASE) 50 mcg/act nasal spray   No Yes   Si spray into each nostril daily   omeprazole (PriLOSEC) 20 mg delayed release capsule  Self No Yes   Sig: Take 1 capsule (20 mg total) by mouth daily   sertraline (ZOLOFT) 100 mg tablet  Self No Yes   Sig: Take 1 5 tablets (150 mg total) by mouth daily   zolpidem (AMBIEN) 10 mg tablet  Self No No   Sig: Take 0 5-1 tablets (5-10 mg total) by mouth daily at bedtime as needed for sleep      Facility-Administered Medications: None       Past Medical History:   Diagnosis Date    Anxiety     Depression     Fibroid     GERD (gastroesophageal reflux disease)     History of pulmonary embolus (PE)     Iliotibial band syndrome     last assessed - [id number]    Mitral valve prolapse     Obesity     PONV (postoperative nausea and vomiting)     Patient requests to be premedicated for N/V    Wears glasses     for reading       Past Surgical History:   Procedure Laterality Date    CHOLECYSTECTOMY      ESOPHAGOGASTRODUODENOSCOPY N/A 5/31/2018    Procedure: ESOPHAGOGASTRODUODENOSCOPY (EGD); Surgeon: Jonathon Farrell MD;  Location: [facility]; Service: Gastroenterology    GALLBLADDER SURGERY      KNEE SURGERY Left     growth plate fx left knee    KNEE SURGERY Right     MO OPEN TREATMENT METATARSAL FRACTURE EACH Left 4/10/2019    Procedure: OPEN REDUCTION W/ INTERNAL FIXATION (ORIF) FOOT, Left 5th metatarsal fracture open reduction internal fixation with calcaneal bone graft and bone marrow aspirate concentrate;  Surgeon: Shirlene Hubbard MD;  Location: [facility];  Service: Orthopedics    TUBAL LIGATION      WISDOM TOOTH EXTRACTION         Family History   Adopted: Yes   Problem Relation Age of Onset    No Known Problems Mother     Alcohol abuse Neg Hx     Substance Abuse Neg Hx     Mental illness Neg Hx     Depression Neg Hx      I have reviewed and agree with the history as documented      Social History     Tobacco Use    Smoking status: Never Smoker    Smokeless tobacco: Never Used   Substance Use Topics    Alcohol use: Yes     Frequency: Monthly or less     Drinks per session: 1 or 2     Binge frequency: Never     Comment: occasional; Special occasions, rarely consumes alcohol - per [employer]    Drug use: No        Review of Systems   Constitutional: Positive Abdomen is soft. There is no mass.      Tenderness: There is no abdominal tenderness. There is no right CVA tenderness, left CVA tenderness, guarding or rebound.      Hernia: No hernia is present.   Genitourinary:     Pubic Area: No rash or pubic lice.       Labia:         Right: No rash, lesion or injury.         Left: No rash, tenderness, lesion or injury.       Urethra: No prolapse, urethral pain, urethral swelling or urethral lesion.      Vagina: Normal. No signs of injury and foreign body. No vaginal discharge, erythema, tenderness, bleeding, lesions or prolapsed vaginal walls.      Cervix: No cervical motion tenderness, discharge, friability, lesion, erythema, cervical bleeding or eversion.      Uterus: Not deviated, not enlarged, not fixed, not tender and no uterine prolapse.       Adnexa:         Right: No mass, tenderness or fullness.          Left: No mass, tenderness or fullness.         Skin:     General: Skin is warm and dry.      Coloration: Skin is not jaundiced or pale.   Neurological:      General: No focal deficit present.      Mental Status: She is alert and oriented to person, place, and time.   Psychiatric:         Mood and Affect: Mood normal.         Behavior: Behavior normal.         ASSESSMENT/PLAN     1. Pap and HPV collected  2. Breast CA screening - breast exam WNL  3. Encouraged calcium and vitamin D.   4. No FH of breast CA  5. Gluteal rash - has been present coming and going for 30 years per patient.  Has seen derm before but needs to establish with a new one.  Referral sent.    for activity change, appetite change and fatigue  Negative for chills, diaphoresis and fever  HENT: Positive for congestion, postnasal drip and rhinorrhea  Negative for dental problem, ear pain, mouth sores, sinus pain, sneezing, sore throat and trouble swallowing  Eyes: Negative for pain, discharge, redness and itching  Respiratory: Positive for cough  Negative for chest tightness and wheezing  Cardiovascular: Negative for chest pain and palpitations  Gastrointestinal: Positive for abdominal pain, anorexia and nausea  Negative for constipation, diarrhea, flatus, hematemesis, hematochezia, melena and vomiting  Genitourinary: Negative for dysuria, hematuria and urgency  Musculoskeletal: Positive for myalgias  Negative for arthralgias and neck pain  Skin: Negative for color change, pallor, rash and wound  Neurological: Negative for headaches  Psychiatric/Behavioral: Negative for confusion  All other systems reviewed and are negative  Physical Exam  Physical Exam   Constitutional: She is oriented to person, place, and time  She appears well-developed and well-nourished  No distress  HENT:   Head: Normocephalic  Right Ear: External ear normal    Left Ear: External ear normal    Clear rhinorrhea   Eyes: Conjunctivae are normal  Right eye exhibits no discharge  Left eye exhibits no discharge  Neck: Neck supple  No tracheal deviation present  No thyromegaly present  Cardiovascular: Normal rate, regular rhythm and normal heart sounds  Exam reveals no gallop and no friction rub  No murmur heard  Pulmonary/Chest: Effort normal and breath sounds normal  No stridor  No respiratory distress  She has no wheezes  She has no rales  Abdominal: Soft  There is tenderness  There is guarding  Epigastric and RUQ, hepatomegaly-tip of the liver palpated below the cosal margin   Musculoskeletal: She exhibits no edema  Lymphadenopathy:     She has no cervical adenopathy     Neurological: She is alert and oriented to person, place, and time  Skin: Skin is warm  Capillary refill takes less than 2 seconds  She is not diaphoretic  Psychiatric: She has a normal mood and affect  Her behavior is normal  Judgment and thought content normal    Nursing note and vitals reviewed        Vital Signs  ED Triage Vitals [01/21/20 0923]   Temperature Pulse Respirations Blood Pressure SpO2   98 2 °F (36 8 °C) 71 18 132/83 98 %      Temp Source Heart Rate Source Patient Position - Orthostatic VS BP Location FiO2 (%)   Oral Monitor Sitting Right arm --      Pain Score       7           Vitals:    01/21/20 0923 01/21/20 1221   BP: 132/83 145/75   Pulse: 71 99   Patient Position - Orthostatic VS: Sitting Sitting         Visual Acuity      ED Medications  Medications   sodium chloride 0 9 % bolus 1,000 mL (0 mL Intravenous Stopped 1/21/20 1325)   albuterol inhalation solution 5 mg (5 mg Nebulization Given 1/21/20 1003)   ipratropium (ATROVENT) 0 02 % inhalation solution 0 5 mg (0 5 mg Nebulization Given 1/21/20 1003)   iohexol (OMNIPAQUE) 350 MG/ML injection (MULTI-DOSE) 100 mL (100 mL Intravenous Given 1/21/20 1144)       Diagnostic Studies  Results Reviewed     Procedure Component Value Units Date/Time    UA w Reflex to Microscopic w Reflex to Culture [[id number]] Collected:  01/21/20 1103    Lab Status:  Final result Specimen:  Urine Updated:  01/21/20 1112     Color, UA Yellow     Clarity, UA Clear     Specific Gravity, UA 1 025     pH, UA 5 0     Leukocytes, UA Negative     Nitrite, UA Negative     Protein, UA Negative mg/dl      Glucose, UA Negative mg/dl      Ketones, UA Negative mg/dl      Urobilinogen, UA 0 2 E U /dl      Bilirubin, UA Negative     Blood, UA Negative    Comprehensive metabolic panel [[id number]]  (Abnormal) Collected:  01/21/20 1016    Lab Status:  Final result Specimen:  Blood from Arm, Right Updated:  01/21/20 1106     Sodium 136 mmol/L      Potassium 4 7 mmol/L      Chloride 102 mmol/L      CO2 24 mmol/L      ANION GAP 10 mmol/L      BUN 13 mg/dL      Creatinine 1 01 mg/dL      Glucose 121 mg/dL      Calcium 9 4 mg/dL      AST 32 U/L      ALT 24 U/L      Alkaline Phosphatase 114 U/L      Total Protein 8 4 g/dL      Albumin 3 7 g/dL      Total Bilirubin 0 41 mg/dL      eGFR 71 ml/min/1 73sq m     Narrative:       National Kidney Disease Foundation guidelines for Chronic Kidney Disease (CKD):     Stage 1 with normal or high GFR (GFR > 90 mL/min/1 73 square meters)    Stage 2 Mild CKD (GFR = 60-89 mL/min/1 73 square meters)    Stage 3A Moderate CKD (GFR = 45-59 mL/min/1 73 square meters)    Stage 3B Moderate CKD (GFR = 30-44 mL/min/1 73 square meters)    Stage 4 Severe CKD (GFR = 15-29 mL/min/1 73 square meters)    Stage 5 End Stage CKD (GFR <15 mL/min/1 73 square meters)  Note: GFR calculation is accurate only with a steady state creatinine    Influenza A/B and RSV PCR [[id number]]  (Normal) Collected:  01/21/20 1007    Lab Status:  Final result Specimen:  Nose Updated:  01/21/20 1054     INFLUENZA A PCR None Detected     INFLUENZA B PCR None Detected     RSV PCR None Detected    POCT pregnancy, urine [[id number]]  (Normal) Resulted:  01/21/20 1049    Lab Status:  Final result Updated:  01/21/20 1049     EXT PREG TEST UR (Ref: Negative) negative     Control valid    Lipase [[id number]]  (Normal) Collected:  01/21/20 1016    Lab Status:  Final result Specimen:  Blood from Arm, Right Updated:  01/21/20 1048     Lipase 143 u/L     CBC and differential [[id number]] Collected:  01/21/20 1016    Lab Status:  Final result Specimen:  Blood from Arm, Right Updated:  01/21/20 1028     WBC 8 71 Thousand/uL      RBC 4 84 Million/uL      Hemoglobin 13 8 g/dL      Hematocrit 43 0 %      MCV 89 fL      MCH 28 5 pg      MCHC 32 1 g/dL      RDW 13 2 %      MPV 9 5 fL      Platelets 077 Thousands/uL      nRBC 0 /100 WBCs      Neutrophils Relative 57 %      Immat GRANS % 1 %      Lymphocytes Relative 33 %      Monocytes Relative 5 %      Eosinophils Relative 3 %      Basophils Relative 1 %      Neutrophils Absolute 4 95 Thousands/µL      Immature Grans Absolute 0 08 Thousand/uL      Lymphocytes Absolute 2 89 Thousands/µL      Monocytes Absolute 0 42 Thousand/µL      Eosinophils Absolute 0 30 Thousand/µL      Basophils Absolute 0 07 Thousands/µL                  CT abdomen pelvis with contrast   ED Interpretation by Mal Ruggiero PA-C (01/21 1234)   No acute abnormality  identified  Hepatomegaly and steatosis improved compared to prior study  Prior small nodule not apparent on this exam      Final Result by Samantha Hussein MD (01/21 1222)      No acute abnormality identified  Hepatomegaly and steatosis, improved compared to prior  Prior small nodule not apparent on this exam                   Workstation performed: [id number]         XR chest 2 views   ED Interpretation by Mal Ruggiero PA-C (01/21 1120)   No acute cardiopulmonary disease      Final Result by Smita Day MD (01/21 1039)      No acute cardiopulmonary disease              Workstation performed: [id number]                    Procedures  ECG 12 Lead Documentation Only  Date/Time: 1/21/2020 10:18 AM  Performed by: Mal Ruggiero PA-C  Authorized by: Mal Ruggiero PA-C     Indications / Diagnosis:  Abdominal-epigastric pain  ECG reviewed by me, the ED Provider: yes    Patient location:  ED  Previous ECG:     Previous ECG:  Compared to current    Comparison ECG info:  June 26, 2019    Similarity:  No change    Comparison to cardiac monitor: Yes    Interpretation:     Interpretation: normal    Rate:     ECG rate:  85    ECG rate assessment: normal    Rhythm:     Rhythm: sinus rhythm    Ectopy:     Ectopy: none    QRS:     QRS axis:  Normal    QRS intervals:  Normal  Conduction:     Conduction: normal    ST segments:     ST segments:  Normal  T waves:     T waves: normal    Comments:      No sign of acute ischemia    Independently interpreted by me             ED Course                               MDM  Number of Diagnoses or Management Options  Abdominal pain: new and requires workup  Acute bronchitis: new and requires workup     Amount and/or Complexity of Data Reviewed  Clinical lab tests: ordered and reviewed  Tests in the radiology section of CPT®: ordered and reviewed  Tests in the medicine section of CPT®: ordered and reviewed    Risk of Complications, Morbidity, and/or Mortality  Presenting problems: high  Diagnostic procedures: high  Management options: high  General comments: Patient presents emergency room with complaints of chest tightness and a productive cough for the past week  She is also complaining of new onset of abdominal pain  She was seen and evaluated  Laboratory studies were taken and were reviewed  A chest x-ray showed no evidence of acute cardiopulmonary disease  An EKG showed no signs of acute ischemia  Patient had a CT scan of her abdomen and pelvis which showed hepatomegaly but no acute abdominal process  The patient was given a DuoNeb in the emergency room  Her coughing was improved  She was discharged home with a prescription for Zithromax, albuterol inhaler, Tessalon Perles  She was instructed to make a follow-up appointment with her family physician for recheck in 2 days  Should her symptoms worsen, she may return to the emergency room at any time      Patient Progress  Patient progress: stable        Disposition  Final diagnoses:   Acute bronchitis   Abdominal pain - Hepatomegaly     Time reflects when diagnosis was documented in both MDM as applicable and the Disposition within this note     Time User Action Codes Description Comment    1/21/2020 12:56 PM Rosaleen Soho Add [J20 9] Acute bronchitis     1/21/2020 12:56 PM Rosaleen Soho Add [R10 9] Abdominal pain     1/21/2020 12:56 PM Rosaleen Soho Modify [R10 9] Abdominal pain Hepatomegaly    1/21/2020 12:56 PM Corita Primus [R05] Cough Symptomatic improvement after nebulizer treatment, given albuterol inhaler to use as needed  Start abx, use saline nasal spray daily  Stop decongestant     1/21/2020 12:57 PM Gutzweiler, Melva Balsam Add [J06 9] Upper respiratory tract infection, unspecified type Start cough medication, nasal sprays, monitor for fevers  ED Disposition     ED Disposition Condition Date/Time Comment    Discharge Stable Tue Jan 21, 2020 12:55 PM Honorio Richards discharge to home/self care              Follow-up Information     Follow up With Specialties Details Why Contact Info    Viktoria Pablo MD Internal Medicine Schedule an appointment as soon as possible for a visit  a recheck in 2 days [email or web address]  [address] [place] [address]  [phone]            Discharge Medication List as of 1/21/2020  1:00 PM      START taking these medications    Details   azithromycin (ZITHROMAX Z-ALEXANDR) 250 mg tablet Take 2 tablets today then 1 tablet daily x 4 days, Normal         CONTINUE these medications which have CHANGED    Details   albuterol (PROVENTIL HFA,VENTOLIN HFA) 90 mcg/act inhaler Inhale 2 puffs every 4 (four) hours as needed for wheezing (cough), Starting Tue 1/21/2020, Normal      benzonatate (TESSALON PERLES) 100 mg capsule Take 1 capsule (100 mg total) by mouth 3 (three) times a day as needed for cough for up to 3 days, Starting Tue 1/21/2020, Until Fri 1/24/2020, Normal         CONTINUE these medications which have NOT CHANGED    Details   ALPRAZolam (XANAX) 0 25 mg tablet TAKE ONE TABLET BY MOUTH EVERY DAY AS NEEDED FOR ANXIETY, Normal      Cetirizine HCl (ZYRTEC ALLERGY) 10 MG CAPS Take 10 mg by mouth , Historical Med      fluticasone (FLONASE) 50 mcg/act nasal spray 1 spray into each nostril daily, Starting Tue 1/14/2020, Normal      omeprazole (PriLOSEC) 20 mg delayed release capsule Take 1 capsule (20 mg total) by mouth daily, Starting Wed 1/23/2019, Normal      sertraline (ZOLOFT) 100 mg tablet Take 1 5 tablets (150 mg total) by mouth daily, Starting Wed 1/8/2020, Normal      zolpidem (AMBIEN) 10 mg tablet Take 0 5-1 tablets (5-10 mg total) by mouth daily at bedtime as needed for sleep, Starting Wed 1/8/2020, Normal           No discharge procedures on file      ED Provider  Electronically Signed by           Vessie Siemens, PA-C  01/21/20 0813

## 2022-02-09 ENCOUNTER — RA CDI HCC (OUTPATIENT)
Dept: OTHER | Facility: HOSPITAL | Age: 42
End: 2022-02-09

## 2022-02-09 NOTE — PROGRESS NOTES
The following dx found on active problem list - please assess using MEAT for  billing     Pulmonary embolism ([facility] [address] ) [I26 99]    [facility] [address]  coding opportunities          Number of diagnosis code(s) already on the problem list added to  fla                     Patients insurance company: [id number] ([employer])

## 2022-02-14 ENCOUNTER — OFFICE VISIT (OUTPATIENT)
Dept: INTERNAL MEDICINE CLINIC | Facility: CLINIC | Age: 42
End: 2022-02-14
Payer: COMMERCIAL

## 2022-02-14 VITALS
SYSTOLIC BLOOD PRESSURE: 112 MMHG | OXYGEN SATURATION: 98 % | DIASTOLIC BLOOD PRESSURE: 72 MMHG | BODY MASS INDEX: 35.17 KG/M2 | HEART RATE: 82 BPM | HEIGHT: 64 IN | TEMPERATURE: 97.5 F | WEIGHT: 206 LBS

## 2022-02-14 DIAGNOSIS — K21.00 GASTROESOPHAGEAL REFLUX DISEASE WITH ESOPHAGITIS WITHOUT HEMORRHAGE: ICD-10-CM

## 2022-02-14 DIAGNOSIS — F41.9 ANXIETY: ICD-10-CM

## 2022-02-14 DIAGNOSIS — H57.89 BURNING SENSATION OF EYE: ICD-10-CM

## 2022-02-14 DIAGNOSIS — G47.09 OTHER INSOMNIA: ICD-10-CM

## 2022-02-14 DIAGNOSIS — E78.2 MIXED HYPERLIPIDEMIA: ICD-10-CM

## 2022-02-14 DIAGNOSIS — G43.909 MIGRAINE WITHOUT STATUS MIGRAINOSUS, NOT INTRACTABLE, UNSPECIFIED MIGRAINE TYPE: Primary | ICD-10-CM

## 2022-02-14 PROCEDURE — 1036F TOBACCO NON-USER: CPT | Performed by: NURSE PRACTITIONER

## 2022-02-14 PROCEDURE — 3008F BODY MASS INDEX DOCD: CPT | Performed by: NURSE PRACTITIONER

## 2022-02-14 PROCEDURE — 99214 OFFICE O/P EST MOD 30 MIN: CPT | Performed by: NURSE PRACTITIONER

## 2022-02-14 RX ORDER — RIZATRIPTAN BENZOATE 5 MG/1
5 TABLET ORAL ONCE AS NEEDED
Qty: 9 TABLET | Refills: 0 | Status: SHIPPED | OUTPATIENT
Start: 2022-02-14

## 2022-02-14 NOTE — LETTER
February 14, 2022     Patient: Lieutenant Mario   YOB: 1980   Date of Visit: 2/14/2022       To Whom it May Concern:    Luanne Mora is under my professional care  She was seen in my office on 2/14/2022  She may return to work on 2/14/2022  If you have any questions or concerns, please don't hesitate to call           Sincerely,          Omar Rey

## 2022-02-14 NOTE — ASSESSMENT & PLAN NOTE
On topamax daily  Start maxalt as needed, at the start of a migraine  Recommend avoiding nsaids or excedrin as it may cause rebound headaches

## 2022-02-14 NOTE — PROGRESS NOTES
Assessment/Plan:    GERD (gastroesophageal reflux disease)  On PPI daily  Migraine headache  On topamax daily  Start maxalt as needed, at the start of a migraine  Recommend avoiding nsaids or excedrin as it may cause rebound headaches  Insomnia  On ambien nightly  Hyperlipidemia  Check labs  Anxiety  Stable  On sertraline and buspar  Alprazolam PRN  BMI Counseling: Body mass index is 35 36 kg/m²  The BMI is above normal  Nutrition recommendations include reducing portion sizes  Diagnoses and all orders for this visit:    Migraine without status migrainosus, not intractable, unspecified migraine type  -     rizatriptan (Maxalt) 5 MG tablet; Take 1 tablet (5 mg total) by mouth once as needed for migraine for up to 1 dose May repeat in 2 hours if needed    Anxiety  -     TSH, 3rd generation with Free T4 reflex; Future    Gastroesophageal reflux disease with esophagitis without hemorrhage  -     Comprehensive metabolic panel; Future  -     CBC and differential; Future    Burning sensation of eye  -     Ambulatory Referral to Ophthalmology; Future    Other insomnia    Mixed hyperlipidemia  -     Lipid Panel with Direct LDL reflex; Future          Subjective:      Patient ID: Radha Florentino is a 46 y o  female  Here today for follow up  Emil Morillo is doing ok  She had covid about 6 weeks ago  Since then she has been having intermittent itching and burning in her right eye  She denies any discharge or vision changes  She continues to have migraines about 4 times a week  She is taking imitrex and uses advil or excedrin migraine as needed  Her anxiety has been well managed  She continues to have issues sleeping at night and is using ambien nightly                       The following portions of the patient's history were reviewed and updated as appropriate: allergies, current medications, past family history, past medical history, past social history, past surgical history and problem list     Review of Systems   Constitutional: Negative for activity change, appetite change and fatigue  Eyes: Positive for pain, redness and itching  Negative for photophobia, discharge and visual disturbance  Respiratory: Negative for cough, chest tightness and shortness of breath  Cardiovascular: Negative for chest pain, palpitations and leg swelling  Gastrointestinal: Negative for abdominal pain, blood in stool, constipation and diarrhea  Genitourinary: Negative for difficulty urinating  Musculoskeletal: Negative for arthralgias  Skin: Negative for rash  Neurological: Positive for headaches  Negative for dizziness, weakness and light-headedness  Psychiatric/Behavioral: Positive for sleep disturbance  Negative for decreased concentration and dysphoric mood  The patient is not nervous/anxious  Objective:      /72   Pulse 82   Temp 97 5 °F (36 4 °C)   Ht 5' 4" (1 626 m)   Wt 93 4 kg (206 lb)   SpO2 98%   BMI 35 36 kg/m²          Physical Exam  Vitals reviewed  Constitutional:       Appearance: She is well-developed  HENT:      Head: Normocephalic and atraumatic  Eyes:      General: Lids are normal          Right eye: No discharge  Left eye: No discharge  Conjunctiva/sclera: Conjunctivae normal       Right eye: Right conjunctiva is not injected  Left eye: Left conjunctiva is not injected  Pupils: Pupils are equal, round, and reactive to light  Cardiovascular:      Rate and Rhythm: Normal rate and regular rhythm  Heart sounds: Normal heart sounds  Pulmonary:      Effort: Pulmonary effort is normal       Breath sounds: Normal breath sounds  Abdominal:      General: Bowel sounds are normal       Palpations: Abdomen is soft  Musculoskeletal:         General: Normal range of motion  Cervical back: Neck supple  Right lower leg: No edema  Left lower leg: No edema  Skin:     General: Skin is warm and dry  Neurological:      Mental Status: She is alert and oriented to person, place, and time     Psychiatric:         Behavior: Behavior normal

## 2022-03-27 DIAGNOSIS — G47.09 OTHER INSOMNIA: ICD-10-CM

## 2022-03-27 DIAGNOSIS — K21.00 GASTROESOPHAGEAL REFLUX DISEASE WITH ESOPHAGITIS WITHOUT HEMORRHAGE: ICD-10-CM

## 2022-03-27 DIAGNOSIS — K25.3 ACUTE GASTRIC EROSION: ICD-10-CM

## 2022-03-27 DIAGNOSIS — G43.909 MIGRAINE WITHOUT STATUS MIGRAINOSUS, NOT INTRACTABLE, UNSPECIFIED MIGRAINE TYPE: ICD-10-CM

## 2022-03-27 DIAGNOSIS — F41.9 ANXIETY: ICD-10-CM

## 2022-03-28 RX ORDER — OMEPRAZOLE 40 MG/1
40 CAPSULE, DELAYED RELEASE ORAL DAILY
Qty: 90 CAPSULE | Refills: 0 | Status: SHIPPED | OUTPATIENT
Start: 2022-03-28 | End: 2022-07-14 | Stop reason: SDUPTHER

## 2022-03-28 RX ORDER — ALPRAZOLAM 0.25 MG/1
0.25 TABLET ORAL DAILY PRN
Qty: 30 TABLET | Refills: 0 | Status: SHIPPED | OUTPATIENT
Start: 2022-03-28 | End: 2022-05-03 | Stop reason: SDUPTHER

## 2022-03-28 RX ORDER — ZOLPIDEM TARTRATE 10 MG/1
5-10 TABLET ORAL
Qty: 30 TABLET | Refills: 0 | Status: SHIPPED | OUTPATIENT
Start: 2022-03-28 | End: 2022-05-03 | Stop reason: SDUPTHER

## 2022-03-28 RX ORDER — SERTRALINE HYDROCHLORIDE 100 MG/1
150 TABLET, FILM COATED ORAL DAILY
Qty: 135 TABLET | Refills: 1 | Status: SHIPPED | OUTPATIENT
Start: 2022-03-28 | End: 2022-05-03 | Stop reason: SDUPTHER

## 2022-03-28 RX ORDER — TOPIRAMATE 25 MG/1
25 TABLET ORAL 2 TIMES DAILY
Qty: 60 TABLET | Refills: 0 | Status: SHIPPED | OUTPATIENT
Start: 2022-03-28 | End: 2022-05-07 | Stop reason: SDUPTHER

## 2022-05-03 DIAGNOSIS — R10.11 RIGHT UPPER QUADRANT ABDOMINAL PAIN: ICD-10-CM

## 2022-05-03 DIAGNOSIS — R19.4 CHANGE IN BOWEL HABITS: ICD-10-CM

## 2022-05-03 DIAGNOSIS — F41.9 ANXIETY: ICD-10-CM

## 2022-05-03 DIAGNOSIS — G47.09 OTHER INSOMNIA: ICD-10-CM

## 2022-05-03 RX ORDER — BUSPIRONE HYDROCHLORIDE 5 MG/1
5 TABLET ORAL 3 TIMES DAILY PRN
Qty: 90 TABLET | Refills: 0 | Status: SHIPPED | OUTPATIENT
Start: 2022-05-03 | End: 2022-07-14 | Stop reason: SDUPTHER

## 2022-05-03 RX ORDER — SERTRALINE HYDROCHLORIDE 100 MG/1
150 TABLET, FILM COATED ORAL DAILY
Qty: 135 TABLET | Refills: 0 | Status: SHIPPED | OUTPATIENT
Start: 2022-05-03

## 2022-05-03 RX ORDER — ALPRAZOLAM 0.25 MG/1
0.25 TABLET ORAL DAILY PRN
Qty: 30 TABLET | Refills: 0 | Status: SHIPPED | OUTPATIENT
Start: 2022-05-03 | End: 2022-06-06 | Stop reason: SDUPTHER

## 2022-05-03 RX ORDER — ONDANSETRON 4 MG/1
4 TABLET, ORALLY DISINTEGRATING ORAL EVERY 6 HOURS PRN
Qty: 60 TABLET | Refills: 0 | Status: SHIPPED | OUTPATIENT
Start: 2022-05-03

## 2022-05-03 RX ORDER — ZOLPIDEM TARTRATE 10 MG/1
5-10 TABLET ORAL
Qty: 30 TABLET | Refills: 0 | Status: SHIPPED | OUTPATIENT
Start: 2022-05-03 | End: 2022-06-06 | Stop reason: SDUPTHER

## 2022-05-04 ENCOUNTER — TELEPHONE (OUTPATIENT)
Dept: GASTROENTEROLOGY | Facility: CLINIC | Age: 42
End: 2022-05-04

## 2022-05-07 DIAGNOSIS — G43.909 MIGRAINE WITHOUT STATUS MIGRAINOSUS, NOT INTRACTABLE, UNSPECIFIED MIGRAINE TYPE: ICD-10-CM

## 2022-05-09 RX ORDER — TOPIRAMATE 25 MG/1
25 TABLET ORAL 2 TIMES DAILY
Qty: 60 TABLET | Refills: 0 | Status: SHIPPED | OUTPATIENT
Start: 2022-05-09 | End: 2022-06-12 | Stop reason: SDUPTHER

## 2022-06-06 DIAGNOSIS — F41.9 ANXIETY: ICD-10-CM

## 2022-06-06 DIAGNOSIS — G47.09 OTHER INSOMNIA: ICD-10-CM

## 2022-06-06 RX ORDER — ALPRAZOLAM 0.25 MG/1
0.25 TABLET ORAL DAILY PRN
Qty: 30 TABLET | Refills: 0 | Status: SHIPPED | OUTPATIENT
Start: 2022-06-06 | End: 2022-07-06 | Stop reason: SDUPTHER

## 2022-06-06 RX ORDER — ZOLPIDEM TARTRATE 10 MG/1
5-10 TABLET ORAL
Qty: 30 TABLET | Refills: 0 | Status: SHIPPED | OUTPATIENT
Start: 2022-06-06 | End: 2022-07-06 | Stop reason: SDUPTHER

## 2022-06-12 DIAGNOSIS — G43.909 MIGRAINE WITHOUT STATUS MIGRAINOSUS, NOT INTRACTABLE, UNSPECIFIED MIGRAINE TYPE: ICD-10-CM

## 2022-06-13 RX ORDER — TOPIRAMATE 25 MG/1
25 TABLET ORAL 2 TIMES DAILY
Qty: 60 TABLET | Refills: 0 | Status: SHIPPED | OUTPATIENT
Start: 2022-06-13 | End: 2022-07-14 | Stop reason: SDUPTHER

## 2022-07-06 DIAGNOSIS — F41.9 ANXIETY: ICD-10-CM

## 2022-07-06 DIAGNOSIS — G47.09 OTHER INSOMNIA: ICD-10-CM

## 2022-07-06 RX ORDER — ZOLPIDEM TARTRATE 10 MG/1
5-10 TABLET ORAL
Qty: 30 TABLET | Refills: 0 | Status: SHIPPED | OUTPATIENT
Start: 2022-07-06 | End: 2022-08-09 | Stop reason: SDUPTHER

## 2022-07-06 RX ORDER — ALPRAZOLAM 0.25 MG/1
0.25 TABLET ORAL DAILY PRN
Qty: 30 TABLET | Refills: 0 | Status: SHIPPED | OUTPATIENT
Start: 2022-07-06 | End: 2022-08-09 | Stop reason: SDUPTHER

## 2022-07-18 ENCOUNTER — OFFICE VISIT (OUTPATIENT)
Dept: INTERNAL MEDICINE CLINIC | Facility: CLINIC | Age: 42
End: 2022-07-18
Payer: COMMERCIAL

## 2022-07-18 VITALS
HEIGHT: 64 IN | WEIGHT: 196 LBS | HEART RATE: 71 BPM | BODY MASS INDEX: 33.46 KG/M2 | DIASTOLIC BLOOD PRESSURE: 84 MMHG | SYSTOLIC BLOOD PRESSURE: 126 MMHG | TEMPERATURE: 97.8 F | OXYGEN SATURATION: 97 %

## 2022-07-18 DIAGNOSIS — G43.909 MIGRAINE WITHOUT STATUS MIGRAINOSUS, NOT INTRACTABLE, UNSPECIFIED MIGRAINE TYPE: ICD-10-CM

## 2022-07-18 DIAGNOSIS — Z20.828 SARS-ASSOCIATED CORONAVIRUS EXPOSURE: ICD-10-CM

## 2022-07-18 DIAGNOSIS — J01.01 ACUTE RECURRENT MAXILLARY SINUSITIS: Primary | ICD-10-CM

## 2022-07-18 DIAGNOSIS — F41.9 ANXIETY: ICD-10-CM

## 2022-07-18 LAB
SARS-COV-2 AG UPPER RESP QL IA: NEGATIVE
VALID CONTROL: NORMAL

## 2022-07-18 PROCEDURE — 3725F SCREEN DEPRESSION PERFORMED: CPT | Performed by: INTERNAL MEDICINE

## 2022-07-18 PROCEDURE — 87811 SARS-COV-2 COVID19 W/OPTIC: CPT | Performed by: INTERNAL MEDICINE

## 2022-07-18 PROCEDURE — 99213 OFFICE O/P EST LOW 20 MIN: CPT | Performed by: INTERNAL MEDICINE

## 2022-07-18 RX ORDER — TOPIRAMATE 50 MG/1
50 TABLET, FILM COATED ORAL 2 TIMES DAILY
Qty: 60 TABLET | Refills: 1 | Status: SHIPPED | OUTPATIENT
Start: 2022-07-18 | End: 2022-08-29 | Stop reason: SDUPTHER

## 2022-07-18 RX ORDER — LORATADINE 10 MG/1
10 TABLET ORAL DAILY
COMMUNITY
Start: 2022-06-01 | End: 2023-06-01

## 2022-07-18 RX ORDER — AZITHROMYCIN 250 MG/1
TABLET, FILM COATED ORAL
Qty: 6 TABLET | Refills: 0 | Status: SHIPPED | OUTPATIENT
Start: 2022-07-18 | End: 2022-07-22

## 2022-07-18 RX ORDER — TOBRAMYCIN 3 MG/ML
SOLUTION/ DROPS OPHTHALMIC
COMMUNITY
Start: 2022-06-01

## 2022-07-18 NOTE — LETTER
July 18, 2022     Patient: Kelly Gonsalez  YOB: 1980  Date of Visit: 7/18/2022      To Whom it May Concern:    Gabi Garcia is under my professional care  Charisse Grullon was seen in my office on 7/18/2022  Chairsse Grullon may return to work on 7/19/2022  If you have any questions or concerns, please don't hesitate to call           Sincerely,          Laura Mandujano MD        CC: No Recipients

## 2022-07-18 NOTE — PROGRESS NOTES
Assessment/Plan:    Migraine headache  Increase topiramate to 50 mg bid  Anxiety  On sertraline, buspirone bid and alprazolam daily  Diagnoses and all orders for this visit:    Acute recurrent maxillary sinusitis  Comments:  Start warm compress, saline spray and saline rinse  If no improvement in 48 hours, start antibiotics  Discussed ENT referral   Orders:  -     azithromycin (ZITHROMAX) 250 mg tablet; Take 2 tablets today then 1 tablet daily x 4 days    SARS-associated coronavirus exposure  Comments:  Rapid COVID was negative  Orders:  -     Poct Covid 19 Rapid Antigen Test    Migraine without status migrainosus, not intractable, unspecified migraine type  -     topiramate (TOPAMAX) 50 MG tablet; Take 1 tablet (50 mg total) by mouth 2 (two) times a day    Anxiety    Other orders  -     loratadine (CLARITIN) 10 mg tablet; Take 10 mg by mouth daily  -     tobramycin (TOBREX) 0 3 % SOLN    Follow up as scheduled or as needed  Subjective:      Patient ID: Glenna Zee is a 46 y o  female  She reports starting to feel sicke 2 days ago, with sinus congestion and headache  She reportsno fever or chills, no sore throat or GI symptoms  She has been taking Tylenol as needed  She had tried the sinus rinse but feels pressure near her right eye  She was told this morning that a co-worker tested (+) for COVID  She was exposed to that co-worker 3 days ago  She reports migraine headaches have not been as good  She has been taking Topamax twice a day, sometimes takes Excedrin as needed  She still has diarrhea  She thought buspirone was causing it to be worse  She does take her Xanax everyday  The following portions of the patient's history were reviewed and updated as appropriate: allergies, current medications, past medical history, past social history and problem list     Review of Systems   Constitutional: Positive for fatigue  Negative for activity change, appetite change and fever     HENT: Positive for congestion, sinus pressure and sinus pain  Negative for ear pain and sore throat  Respiratory: Positive for cough  Negative for shortness of breath  Gastrointestinal: Negative for diarrhea, nausea and vomiting  Musculoskeletal: Negative for arthralgias and myalgias  Neurological: Positive for headaches  Negative for dizziness and light-headedness  Objective:      /84   Pulse 71   Temp 97 8 °F (36 6 °C)   Ht 5' 4" (1 626 m)   Wt 88 9 kg (196 lb)   SpO2 97%   BMI 33 64 kg/m²          Physical Exam  Constitutional:       General: She is not in acute distress  Appearance: She is well-developed  She is ill-appearing  HENT:      Head: Normocephalic and atraumatic  Right Ear: Tympanic membrane, ear canal and external ear normal       Left Ear: Tympanic membrane, ear canal and external ear normal       Nose: Congestion present  Right Sinus: Maxillary sinus tenderness and frontal sinus tenderness present  Left Sinus: No maxillary sinus tenderness or frontal sinus tenderness  Eyes:      Pupils: Pupils are equal, round, and reactive to light  Cardiovascular:      Rate and Rhythm: Normal rate  Heart sounds: Normal heart sounds  Pulmonary:      Effort: Pulmonary effort is normal  No respiratory distress  Breath sounds: Normal breath sounds  Neurological:      Mental Status: She is alert

## 2022-08-09 DIAGNOSIS — G47.09 OTHER INSOMNIA: ICD-10-CM

## 2022-08-09 DIAGNOSIS — F41.9 ANXIETY: ICD-10-CM

## 2022-08-09 RX ORDER — ALPRAZOLAM 0.25 MG/1
0.25 TABLET ORAL DAILY PRN
Qty: 30 TABLET | Refills: 0 | Status: SHIPPED | OUTPATIENT
Start: 2022-08-09 | End: 2022-09-11 | Stop reason: SDUPTHER

## 2022-08-09 RX ORDER — ZOLPIDEM TARTRATE 10 MG/1
5-10 TABLET ORAL
Qty: 30 TABLET | Refills: 0 | Status: SHIPPED | OUTPATIENT
Start: 2022-08-09 | End: 2022-09-11 | Stop reason: SDUPTHER

## 2022-08-15 ENCOUNTER — APPOINTMENT (OUTPATIENT)
Dept: LAB | Facility: CLINIC | Age: 42
End: 2022-08-15
Payer: COMMERCIAL

## 2022-08-15 ENCOUNTER — TELEPHONE (OUTPATIENT)
Dept: INTERNAL MEDICINE CLINIC | Facility: CLINIC | Age: 42
End: 2022-08-15

## 2022-08-15 ENCOUNTER — OFFICE VISIT (OUTPATIENT)
Dept: INTERNAL MEDICINE CLINIC | Facility: CLINIC | Age: 42
End: 2022-08-15
Payer: COMMERCIAL

## 2022-08-15 VITALS
WEIGHT: 193 LBS | HEART RATE: 90 BPM | HEIGHT: 64 IN | BODY MASS INDEX: 32.95 KG/M2 | OXYGEN SATURATION: 99 % | TEMPERATURE: 97.5 F | DIASTOLIC BLOOD PRESSURE: 82 MMHG | SYSTOLIC BLOOD PRESSURE: 114 MMHG

## 2022-08-15 DIAGNOSIS — E78.2 MIXED HYPERLIPIDEMIA: ICD-10-CM

## 2022-08-15 DIAGNOSIS — K21.00 GASTROESOPHAGEAL REFLUX DISEASE WITH ESOPHAGITIS WITHOUT HEMORRHAGE: ICD-10-CM

## 2022-08-15 DIAGNOSIS — F41.9 ANXIETY: ICD-10-CM

## 2022-08-15 DIAGNOSIS — G43.909 MIGRAINE WITHOUT STATUS MIGRAINOSUS, NOT INTRACTABLE, UNSPECIFIED MIGRAINE TYPE: ICD-10-CM

## 2022-08-15 DIAGNOSIS — R11.2 NAUSEA AND VOMITING, UNSPECIFIED VOMITING TYPE: ICD-10-CM

## 2022-08-15 DIAGNOSIS — E66.9 OBESITY (BMI 30-39.9): ICD-10-CM

## 2022-08-15 DIAGNOSIS — G47.09 OTHER INSOMNIA: ICD-10-CM

## 2022-08-15 DIAGNOSIS — E55.9 VITAMIN D DEFICIENCY: ICD-10-CM

## 2022-08-15 DIAGNOSIS — Z00.00 HEALTH MAINTENANCE EXAMINATION: Primary | ICD-10-CM

## 2022-08-15 PROBLEM — N93.9 ABNORMAL UTERINE BLEEDING (AUB): Status: RESOLVED | Noted: 2020-07-17 | Resolved: 2022-08-15

## 2022-08-15 PROBLEM — U07.1 COVID-19 VIRUS INFECTION: Status: RESOLVED | Noted: 2022-08-15 | Resolved: 2022-08-15

## 2022-08-15 PROBLEM — U07.1 COVID-19 VIRUS INFECTION: Status: ACTIVE | Noted: 2022-08-15

## 2022-08-15 PROBLEM — I80.8 SUPERFICIAL THROMBOPHLEBITIS OF LEFT UPPER EXTREMITY: Status: RESOLVED | Noted: 2020-07-17 | Resolved: 2022-08-15

## 2022-08-15 PROBLEM — R10.13 EPIGASTRIC ABDOMINAL PAIN: Status: RESOLVED | Noted: 2018-05-29 | Resolved: 2022-08-15

## 2022-08-15 LAB
ALBUMIN SERPL BCP-MCNC: 4.4 G/DL (ref 3.5–5)
ALP SERPL-CCNC: 57 U/L (ref 34–104)
ALT SERPL W P-5'-P-CCNC: 9 U/L (ref 7–52)
ANION GAP SERPL CALCULATED.3IONS-SCNC: 10 MMOL/L (ref 4–13)
AST SERPL W P-5'-P-CCNC: 11 U/L (ref 13–39)
BASOPHILS # BLD AUTO: 0.05 THOUSANDS/ΜL (ref 0–0.1)
BASOPHILS NFR BLD AUTO: 1 % (ref 0–1)
BILIRUB SERPL-MCNC: 0.52 MG/DL (ref 0.2–1)
BUN SERPL-MCNC: 15 MG/DL (ref 5–25)
CALCIUM SERPL-MCNC: 9.1 MG/DL (ref 8.4–10.2)
CHLORIDE SERPL-SCNC: 109 MMOL/L (ref 96–108)
CHOLEST SERPL-MCNC: 223 MG/DL
CO2 SERPL-SCNC: 20 MMOL/L (ref 21–32)
CREAT SERPL-MCNC: 1.07 MG/DL (ref 0.6–1.3)
EOSINOPHIL # BLD AUTO: 0.36 THOUSAND/ΜL (ref 0–0.61)
EOSINOPHIL NFR BLD AUTO: 5 % (ref 0–6)
ERYTHROCYTE [DISTWIDTH] IN BLOOD BY AUTOMATED COUNT: 13 % (ref 11.6–15.1)
GFR SERPL CREATININE-BSD FRML MDRD: 65 ML/MIN/1.73SQ M
GLUCOSE P FAST SERPL-MCNC: 113 MG/DL (ref 65–99)
HCT VFR BLD AUTO: 39.9 % (ref 34.8–46.1)
HDLC SERPL-MCNC: 37 MG/DL
HGB BLD-MCNC: 13.2 G/DL (ref 11.5–15.4)
IMM GRANULOCYTES # BLD AUTO: 0.08 THOUSAND/UL (ref 0–0.2)
IMM GRANULOCYTES NFR BLD AUTO: 1 % (ref 0–2)
LDLC SERPL CALC-MCNC: 158 MG/DL (ref 0–100)
LYMPHOCYTES # BLD AUTO: 2.16 THOUSANDS/ΜL (ref 0.6–4.47)
LYMPHOCYTES NFR BLD AUTO: 32 % (ref 14–44)
MCH RBC QN AUTO: 29.4 PG (ref 26.8–34.3)
MCHC RBC AUTO-ENTMCNC: 33.1 G/DL (ref 31.4–37.4)
MCV RBC AUTO: 89 FL (ref 82–98)
MONOCYTES # BLD AUTO: 0.42 THOUSAND/ΜL (ref 0.17–1.22)
MONOCYTES NFR BLD AUTO: 6 % (ref 4–12)
NEUTROPHILS # BLD AUTO: 3.72 THOUSANDS/ΜL (ref 1.85–7.62)
NEUTS SEG NFR BLD AUTO: 55 % (ref 43–75)
NRBC BLD AUTO-RTO: 0 /100 WBCS
PLATELET # BLD AUTO: 238 THOUSANDS/UL (ref 149–390)
PMV BLD AUTO: 9.8 FL (ref 8.9–12.7)
POTASSIUM SERPL-SCNC: 3.4 MMOL/L (ref 3.5–5.3)
PROT SERPL-MCNC: 7.6 G/DL (ref 6.4–8.4)
RBC # BLD AUTO: 4.49 MILLION/UL (ref 3.81–5.12)
SODIUM SERPL-SCNC: 139 MMOL/L (ref 135–147)
TRIGL SERPL-MCNC: 141 MG/DL
TSH SERPL DL<=0.05 MIU/L-ACNC: 2.15 UIU/ML (ref 0.45–4.5)
WBC # BLD AUTO: 6.79 THOUSAND/UL (ref 4.31–10.16)

## 2022-08-15 PROCEDURE — 84443 ASSAY THYROID STIM HORMONE: CPT

## 2022-08-15 PROCEDURE — 80053 COMPREHEN METABOLIC PANEL: CPT

## 2022-08-15 PROCEDURE — 36415 COLL VENOUS BLD VENIPUNCTURE: CPT

## 2022-08-15 PROCEDURE — 80061 LIPID PANEL: CPT

## 2022-08-15 PROCEDURE — 85025 COMPLETE CBC W/AUTO DIFF WBC: CPT

## 2022-08-15 PROCEDURE — 99395 PREV VISIT EST AGE 18-39: CPT | Performed by: INTERNAL MEDICINE

## 2022-08-15 NOTE — TELEPHONE ENCOUNTER
Lab results: Your potassium is also a bit low  Eat foods high in potassium such as bananas, tomatoes, avocado, spinach, cantaloupe, melons, dried fruits, citrus juices and yogurt  Cholesterol has improved but remains high    The rest of your labs were normal

## 2022-08-15 NOTE — LETTER
August 15, 2022     Patient: Iggy Camilo  YOB: 1980  Date of Visit: 8/15/2022      To Whom it May Concern:    Arben Mckeon is under my professional care  Brett Martinez was seen in my office on 8/15/2022  If you have any questions or concerns, please don't hesitate to call           Sincerely,          Reinaldo Wright MD        CC: No Recipients

## 2022-08-15 NOTE — PROGRESS NOTES
Assessment/Plan:    GERD (gastroesophageal reflux disease)  Taking with PPI daily  Migraine headache  Improved, taking topiramate bid, Tylenol prn  Insomnia  Taking zolpidem qHS  Anxiety  On sertraline  May take buspirone up to 10 mg tid  Discussed alprazolam use  PDMP reviewed  Hyperlipidemia  Lipids due  Not o medication  Vitamin D deficiency  Taking D3 daily  Nausea and vomiting  Saw GI, suspect gastroparesis  Schedule gastric emptying study  Obesity (BMI 30-39  9)  Lost 13 lbs since 6 months ago  No appetite due to frequent n/v          Diagnoses and all orders for this visit:    Health maintenance examination  Comments:  Labs, PAPs due  Gastroesophageal reflux disease with esophagitis without hemorrhage    Migraine without status migrainosus, not intractable, unspecified migraine type    Mixed hyperlipidemia    Nausea and vomiting, unspecified vomiting type    Obesity (BMI 30-39  9)    Vitamin D deficiency    Other insomnia    Follow up in 6 months or as needed  Subjective:      Patient ID: Daniella Franco is a 46 y o  female here for a physical     She feels well, no new complaints  She reports her headaches have improved  She reports no headaches last week, usually gets it about 3 times a week  She takes Tylenol as needed  She continues to throw up about 3 days a week  She eats one meal a day because of this, has lost weight  She did see GI  She does not eat raw vegetables  She was supposed to do a study but cancelled because she had COVID, has not rescheduled it  She moves her bowels daily  She is stressed a work and at home, caring for her mother also who has health issues  She tries not to take Ambien every night, does take Xanax at night to help her sleep  She feels a bit better since increasing the buspirone 3 times a day      The following portions of the patient's history were reviewed and updated as appropriate: allergies, current medications, past medical history, past social history and problem list     Review of Systems   Constitutional: Positive for appetite change  Negative for activity change and fatigue  HENT: Negative for congestion, ear pain and postnasal drip  Eyes: Negative for visual disturbance  Respiratory: Negative for cough and shortness of breath  Cardiovascular: Negative for chest pain and leg swelling  Gastrointestinal: Positive for nausea and vomiting  Negative for abdominal pain, constipation and diarrhea  Genitourinary: Negative for dysuria, frequency and urgency  Musculoskeletal: Negative for arthralgias and myalgias  Skin: Negative for rash and wound  Neurological: Positive for headaches  Negative for dizziness and numbness  Hematological: Does not bruise/bleed easily  Psychiatric/Behavioral: Positive for sleep disturbance  Negative for confusion and dysphoric mood  The patient is nervous/anxious  Objective:      /82   Pulse 90   Temp 97 5 °F (36 4 °C)   Ht 5' 4" (1 626 m)   Wt 87 5 kg (193 lb)   SpO2 99%   BMI 33 13 kg/m²          Physical Exam  Vitals and nursing note reviewed  Constitutional:       General: She is not in acute distress  Appearance: She is well-developed  HENT:      Head: Normocephalic and atraumatic  Mouth/Throat:      Mouth: Mucous membranes are moist    Eyes:      Pupils: Pupils are equal, round, and reactive to light  Cardiovascular:      Rate and Rhythm: Normal rate and regular rhythm  Heart sounds: Normal heart sounds  Pulmonary:      Effort: Pulmonary effort is normal       Breath sounds: Normal breath sounds  No wheezing  Abdominal:      General: Bowel sounds are normal       Palpations: Abdomen is soft  Musculoskeletal:         General: No swelling  Right lower leg: No edema  Left lower leg: No edema  Skin:     General: Skin is warm  Findings: No rash  Neurological:      General: No focal deficit present        Mental Status: She is alert and oriented to person, place, and time  Psychiatric:         Mood and Affect: Mood and affect normal  Mood is not anxious or depressed  Behavior: Behavior normal            Labs & imaging results reviewed with patient

## 2022-09-11 DIAGNOSIS — G47.09 OTHER INSOMNIA: ICD-10-CM

## 2022-09-11 DIAGNOSIS — F41.9 ANXIETY: ICD-10-CM

## 2022-09-12 RX ORDER — ALPRAZOLAM 0.25 MG/1
0.25 TABLET ORAL DAILY PRN
Qty: 30 TABLET | Refills: 0 | Status: SHIPPED | OUTPATIENT
Start: 2022-09-12 | End: 2022-10-19 | Stop reason: SDUPTHER

## 2022-09-12 RX ORDER — ZOLPIDEM TARTRATE 10 MG/1
5-10 TABLET ORAL
Qty: 30 TABLET | Refills: 0 | Status: SHIPPED | OUTPATIENT
Start: 2022-09-12 | End: 2022-10-19 | Stop reason: SDUPTHER

## 2022-10-19 DIAGNOSIS — G47.09 OTHER INSOMNIA: ICD-10-CM

## 2022-10-19 DIAGNOSIS — F41.9 ANXIETY: ICD-10-CM

## 2022-10-20 RX ORDER — ZOLPIDEM TARTRATE 10 MG/1
5-10 TABLET ORAL
Qty: 30 TABLET | Refills: 0 | Status: SHIPPED | OUTPATIENT
Start: 2022-10-20

## 2022-10-20 RX ORDER — ALPRAZOLAM 0.25 MG/1
0.25 TABLET ORAL DAILY PRN
Qty: 30 TABLET | Refills: 0 | Status: SHIPPED | OUTPATIENT
Start: 2022-10-20

## 2022-11-07 ENCOUNTER — OFFICE VISIT (OUTPATIENT)
Dept: INTERNAL MEDICINE CLINIC | Facility: CLINIC | Age: 42
End: 2022-11-07

## 2022-11-07 VITALS
TEMPERATURE: 97.3 F | BODY MASS INDEX: 32.78 KG/M2 | OXYGEN SATURATION: 99 % | WEIGHT: 192 LBS | DIASTOLIC BLOOD PRESSURE: 72 MMHG | HEIGHT: 64 IN | SYSTOLIC BLOOD PRESSURE: 116 MMHG | HEART RATE: 63 BPM

## 2022-11-07 DIAGNOSIS — R10.11 RIGHT UPPER QUADRANT ABDOMINAL PAIN: ICD-10-CM

## 2022-11-07 DIAGNOSIS — K21.00 GASTROESOPHAGEAL REFLUX DISEASE WITH ESOPHAGITIS WITHOUT HEMORRHAGE: ICD-10-CM

## 2022-11-07 DIAGNOSIS — R68.81 EARLY SATIETY: ICD-10-CM

## 2022-11-07 DIAGNOSIS — F41.9 ANXIETY: ICD-10-CM

## 2022-11-07 DIAGNOSIS — R11.2 NAUSEA AND VOMITING, UNSPECIFIED VOMITING TYPE: Primary | ICD-10-CM

## 2022-11-07 DIAGNOSIS — R19.4 CHANGE IN BOWEL HABITS: ICD-10-CM

## 2022-11-07 RX ORDER — ONDANSETRON 4 MG/1
4 TABLET, ORALLY DISINTEGRATING ORAL EVERY 6 HOURS PRN
Qty: 60 TABLET | Refills: 0 | Status: SHIPPED | OUTPATIENT
Start: 2022-11-07

## 2022-11-07 NOTE — LETTER
November 7, 2022     Patient: Nadira Snow  YOB: 1980  Date of Visit: 11/7/2022      To Whom it May Concern:    Harley Coronel is under my professional care  Vonda Barakat was seen in my office on 11/7/2022  Vonda Barakat may return to work on 11/8/2022  If you have any questions or concerns, please don't hesitate to call           Sincerely,          Krissy Zarate MD

## 2022-11-07 NOTE — PROGRESS NOTES
Assessment/Plan:    Nausea and vomiting  Symptoms worse the past few days  Zofran refilled  Discussed liquid diet  Already avoiding raw vegetables  Schedule gastric emptying study  GERD (gastroesophageal reflux disease)  Increase PPI to bid x 1 week  Discussed low acid diet  Diagnoses and all orders for this visit:    Nausea and vomiting, unspecified vomiting type  -     NM gastric emptying; Future    Early satiety  -     NM gastric emptying; Future    Right upper quadrant abdominal pain  -     ondansetron (ZOFRAN-ODT) 4 mg disintegrating tablet; Take 1 tablet (4 mg total) by mouth every 6 (six) hours as needed for nausea or vomiting    Change in bowel habits  -     ondansetron (ZOFRAN-ODT) 4 mg disintegrating tablet; Take 1 tablet (4 mg total) by mouth every 6 (six) hours as needed for nausea or vomiting    Gastroesophageal reflux disease with esophagitis without hemorrhage    Anxiety    Follow up as scheduled or as needed  Subjective:      Patient ID: Urvashi Garcia is a 46 y o  female c/o nausea and vomiting  She reports nausea and vomiting worse the past 2 days  She has a difficult time keeping food in, has no appetite  She is able to drink a fruit juice that has lemonade in it  She ate chicken noodle soup yesterday, no food today  She has vomited 4 times today  She also complains of loose stools, no blood, but with mucus  She complains of abdominal pain, frequent nausea  She feels her abdomen is sore  She tried to schedule her emptying study today but was told there was no order  She already avoid raw vegetables, nuts  She has been taking her omeprazole daily  The following portions of the patient's history were reviewed and updated as appropriate: allergies, current medications, past medical history, past social history and problem list     Review of Systems   Constitutional: Negative for fever  Respiratory: Negative for cough and shortness of breath      Gastrointestinal: Positive for abdominal pain, diarrhea, nausea and vomiting  Musculoskeletal: Negative for arthralgias  Psychiatric/Behavioral: Positive for sleep disturbance  Negative for agitation and confusion  Objective:      /72 (BP Location: Left arm, Patient Position: Sitting, Cuff Size: Adult)   Pulse 63   Temp (!) 97 3 °F (36 3 °C)   Ht 5' 4" (1 626 m)   Wt 87 1 kg (192 lb)   SpO2 99%   BMI 32 96 kg/m²          Physical Exam  Constitutional:       General: She is in acute distress  Appearance: She is ill-appearing  HENT:      Head: Normocephalic and atraumatic  Nose: Nose normal       Mouth/Throat:      Mouth: Mucous membranes are moist    Eyes:      Pupils: Pupils are equal, round, and reactive to light  Cardiovascular:      Rate and Rhythm: Normal rate and regular rhythm  Pulmonary:      Effort: Pulmonary effort is normal  No respiratory distress  Breath sounds: Normal breath sounds  Abdominal:      General: Bowel sounds are normal       Tenderness: There is no abdominal tenderness  Skin:     General: Skin is warm  Neurological:      General: No focal deficit present  Mental Status: She is alert and oriented to person, place, and time     Psychiatric:         Mood and Affect: Mood normal          Behavior: Behavior normal

## 2022-11-07 NOTE — ASSESSMENT & PLAN NOTE
Symptoms worse the past few days  Zofran refilled  Discussed liquid diet  Already avoiding raw vegetables  Schedule gastric emptying study

## 2022-11-08 ENCOUNTER — TELEPHONE (OUTPATIENT)
Dept: INTERNAL MEDICINE CLINIC | Facility: CLINIC | Age: 42
End: 2022-11-08

## 2022-11-22 DIAGNOSIS — G47.09 OTHER INSOMNIA: ICD-10-CM

## 2022-11-22 DIAGNOSIS — F41.9 ANXIETY: ICD-10-CM

## 2022-11-23 RX ORDER — ZOLPIDEM TARTRATE 10 MG/1
5-10 TABLET ORAL
Qty: 30 TABLET | Refills: 0 | Status: SHIPPED | OUTPATIENT
Start: 2022-11-23

## 2022-11-23 RX ORDER — ALPRAZOLAM 0.25 MG/1
0.25 TABLET ORAL DAILY PRN
Qty: 30 TABLET | Refills: 0 | Status: SHIPPED | OUTPATIENT
Start: 2022-11-23

## 2022-12-05 DIAGNOSIS — K21.00 GASTROESOPHAGEAL REFLUX DISEASE WITH ESOPHAGITIS WITHOUT HEMORRHAGE: ICD-10-CM

## 2022-12-05 DIAGNOSIS — G43.909 MIGRAINE WITHOUT STATUS MIGRAINOSUS, NOT INTRACTABLE, UNSPECIFIED MIGRAINE TYPE: ICD-10-CM

## 2022-12-05 DIAGNOSIS — K25.3 ACUTE GASTRIC EROSION: ICD-10-CM

## 2022-12-05 DIAGNOSIS — F41.9 ANXIETY: ICD-10-CM

## 2022-12-05 RX ORDER — SERTRALINE HYDROCHLORIDE 100 MG/1
150 TABLET, FILM COATED ORAL DAILY
Qty: 135 TABLET | Refills: 0 | Status: SHIPPED | OUTPATIENT
Start: 2022-12-05

## 2022-12-05 RX ORDER — OMEPRAZOLE 40 MG/1
40 CAPSULE, DELAYED RELEASE ORAL DAILY
Qty: 90 CAPSULE | Refills: 0 | Status: SHIPPED | OUTPATIENT
Start: 2022-12-05

## 2022-12-05 RX ORDER — BUSPIRONE HYDROCHLORIDE 5 MG/1
5 TABLET ORAL 3 TIMES DAILY PRN
Qty: 90 TABLET | Refills: 0 | Status: SHIPPED | OUTPATIENT
Start: 2022-12-05

## 2022-12-05 RX ORDER — TOPIRAMATE 50 MG/1
50 TABLET, FILM COATED ORAL 2 TIMES DAILY
Qty: 180 TABLET | Refills: 0 | Status: SHIPPED | OUTPATIENT
Start: 2022-12-05

## 2022-12-08 ENCOUNTER — HOSPITAL ENCOUNTER (OUTPATIENT)
Dept: NON INVASIVE DIAGNOSTICS | Facility: CLINIC | Age: 42
Discharge: HOME | End: 2022-12-08

## 2022-12-08 DIAGNOSIS — R68.81 EARLY SATIETY: ICD-10-CM

## 2022-12-08 DIAGNOSIS — R11.2 NAUSEA AND VOMITING, UNSPECIFIED VOMITING TYPE: ICD-10-CM

## 2022-12-09 ENCOUNTER — TELEPHONE (OUTPATIENT)
Dept: INTERNAL MEDICINE CLINIC | Facility: CLINIC | Age: 42
End: 2022-12-09

## 2022-12-09 PROBLEM — K31.84 GASTROPARESIS: Status: ACTIVE | Noted: 2022-12-09

## 2022-12-09 NOTE — TELEPHONE ENCOUNTER
Please call patient  Gastric emptying study was abnormal     Do not eat raw fruits, vegetables, or whole grains  Ok to eat if cooked  Eat small frequent meals  Avoid soda or other carbonated beverages    Do not lay down 2 hours after a meal     Recommend to follow up with GI

## 2022-12-27 DIAGNOSIS — F41.9 ANXIETY: ICD-10-CM

## 2022-12-27 DIAGNOSIS — G47.09 OTHER INSOMNIA: ICD-10-CM

## 2022-12-28 RX ORDER — ALPRAZOLAM 0.25 MG/1
0.25 TABLET ORAL DAILY PRN
Qty: 30 TABLET | Refills: 0 | Status: SHIPPED | OUTPATIENT
Start: 2022-12-28

## 2022-12-28 RX ORDER — ZOLPIDEM TARTRATE 10 MG/1
5-10 TABLET ORAL
Qty: 30 TABLET | Refills: 0 | Status: SHIPPED | OUTPATIENT
Start: 2022-12-28

## 2022-12-28 RX ORDER — BUSPIRONE HYDROCHLORIDE 5 MG/1
5 TABLET ORAL 3 TIMES DAILY PRN
Qty: 90 TABLET | Refills: 0 | Status: SHIPPED | OUTPATIENT
Start: 2022-12-28

## 2023-01-31 DIAGNOSIS — K25.3 ACUTE GASTRIC EROSION: ICD-10-CM

## 2023-01-31 DIAGNOSIS — K21.00 GASTROESOPHAGEAL REFLUX DISEASE WITH ESOPHAGITIS WITHOUT HEMORRHAGE: ICD-10-CM

## 2023-01-31 DIAGNOSIS — F41.9 ANXIETY: ICD-10-CM

## 2023-02-01 RX ORDER — SERTRALINE HYDROCHLORIDE 100 MG/1
150 TABLET, FILM COATED ORAL DAILY
Qty: 135 TABLET | Refills: 0 | Status: SHIPPED | OUTPATIENT
Start: 2023-02-01

## 2023-02-01 RX ORDER — OMEPRAZOLE 40 MG/1
40 CAPSULE, DELAYED RELEASE ORAL DAILY
Qty: 90 CAPSULE | Refills: 0 | Status: SHIPPED | OUTPATIENT
Start: 2023-02-01

## 2023-02-08 ENCOUNTER — RA CDI HCC (OUTPATIENT)
Dept: OTHER | Facility: HOSPITAL | Age: 43
End: 2023-02-08

## 2023-02-08 NOTE — PROGRESS NOTES
[facility]  coding opportunities       Chart reviewed, no opportunity found: CHART REVIEWED, NO OPPORTUNITY FOUND      This is a reminder to address ALL HCC (risk adjustment) codes as found on active problem list for 2023 as patient scores reset to zero STACIA    Patients Insurance        Commercial Insurance: [address] [place] [address]

## 2023-03-07 ENCOUNTER — TELEPHONE (OUTPATIENT)
Dept: INTERNAL MEDICINE CLINIC | Facility: CLINIC | Age: 43
End: 2023-03-07

## 2023-03-07 NOTE — TELEPHONE ENCOUNTER
Pt has diarrhea and vomiting since 2 am this morning  No abdominal pain  She's been keeping hydrated with Gatorade and taking Zofran  No change in diet  Her diet is very limited normally  Unable to go to work today  Does she need an appointment?

## 2023-03-07 NOTE — TELEPHONE ENCOUNTER
Work note completed and sent to patient for today, she plans to go in tomorrow if she cant she will let us know   Will call Gi to schedule   Will schedule with us when she is able to

## 2023-03-07 NOTE — TELEPHONE ENCOUNTER
Do you need a work note? For today and tomorrow? Please reschedule the appt you cancelled last month    Also, please schedule appt with GI

## 2023-03-08 DIAGNOSIS — R19.4 CHANGE IN BOWEL HABITS: ICD-10-CM

## 2023-03-08 DIAGNOSIS — G47.09 OTHER INSOMNIA: ICD-10-CM

## 2023-03-08 DIAGNOSIS — R10.11 RIGHT UPPER QUADRANT ABDOMINAL PAIN: ICD-10-CM

## 2023-03-08 DIAGNOSIS — F41.9 ANXIETY: ICD-10-CM

## 2023-03-09 RX ORDER — ZOLPIDEM TARTRATE 10 MG/1
5-10 TABLET ORAL
Qty: 30 TABLET | Refills: 0 | Status: SHIPPED | OUTPATIENT
Start: 2023-03-09

## 2023-03-09 RX ORDER — BUSPIRONE HYDROCHLORIDE 5 MG/1
5 TABLET ORAL 3 TIMES DAILY PRN
Qty: 90 TABLET | Refills: 0 | Status: SHIPPED | OUTPATIENT
Start: 2023-03-09

## 2023-03-09 RX ORDER — ONDANSETRON 4 MG/1
4 TABLET, ORALLY DISINTEGRATING ORAL EVERY 6 HOURS PRN
Qty: 60 TABLET | Refills: 0 | Status: SHIPPED | OUTPATIENT
Start: 2023-03-09

## 2023-03-09 RX ORDER — ALPRAZOLAM 0.25 MG/1
0.25 TABLET ORAL DAILY PRN
Qty: 30 TABLET | Refills: 0 | Status: SHIPPED | OUTPATIENT
Start: 2023-03-09

## 2023-05-02 ENCOUNTER — OFFICE VISIT (OUTPATIENT)
Dept: INTERNAL MEDICINE CLINIC | Facility: CLINIC | Age: 43
End: 2023-05-02

## 2023-05-02 VITALS
BODY MASS INDEX: 29.88 KG/M2 | HEIGHT: 64 IN | WEIGHT: 175 LBS | OXYGEN SATURATION: 99 % | HEART RATE: 80 BPM | DIASTOLIC BLOOD PRESSURE: 72 MMHG | TEMPERATURE: 97.1 F | SYSTOLIC BLOOD PRESSURE: 114 MMHG

## 2023-05-02 DIAGNOSIS — F41.1 GENERALIZED ANXIETY DISORDER: ICD-10-CM

## 2023-05-02 DIAGNOSIS — K31.84 GASTROPARESIS: Primary | ICD-10-CM

## 2023-05-02 DIAGNOSIS — Z12.31 ENCOUNTER FOR SCREENING MAMMOGRAM FOR MALIGNANT NEOPLASM OF BREAST: ICD-10-CM

## 2023-05-02 DIAGNOSIS — Z79.899 ENCOUNTER FOR LONG-TERM CURRENT USE OF MEDICATION: ICD-10-CM

## 2023-05-02 DIAGNOSIS — K21.00 GASTROESOPHAGEAL REFLUX DISEASE WITH ESOPHAGITIS WITHOUT HEMORRHAGE: ICD-10-CM

## 2023-05-02 DIAGNOSIS — G43.909 MIGRAINE WITHOUT STATUS MIGRAINOSUS, NOT INTRACTABLE, UNSPECIFIED MIGRAINE TYPE: ICD-10-CM

## 2023-05-02 DIAGNOSIS — E55.9 VITAMIN D DEFICIENCY: ICD-10-CM

## 2023-05-02 DIAGNOSIS — M79.652 LEFT THIGH PAIN: ICD-10-CM

## 2023-05-02 DIAGNOSIS — R07.89 OTHER CHEST PAIN: ICD-10-CM

## 2023-05-02 DIAGNOSIS — E78.2 MIXED HYPERLIPIDEMIA: ICD-10-CM

## 2023-05-02 DIAGNOSIS — F41.9 ANXIETY: ICD-10-CM

## 2023-05-02 DIAGNOSIS — E66.9 OBESITY (BMI 30-39.9): ICD-10-CM

## 2023-05-02 PROBLEM — N92.6 IRREGULAR PERIODS: Status: RESOLVED | Noted: 2018-06-11 | Resolved: 2023-05-02

## 2023-05-02 RX ORDER — BUSPIRONE HYDROCHLORIDE 5 MG/1
10 TABLET ORAL 3 TIMES DAILY
Qty: 90 TABLET | Refills: 0
Start: 2023-05-02

## 2023-05-02 RX ORDER — TOPIRAMATE 50 MG/1
TABLET, FILM COATED ORAL
Qty: 180 TABLET | Refills: 0
Start: 2023-05-02

## 2023-05-02 NOTE — ASSESSMENT & PLAN NOTE
More stress and symptoms recently  On sertraline 150 mg daily, taking alprazolam daily  Increase buspirone to 10 mg tid

## 2023-05-02 NOTE — ASSESSMENT & PLAN NOTE
Reviewed gastric emptying study  Discussed diet which she is adhering to  Still with frequent nausea and vomiting, occurs daily  Follow up with GI as scheduled

## 2023-05-02 NOTE — PROGRESS NOTES
Assessment/Plan:    Gastroparesis  Reviewed gastric emptying study  Discussed diet which she is adhering to  Still with frequent nausea and vomiting, occurs daily  Follow up with GI as scheduled  GERD (gastroesophageal reflux disease)  Taking PPI daily  Generalized anxiety disorder  More stress and symptoms recently  On sertraline 150 mg daily, taking alprazolam daily  Increase buspirone to 10 mg tid  Migraine headache  More frequent headaches recently, suspect due to stress and lack of good sleep  Adjust topiramate, continue 50 mg in AM and increase to 100 mg in PM     Obesity (BMI 30-39  9)  Lost 17 lbs since last visit  Fair appetite  Insomnia  Still taking zolpidem qHS  Hyperlipidemia  Lipids remain elevated  Low risk  Diagnoses and all orders for this visit:    Gastroparesis  -     Vitamin B12    Left thigh pain  Comments:  Differential Dx: muscular, lumbar radiculopathy  Discussed work station  Declined imaging  Start back stretching exercises  Other chest pain  Comments:  Declined work up at this time  Gastroesophageal reflux disease with esophagitis without hemorrhage    Mixed hyperlipidemia  -     Comprehensive metabolic panel  -     Lipid panel    Generalized anxiety disorder    Migraine without status migrainosus, not intractable, unspecified migraine type  -     topiramate (TOPAMAX) 50 MG tablet; Take 1 tab in AM, 2 tabs in PM    Obesity (BMI 30-39  9)    Vitamin D deficiency  -     Vitamin D 25 hydroxy    Anxiety  -     busPIRone (BUSPAR) 5 mg tablet; Take 2 tablets (10 mg total) by mouth 3 (three) times a day    Encounter for long-term current use of medication  -     Vitamin B12    Encounter for screening mammogram for malignant neoplasm of breast  -     Mammo screening bilateral w 3d & cad; Future    Follow up in 4 months or as needed  Subjective:      Patient ID: Daniel Gallardo is a 38 y o  female  here for a follow up      She has been very stressed the last "few months  She has been caring for her mother who has been sick recently  She also reports her  had recent deaths in his family  She wakes up several times at night, is able to return to sleep but never sleeps through the night  She reports intermittent left sided chest pain, no palpitations or other symptoms  She thinks it is due to anxiety  She continue to vomit almost daily, either previously eaten meals or water/bile  She is avoiding vegetables and fruit, eats carbs and chicken  She complains of some left thigh discomfort for a few weeks  No back pain, no falls or injury  She is not using a stool for her feet to support it at work  She reports migraine headaches have been worse recently, headaches occurring almost daily  The following portions of the patient's history were reviewed and updated as appropriate: allergies, current medications, past medical history, past social history and problem list     Review of Systems   Constitutional: Positive for fatigue  Negative for appetite change  HENT: Negative for congestion, ear pain and postnasal drip  Eyes: Negative for visual disturbance  Respiratory: Negative for cough and shortness of breath  Cardiovascular: Positive for chest pain and palpitations  Negative for leg swelling  Gastrointestinal: Positive for nausea and vomiting  Negative for abdominal pain, constipation and diarrhea  Genitourinary: Negative for dysuria, frequency and urgency  Musculoskeletal: Negative for arthralgias and myalgias  Skin: Negative for rash and wound  Neurological: Positive for headaches  Negative for dizziness and numbness  Hematological: Does not bruise/bleed easily  Psychiatric/Behavioral: Negative for confusion and dysphoric mood  The patient is nervous/anxious            Objective:      /72   Pulse 80   Temp (!) 97 1 °F (36 2 °C)   Ht 5' 4\" (1 626 m)   Wt 79 4 kg (175 lb)   SpO2 99%   BMI 30 04 kg/m²          Physical " Exam  Vitals and nursing note reviewed  Constitutional:       General: She is not in acute distress  Appearance: She is well-developed  HENT:      Head: Normocephalic and atraumatic  Mouth/Throat:      Mouth: Mucous membranes are moist    Eyes:      Pupils: Pupils are equal, round, and reactive to light  Cardiovascular:      Rate and Rhythm: Normal rate and regular rhythm  Heart sounds: Normal heart sounds  Pulmonary:      Effort: Pulmonary effort is normal       Breath sounds: Normal breath sounds  No wheezing  Chest:      Chest wall: No tenderness  Abdominal:      General: Bowel sounds are normal       Palpations: Abdomen is soft  Musculoskeletal:         General: No swelling  Thoracic back: No spasms or tenderness  Lumbar back: No spasms or tenderness  Left hip: Normal       Right lower leg: No edema  Left lower leg: No edema  Skin:     General: Skin is warm  Findings: No rash  Neurological:      General: No focal deficit present  Mental Status: She is alert and oriented to person, place, and time  Psychiatric:         Mood and Affect: Mood and affect normal  Mood is not anxious or depressed  Behavior: Behavior normal            BMI Counseling: Body mass index is 30 04 kg/m²  The BMI is above normal  Nutrition recommendations include increasing intake of lean protein  Exercise recommendations include strength training exercises

## 2023-05-02 NOTE — PATIENT INSTRUCTIONS
Lower Back Exercises   WHAT YOU NEED TO KNOW:   What do I need to know about lower back exercises? Lower back exercises help heal and strengthen your back muscles to prevent another injury  Ask your healthcare provider if you need to see a physical therapist for more advanced exercises  What do I need to know about exercise safety? Do the exercises on a mat or firm surface (not on a bed)  A firm surface will support your spine and prevent low back pain  Move slowly and smoothly  Avoid fast or jerky motions  Breathe normally  Do not hold your breath  Stop if you feel pain  It is normal to feel some discomfort at first, but you should not feel pain  Regular exercise will help decrease your discomfort over time  How do I perform lower back exercises safely? Your healthcare provider may recommend that you do back exercises 10 to 30 minutes each day  He or she may also recommend that you do exercises 1 to 3 times each day  Ask your healthcare provider which exercises are best for you and how often to do them  Ankle pumps:  Lie on your back  Move your foot up (with your toes pointing toward your head)  Then, move your foot down (with your toes pointing away from you)  Repeat this exercise 10 times on each side  Heel slides:  Lie on your back  Slowly bend one leg and then straighten it  Next, bend the other leg and then straighten it  Repeat 10 times on each side  Pelvic tilt:  Lie on your back with your knees bent and feet flat on the floor  Place your arms in a relaxed position beside your body  Tighten the muscles of your abdomen and flatten your back against the floor  Hold for 5 seconds  Repeat 5 times  Back stretch:  Lie on your back with your hands behind your head  Bend your knees and turn the lower half of your body to one side  Hold this position for 10 seconds  Repeat 3 times on each side  Straight leg raises:  Lie on your back with one leg straight   Bend the other knee  Tighten your abdomen and then slowly lift the straight leg up about 6 to 12 inches off the floor  Hold for 1 to 5 seconds  Lower your leg slowly  Repeat 10 times on each leg  Knee-to-chest:  Lie on your back with your knees bent and feet flat on the floor  Pull one of your knees toward your chest and hold it there for 5 seconds  Return your leg to the starting position  Lift the other knee toward your chest and hold for 5 seconds  Do this 5 times on each side  Cat and camel:  Place your hands and knees on the floor  Arch your back upward toward the ceiling and lower your head  Round out your spine as much as you can  Hold for 5 seconds  Lift your head upward and push your chest downward toward the floor  Hold for 5 seconds  Do 3 sets or as directed  Wall squats:  Stand with your back against a wall  Tighten the muscles of your abdomen  Slowly lower your body until your knees are bent at a 45 degree angle  Hold this position for 5 seconds  Slowly move back up to a standing position  Repeat 10 times  Curl up:  Lie on your back with your knees bent and feet flat on the floor  Place your hands, palms down, underneath the curve in your lower back  Next, with your elbows on the floor, lift your shoulders and chest 2 to 3 inches  Keep your head in line with your shoulders  Hold this position for 5 seconds  When you can do this exercise without pain for 10 to 15 seconds, you may add a rotation  While your shoulders and chest are lifted off the ground, turn slightly to the left and hold  Repeat on the other side  Bird dog:  Place your hands and knees on the floor  Keep your wrists directly below your shoulders and your knees directly below your hips  Pull your belly button in toward your spine  Do not flatten or arch your back  Tighten your abdominal muscles  Raise one arm straight out so that it is aligned with your head  Next, raise the leg opposite your arm   Hold this position for 15 seconds  Lower your arm and leg slowly and change sides  Do 5 sets  When should I seek immediate care? You have severe pain that prevents you from moving  When should I call my doctor? Your pain becomes worse  You have new pain  You have questions or concerns about your condition or care  CARE AGREEMENT:   You have the right to help plan your care  Learn about your health condition and how it may be treated  Discuss treatment options with your healthcare providers to decide what care you want to receive  You always have the right to refuse treatment  The above information is an  only  It is not intended as medical advice for individual conditions or treatments  Talk to your doctor, nurse or pharmacist before following any medical regimen to see if it is safe and effective for you  © Copyright [employer] Wilson 2022 Information is for End User's use only and may not be sold, redistributed or otherwise used for commercial purposes

## 2023-05-02 NOTE — ASSESSMENT & PLAN NOTE
More frequent headaches recently, suspect due to stress and lack of good sleep    Adjust topiramate, continue 50 mg in AM and increase to 100 mg in PM

## 2023-05-05 ENCOUNTER — TELEPHONE (OUTPATIENT)
Dept: INTERNAL MEDICINE CLINIC | Facility: CLINIC | Age: 43
End: 2023-05-05

## 2023-05-05 NOTE — TELEPHONE ENCOUNTER
You can take Mucinex or Robitussin for the cough  Use saline spray or Flonase to help with congestion  Stay hydrated   Tylenol as needed for headaches, joint pain etc     Staff: please do work note

## 2023-05-05 NOTE — TELEPHONE ENCOUNTER
Pt  Started with nasal congestion yesterday, now has a cough  covid test neg  She was sent home from work yesterday and stayed home today  Is taking Mucinex  No fever  Is coughing up clear phlegm  What else can she take? Also, she will need a note for work for yesterday and today  [facility]

## 2023-05-10 ENCOUNTER — APPOINTMENT (OUTPATIENT)
Dept: LAB | Facility: CLINIC | Age: 43
End: 2023-05-10

## 2023-05-10 ENCOUNTER — TELEPHONE (OUTPATIENT)
Dept: INTERNAL MEDICINE CLINIC | Facility: CLINIC | Age: 43
End: 2023-05-10

## 2023-05-10 PROBLEM — E53.8 VITAMIN B12 DEFICIENCY: Status: ACTIVE | Noted: 2023-05-10

## 2023-05-10 LAB
25(OH)D3 SERPL-MCNC: 10.8 NG/ML (ref 30–100)
ALBUMIN SERPL BCP-MCNC: 4.5 G/DL (ref 3.5–5)
ALP SERPL-CCNC: 61 U/L (ref 34–104)
ALT SERPL W P-5'-P-CCNC: 11 U/L (ref 7–52)
ANION GAP SERPL CALCULATED.3IONS-SCNC: 6 MMOL/L (ref 4–13)
AST SERPL W P-5'-P-CCNC: 11 U/L (ref 13–39)
BILIRUB SERPL-MCNC: 0.63 MG/DL (ref 0.2–1)
BUN SERPL-MCNC: 10 MG/DL (ref 5–25)
CALCIUM SERPL-MCNC: 9.6 MG/DL (ref 8.4–10.2)
CHLORIDE SERPL-SCNC: 110 MMOL/L (ref 96–108)
CHOLEST SERPL-MCNC: 227 MG/DL
CO2 SERPL-SCNC: 25 MMOL/L (ref 21–32)
CREAT SERPL-MCNC: 1.11 MG/DL (ref 0.6–1.3)
GFR SERPL CREATININE-BSD FRML MDRD: 62 ML/MIN/1.73SQ M
GLUCOSE P FAST SERPL-MCNC: 86 MG/DL (ref 65–99)
HDLC SERPL-MCNC: 54 MG/DL
LDLC SERPL CALC-MCNC: 144 MG/DL (ref 0–100)
NONHDLC SERPL-MCNC: 173 MG/DL
POTASSIUM SERPL-SCNC: 3.8 MMOL/L (ref 3.5–5.3)
PROT SERPL-MCNC: 7.5 G/DL (ref 6.4–8.4)
SODIUM SERPL-SCNC: 141 MMOL/L (ref 135–147)
TRIGL SERPL-MCNC: 143 MG/DL
VIT B12 SERPL-MCNC: 212 PG/ML (ref 100–900)

## 2023-05-10 NOTE — TELEPHONE ENCOUNTER
Lab results:    Vitamin D levels came back very low  Recommend to start high dose weekly D2, sent to pharmacy  Once completed (12 weeks), start vitamin D3 2000 units daily  Your vitamin B12 levels are low, start 1000 mcg daily  Kidney function borderline  Avoid NSAIDs such as ibuprofen, naproxen, Advil, Aleve or Motrin  Take Tylenol for pain  Your cholesterol remains elevated

## 2023-05-14 DIAGNOSIS — G47.09 OTHER INSOMNIA: ICD-10-CM

## 2023-05-14 DIAGNOSIS — F41.9 ANXIETY: ICD-10-CM

## 2023-05-15 RX ORDER — BUSPIRONE HYDROCHLORIDE 5 MG/1
10 TABLET ORAL 3 TIMES DAILY
Qty: 90 TABLET | Refills: 0 | Status: SHIPPED | OUTPATIENT
Start: 2023-05-15

## 2023-05-15 RX ORDER — ALPRAZOLAM 0.25 MG/1
0.25 TABLET ORAL DAILY PRN
Qty: 30 TABLET | Refills: 0 | Status: SHIPPED | OUTPATIENT
Start: 2023-05-15

## 2023-05-15 RX ORDER — ZOLPIDEM TARTRATE 10 MG/1
5-10 TABLET ORAL
Qty: 30 TABLET | Refills: 0 | Status: SHIPPED | OUTPATIENT
Start: 2023-05-15

## 2023-05-19 ENCOUNTER — TELEPHONE (OUTPATIENT)
Dept: INTERNAL MEDICINE CLINIC | Facility: CLINIC | Age: 43
End: 2023-05-19

## 2023-05-19 NOTE — TELEPHONE ENCOUNTER
Pt  Is vomiting non-stop so she didn't go to work today  Needs a note for work to be out today   OV?

## 2023-05-30 ENCOUNTER — OFFICE VISIT (OUTPATIENT)
Dept: GASTROENTEROLOGY | Facility: AMBULARY SURGERY CENTER | Age: 43
End: 2023-05-30

## 2023-05-30 VITALS
BODY MASS INDEX: 29.19 KG/M2 | SYSTOLIC BLOOD PRESSURE: 112 MMHG | DIASTOLIC BLOOD PRESSURE: 74 MMHG | OXYGEN SATURATION: 98 % | WEIGHT: 171 LBS | HEART RATE: 70 BPM | HEIGHT: 64 IN

## 2023-05-30 DIAGNOSIS — K31.84 GASTROPARESIS: Primary | ICD-10-CM

## 2023-05-30 DIAGNOSIS — R68.81 EARLY SATIETY: ICD-10-CM

## 2023-05-30 DIAGNOSIS — K59.00 CONSTIPATION, UNSPECIFIED CONSTIPATION TYPE: ICD-10-CM

## 2023-05-30 RX ORDER — LANOLIN ALCOHOL/MO/W.PET/CERES
400 CREAM (GRAM) TOPICAL 2 TIMES DAILY
Qty: 120 TABLET | Refills: 4 | Status: SHIPPED | OUTPATIENT
Start: 2023-05-30

## 2023-05-30 NOTE — PATIENT INSTRUCTIONS
NUTRITION RECOMMENDATIONS FOR PATIENTS WITH   DELAYED GASTRIC EMPTYING **    Nutrition education and diet modifications are important factors for controlling the symptoms of chronic nausea and vomiting associated with impaired stomach emptying  Maintaining good nutrition can be achieved with modest changes in your diet while at the same time help to reduce symptoms  Basic Points to Remember:    1  Eat smaller portions  The greater the meal volume, the slower the stomach empties  Eat smaller meals more frequently for easier digestion  2  Sit up or stand during eating and after meals  Body positioning during meals is very important  Avoid lying down while eating  Try to sit up or stand and walk around during and at least 3 hours after meals  3  Choose liquid or pureed foods should empty even on a bad day  Liquid food exits the stomach more easily than solids  Switch to a liquid diet or pureed/ground foods if necessary  The same thing can be accomplished in most patients my thoroughly chewing your food  Drink caloric drinks rather than water (e g  Peach/pear nectar, cranberry juice, Gator Aide, soup broth, Nickolas-Aid, etc )  4  Be aware of side effects from medications  Some medications may irritate the stomach or cause further delay of your stomach emptying  Either can lead to nausea and vomiting  Ask your doctor if you are currently taking any medications that may be causing chronic nausea and/or vomiting  5  If you have diabetes, the most important thing to do is to control your glucose levels  Elevated glucose levels >200 mg/dL can delay stomach emptying in healthy people  If you have diabetes, take frequent glucose measurements and make insulin adjustments as needed for glucose control  6  Avoid excess fiber intake  Some fiber is important to maintain normal intestinal functions  On the other hand, a high-fiber diet slows stomach emptying and increases the presence of food residue in the stomach   Avoid "foods like oranges, berries, green beans, figs, skin on apples, potato peels, broccoli, cauliflower, and lettuce  Most fruits and vegetables can be digested successfully if they are smaller than 1/4 inch  Again, chewing and cutting up your food and eating with plenty of liquid is important  Avoid excess high-fiber supplements such as Metamucil, Citrucel, etc   7  Avoid high-fat foods  Fat slows the exit of food from the stomach  A low-fat diet is recommended; however, some liquid containing fat can be a good source of calories  8  Take a daily multi-vitamin supplement  A multi-vitamin supplement (specifically vitamins A and C, and the mineral iron) should be considered  9  Eat nutritious foods  Eat nutritiously before filling up on empty calories found in foods such as cakes, candy and pastries  10  Be aware of \"trigger foods\"  Some foods may cause symptoms more readily than others  Take note of these foods and avoid them if possible  Recommended Foods:    Skim milk, low-fat yogurt, low-fat milkshakes, low-fat cheeses, hot cereals (cream of wheat, grits)  Fat-free soups and bouillon with noodles and vegetables  Fruit juice, canned fruits without skin (applesauce, peaches, pears); add honey or syrup for extra calories  Eggs, peanut butter  Meats, fish, poultry; mix with broth, water, vegetable or V-8 juice  Low-grain bread and cereals, pasta, rice, crackers  Vegetable juices, cooked vegetables without skins (beets, carrots, mushrooms, potatoes, including mashed potatoes)  Tea, water, coffee, soda    ** These recommendations have been modified from a publication from the [employer]   It was written by the following authors:  Werner PINEDA, Junie M, Demetrius S, Edy R       LIVING WITH GASTROPARESIS BOOK    MARIJUANA HYPEREMESIS SYNDROME  "

## 2023-05-30 NOTE — PROGRESS NOTES
[facility] - Outpatient Consultation  Thao Watt 38 y o  female MRN: [id number]  Encounter: [id number]      PCP: Rani Fall MD  Referring: Rani Fall MD  [address]  [facility],  [facility]      ASSESSMENT AND PLAN:      1  Gastroparesis  2  Early satiety  3  Constipation, unspecified constipation type  Symptoms of abdominal pain, nausea/vomiting, fatigue and constipation   EGD, colonoscopy, CT imaging  Recent delayed gastric emptying on GES  Idiopathic gastroparesis, as patient does not have diabetes  Cannot rule out component of marijuana hyperemesis syndrome, and strongly encouraged marijuana cessation  Recommend life style management with small frequent meals- referral to nutrition and dietary guidelines recommended (Living With Gastroparesis book)  - magnesium Oxide (MAG-OX) 400 mg TABS; Take 1 tablet (400 mg total) by mouth 2 (two) times a day  Dispense: 120 tablet; Refill: 4  - Ambulatory Referral to Nutrition Services; Future  - EGD Botox; Future      ______________________________________________________________________    CC:  Chief Complaint   Patient presents with   • Follow-up     From gastric emptying study  Ordered by PCP   • Vomiting     Abd pain   • Constipation     And Diarrhea       HPI:      Patient is a 81-year-old female last seen by our office in 2021-for irregular bowel habits, nausea, abdominal pain  EGD in July 2021 with small hiatal hernia, pylori negative erosive gastritis, biopsies negative for celiac  Colonoscopy diminutive tubular adenoma sigmoid colon, biopsies negative for microscopic colitis  She has undergone cholecystectomy  Gastric emptying study performed in December with severely decreased rate of gastric emptying  CT abdomen/pelvis in May 2021 was normal     She has vomiting episodes 2 times a day  She has lost 17 pounds over the last 6 months    She experiences periumbilical, right upper quadrant abdominal pain after eating  She has fatigue  She denies any dysphagia or rectal bleeding  She has irregular bowel habits, with no bowel movements for 3 days at a time, and then small frequent stools associated with incomplete evacuation  She smokes medical marijuana twice a day  Vomiting   Associated symptoms include abdominal pain, arthralgias, diarrhea, headaches, myalgias and weight loss  Pertinent negatives include no fever  Constipation  Associated symptoms include abdominal pain, anorexia, diarrhea, nausea, vomiting and weight loss  Pertinent negatives include no fever, flatus, hematochezia or melena  Abdominal Pain  This is a recurrent problem  The current episode started more than 1 year ago  The onset quality is gradual  The problem occurs daily  The most recent episode lasted 1 hours  The problem has been gradually worsening  The pain is located in the epigastric region  The pain is at a severity of 4/10  The quality of the pain is aching, cramping, a sensation of fullness and sharp  The abdominal pain radiates to the RUQ, right shoulder and chest  Associated symptoms include anorexia, arthralgias, belching, constipation, diarrhea, frequency, headaches, myalgias, nausea, vomiting and weight loss  Pertinent negatives include no dysuria, fever, flatus, hematochezia, hematuria or melena  The pain is aggravated by certain positions, eating and vomiting  The pain is relieved by bowel movements, certain positions, recumbency and vomiting  REVIEW OF SYSTEMS:    CONSTITUTIONAL: Denies any fever, chills, rigors, and weight loss  HEENT: No earache or tinnitus  Denies hearing loss or visual disturbances  CARDIOVASCULAR: No chest pain or palpitations  RESPIRATORY: Denies any cough, hemoptysis, shortness of breath or dyspnea on exertion  GASTROINTESTINAL: As noted in the History of Present Illness  GENITOURINARY: No problems with urination  Denies any hematuria or dysuria    NEUROLOGIC: No dizziness or vertigo, denies headaches  MUSCULOSKELETAL: Denies any muscle or joint pain  SKIN: Denies skin rashes or itching  ENDOCRINE: Denies excessive thirst  Denies intolerance to heat or cold  PSYCHOSOCIAL: Denies depression or anxiety  Denies any recent memory loss  Historical Information   Past Medical History:   Diagnosis Date   • Allergic    • Anxiety    • Depression    • Fibroid    • GERD (gastroesophageal reflux disease)    • Headache(784 0)    • History of pulmonary embolus (PE)    • Iliotibial band syndrome     last assessed - [id number]   • Mitral valve prolapse    • Obesity    • Pneumonia     2018   • PONV (postoperative nausea and vomiting)     Patient requests to be premedicated for N/V   • Wears glasses     for reading     Past Surgical History:   Procedure Laterality Date   • CHOLECYSTECTOMY     • ESOPHAGOGASTRODUODENOSCOPY N/A 5/31/2018    Procedure: ESOPHAGOGASTRODUODENOSCOPY (EGD); Surgeon: Audrey Ferrer MD;  Location: [facility]; Service: Gastroenterology   • GALLBLADDER SURGERY     • KNEE SURGERY Left     growth plate fx left knee   • KNEE SURGERY Right    • MI HYSTEROSCOPY BX ENDOMETRIUM&/POLYPC W/WO D&C N/A 8/3/2020    Procedure: DILATATION AND CURETTAGE (D&C) WITH HYSTEROSCOPY (MYOSURE);   Surgeon: Merly Wiggins DO;  [facility];  Service: Gynecology   • MI HYSTEROSCOPY BX ENDOMETRIUM&/POLYPC W/WO D&C N/A 8/3/2020    Procedure: HYSTEROSCOPY WITH  RESECTION UTERINE TUMOR/FIBROID;  Surgeon: Merly Wiggins DO;  [facility];  Service: Gynecology   • MI HYSTEROSCOPY ENDOMETRIAL ABLATION N/A 8/3/2020    Procedure: ABLATION ENDOMETRIAL Tootie Guild;  Surgeon: Merly Wiggins DO;  [facility];  Service: Gynecology   • MI OPEN TREATMENT METATARSAL FRACTURE EACH Left 4/10/2019    Procedure: OPEN REDUCTION W/ INTERNAL FIXATION (ORIF) FOOT, Left 5th metatarsal fracture open reduction internal fixation with calcaneal bone graft and bone marrow aspirate "concentrate;  Surgeon: Marlena Schirmer, MD;  Location: [facility];  Service: Orthopedics   • TUBAL LIGATION     • UPPER GASTROINTESTINAL ENDOSCOPY     • WISDOM TOOTH EXTRACTION       Social History   Social History     Substance and Sexual Activity   Alcohol Use Not Currently     Social History     Substance and Sexual Activity   Drug Use Yes   • Frequency: 7 0 times per week   • Types: Marijuana    Comment: Medical marijuana     Social History     Tobacco Use   Smoking Status Never   Smokeless Tobacco Never     Family History   Adopted: Yes   Problem Relation Age of Onset   • No Known Problems Mother    • Alcohol abuse Neg Hx    • Substance Abuse Neg Hx    • Mental illness Neg Hx    • Depression Neg Hx        Meds/Allergies       Current Outpatient Medications:   •  acetaminophen (TYLENOL) 325 mg tablet  •  ALPRAZolam (XANAX) 0 25 mg tablet  •  busPIRone (BUSPAR) 5 mg tablet  •  Cetirizine HCl 10 MG CAPS  •  dicyclomine (BENTYL) 20 mg tablet  •  ergocalciferol (VITAMIN D2) 50,000 units  •  omeprazole (PriLOSEC) 40 MG capsule  •  ondansetron (ZOFRAN-ODT) 4 mg disintegrating tablet  •  sertraline (ZOLOFT) 100 mg tablet  •  topiramate (TOPAMAX) 50 MG tablet  •  zolpidem (AMBIEN) 10 mg tablet  •  diclofenac sodium (VOLTAREN) 1 %  •  fluticasone (FLONASE) 50 mcg/act nasal spray  •  loratadine (CLARITIN) 10 mg tablet  •  polyethylene glycol (GLYCOLAX) 17 GM/SCOOP powder  •  prochlorperazine (COMPAZINE) 5 mg tablet  •  rizatriptan (Maxalt) 5 MG tablet    Allergies   Allergen Reactions   • Penicillin G Hives           Objective     Blood pressure 112/74, pulse 70, height 5' 4\" (1 626 m), weight 77 6 kg (171 lb), SpO2 98 %  Body mass index is 29 35 kg/m²  PHYSICAL EXAM:      General Appearance:   Alert, cooperative, no distress   HEENT:   Normocephalic, atraumatic, anicteric       Neck:  Supple, symmetrical, trachea midline   Lungs:   Clear to auscultation bilaterally; no rales, rhonchi or wheezing; respirations " "unlabored    Heart[de-identified]   Regular rate and rhythm; no murmur, rub, or gallop  Abdomen:   Soft, RUQ and epigastric pain to palpation, non-distended; normal bowel sounds; no masses, no organomegaly    Genitalia:   Deferred    Rectal:   Deferred    Extremities:  No cyanosis, clubbing or edema    Pulses:  2+ and symmetric    Skin:  No jaundice, rashes, or lesions    Lymph nodes:  No palpable cervical lymphadenopathy        Lab Results:     Lab Results   Component Value Date    HCT 39 9 08/15/2022    HGB 13 2 08/15/2022    MCV 89 08/15/2022     08/15/2022    WBC 6 79 08/15/2022       Lab Results   Component Value Date    ALKPHOS 61 05/10/2023    ALT 11 05/10/2023    ANIONGAP 5 05/11/2014    AST 11 (L) 05/10/2023    BILITOT 0 19 (L) 05/11/2014    BUN 10 05/10/2023    CALCIUM 9 6 05/10/2023     (H) 05/10/2023    CO2 25 05/10/2023    CREATININE 1 11 05/10/2023    EGFR 62 05/10/2023    GLUCOSE 118 05/11/2014    GLUF 86 05/10/2023    K 3 8 05/10/2023     05/11/2014    PROT 7 6 05/11/2014       Lab Results   Component Value Date    INR 0 94 03/05/2021    INR 0 97 11/11/2020    INR 1 03 07/12/2019    PROTIME 12 7 03/05/2021    PROTIME 13 0 11/11/2020    PROTIME 12 9 07/12/2019         Radiology Results:   No results found  Portions of the record may have been created with voice recognition software  Occasional wrong word or \"sound a like\" substitutions may have occurred due to the inherent limitations of voice recognition software  Read the chart carefully and recognize, using context, where substitutions have occurred          "

## 2023-06-02 ENCOUNTER — HOSPITAL ENCOUNTER (OUTPATIENT)
Dept: MAMMOGRAPHY | Facility: HOSPITAL | Age: 43
Discharge: HOME | End: 2023-06-02

## 2023-06-02 VITALS — HEIGHT: 64 IN | WEIGHT: 171 LBS | BODY MASS INDEX: 29.19 KG/M2

## 2023-06-02 DIAGNOSIS — Z12.31 ENCOUNTER FOR SCREENING MAMMOGRAM FOR MALIGNANT NEOPLASM OF BREAST: ICD-10-CM

## 2023-06-05 ENCOUNTER — TELEPHONE (OUTPATIENT)
Dept: GASTROENTEROLOGY | Facility: AMBULARY SURGERY CENTER | Age: 43
End: 2023-06-05

## 2023-06-05 DIAGNOSIS — K25.3 ACUTE GASTRIC EROSION: ICD-10-CM

## 2023-06-05 DIAGNOSIS — K21.00 GASTROESOPHAGEAL REFLUX DISEASE WITH ESOPHAGITIS WITHOUT HEMORRHAGE: ICD-10-CM

## 2023-06-05 RX ORDER — OMEPRAZOLE 40 MG/1
40 CAPSULE, DELAYED RELEASE ORAL DAILY
Qty: 90 CAPSULE | Refills: 0 | Status: SHIPPED | OUTPATIENT
Start: 2023-06-05

## 2023-06-09 DIAGNOSIS — F41.9 ANXIETY: ICD-10-CM

## 2023-06-09 DIAGNOSIS — G43.909 MIGRAINE WITHOUT STATUS MIGRAINOSUS, NOT INTRACTABLE, UNSPECIFIED MIGRAINE TYPE: ICD-10-CM

## 2023-06-09 RX ORDER — TOPIRAMATE 50 MG/1
TABLET, FILM COATED ORAL
Qty: 180 TABLET | Refills: 0 | Status: SHIPPED | OUTPATIENT
Start: 2023-06-09 | End: 2023-06-16

## 2023-06-09 RX ORDER — SERTRALINE HYDROCHLORIDE 100 MG/1
150 TABLET, FILM COATED ORAL DAILY
Qty: 135 TABLET | Refills: 0 | Status: SHIPPED | OUTPATIENT
Start: 2023-06-09

## 2023-06-12 RX ORDER — BUSPIRONE HYDROCHLORIDE 5 MG/1
10 TABLET ORAL 3 TIMES DAILY
Qty: 90 TABLET | Refills: 0 | Status: SHIPPED | OUTPATIENT
Start: 2023-06-12

## 2023-06-14 DIAGNOSIS — G43.909 MIGRAINE WITHOUT STATUS MIGRAINOSUS, NOT INTRACTABLE, UNSPECIFIED MIGRAINE TYPE: ICD-10-CM

## 2023-06-14 DIAGNOSIS — G47.09 OTHER INSOMNIA: ICD-10-CM

## 2023-06-14 DIAGNOSIS — F41.9 ANXIETY: ICD-10-CM

## 2023-06-14 RX ORDER — TOPIRAMATE 50 MG/1
TABLET, FILM COATED ORAL
Qty: 180 TABLET | Refills: 0 | OUTPATIENT
Start: 2023-06-14

## 2023-06-15 RX ORDER — ALPRAZOLAM 0.25 MG/1
0.25 TABLET ORAL DAILY PRN
Qty: 30 TABLET | Refills: 0 | Status: SHIPPED | OUTPATIENT
Start: 2023-06-15

## 2023-06-15 RX ORDER — ZOLPIDEM TARTRATE 10 MG/1
5-10 TABLET ORAL
Qty: 30 TABLET | Refills: 0 | Status: SHIPPED | OUTPATIENT
Start: 2023-06-15

## 2023-06-16 NOTE — TELEPHONE ENCOUNTER
Topamax dosage was changed to to 1 in the am and 2 in the pm   daily, but quantity was not changed from 180 to 270 and insurance is denying  Please change quantity and resend

## 2023-06-19 ENCOUNTER — TELEPHONE (OUTPATIENT)
Dept: GASTROENTEROLOGY | Facility: CLINIC | Age: 43
End: 2023-06-19

## 2023-06-19 NOTE — TELEPHONE ENCOUNTER
Patient is scheduled for EGD with botox on July 24 , 2023 at [facility] with Guevara Sheffield MD  Patient is aware of pre-procedure prep of Nothing to eat after midnight, day of the procedure clear liquids only and nothing to drink 4 hours prior to the EGD and they will be called the day prior between 2 and 6 pm for time to report for procedure  Pre-procedure prep has been given to the patient  via [employer] on June 16 , 2023

## 2023-07-10 DIAGNOSIS — F41.9 ANXIETY: ICD-10-CM

## 2023-07-11 RX ORDER — BUSPIRONE HYDROCHLORIDE 10 MG/1
10 TABLET ORAL 3 TIMES DAILY
Qty: 90 TABLET | Refills: 1 | Status: SHIPPED | OUTPATIENT
Start: 2023-07-11 | End: 2023-08-14 | Stop reason: SDUPTHER

## 2023-07-18 DIAGNOSIS — G47.09 OTHER INSOMNIA: ICD-10-CM

## 2023-07-18 DIAGNOSIS — F41.9 ANXIETY: ICD-10-CM

## 2023-07-18 RX ORDER — ALPRAZOLAM 0.25 MG/1
0.25 TABLET ORAL DAILY PRN
Qty: 30 TABLET | Refills: 0 | Status: SHIPPED | OUTPATIENT
Start: 2023-07-18

## 2023-07-18 RX ORDER — ZOLPIDEM TARTRATE 10 MG/1
5-10 TABLET ORAL
Qty: 30 TABLET | Refills: 0 | Status: SHIPPED | OUTPATIENT
Start: 2023-07-18

## 2023-07-22 RX ORDER — SODIUM CHLORIDE, SODIUM LACTATE, POTASSIUM CHLORIDE, CALCIUM CHLORIDE 600; 310; 30; 20 MG/100ML; MG/100ML; MG/100ML; MG/100ML
50 INJECTION, SOLUTION INTRAVENOUS CONTINUOUS
Status: CANCELLED | OUTPATIENT
Start: 2023-07-22

## 2023-07-24 ENCOUNTER — ANESTHESIA (OUTPATIENT)
Dept: GASTROENTEROLOGY | Facility: HOSPITAL | Age: 43
End: 2023-07-24

## 2023-07-24 ENCOUNTER — HOSPITAL ENCOUNTER (OUTPATIENT)
Dept: GASTROENTEROLOGY | Facility: HOSPITAL | Age: 43
Setting detail: OUTPATIENT SURGERY
Discharge: HOME | End: 2023-07-24
Attending: INTERNAL MEDICINE
Payer: COMMERCIAL

## 2023-07-24 ENCOUNTER — ANESTHESIA EVENT (OUTPATIENT)
Dept: GASTROENTEROLOGY | Facility: HOSPITAL | Age: 43
End: 2023-07-24

## 2023-07-24 ENCOUNTER — ANESTHESIA EVENT (OUTPATIENT)
Dept: ANESTHESIOLOGY | Facility: HOSPITAL | Age: 43
End: 2023-07-24

## 2023-07-24 ENCOUNTER — ANESTHESIA (OUTPATIENT)
Dept: ANESTHESIOLOGY | Facility: HOSPITAL | Age: 43
End: 2023-07-24

## 2023-07-24 VITALS
RESPIRATION RATE: 20 BRPM | TEMPERATURE: 97 F | DIASTOLIC BLOOD PRESSURE: 68 MMHG | HEIGHT: 64 IN | HEART RATE: 60 BPM | BODY MASS INDEX: 28.68 KG/M2 | OXYGEN SATURATION: 99 % | WEIGHT: 168 LBS | SYSTOLIC BLOOD PRESSURE: 127 MMHG

## 2023-07-24 DIAGNOSIS — R68.81 EARLY SATIETY: ICD-10-CM

## 2023-07-24 DIAGNOSIS — K31.84 GASTROPARESIS: ICD-10-CM

## 2023-07-24 PROCEDURE — 43236 UPPR GI SCOPE W/SUBMUC INJ: CPT | Performed by: INTERNAL MEDICINE

## 2023-07-24 RX ORDER — SODIUM CHLORIDE, SODIUM LACTATE, POTASSIUM CHLORIDE, CALCIUM CHLORIDE 600; 310; 30; 20 MG/100ML; MG/100ML; MG/100ML; MG/100ML
50 INJECTION, SOLUTION INTRAVENOUS CONTINUOUS
Status: DISCONTINUED | OUTPATIENT
Start: 2023-07-24 | End: 2023-07-28 | Stop reason: HOSPADM

## 2023-07-24 RX ORDER — PROPOFOL 10 MG/ML
INJECTION, EMULSION INTRAVENOUS AS NEEDED
Status: DISCONTINUED | OUTPATIENT
Start: 2023-07-24 | End: 2023-07-24

## 2023-07-24 RX ORDER — GLYCOPYRROLATE 0.2 MG/ML
INJECTION INTRAMUSCULAR; INTRAVENOUS AS NEEDED
Status: DISCONTINUED | OUTPATIENT
Start: 2023-07-24 | End: 2023-07-24

## 2023-07-24 RX ORDER — LIDOCAINE HYDROCHLORIDE 20 MG/ML
INJECTION, SOLUTION EPIDURAL; INFILTRATION; INTRACAUDAL; PERINEURAL AS NEEDED
Status: DISCONTINUED | OUTPATIENT
Start: 2023-07-24 | End: 2023-07-24

## 2023-07-24 RX ORDER — SODIUM CHLORIDE, SODIUM LACTATE, POTASSIUM CHLORIDE, CALCIUM CHLORIDE 600; 310; 30; 20 MG/100ML; MG/100ML; MG/100ML; MG/100ML
INJECTION, SOLUTION INTRAVENOUS CONTINUOUS PRN
Status: DISCONTINUED | OUTPATIENT
Start: 2023-07-24 | End: 2023-07-24

## 2023-07-24 RX ADMIN — SODIUM CHLORIDE, SODIUM LACTATE, POTASSIUM CHLORIDE, AND CALCIUM CHLORIDE: .6; .31; .03; .02 INJECTION, SOLUTION INTRAVENOUS at 11:30

## 2023-07-24 RX ADMIN — GLYCOPYRROLATE 0.1 MG: 0.2 INJECTION, SOLUTION INTRAMUSCULAR; INTRAVENOUS at 11:39

## 2023-07-24 RX ADMIN — LIDOCAINE HYDROCHLORIDE 80 MG: 20 INJECTION, SOLUTION EPIDURAL; INFILTRATION; INTRACAUDAL; PERINEURAL at 11:39

## 2023-07-24 RX ADMIN — PROPOFOL 30 MG: 10 INJECTION, EMULSION INTRAVENOUS at 11:44

## 2023-07-24 RX ADMIN — PROPOFOL 150 MG: 10 INJECTION, EMULSION INTRAVENOUS at 11:41

## 2023-07-24 RX ADMIN — ONABOTULINUMTOXINA 100 UNITS: 100 INJECTION, POWDER, LYOPHILIZED, FOR SOLUTION INTRADERMAL; INTRAMUSCULAR at 11:45

## 2023-07-24 RX ADMIN — PROPOFOL 50 MG: 10 INJECTION, EMULSION INTRAVENOUS at 11:42

## 2023-07-24 NOTE — ANESTHESIA PREPROCEDURE EVALUATION
Procedure:  EGD    Relevant Problems   ANESTHESIA   (+) Motion sickness      CARDIO  MVP    (+) Hyperlipidemia   (+) Migraine headache      GI/HEPATIC   (+) GERD (gastroesophageal reflux disease)   (+) Gastric erosion      NEURO/PSYCH   (+) Generalized anxiety disorder   (+) Migraine headache        Physical Exam    Airway    Mallampati score: II  TM Distance: >3 FB  Neck ROM: full     Dental       Cardiovascular  Cardiovascular exam normal    Pulmonary  Pulmonary exam normal     Other Findings        Anesthesia Plan  ASA Score- 2     Anesthesia Type- IV sedation with anesthesia with ASA Monitors. Additional Monitors:   Airway Plan:           Plan Factors-Exercise tolerance (METS): >4 METS. Chart reviewed. EKG reviewed. Existing labs reviewed. Patient summary reviewed. Patient is a current smoker. Patient instructed to abstain from smoking on day of procedure. Patient smoked on day of surgery. Induction-     Postoperative Plan-     Informed Consent- Anesthetic plan and risks discussed with patient. I personally reviewed this patient with the CRNA. Discussed and agreed on the Anesthesia Plan with the CRNA. .            Lab Results   Component Value Date    HGBA1C 5.3 07/09/2016       Lab Results   Component Value Date     05/11/2014    K 3.8 05/10/2023     (H) 05/10/2023    CO2 25 05/10/2023    ANIONGAP 5 05/11/2014    BUN 10 05/10/2023    CREATININE 1.11 05/10/2023    GLUCOSE 118 05/11/2014    GLUF 86 05/10/2023    CALCIUM 9.6 05/10/2023    AST 11 (L) 05/10/2023    ALT 11 05/10/2023    ALKPHOS 61 05/10/2023    PROT 7.6 05/11/2014    BILITOT 0.19 (L) 05/11/2014    EGFR 62 05/10/2023       Lab Results   Component Value Date    WBC 6.79 08/15/2022    HGB 13.2 08/15/2022    HCT 39.9 08/15/2022    MCV 89 08/15/2022     08/15/2022   Normal sinus rhythm  Rightward axis  Borderline ECG  When compared with ECG of 02-MAY-2021 23:37,  No significant change was found  Confirmed by Chasidy Issa Mp Hodge ([id number]) on 6/3/2021 8:22:22 AM

## 2023-07-24 NOTE — H&P
History and Physical - [facility]  Alejandra Milner 38 y.o. female MRN: [id number]                  HPI: Alejandra Milner is a 38y.o. year old female who presents for gastroparesis. REVIEW OF SYSTEMS: Per the HPI, and otherwise unremarkable. Historical Information   Past Medical History:   Diagnosis Date   • Allergic    • Anxiety    • Depression    • Fibroid    • GERD (gastroesophageal reflux disease)    • Headache(784.0)    • History of pulmonary embolus (PE)    • Iliotibial band syndrome     last assessed - [id number]   • Mitral valve prolapse    • Obesity    • Pneumonia     2018   • PONV (postoperative nausea and vomiting)     Patient requests to be premedicated for N/V   • Wears glasses     for reading     Past Surgical History:   Procedure Laterality Date   • CHOLECYSTECTOMY     • ESOPHAGOGASTRODUODENOSCOPY N/A 5/31/2018    Procedure: ESOPHAGOGASTRODUODENOSCOPY (EGD); Surgeon: Audrey Park MD;  Location: [facility]; Service: Gastroenterology   • GALLBLADDER SURGERY     • KNEE SURGERY Left     growth plate fx left knee   • KNEE SURGERY Right    • AK HYSTEROSCOPY BX ENDOMETRIUM&/POLYPC W/WO D&C N/A 8/3/2020    Procedure: DILATATION AND CURETTAGE (D&C) WITH HYSTEROSCOPY (MYOSURE);   Surgeon: Salina Contreras DO;  [facility];  Service: Gynecology   • AK HYSTEROSCOPY BX ENDOMETRIUM&/POLYPC W/WO D&C N/A 8/3/2020    Procedure: HYSTEROSCOPY WITH  RESECTION UTERINE TUMOR/FIBROID;  Surgeon: Salina Contreras DO;  [facility];  Service: Gynecology   • AK HYSTEROSCOPY ENDOMETRIAL ABLATION N/A 8/3/2020    Procedure: ABLATION ENDOMETRIAL Janeal Emmanuel;  Surgeon: Salina Contreras DO;  [facility];  Service: Gynecology   • AK OPEN TREATMENT METATARSAL FRACTURE EACH Left 4/10/2019    Procedure: OPEN REDUCTION W/ INTERNAL FIXATION (ORIF) FOOT, Left 5th metatarsal fracture open reduction internal fixation with calcaneal bone graft and bone marrow aspirate concentrate; Surgeon: Chris Katz MD;  Location: [facility];  Service: Orthopedics   • TUBAL LIGATION     • UPPER GASTROINTESTINAL ENDOSCOPY     • WISDOM TOOTH EXTRACTION       Social History   Social History     Substance and Sexual Activity   Alcohol Use Not Currently     Social History     Substance and Sexual Activity   Drug Use Yes   • Frequency: 7.0 times per week   • Types: Marijuana    Comment: Medical marijuana     Social History     Tobacco Use   Smoking Status Never   Smokeless Tobacco Never     Family History   Adopted: Yes   Problem Relation Age of Onset   • No Known Problems Mother    • Alcohol abuse Neg Hx    • Substance Abuse Neg Hx    • Mental illness Neg Hx    • Depression Neg Hx        Meds/Allergies       Current Outpatient Medications:   •  acetaminophen (TYLENOL) 325 mg tablet  •  ALPRAZolam (XANAX) 0.25 mg tablet  •  busPIRone (BUSPAR) 10 mg tablet  •  Cetirizine HCl 10 MG CAPS  •  ergocalciferol (VITAMIN D2) 50,000 units  •  fluticasone (FLONASE) 50 mcg/act nasal spray  •  magnesium Oxide (MAG-OX) 400 mg TABS  •  omeprazole (PriLOSEC) 40 MG capsule  •  ondansetron (ZOFRAN-ODT) 4 mg disintegrating tablet  •  sertraline (ZOLOFT) 100 mg tablet  •  topiramate (TOPAMAX) 50 MG tablet  •  zolpidem (AMBIEN) 10 mg tablet  •  diclofenac sodium (VOLTAREN) 1 %  •  dicyclomine (BENTYL) 20 mg tablet  •  polyethylene glycol (GLYCOLAX) 17 GM/SCOOP powder  •  prochlorperazine (COMPAZINE) 5 mg tablet  •  rizatriptan (Maxalt) 5 MG tablet    Current Facility-Administered Medications:   •  lactated ringers infusion, 50 mL/hr, Intravenous, Continuous  •  onabotulinumtoxin A (BOTOX) injection 100 Units, 100 Units, Intramuscular, Once    Allergies   Allergen Reactions   • Penicillin G Hives       Objective           PHYSICAL EXAM    Gen: NAD  Head: NCAT  CV: RRR  CHEST: Clear  ABD: soft, NT/ND  EXT: no edema      ASSESSMENT/PLAN:  This is a 38y.o. year old female here for EGD, and she is stable and optimized for her procedure.

## 2023-07-24 NOTE — ANESTHESIA POSTPROCEDURE EVALUATION
Post-Op Assessment Note    CV Status:  Stable    Pain management: adequate     Mental Status:  Sleepy   Hydration Status:  Euvolemic   PONV Controlled:  Controlled   Airway Patency:  Patent      Post Op Vitals Reviewed: Yes      Staff: CRNA         No notable events documented.     BP   129/89   Temp      Pulse  59   Resp   17   SpO2   99

## 2023-07-24 NOTE — ANESTHESIA PREPROCEDURE EVALUATION
Procedure:  PRE-OP ONLY    Relevant Problems   ANESTHESIA   (+) Motion sickness      CARDIO  MVP    (+) Hyperlipidemia   (+) Migraine headache      GI/HEPATIC   (+) GERD (gastroesophageal reflux disease)   (+) Gastric erosion      NEURO/PSYCH   (+) Generalized anxiety disorder   (+) Migraine headache      Digestive   (+) Gastroparesis      Other   (+) Obesity (BMI 30-39. 9)             Anesthesia Plan  ASA Score- 2     Anesthesia Type- IV sedation with anesthesia with ASA Monitors. Additional Monitors:   Airway Plan:           Plan Factors-Exercise tolerance (METS): >4 METS. Chart reviewed. EKG reviewed. Existing labs reviewed. Patient summary reviewed. Patient is a current smoker. Patient instructed to abstain from smoking on day of procedure. Induction-     Postoperative Plan-     Informed Consent- Anesthetic plan and risks discussed with patient. I personally reviewed this patient with the CRNA. Discussed and agreed on the Anesthesia Plan with the CRNA. .            Lab Results   Component Value Date    HGBA1C 5.3 07/09/2016       Lab Results   Component Value Date     05/11/2014    K 3.8 05/10/2023     (H) 05/10/2023    CO2 25 05/10/2023    ANIONGAP 5 05/11/2014    BUN 10 05/10/2023    CREATININE 1.11 05/10/2023    GLUCOSE 118 05/11/2014    GLUF 86 05/10/2023    CALCIUM 9.6 05/10/2023    AST 11 (L) 05/10/2023    ALT 11 05/10/2023    ALKPHOS 61 05/10/2023    PROT 7.6 05/11/2014    BILITOT 0.19 (L) 05/11/2014    EGFR 62 05/10/2023       Lab Results   Component Value Date    WBC 6.79 08/15/2022    HGB 13.2 08/15/2022    HCT 39.9 08/15/2022    MCV 89 08/15/2022     08/15/2022   Normal sinus rhythm  Rightward axis  Borderline ECG  When compared with ECG of 02-MAY-2021 23:37,  No significant change was found  Confirmed by Agnes Atkins ([id number]) on 6/3/2021 8:22:22 AM

## 2023-07-25 ENCOUNTER — TELEPHONE (OUTPATIENT)
Age: 43
End: 2023-07-25

## 2023-07-25 NOTE — TELEPHONE ENCOUNTER
Patients GI provider:  Dr. Ramesh Reed    Number to return call: (  [phone]      Reason for call: Pt calling to get a work note for her  Ron Fuentes who was her caregiver for yesterday and today. She had an EGD done yesterday. She is asking for for a note for 7/24 and 7/25. Can be sent to her [employer] or email.     Scheduled procedure/appointment date if applicable:  N/A Melolabial Interpolation Flap Text: A decision was made to reconstruct the defect utilizing an interpolation axial flap and a staged reconstruction.  A telfa template was made of the defect.  This telfa template was then used to outline the melolabial interpolation flap.  The donor area for the pedicle flap was then injected with anesthesia.  The flap was excised through the skin and subcutaneous tissue down to the layer of the underlying musculature.  The pedicle flap was carefully excised within this deep plane to maintain its blood supply.  The edges of the donor site were undermined.   The donor site was closed in a primary fashion.  The pedicle was then rotated into position and sutured.  Once the tube was sutured into place, adequate blood supply was confirmed with blanching and refill.  The pedicle was then wrapped with xeroform gauze and dressed appropriately with a telfa and gauze bandage to ensure continued blood supply and protect the attached pedicle.

## 2023-07-26 ENCOUNTER — HOSPITAL ENCOUNTER (OUTPATIENT)
Dept: ULTRASOUND IMAGING | Facility: CLINIC | Age: 43
Discharge: HOME | End: 2023-07-26
Payer: COMMERCIAL

## 2023-07-26 ENCOUNTER — HOSPITAL ENCOUNTER (OUTPATIENT)
Dept: MAMMOGRAPHY | Facility: CLINIC | Age: 43
Discharge: HOME | End: 2023-07-26
Payer: COMMERCIAL

## 2023-07-26 VITALS — HEIGHT: 64 IN | WEIGHT: 167.55 LBS | BODY MASS INDEX: 28.6 KG/M2

## 2023-07-26 DIAGNOSIS — R92.8 ABNORMAL MAMMOGRAM: ICD-10-CM

## 2023-07-26 DIAGNOSIS — R92.8 ABNORMAL SCREENING MAMMOGRAM: ICD-10-CM

## 2023-07-26 PROCEDURE — 77065 DX MAMMO INCL CAD UNI: CPT

## 2023-07-26 PROCEDURE — 76642 ULTRASOUND BREAST LIMITED: CPT

## 2023-07-26 NOTE — PROGRESS NOTES
Met with patient and Dr. Carlos Manuel Minor  regarding recommendation for;      _____ RIGHT ___x___LEFT      __x___Ultrasound guided  ______Stereotactic  Breast biopsy. _x____Verbalized understanding. Blood thinners:  _____yes __x___no    Date stopped: ____x_______    Biopsy teaching sheet given:  ____x___yes ______no    No pertnent medical history. See Epic summary for history.

## 2023-08-07 ENCOUNTER — HOSPITAL ENCOUNTER (OUTPATIENT)
Dept: ULTRASOUND IMAGING | Facility: CLINIC | Age: 43
Discharge: HOME | End: 2023-08-07
Payer: COMMERCIAL

## 2023-08-07 ENCOUNTER — HOSPITAL ENCOUNTER (OUTPATIENT)
Dept: MAMMOGRAPHY | Facility: CLINIC | Age: 43
Discharge: HOME | End: 2023-08-07
Payer: COMMERCIAL

## 2023-08-07 VITALS — SYSTOLIC BLOOD PRESSURE: 112 MMHG | DIASTOLIC BLOOD PRESSURE: 67 MMHG | HEART RATE: 63 BPM

## 2023-08-07 DIAGNOSIS — R92.8 ABNORMAL ULTRASOUND OF BREAST: ICD-10-CM

## 2023-08-07 DIAGNOSIS — Z98.890 STATUS POST BIOPSY: ICD-10-CM

## 2023-08-07 PROCEDURE — 19083 BX BREAST 1ST LESION US IMAG: CPT

## 2023-08-07 PROCEDURE — 88342 IMHCHEM/IMCYTCHM 1ST ANTB: CPT | Performed by: PATHOLOGY

## 2023-08-07 PROCEDURE — A4648 IMPLANTABLE TISSUE MARKER: HCPCS

## 2023-08-07 PROCEDURE — 88341 IMHCHEM/IMCYTCHM EA ADD ANTB: CPT | Performed by: PATHOLOGY

## 2023-08-07 PROCEDURE — 88305 TISSUE EXAM BY PATHOLOGIST: CPT | Performed by: PATHOLOGY

## 2023-08-07 RX ORDER — LIDOCAINE HYDROCHLORIDE 10 MG/ML
5 INJECTION, SOLUTION EPIDURAL; INFILTRATION; INTRACAUDAL; PERINEURAL ONCE
Status: COMPLETED | OUTPATIENT
Start: 2023-08-07 | End: 2023-08-07

## 2023-08-07 RX ADMIN — LIDOCAINE HYDROCHLORIDE 5 ML: 10 INJECTION, SOLUTION EPIDURAL; INFILTRATION; INTRACAUDAL; PERINEURAL at 14:45

## 2023-08-07 NOTE — LETTER
[facility] [place]  [address] [facility] [address]  Dept: [phone]    August 7, 2023     Patient: Makeda Blandon   YOB: 1980   Date of Visit: 8/7/2023       To Whom it May Concern:    Carol Calzada is under our professional care. She was seen in the office from 8/7/2023 to 08/07/23. If you have any questions or concerns, please don't hesitate to call.          Sincerely,          Bhavesh Solorio

## 2023-08-07 NOTE — PROGRESS NOTES
Procedure type:    __x___ultrasound guided _____stereotactic    Breast:    __x___Left _____Right    Location: 4 oclock 6 cmfn    Needle: 12g Juliet    # of passes: 3    Clip: Hydromark Butterfly    Performed by: Dr. Richar Friend    Pressure held for 5 minutes by: Bhavesh Thompson Strips:    _x____yes _____no    Band aid:    __x___yes_____no    Tape and guaze:    _____yes __x___no    Tolerated procedure:    __x___yes _____no

## 2023-08-08 NOTE — PROGRESS NOTES
Post procedure call completed    Bleeding: _____yes __X___no    Pain: ___x__yes ______no   (soreness.  Relief with tylenol)    Redness/Swelling: ______yes ___X___no    Band aid removed: _____yes ___X__no (discussed removing when she showers)    Steri-Strips intact: ___X___yes _____no (discussed with patient to remove steri strips on Saturday if they have not come off on their own)    Pt with no questions at this time, adv will call when results available, adv to call with any questions or concerns, has name/# for contact

## 2023-08-10 ENCOUNTER — TELEPHONE (OUTPATIENT)
Dept: MAMMOGRAPHY | Facility: CLINIC | Age: 43
End: 2023-08-10

## 2023-08-10 PROCEDURE — 88305 TISSUE EXAM BY PATHOLOGIST: CPT | Performed by: PATHOLOGY

## 2023-08-10 PROCEDURE — 88342 IMHCHEM/IMCYTCHM 1ST ANTB: CPT | Performed by: PATHOLOGY

## 2023-08-10 PROCEDURE — 88341 IMHCHEM/IMCYTCHM EA ADD ANTB: CPT | Performed by: PATHOLOGY

## 2023-08-14 DIAGNOSIS — F41.9 ANXIETY: ICD-10-CM

## 2023-08-14 RX ORDER — BUSPIRONE HYDROCHLORIDE 10 MG/1
10 TABLET ORAL 3 TIMES DAILY
Qty: 90 TABLET | Refills: 1 | Status: SHIPPED | OUTPATIENT
Start: 2023-08-14 | End: 2023-09-24 | Stop reason: SDUPTHER

## 2023-08-21 DIAGNOSIS — K21.00 GASTROESOPHAGEAL REFLUX DISEASE WITH ESOPHAGITIS WITHOUT HEMORRHAGE: ICD-10-CM

## 2023-08-21 DIAGNOSIS — F41.9 ANXIETY: ICD-10-CM

## 2023-08-21 DIAGNOSIS — E55.9 VITAMIN D DEFICIENCY: ICD-10-CM

## 2023-08-21 DIAGNOSIS — G43.909 MIGRAINE WITHOUT STATUS MIGRAINOSUS, NOT INTRACTABLE, UNSPECIFIED MIGRAINE TYPE: ICD-10-CM

## 2023-08-21 DIAGNOSIS — G47.09 OTHER INSOMNIA: ICD-10-CM

## 2023-08-21 DIAGNOSIS — K25.3 ACUTE GASTRIC EROSION: ICD-10-CM

## 2023-08-22 RX ORDER — ERGOCALCIFEROL 1.25 MG/1
50000 CAPSULE ORAL WEEKLY
Qty: 12 CAPSULE | Refills: 0 | OUTPATIENT
Start: 2023-08-22

## 2023-08-22 RX ORDER — TOPIRAMATE 50 MG/1
TABLET, FILM COATED ORAL
Qty: 270 TABLET | Refills: 0 | Status: SHIPPED | OUTPATIENT
Start: 2023-08-22

## 2023-08-22 RX ORDER — SERTRALINE HYDROCHLORIDE 100 MG/1
150 TABLET, FILM COATED ORAL DAILY
Qty: 135 TABLET | Refills: 1 | Status: SHIPPED | OUTPATIENT
Start: 2023-08-22

## 2023-08-22 RX ORDER — ZOLPIDEM TARTRATE 10 MG/1
5-10 TABLET ORAL
Qty: 30 TABLET | Refills: 0 | Status: SHIPPED | OUTPATIENT
Start: 2023-08-22

## 2023-08-22 RX ORDER — ALPRAZOLAM 0.25 MG/1
0.25 TABLET ORAL DAILY PRN
Qty: 30 TABLET | Refills: 0 | Status: SHIPPED | OUTPATIENT
Start: 2023-08-22

## 2023-08-22 RX ORDER — OMEPRAZOLE 40 MG/1
40 CAPSULE, DELAYED RELEASE ORAL DAILY
Qty: 90 CAPSULE | Refills: 1 | Status: SHIPPED | OUTPATIENT
Start: 2023-08-22

## 2023-08-25 DIAGNOSIS — G43.909 MIGRAINE WITHOUT STATUS MIGRAINOSUS, NOT INTRACTABLE, UNSPECIFIED MIGRAINE TYPE: ICD-10-CM

## 2023-08-25 RX ORDER — RIZATRIPTAN BENZOATE 5 MG/1
5 TABLET ORAL ONCE AS NEEDED
Qty: 9 TABLET | Refills: 0 | Status: SHIPPED | OUTPATIENT
Start: 2023-08-25

## 2023-08-28 ENCOUNTER — RA CDI HCC (OUTPATIENT)
Dept: OTHER | Facility: HOSPITAL | Age: 43
End: 2023-08-28

## 2023-08-28 NOTE — PROGRESS NOTES
[address] [facility] coding opportunities       Chart reviewed, no opportunity found: CHART REVIEWED, NO OPPORTUNITY FOUND        Patients Insurance        Commercial Insurance: Ammon Stuart

## 2023-09-19 NOTE — TELEPHONE ENCOUNTER
Assessment and Plan:     Problem List Items Addressed This Visit        Digestive    Gastroesophageal reflux disease without esophagitis       Endocrine    Acquired hypothyroidism    Relevant Orders    TSH, 3rd generation with Free T4 reflex       Nervous and Auditory    Parkinson's disease ([address]) - Primary    Relevant Orders    CBC and differential    Comprehensive metabolic panel    Dementia without behavioral disturbance (HCC)    Relevant Orders    CBC and differential    Comprehensive metabolic panel       Musculoskeletal and Integument    Osteopenia of multiple sites       Genitourinary    Frequent UTI       Other    Recurrent major depressive disorder, in partial remission ([address])    Auditory hallucination        Preventive health issues were discussed with patient, and age appropriate screening tests were ordered as noted in patient's After Visit Summary. Personalized health advice and appropriate referrals for health education or preventive services given if needed, as noted in patient's After Visit Summary. History of Present Illness:     Patient presents for a Medicare Wellness Visit    F/u MMP and review labs, accompanied by Petar and her dgtr    No complaints  PD-taking rx as directed, f/b Dr. Rell Williamson. Hypothyroid-taking rx as directed  Dementia-progressive, no aggressive behaviors, still w/ visual and auditory hallucinations that aren't terribly troubling. Depression stable on present regimen  GERD-stable w/ pantoprazole  Osteoporosis-taking prolia biy.      Patient Care Team:  Woodrow Munoz MD as PCP - General (Internal Medicine)  Aurelia Khan MD as PCP [facility]  Aurelia Khan MD as PCP - [facility] ([facility])  MD Austin Gonzales PA-C as Physician Assistant (Gastroenterology)  Mine Cordero MD as Endoscopist     Review of Systems:     Review of Systems   Constitutional: Negative for appetite change, chills, diaphoresis, fatigue, fever and Pt  did not go to work today  Needs note extended  Will go back tomorrow  Will get note out of mychart  unexpected weight change. HENT: Negative for congestion, hearing loss and rhinorrhea. Eyes: Negative for visual disturbance. Respiratory: Negative for cough, chest tightness, shortness of breath and wheezing. Cardiovascular: Negative for chest pain, palpitations and leg swelling. Gastrointestinal: Negative for abdominal pain and blood in stool. Endocrine: Negative for cold intolerance, heat intolerance, polydipsia and polyuria. Genitourinary: Negative for difficulty urinating, dysuria, frequency and urgency. Musculoskeletal: Negative for arthralgias and myalgias. Skin: Negative for rash. Neurological: Negative for dizziness, weakness, light-headedness and headaches. Hematological: Does not bruise/bleed easily. Psychiatric/Behavioral: Positive for confusion, decreased concentration and hallucinations. Negative for dysphoric mood and sleep disturbance.         Problem List:     Patient Active Problem List   Diagnosis   • Parkinson's disease ([address] [facility])   • Scoliosis   • Low back pain   • Acquired hypothyroidism   • Elevated antinuclear antibody (RAS) level   • Family hx-breast malignancy   • Mixed hyperlipidemia   • Rosacea   • Trochanteric bursitis   • Urinary incontinence   • Vitamin D deficiency   • Flank pain   • Irritable bowel syndrome with both constipation and diarrhea   • REM behavioral disorder   • Anxiety   • Recurrent major depressive disorder, in partial remission ([facility])   • Overweight   • Osteopenia of multiple sites   • Frequent UTI   • Hallucination, visual   • Dementia without behavioral disturbance ([facility])   • Heart failure, unspecified HF chronicity, unspecified heart failure type ([address] [facility])   • Senile purpura ([facility])   • Abnormal stress test   • Hiatal hernia   • Gastroesophageal reflux disease without esophagitis   • Gallbladder sludge   • Other constipation   • Auditory hallucination      Past Medical and Surgical History:     Past Medical History:   Diagnosis Date   • Anxiety    • BRCA1 negative 1980   • BRCA2 negative 1980   • Disease of thyroid gland    • Generalized anxiety disorder 4/23/2015   • Hepatic disease    • Infectious viral hepatitis    • Liver disease    • Osteoporosis    • Overweight (BMI 25.0-29.9) 2/12/2020   • Parkinson's disease ([address])    • Patient refuses to disclose advance directive information 5/23/2018    Overview:  Yes, Patient instructed to provide copy of advance directive for provider to review and to be scanned into Electronic Medical Record  Overview:  Yes, Patient instructed to provide copy of advance directive for provider to review and to be scanned into Electronic Medical Record   • Rosacea    • Senile osteoporosis 5/27/2003   • Shortness of breath      Past Surgical History:   Procedure Laterality Date   • FOOT SURGERY Bilateral    • HYSTERECTOMY     • OH ESOPHAGOGASTRODUODENOSCOPY TRANSORAL DIAGNOSTIC N/A 9/5/2018    Procedure: EGD AND COLONOSCOPY;  Surgeon: Missy Newberry MD;  Location: [facility];   Service: Gastroenterology   • URETHRA SURGERY      laparoscopic urethral suspension for stress incontinence      Family History:     Family History   Problem Relation Age of Onset   • Lung cancer Mother    • Dementia Mother    • Lung cancer Father    • Cancer Father    • Esophageal cancer Brother    • Cancer Brother    • Cavazos's esophagus Brother    • Esophageal cancer Family    • Heart disease Family    • Stroke Family         syndrome   • No Known Problems Daughter    • No Known Problems Maternal Grandmother    • Breast cancer Paternal Grandmother 36   • No Known Problems Paternal Aunt    • Breast cancer Other 39   • BRCA1 Positive Other 39   • BRCA2 Positive Other 39      Social History:     Social History     Socioeconomic History   • Marital status: Registered Domestic Partner     Spouse name: None   • Number of children: None   • Years of education: Master's Degree   • Highest education level: None   Occupational History     Comment: Retired   Tobacco Use   • Smoking status: Never   • Smokeless tobacco: Never   Vaping Use   • Vaping Use: Never used   Substance and Sexual Activity   • Alcohol use: Not Currently     Alcohol/week: 1.0 standard drink of alcohol     Types: 1 Glasses of wine per week     Comment: Alcohol use per [employer]   • Drug use: No   • Sexual activity: Never   Other Topics Concern   • None   Social History Narrative    Denied:  History of caffeine use     Social Determinants of Health     Financial Resource Strain: Low Risk  (9/19/2023)    Overall Financial Resource Strain (CARDIA)    • Difficulty of Paying Living Expenses: Not very hard   Food Insecurity: Not on file   Transportation Needs: No Transportation Needs (9/19/2023)    PRAPARE - Transportation    • Lack of Transportation (Medical): No    • Lack of Transportation (Non-Medical): No   Physical Activity: Unknown (9/21/2022)    Exercise Vital Sign    • Days of Exercise per Week: 0 days    • Minutes of Exercise per Session: Not on file   Stress: No Stress Concern Present (3/16/2021)    [facility]    • Feeling of Stress :  Only a little   Social Connections: Not on file   Intimate Partner Violence: Not on file   Housing Stability: Not on file      Medications and Allergies:     Current Outpatient Medications   Medication Sig Dispense Refill   • acetaminophen (TYLENOL) 650 mg CR tablet Take 650mg tid x 5 days then tid prn for pain 30 tablet 0   • artificial tear (LUBRIFRESH P.M.) 83-15 % ophthalmic ointment daily at bedtime     • Ascorbic Acid (vitamin C) 100 MG tablet Take by mouth     • aspirin 81 mg chewable tablet Chew 1 tablet (81 mg total) daily     • buPROPion (Wellbutrin XL) 150 mg 24 hr tablet Take 1 tablet (150 mg total) by mouth every morning 90 tablet 3   • carbidopa-levodopa (SINEMET)  mg per tablet 2 TABS ORALLY 4 TIMES DAILY DX: PARKINSON'S DISEASE 224 tablet 4   • escitalopram (LEXAPRO) 20 mg tablet Take 20 mg by mouth daily       • levothyroxine 50 mcg tablet Take 1 tablet (50 mcg total) by mouth daily 90 tablet 0   • loratadine (CLARITIN) 10 mg tablet 10 mg daily x 7 days, then use 10 mg qd prn congestion 30 tablet 7   • memantine (NAMENDA) 10 mg tablet 1 TAB ORALLY TWICE DAILY DX: DEMENTIA 56 tablet 4   • methenamine hippurate (HIPREX) 1 g tablet Take 1 tablet (1 g total) by mouth 2 (two) times a day with meals 60 tablet 5   • Multiple Vitamin (DAILY CHIDI PO) Take by mouth     • Nutritional Supplements (ENSURE PO) Take by mouth 3 (three) times a day     • pantoprazole (PROTONIX) 40 mg tablet Take 1 tablet (40 mg total) by mouth daily before breakfast 30 tablet 5   • polyethylene glycol (MIRALAX) 17 g packet Take 17 g by mouth daily as needed (for constipation) 510 g 0   • zinc oxide 20 % ointment Apply topically as needed     • Cholecalciferol (Vitamin D) 50 MCG (2000 UT) CAPS Take by mouth daily     • Cranberry 1000 MG CAPS Take 4,200 mg by mouth as needed     • Promethazine-DM (PHENERGAN-DM) 6.25-15 mg/5 mL oral syrup Take 5 mL by mouth 4 (four) times a day as needed for cough (Patient not taking: Reported on 6/28/2023) 120 mL 0     Current Facility-Administered Medications   Medication Dose Route Frequency Provider Last Rate Last Admin   • denosumab (PROLIA) subcutaneous injection 60 mg  60 mg Subcutaneous Q6 Months Nicolasa Vincent MD   60 mg at 05/16/23 1021     Allergies   Allergen Reactions   • Statins Other (See Comments)     jaundice   • Naproxen Hives   • Simvastatin Other (See Comments)     Jaundice   • Dust Mite Extract Other (See Comments)     Per pt states does not know the type of reaction   • Pollen Extract Sneezing, Nasal Congestion, Cough and Itching      Immunizations:     Immunization History   Administered Date(s) Administered   • COVID-19 MODERNA VACC 0.5 ML IM 01/26/2021, 03/04/2021, 11/01/2021, 06/23/2022   • H1N1 Inj 12/06/2009   • H1N1, All Formulations 12/14/2009, 12/14/2009   • Hep A, adult 06/06/1996, 05/22/1997   • INFLUENZA 12/04/2006, 11/07/2007, 10/03/2008, 02/12/2010, 09/15/2010, 09/21/2011, 10/25/2012, 11/01/2013, 10/15/2014, 10/05/2015, 10/02/2016, 11/13/2017, 09/18/2018   • Influenza Quadrivalent 3 years and older 10/05/2015   • Influenza Quadrivalent 6 mos and older IM 09/18/2018   • Influenza, high dose seasonal 0.7 mL 09/22/2020   • Influenza, seasonal, injectable 12/04/2006, 11/07/2007, 10/03/2008, 02/12/2010, 09/15/2010, 09/21/2011, 10/25/2012, 11/01/2013, 10/15/2014   • Pneumococcal Conjugate 13-Valent 05/03/2016   • Pneumococcal Polysaccharide PPV23 03/17/2011   • Td (adult), adsorbed 06/10/2002   • Tdap 10/25/2012   • Zoster 09/12/2012   • influenza, trivalent, adjuvanted 10/14/2019      Health Maintenance:         Topic Date Due   • DXA SCAN  09/16/2024   • Hepatitis C Screening  Completed   • Colorectal Cancer Screening  Discontinued         Topic Date Due   • COVID-19 Vaccine (5 - Moderna series) 08/18/2022   • Influenza Vaccine (1) 09/01/2023      Medicare Screening Tests and Risk Assessments:     Radha Tripathi is here for her Subsequent Wellness visit. Health Risk Assessment:   Patient rates overall health as fair. Patient feels that their physical health rating is same. Patient is satisfied with their life. Eyesight was rated as same. Hearing was rated as same. Patient feels that their emotional and mental health rating is slightly better. Patients states they are never, rarely angry. Patient states they are sometimes unusually tired/fatigued. Pain experienced in the last 7 days has been none. Patient states that she has experienced no weight loss or gain in last 6 months. Fall Risk Screening: In the past year, patient has experienced: no history of falling in past year      Urinary Incontinence Screening:   Patient has leaked urine accidently in the last six months. Home Safety:  Patient does not have trouble with stairs inside or outside of their home.  Patient has working smoke alarms and has working carbon monoxide detector. Home safety hazards include: none. Nutrition:   Current diet is Regular. Medications:   Patient is currently taking over-the-counter supplements. OTC medications include: see medication list. Patient is not able to manage medications. Activities of Daily Living (ADLs)/Instrumental Activities of Daily Living (IADLs):   Walk and transfer into and out of bed and chair?: No  Dress and groom yourself?: No    Bathe or shower yourself?: No    Feed yourself? No  Do your laundry/housekeeping?: No  Manage your money, pay your bills and track your expenses?: No  Make your own meals?: No    Do your own shopping?: No    Previous Hospitalizations:   Any hospitalizations or ED visits within the last 12 months?: No      Advance Care Planning:     Advanced directive counseling given: Yes      PREVENTIVE SCREENINGS      Cardiovascular Screening:    General: Screening Not Indicated and History Lipid Disorder      Diabetes Screening:     General: Screening Current      Cervical Cancer Screening:    General: Screening Not Indicated      Osteoporosis Screening:    General: Screening Not Indicated and History Osteoporosis      Lung Cancer Screening:     General: Screening Not Indicated      Hepatitis C Screening:    General: Screening Current    Screening, Brief Intervention, and Referral to Treatment (SBIRT)    Screening  Typical number of drinks in a day: 0  Typical number of drinks in a week: 0  Interpretation: Low risk drinking behavior. Single Item Drug Screening:  How often have you used an illegal drug (including marijuana) or a prescription medication for non-medical reasons in the past year? never    Single Item Drug Screen Score: 0  Interpretation: Negative screen for possible drug use disorder    No results found.      Physical Exam:     /62 (BP Location: Left arm, Patient Position: Sitting, Cuff Size: Standard)   Pulse 88   Temp 98 °F (36.7 °C) (Tympanic)   Ht 5' 3" (1.6 m)   Wt 59.2 kg (130 lb 9.6 oz)   SpO2 95%   BMI 23.13 kg/m²     Physical Exam  Vitals and nursing note reviewed. Constitutional:       General: She is not in acute distress. Appearance: She is well-developed. HENT:      Head: Normocephalic and atraumatic. Eyes:      Conjunctiva/sclera: Conjunctivae normal.   Cardiovascular:      Rate and Rhythm: Normal rate and regular rhythm. Heart sounds: No murmur heard. Pulmonary:      Effort: Pulmonary effort is normal. No respiratory distress. Breath sounds: Normal breath sounds. Abdominal:      Palpations: Abdomen is soft. Tenderness: There is no abdominal tenderness. Musculoskeletal:         General: No swelling. Cervical back: Neck supple. Skin:     General: Skin is warm and dry. Capillary Refill: Capillary refill takes less than 2 seconds. Neurological:      Mental Status: She is alert.    Psychiatric:         Mood and Affect: Mood normal.          Emely Orozco MD

## 2023-09-22 ENCOUNTER — ANNUAL EXAM (OUTPATIENT)
Dept: OBGYN CLINIC | Facility: CLINIC | Age: 43
End: 2023-09-22
Payer: COMMERCIAL

## 2023-09-22 VITALS
HEIGHT: 64 IN | BODY MASS INDEX: 28.51 KG/M2 | WEIGHT: 167 LBS | DIASTOLIC BLOOD PRESSURE: 64 MMHG | SYSTOLIC BLOOD PRESSURE: 118 MMHG

## 2023-09-22 DIAGNOSIS — Z11.51 SCREENING FOR HPV (HUMAN PAPILLOMAVIRUS): ICD-10-CM

## 2023-09-22 DIAGNOSIS — Z12.31 ENCOUNTER FOR SCREENING MAMMOGRAM FOR MALIGNANT NEOPLASM OF BREAST: ICD-10-CM

## 2023-09-22 DIAGNOSIS — Z01.419 WELL WOMAN EXAM WITH ROUTINE GYNECOLOGICAL EXAM: Primary | ICD-10-CM

## 2023-09-22 DIAGNOSIS — Z12.4 ENCOUNTER FOR SCREENING FOR MALIGNANT NEOPLASM OF CERVIX: ICD-10-CM

## 2023-09-22 PROCEDURE — S0610 ANNUAL GYNECOLOGICAL EXAMINA: HCPCS | Performed by: OBSTETRICS & GYNECOLOGY

## 2023-09-22 PROCEDURE — G0476 HPV COMBO ASSAY CA SCREEN: HCPCS | Performed by: OBSTETRICS & GYNECOLOGY

## 2023-09-22 PROCEDURE — G0145 SCR C/V CYTO,THINLAYER,RESCR: HCPCS | Performed by: OBSTETRICS & GYNECOLOGY

## 2023-09-22 NOTE — PROGRESS NOTES
ASSESSMENT & PLAN:   Diagnoses and all orders for this visit:    Well woman exam with routine gynecological exam  -     Liquid-based pap, screening    Encounter for screening for malignant neoplasm of cervix  -     Liquid-based pap, screening    Screening for HPV (human papillomavirus)  -     Liquid-based pap, screening    Encounter for screening mammogram for malignant neoplasm of breast  -     Mammo screening bilateral w 3d & cad; Future          The following were reviewed in today's visit: ASCCP guidelines, Gardisil vaccination, STD testing breast self exam, mammography screening ordered, family planning choices and exercise. Patient to return to office in yearly for annual exam.     All questions have been answered to her satisfaction. CC:  Annual Gynecologic Examination  Chief Complaint   Patient presents with   • Gynecologic Exam     Pt is here for an annual exam and pap smear. 6/15/2020 NILM, neg HPV  2016 NILM, neg HPV  mammo 6/2/23       HPI: Xoimy Navas is a 38 y.o. [place] Barnacle who presents for annual gynecologic examination. She has the following concerns:        Health Maintenance:    Exercise: frequently  Breast exams/breast awareness: yes  Last mammogram: 6/2023; had biopsy - benign. Due for mammo 6/2024    Past Medical History:   Diagnosis Date   • Allergic    • Anxiety    • Depression    • Fibroid    • GERD (gastroesophageal reflux disease)    • Headache(784.0)    • History of pulmonary embolus (PE)    • Iliotibial band syndrome     last assessed - [id number]   • Mitral valve prolapse    • Obesity    • Pneumonia     2018   • PONV (postoperative nausea and vomiting)     Patient requests to be premedicated for N/V   • Wears glasses     for reading       Past Surgical History:   Procedure Laterality Date   • CHOLECYSTECTOMY     • ESOPHAGOGASTRODUODENOSCOPY N/A 5/31/2018    Procedure: ESOPHAGOGASTRODUODENOSCOPY (EGD); Surgeon: Cecilia Forrester MD;  Location: [facility];   Service: Gastroenterology   • GALLBLADDER SURGERY     • KNEE SURGERY Left     growth plate fx left knee   • KNEE SURGERY Right    • CO HYSTEROSCOPY BX ENDOMETRIUM&/POLYPC W/WO D&C N/A 8/3/2020    Procedure: DILATATION AND CURETTAGE (D&C) WITH HYSTEROSCOPY (MYOSURE); Surgeon: Sintia Roche DO;  [facility];  Service: Gynecology   • CO HYSTEROSCOPY BX ENDOMETRIUM&/POLYPC W/WO D&C N/A 8/3/2020    Procedure: HYSTEROSCOPY WITH  RESECTION UTERINE TUMOR/FIBROID;  Surgeon: Sintia Roche DO;  [facility];  Service: Gynecology   • CO HYSTEROSCOPY ENDOMETRIAL ABLATION N/A 8/3/2020    Procedure: ABLATION ENDOMETRIAL Rochele Florin;  Surgeon: Sintia Roche DO;  [facility];  Service: Gynecology   • CO OPEN TREATMENT METATARSAL FRACTURE EACH Left 4/10/2019    Procedure: OPEN REDUCTION W/ INTERNAL FIXATION (ORIF) FOOT, Left 5th metatarsal fracture open reduction internal fixation with calcaneal bone graft and bone marrow aspirate concentrate;  Surgeon: Ana Laura Kent MD;  [facility];  Service: Orthopedics   • TUBAL LIGATION     • UPPER GASTROINTESTINAL ENDOSCOPY     • US GUIDED BREAST BIOPSY LEFT COMPLETE Left 8/7/2023   • WISDOM TOOTH EXTRACTION         Past OB/Gyn History:   Patient's last menstrual period was 09/19/2023. Last Pap: 2020 : no abnormalities  History of abnormal Pap smear: Yes   HPV vaccine completed: no    Patient is currently sexually active.    STD testing: no  Current contraception: tubal ligation      Family History  Family History   Adopted: Yes   Problem Relation Age of Onset   • No Known Problems Mother    • Alcohol abuse Neg Hx    • Substance Abuse Neg Hx    • Mental illness Neg Hx    • Depression Neg Hx        Family history of uterine or ovarian cancer: no  Family history of breast cancer: no  Family history of colon cancer: no    Social History:  Social History     Socioeconomic History   • Marital status: /Civil Union     Spouse name: Not on file   • Number of children: 1   • Years of education: Not on file   • Highest education level: Not on file   Occupational History   • Occupation: working [employer]     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, cardiology office         Social Determinants of Health     Financial Resource Strain: Not on file   Food Insecurity: Not on file   Transportation Needs: Not on file   Physical Activity: Not on file   Stress: Not on file   Social Connections: Not on file   Intimate Partner Violence: Not on file   Housing Stability: Not on file     Domestic violence screen: negative    Allergies:   Allergies   Allergen Reactions   • Penicillin G Hives       Medications:    Current Outpatient Medications:   •  acetaminophen (TYLENOL) 325 mg tablet, Take 2 tablets (650 mg total) by mouth every 6 (six) hours as needed for mild pain, Disp: 30 tablet, Rfl: 0  •  ALPRAZolam (XANAX) 0.25 mg tablet, Take 1 tablet (0.25 mg total) by mouth daily as needed for anxiety, Disp: 30 tablet, Rfl: 0  •  busPIRone (BUSPAR) 10 mg tablet, Take 1 tablet (10 mg total) by mouth 3 (three) times a day, Disp: 90 tablet, Rfl: 1  •  Cetirizine HCl 10 MG CAPS, Take 10 mg by mouth., Disp: , Rfl:   •  ergocalciferol (VITAMIN D2) 50,000 units, Take 1 capsule (50,000 Units total) by mouth once a week, Disp: 12 capsule, Rfl: 0  •  fluticasone (FLONASE) 50 mcg/act nasal spray, 1 spray into each nostril daily, Disp: 16 g, Rfl: 1  •  magnesium Oxide (MAG-OX) 400 mg TABS, Take 1 tablet (400 mg total) by mouth 2 (two) times a day, Disp: 120 tablet, Rfl: 4  •  omeprazole (PriLOSEC) 40 MG capsule, Take 1 capsule (40 mg total) by mouth daily, Disp: 90 capsule, Rfl: 1  •  ondansetron (ZOFRAN-ODT) 4 mg disintegrating tablet, Take 1 tablet (4 mg total) by mouth every 6 (six) hours as needed for nausea or vomiting, Disp: 60 tablet, Rfl: 0  •  rizatriptan (Maxalt) 5 mg tablet, Take 1 tablet (5 mg total) by mouth once as needed for migraine for up to 1 dose May repeat in 2 hours if needed, Disp: 9 tablet, Rfl: 0  •  sertraline (ZOLOFT) 100 mg tablet, Take 1.5 tablets (150 mg total) by mouth daily, Disp: 135 tablet, Rfl: 1  •  topiramate (TOPAMAX) 50 MG tablet, Take 1 tab in AM, 2 tabs in PM, Disp: 270 tablet, Rfl: 0  •  zolpidem (AMBIEN) 10 mg tablet, Take 0.5-1 tablets (5-10 mg total) by mouth daily at bedtime as needed for sleep, Disp: 30 tablet, Rfl: 0  •  diclofenac sodium (VOLTAREN) 1 %, Apply 2 g topically 4 (four) times a day for 5 days, Disp: 100 g, Rfl: 0  •  dicyclomine (BENTYL) 20 mg tablet, Take 1 tablet (20 mg total) by mouth 4 (four) times a day (before meals and at bedtime) (Patient not taking: Reported on 9/22/2023), Disp: 120 tablet, Rfl: 5  •  prochlorperazine (COMPAZINE) 5 mg tablet, , Disp: , Rfl:     Review of Systems:  Review of Systems   Constitutional: Negative for chills and fever. Respiratory: Negative for shortness of breath. Cardiovascular: Negative for chest pain. Gastrointestinal: Positive for nausea. Negative for blood in stool and vomiting. Having stomach issues, seeing GI       Genitourinary: Positive for menstrual problem (spotting irregular s/p ablation). Negative for difficulty urinating, dyspareunia, dysuria, frequency, pelvic pain, urgency, vaginal bleeding, vaginal discharge and vaginal pain.    Neurological: Positive for headaches (migraines). Physical Exam:  /64   Ht 5' 4" (1.626 m)   Wt 75.8 kg (167 lb)   LMP 09/19/2023   BMI 28.67 kg/m²    Physical Exam  Constitutional:       General: She is awake. Appearance: Normal appearance. She is well-developed. Genitourinary:      Vulva, bladder and urethral meatus normal.      Right Labia: No rash, tenderness or lesions. Left Labia: No tenderness, lesions or rash. No labial fusion noted. No vaginal discharge, erythema, tenderness or bleeding. No vaginal prolapse present. No vaginal atrophy present. Right Adnexa: not tender, not full and no mass present. Left Adnexa: not tender, not full and no mass present. Cervix is parous. No cervical motion tenderness, discharge, lesion or polyp. Uterus is not enlarged, tender or irregular. No uterine mass detected. Uterus is anteverted. No urethral prolapse present. Bladder is not tender. Pelvic exam was performed with patient in the lithotomy position. Breasts:     Right: No inverted nipple, mass, nipple discharge, skin change or tenderness. Left: No inverted nipple, mass, nipple discharge, skin change or tenderness. HENT:      Head: Normocephalic and atraumatic. Cardiovascular:      Rate and Rhythm: Normal rate and regular rhythm. Heart sounds: Normal heart sounds. Pulmonary:      Effort: Pulmonary effort is normal. No tachypnea or respiratory distress. Breath sounds: Normal breath sounds. Abdominal:      General: Abdomen is flat. There is no distension. Palpations: Abdomen is soft. Tenderness: There is no abdominal tenderness. There is no guarding or rebound. Musculoskeletal:      Cervical back: Neck supple. Lymphadenopathy:      Upper Body:      Right upper body: No supraclavicular or axillary adenopathy. Left upper body: No supraclavicular or axillary adenopathy. Neurological:      General: No focal deficit present. Mental Status: She is alert and oriented to person, place, and time. Psychiatric:         Mood and Affect: Mood normal.         Behavior: Behavior normal.         Thought Content: Thought content normal.         Judgment: Judgment normal.   Vitals reviewed.

## 2023-09-26 DIAGNOSIS — F41.9 ANXIETY: ICD-10-CM

## 2023-09-26 DIAGNOSIS — G47.09 OTHER INSOMNIA: ICD-10-CM

## 2023-09-26 NOTE — RESULT ENCOUNTER NOTE
Dori Hannah,     Your HPV testing is negative. Your pap is still pending, and will be updated soon. Please contact the office at [phone] with any questions.      Marisel

## 2023-09-27 DIAGNOSIS — Z11.3 SCREENING EXAMINATION FOR STD (SEXUALLY TRANSMITTED DISEASE): Primary | ICD-10-CM

## 2023-09-27 RX ORDER — ALPRAZOLAM 0.25 MG/1
0.25 TABLET ORAL DAILY PRN
Qty: 30 TABLET | Refills: 0 | Status: SHIPPED | OUTPATIENT
Start: 2023-09-27

## 2023-09-27 RX ORDER — ZOLPIDEM TARTRATE 10 MG/1
5-10 TABLET ORAL
Qty: 30 TABLET | Refills: 0 | Status: SHIPPED | OUTPATIENT
Start: 2023-09-27

## 2023-09-28 ENCOUNTER — LAB (OUTPATIENT)
Dept: LAB | Facility: CLINIC | Age: 43
End: 2023-09-28
Payer: COMMERCIAL

## 2023-09-28 DIAGNOSIS — Z11.3 SCREENING EXAMINATION FOR STD (SEXUALLY TRANSMITTED DISEASE): ICD-10-CM

## 2023-09-28 LAB
HBV SURFACE AG SER QL: NORMAL
HCV AB SER QL: NORMAL
TREPONEMA PALLIDUM IGG+IGM AB [PRESENCE] IN SERUM OR PLASMA BY IMMUNOASSAY: NORMAL

## 2023-09-28 PROCEDURE — 86803 HEPATITIS C AB TEST: CPT

## 2023-09-28 PROCEDURE — 87491 CHLMYD TRACH DNA AMP PROBE: CPT

## 2023-09-28 PROCEDURE — 87340 HEPATITIS B SURFACE AG IA: CPT

## 2023-09-28 PROCEDURE — 87591 N.GONORRHOEAE DNA AMP PROB: CPT

## 2023-09-28 PROCEDURE — 36415 COLL VENOUS BLD VENIPUNCTURE: CPT

## 2023-09-28 PROCEDURE — 87389 HIV-1 AG W/HIV-1&-2 AB AG IA: CPT

## 2023-09-28 PROCEDURE — 86780 TREPONEMA PALLIDUM: CPT

## 2023-09-29 LAB
C TRACH DNA SPEC QL NAA+PROBE: NEGATIVE
HIV 1+2 AB+HIV1 P24 AG SERPL QL IA: NON REACTIVE
N GONORRHOEA DNA SPEC QL NAA+PROBE: NEGATIVE

## 2023-10-03 LAB
LAB AP GYN PRIMARY INTERPRETATION: NORMAL
Lab: NORMAL

## 2023-10-24 ENCOUNTER — TELEPHONE (OUTPATIENT)
Dept: GASTROENTEROLOGY | Facility: CLINIC | Age: 43
End: 2023-10-24

## 2023-10-30 ENCOUNTER — OFFICE VISIT (OUTPATIENT)
Dept: INTERNAL MEDICINE CLINIC | Facility: CLINIC | Age: 43
End: 2023-10-30
Payer: COMMERCIAL

## 2023-10-30 ENCOUNTER — APPOINTMENT (OUTPATIENT)
Dept: LAB | Facility: CLINIC | Age: 43
End: 2023-10-30
Payer: COMMERCIAL

## 2023-10-30 VITALS
TEMPERATURE: 98.6 F | SYSTOLIC BLOOD PRESSURE: 110 MMHG | HEIGHT: 64 IN | DIASTOLIC BLOOD PRESSURE: 70 MMHG | WEIGHT: 165 LBS | OXYGEN SATURATION: 99 % | HEART RATE: 82 BPM | BODY MASS INDEX: 28.17 KG/M2

## 2023-10-30 DIAGNOSIS — E78.2 MIXED HYPERLIPIDEMIA: ICD-10-CM

## 2023-10-30 DIAGNOSIS — F41.1 GENERALIZED ANXIETY DISORDER: Primary | ICD-10-CM

## 2023-10-30 DIAGNOSIS — K31.84 GASTROPARESIS: ICD-10-CM

## 2023-10-30 DIAGNOSIS — E53.8 VITAMIN B12 DEFICIENCY: ICD-10-CM

## 2023-10-30 DIAGNOSIS — R73.01 ABNORMAL FASTING GLUCOSE: ICD-10-CM

## 2023-10-30 DIAGNOSIS — E55.9 VITAMIN D DEFICIENCY: ICD-10-CM

## 2023-10-30 DIAGNOSIS — G43.909 MIGRAINE WITHOUT STATUS MIGRAINOSUS, NOT INTRACTABLE, UNSPECIFIED MIGRAINE TYPE: ICD-10-CM

## 2023-10-30 DIAGNOSIS — Z00.00 HEALTH MAINTENANCE EXAMINATION: ICD-10-CM

## 2023-10-30 DIAGNOSIS — Z23 ENCOUNTER FOR IMMUNIZATION: ICD-10-CM

## 2023-10-30 DIAGNOSIS — R11.2 NAUSEA AND VOMITING, UNSPECIFIED VOMITING TYPE: ICD-10-CM

## 2023-10-30 PROBLEM — E66.3 OVERWEIGHT (BMI 25.0-29.9): Status: ACTIVE | Noted: 2021-05-28

## 2023-10-30 LAB
25(OH)D3 SERPL-MCNC: 24 NG/ML (ref 30–100)
ALBUMIN SERPL BCP-MCNC: 4.2 G/DL (ref 3.5–5)
ALP SERPL-CCNC: 42 U/L (ref 34–104)
ALT SERPL W P-5'-P-CCNC: 10 U/L (ref 7–52)
ANION GAP SERPL CALCULATED.3IONS-SCNC: 6 MMOL/L
AST SERPL W P-5'-P-CCNC: 10 U/L (ref 13–39)
BASOPHILS # BLD AUTO: 0.06 THOUSANDS/ÂΜL (ref 0–0.1)
BASOPHILS NFR BLD AUTO: 1 % (ref 0–1)
BILIRUB SERPL-MCNC: 0.47 MG/DL (ref 0.2–1)
BUN SERPL-MCNC: 10 MG/DL (ref 5–25)
CALCIUM SERPL-MCNC: 9 MG/DL (ref 8.4–10.2)
CHLORIDE SERPL-SCNC: 112 MMOL/L (ref 96–108)
CHOLEST SERPL-MCNC: 207 MG/DL
CO2 SERPL-SCNC: 23 MMOL/L (ref 21–32)
CREAT SERPL-MCNC: 0.94 MG/DL (ref 0.6–1.3)
EOSINOPHIL # BLD AUTO: 0.23 THOUSAND/ÂΜL (ref 0–0.61)
EOSINOPHIL NFR BLD AUTO: 4 % (ref 0–6)
ERYTHROCYTE [DISTWIDTH] IN BLOOD BY AUTOMATED COUNT: 13.1 % (ref 11.6–15.1)
EST. AVERAGE GLUCOSE BLD GHB EST-MCNC: 91 MG/DL
GFR SERPL CREATININE-BSD FRML MDRD: 76 ML/MIN/1.73SQ M
GLUCOSE P FAST SERPL-MCNC: 96 MG/DL (ref 65–99)
HBA1C MFR BLD: 4.8 %
HCT VFR BLD AUTO: 41.5 % (ref 34.8–46.1)
HDLC SERPL-MCNC: 54 MG/DL
HGB BLD-MCNC: 13.5 G/DL (ref 11.5–15.4)
IMM GRANULOCYTES # BLD AUTO: 0.03 THOUSAND/UL (ref 0–0.2)
IMM GRANULOCYTES NFR BLD AUTO: 1 % (ref 0–2)
LDLC SERPL CALC-MCNC: 115 MG/DL (ref 0–100)
LYMPHOCYTES # BLD AUTO: 1.88 THOUSANDS/ÂΜL (ref 0.6–4.47)
LYMPHOCYTES NFR BLD AUTO: 36 % (ref 14–44)
MCH RBC QN AUTO: 30.6 PG (ref 26.8–34.3)
MCHC RBC AUTO-ENTMCNC: 32.5 G/DL (ref 31.4–37.4)
MCV RBC AUTO: 94 FL (ref 82–98)
MONOCYTES # BLD AUTO: 0.38 THOUSAND/ÂΜL (ref 0.17–1.22)
MONOCYTES NFR BLD AUTO: 7 % (ref 4–12)
NEUTROPHILS # BLD AUTO: 2.68 THOUSANDS/ÂΜL (ref 1.85–7.62)
NEUTS SEG NFR BLD AUTO: 51 % (ref 43–75)
NONHDLC SERPL-MCNC: 153 MG/DL
NRBC BLD AUTO-RTO: 0 /100 WBCS
PLATELET # BLD AUTO: 197 THOUSANDS/UL (ref 149–390)
PMV BLD AUTO: 9.7 FL (ref 8.9–12.7)
POTASSIUM SERPL-SCNC: 3.7 MMOL/L (ref 3.5–5.3)
PROT SERPL-MCNC: 7 G/DL (ref 6.4–8.4)
RBC # BLD AUTO: 4.41 MILLION/UL (ref 3.81–5.12)
SODIUM SERPL-SCNC: 141 MMOL/L (ref 135–147)
TRIGL SERPL-MCNC: 191 MG/DL
TSH SERPL DL<=0.05 MIU/L-ACNC: 1.68 UIU/ML (ref 0.45–4.5)
VIT B12 SERPL-MCNC: 617 PG/ML (ref 180–914)
WBC # BLD AUTO: 5.26 THOUSAND/UL (ref 4.31–10.16)

## 2023-10-30 PROCEDURE — 80061 LIPID PANEL: CPT | Performed by: INTERNAL MEDICINE

## 2023-10-30 PROCEDURE — 82607 VITAMIN B-12: CPT | Performed by: INTERNAL MEDICINE

## 2023-10-30 PROCEDURE — 90686 IIV4 VACC NO PRSV 0.5 ML IM: CPT

## 2023-10-30 PROCEDURE — 84443 ASSAY THYROID STIM HORMONE: CPT | Performed by: INTERNAL MEDICINE

## 2023-10-30 PROCEDURE — 99396 PREV VISIT EST AGE 40-64: CPT | Performed by: INTERNAL MEDICINE

## 2023-10-30 PROCEDURE — 85025 COMPLETE CBC W/AUTO DIFF WBC: CPT | Performed by: INTERNAL MEDICINE

## 2023-10-30 PROCEDURE — 90471 IMMUNIZATION ADMIN: CPT

## 2023-10-30 PROCEDURE — 83036 HEMOGLOBIN GLYCOSYLATED A1C: CPT | Performed by: INTERNAL MEDICINE

## 2023-10-30 PROCEDURE — 36415 COLL VENOUS BLD VENIPUNCTURE: CPT | Performed by: INTERNAL MEDICINE

## 2023-10-30 PROCEDURE — 99214 OFFICE O/P EST MOD 30 MIN: CPT | Performed by: INTERNAL MEDICINE

## 2023-10-30 PROCEDURE — 82306 VITAMIN D 25 HYDROXY: CPT | Performed by: INTERNAL MEDICINE

## 2023-10-30 PROCEDURE — 80053 COMPREHEN METABOLIC PANEL: CPT | Performed by: INTERNAL MEDICINE

## 2023-10-30 RX ORDER — ACETAMINOPHEN 160 MG
2000 TABLET,DISINTEGRATING ORAL DAILY
Qty: 90 CAPSULE | Refills: 1 | Status: SHIPPED | OUTPATIENT
Start: 2023-10-30

## 2023-10-30 RX ORDER — VENLAFAXINE HYDROCHLORIDE 37.5 MG/1
37.5 CAPSULE, EXTENDED RELEASE ORAL
Qty: 90 CAPSULE | Refills: 0 | Status: SHIPPED | OUTPATIENT
Start: 2023-10-30

## 2023-10-30 NOTE — PROGRESS NOTES
Assessment/Plan:    Gastroparesis  No change, s/p Botox. Encouraged complete marijuana cessation. Eating mostly chicken and rice. GERD (gastroesophageal reflux disease)  On daily PPI. Generalized anxiety disorder  Change sertraline to venlafaxine. Weaning instructions given. Continue buspirone tid, taking alprazolam daily. PDMP reviewed. Looking for a therapist.    Migraine headache  Not controlled. Add B2, taking Mg. Venlafaxine may help. Already on topiramate 200 mg daily. Insomnia  Has zolpidem to take prn. Overweight (BMI 25.0-29. 9)  Weight loss of 10 lbs since last visit. Poor appetite, topiramate may be contributing. Vitamin B12 deficiency  Taking B12. Vitamin D deficiency  Completed D2, did not start D3. Nausea and vomiting  As above. Hyperlipidemia  Lipids remain elevated. Diagnoses and all orders for this visit:    Generalized anxiety disorder  -     venlafaxine (EFFEXOR-XR) 37.5 mg 24 hr capsule; Take 1 capsule (37.5 mg total) by mouth daily with breakfast    Migraine without status migrainosus, not intractable, unspecified migraine type    Gastroparesis    Mixed hyperlipidemia  -     TSH, 3rd generation with Free T4 reflex  -     Lipid panel  -     Comprehensive metabolic panel    Vitamin B12 deficiency  -     Vitamin B12  -     CBC and differential    Vitamin D deficiency  -     Vitamin D 25 hydroxy  -     Cholecalciferol (Vitamin D3) 50 MCG (2000 UT) capsule; Take 1 capsule (2,000 Units total) by mouth daily    Abnormal fasting glucose  -     Hemoglobin A1C    Encounter for immunization  -     influenza vaccine, quadrivalent, 0.5 mL, preservative-free, for adult and pediatric patients 6 mos+ (AFLURIA, FLUARIX, FLULAVAL, FLUZONE)    Health maintenance examination  Comments:  Flu vaccine today. Mammogram updated, reviewed N biopsy. Nausea and vomiting, unspecified vomiting type      Follow up in 4 months or as needed.       Subjective:      Patient ID: Mel Grady Rashida Zhu is a 38 y.o. female here for a follow up. She has not been doing too well. She has been very stressed the past year, worse in the past week. She broke up with her best friend a week ago, upset about it. She has been looking for a therapist but either does not accept her insurance or long wait list.    She continues to experience nausea and vomiting 4 to 5 days a week. She mostly eats chicken and rice. She continues to lose weight. She has not been using medical marijuana as often. She does find that it helps with the nausea. She reports migraine headaches slightly worse recently, taking topiramate 200 mg a day. The following portions of the patient's history were reviewed and updated as appropriate: allergies, current medications, past medical history, past social history, and problem list.    Review of Systems   Constitutional:  Positive for appetite change. Negative for fatigue. HENT:  Negative for congestion and postnasal drip. Eyes:  Negative for visual disturbance. Respiratory:  Negative for cough and shortness of breath. Cardiovascular:  Negative for chest pain, palpitations and leg swelling. Gastrointestinal:  Positive for nausea and vomiting. Negative for abdominal pain, constipation and diarrhea. Genitourinary:  Negative for dysuria, frequency and urgency. Musculoskeletal:  Negative for arthralgias and myalgias. Skin:  Negative for rash and wound. Neurological:  Positive for headaches. Negative for dizziness and numbness. Hematological:  Does not bruise/bleed easily. Psychiatric/Behavioral:  Positive for dysphoric mood. Negative for confusion. The patient is not nervous/anxious. Objective:      /70   Pulse 82   Temp 98.6 °F (37 °C)   Ht 5' 4" (1.626 m)   Wt 74.8 kg (165 lb)   SpO2 99%   BMI 28.32 kg/m²          Physical Exam  Vitals and nursing note reviewed. Constitutional:       General: She is not in acute distress.      Appearance: She is well-developed. HENT:      Head: Normocephalic and atraumatic. Mouth/Throat:      Mouth: Mucous membranes are moist.   Eyes:      Pupils: Pupils are equal, round, and reactive to light. Cardiovascular:      Rate and Rhythm: Normal rate and regular rhythm. Heart sounds: Normal heart sounds. Pulmonary:      Effort: Pulmonary effort is normal.      Breath sounds: Normal breath sounds. No wheezing. Abdominal:      General: Bowel sounds are normal.      Palpations: Abdomen is soft. Musculoskeletal:         General: No swelling. Right lower leg: No edema. Left lower leg: No edema. Skin:     General: Skin is warm. Findings: No rash. Neurological:      General: No focal deficit present. Mental Status: She is alert and oriented to person, place, and time. Psychiatric:         Mood and Affect: Affect normal. Mood is depressed. Mood is not anxious. Speech: Speech normal.         Behavior: Behavior normal.           Labs & imaging results reviewed with patient.

## 2023-10-30 NOTE — PATIENT INSTRUCTIONS
Start magnesium oxide 400 mg twice a day. Start vitamin B2 100 mg twice a day. Start venlafaxine (Effexor). Increase to 75 mg in 2 weeks. Wean off sertraline. Take 100 mg daily for a week, decrease to 50 mg for 2 weeks then stop.

## 2023-10-30 NOTE — ASSESSMENT & PLAN NOTE
Change sertraline to venlafaxine. Weaning instructions given. Continue buspirone tid, taking alprazolam daily. PDMP reviewed.   Looking for a therapist.

## 2023-11-01 DIAGNOSIS — G47.09 OTHER INSOMNIA: ICD-10-CM

## 2023-11-01 DIAGNOSIS — F41.9 ANXIETY: ICD-10-CM

## 2023-11-02 RX ORDER — ALPRAZOLAM 0.25 MG/1
0.25 TABLET ORAL DAILY PRN
Qty: 30 TABLET | Refills: 0 | Status: SHIPPED | OUTPATIENT
Start: 2023-11-02

## 2023-11-02 RX ORDER — BUSPIRONE HYDROCHLORIDE 10 MG/1
10 TABLET ORAL 3 TIMES DAILY
Qty: 90 TABLET | Refills: 5 | Status: SHIPPED | OUTPATIENT
Start: 2023-11-02

## 2023-11-02 RX ORDER — ZOLPIDEM TARTRATE 10 MG/1
5-10 TABLET ORAL
Qty: 30 TABLET | Refills: 0 | Status: SHIPPED | OUTPATIENT
Start: 2023-11-02

## 2023-11-09 NOTE — RESULT ENCOUNTER NOTE
Your cholesterol has improved but remains slightly elevated. Please take vitamin D3 2000 units daily, continue your vitamin B12 supplement daily. The rest of your labs were normal.    Gyros message already sent.

## 2023-11-20 ENCOUNTER — TELEPHONE (OUTPATIENT)
Dept: INTERNAL MEDICINE CLINIC | Facility: CLINIC | Age: 43
End: 2023-11-20

## 2023-11-20 DIAGNOSIS — G43.909 MIGRAINE WITHOUT STATUS MIGRAINOSUS, NOT INTRACTABLE, UNSPECIFIED MIGRAINE TYPE: Primary | ICD-10-CM

## 2023-11-20 NOTE — TELEPHONE ENCOUNTER
Please call patient. Ask how she is doing since weaning off sertraline and now on venlafaxine (Effexor).

## 2023-11-21 NOTE — TELEPHONE ENCOUNTER
Patient is still weaning off sertraline and taking venlafaxine. She is going to double the venlafaxine on the 22nd. Please put in a referral to Neurology.

## 2023-11-21 NOTE — TELEPHONE ENCOUNTER
Please call patient. Are you still weaning off sertraline and just taking venlafaxine? Would you like to see neurology? Let me know.

## 2023-12-03 DIAGNOSIS — G43.909 MIGRAINE WITHOUT STATUS MIGRAINOSUS, NOT INTRACTABLE, UNSPECIFIED MIGRAINE TYPE: ICD-10-CM

## 2023-12-03 DIAGNOSIS — F41.9 ANXIETY: ICD-10-CM

## 2023-12-03 DIAGNOSIS — G47.09 OTHER INSOMNIA: ICD-10-CM

## 2023-12-04 RX ORDER — BUSPIRONE HYDROCHLORIDE 10 MG/1
10 TABLET ORAL 3 TIMES DAILY
Qty: 90 TABLET | Refills: 2 | Status: SHIPPED | OUTPATIENT
Start: 2023-12-04

## 2023-12-04 RX ORDER — ALPRAZOLAM 0.25 MG/1
0.25 TABLET ORAL DAILY PRN
Qty: 30 TABLET | Refills: 0 | Status: SHIPPED | OUTPATIENT
Start: 2023-12-04

## 2023-12-04 RX ORDER — TOPIRAMATE 50 MG/1
TABLET, FILM COATED ORAL
Qty: 270 TABLET | Refills: 0 | Status: SHIPPED | OUTPATIENT
Start: 2023-12-04

## 2023-12-04 RX ORDER — ZOLPIDEM TARTRATE 10 MG/1
5-10 TABLET ORAL
Qty: 30 TABLET | Refills: 0 | Status: SHIPPED | OUTPATIENT
Start: 2023-12-04

## 2023-12-04 RX ORDER — RIZATRIPTAN BENZOATE 5 MG/1
5 TABLET ORAL ONCE AS NEEDED
Qty: 1 TABLET | Refills: 0 | Status: SHIPPED | OUTPATIENT
Start: 2023-12-04

## 2023-12-19 ENCOUNTER — OFFICE VISIT (OUTPATIENT)
Dept: INTERNAL MEDICINE CLINIC | Facility: CLINIC | Age: 43
End: 2023-12-19
Payer: COMMERCIAL

## 2023-12-19 VITALS
HEART RATE: 80 BPM | SYSTOLIC BLOOD PRESSURE: 126 MMHG | DIASTOLIC BLOOD PRESSURE: 78 MMHG | OXYGEN SATURATION: 99 % | WEIGHT: 165 LBS | HEIGHT: 64 IN | BODY MASS INDEX: 28.17 KG/M2 | TEMPERATURE: 97.7 F

## 2023-12-19 DIAGNOSIS — B34.9 VIRAL ILLNESS: Primary | ICD-10-CM

## 2023-12-19 LAB
SARS-COV-2 AG UPPER RESP QL IA: NEGATIVE
SL AMB POCT RAPID FLU A: NEGATIVE
SL AMB POCT RAPID FLU B: NEGATIVE
VALID CONTROL: NORMAL

## 2023-12-19 PROCEDURE — 99213 OFFICE O/P EST LOW 20 MIN: CPT | Performed by: NURSE PRACTITIONER

## 2023-12-19 PROCEDURE — 87804 INFLUENZA ASSAY W/OPTIC: CPT | Performed by: NURSE PRACTITIONER

## 2023-12-19 PROCEDURE — 87811 SARS-COV-2 COVID19 W/OPTIC: CPT | Performed by: NURSE PRACTITIONER

## 2023-12-19 NOTE — PROGRESS NOTES
Name: Brielle Pickard      : 1980      MRN: [id number]  Encounter Provider: LESTER Hagan  Encounter Date: 2023   Encounter department: [facility]    Assessment & Plan     1. Viral illness  -     POCT rapid flu A and B  -     POCT Rapid Covid Ag    Rapid flu and covid were negative. Continue symptomatic treatment with tylenol and plenty of fluids.     Depression Screening and Follow-up Plan: Patient was screened for depression during today's encounter. They screened negative with a PHQ-2 score of 0.      Subjective      Brielle is here today with complaints of flu like symptoms.   Symptoms started 2 days ago.   She has been having headaches, chills, mild sore throat, dry cough, rhinorrhea, nausea/diarrhea, and body aches.   No fevers.   Taking tylenol as needed.   Home covid test yesterday negative       Review of Systems   Constitutional:  Positive for appetite change, chills and fatigue. Negative for activity change and fever.   HENT:  Positive for rhinorrhea and sore throat. Negative for congestion.    Respiratory:  Positive for cough. Negative for chest tightness, shortness of breath and wheezing.    Cardiovascular:  Negative for chest pain.   Gastrointestinal:  Positive for diarrhea and nausea. Negative for abdominal pain and vomiting.   Genitourinary:  Negative for difficulty urinating.   Musculoskeletal:  Positive for myalgias.   Neurological:  Positive for dizziness and headaches. Negative for light-headedness.       Current Outpatient Medications on File Prior to Visit   Medication Sig    acetaminophen (TYLENOL) 325 mg tablet Take 2 tablets (650 mg total) by mouth every 6 (six) hours as needed for mild pain    ALPRAZolam (XANAX) 0.25 mg tablet Take 1 tablet (0.25 mg total) by mouth daily as needed for anxiety    busPIRone (BUSPAR) 10 mg tablet Take 1 tablet (10 mg total) by mouth 3 (three) times a day    Cetirizine HCl 10 MG CAPS Take 10 mg by mouth.     "Cholecalciferol (Vitamin D3) 50 MCG (2000 UT) capsule Take 1 capsule (2,000 Units total) by mouth daily    diclofenac sodium (VOLTAREN) 1 % Apply 2 g topically 4 (four) times a day for 5 days    dicyclomine (BENTYL) 20 mg tablet Take 1 tablet (20 mg total) by mouth 4 (four) times a day (before meals and at bedtime)    fluticasone (FLONASE) 50 mcg/act nasal spray 1 spray into each nostril daily    magnesium Oxide (MAG-OX) 400 mg TABS Take 1 tablet (400 mg total) by mouth 2 (two) times a day    omeprazole (PriLOSEC) 40 MG capsule Take 1 capsule (40 mg total) by mouth daily    ondansetron (ZOFRAN-ODT) 4 mg disintegrating tablet Take 1 tablet (4 mg total) by mouth every 6 (six) hours as needed for nausea or vomiting    rizatriptan (Maxalt) 5 mg tablet Take 1 tablet (5 mg total) by mouth once as needed for migraine for up to 1 dose May repeat in 2 hours if needed    sertraline (ZOLOFT) 100 mg tablet Take 1.5 tablets (150 mg total) by mouth daily    topiramate (TOPAMAX) 50 MG tablet Take 1 tab in AM, 2 tabs in PM    venlafaxine (EFFEXOR-XR) 37.5 mg 24 hr capsule Take 1 capsule (37.5 mg total) by mouth daily with breakfast    zolpidem (AMBIEN) 10 mg tablet Take 0.5-1 tablets (5-10 mg total) by mouth daily at bedtime as needed for sleep       Objective     /78   Pulse 80   Temp 97.7 °F (36.5 °C)   Ht 5' 4\" (1.626 m)   Wt 74.8 kg (165 lb)   SpO2 99%   BMI 28.32 kg/m²     Physical Exam  Vitals reviewed.   Constitutional:       Appearance: Normal appearance.   HENT:      Head: Normocephalic and atraumatic.      Right Ear: Tympanic membrane, ear canal and external ear normal.      Left Ear: Tympanic membrane, ear canal and external ear normal.      Mouth/Throat:      Pharynx: Posterior oropharyngeal erythema present. No oropharyngeal exudate.   Eyes:      Conjunctiva/sclera: Conjunctivae normal.   Cardiovascular:      Rate and Rhythm: Normal rate and regular rhythm.      Heart sounds: Normal heart sounds. "   Pulmonary:      Effort: Pulmonary effort is normal.      Breath sounds: Normal breath sounds.   Skin:     General: Skin is warm and dry.   Neurological:      Mental Status: She is alert and oriented to person, place, and time.   Psychiatric:         Mood and Affect: Mood normal.         Behavior: Behavior normal.     LESTER Hagan

## 2023-12-19 NOTE — LETTER
December 19, 2023     Patient: Brielle Pickard  YOB: 1980  Date of Visit: 12/19/2023      To Whom it May Concern:    Brielle Pickard is under my professional care. Brielle was seen in my office on 12/19/2023. Please excuse her from work from 12/18-12/20/23.  Brielle may return to work on 12/21/2023 .    If you have any questions or concerns, please don't hesitate to call.         Sincerely,          LESTER Hagan

## 2023-12-29 DIAGNOSIS — F41.1 GENERALIZED ANXIETY DISORDER: ICD-10-CM

## 2023-12-29 DIAGNOSIS — K21.00 GASTROESOPHAGEAL REFLUX DISEASE WITH ESOPHAGITIS WITHOUT HEMORRHAGE: ICD-10-CM

## 2023-12-29 DIAGNOSIS — K25.3 ACUTE GASTRIC EROSION: ICD-10-CM

## 2023-12-29 RX ORDER — VENLAFAXINE HYDROCHLORIDE 37.5 MG/1
37.5 CAPSULE, EXTENDED RELEASE ORAL
Qty: 90 CAPSULE | Refills: 1 | Status: SHIPPED | OUTPATIENT
Start: 2023-12-29

## 2023-12-29 RX ORDER — OMEPRAZOLE 40 MG/1
40 CAPSULE, DELAYED RELEASE ORAL DAILY
Qty: 90 CAPSULE | Refills: 1 | Status: SHIPPED | OUTPATIENT
Start: 2023-12-29

## 2024-01-04 DIAGNOSIS — G47.09 OTHER INSOMNIA: ICD-10-CM

## 2024-01-04 DIAGNOSIS — F41.9 ANXIETY: ICD-10-CM

## 2024-01-05 RX ORDER — ZOLPIDEM TARTRATE 10 MG/1
5-10 TABLET ORAL
Qty: 30 TABLET | Refills: 0 | Status: SHIPPED | OUTPATIENT
Start: 2024-01-05

## 2024-01-05 RX ORDER — ALPRAZOLAM 0.25 MG/1
0.25 TABLET ORAL DAILY PRN
Qty: 30 TABLET | Refills: 0 | Status: SHIPPED | OUTPATIENT
Start: 2024-01-05

## 2024-01-08 DIAGNOSIS — F41.1 GENERALIZED ANXIETY DISORDER: ICD-10-CM

## 2024-01-08 RX ORDER — VENLAFAXINE HYDROCHLORIDE 75 MG/1
75 CAPSULE, EXTENDED RELEASE ORAL DAILY
Qty: 90 CAPSULE | Refills: 0 | Status: SHIPPED | OUTPATIENT
Start: 2024-01-08

## 2024-01-08 NOTE — TELEPHONE ENCOUNTER
Patient called an stated she has been taking medication twice a day since medication was ordered now patient has been out of medication for a week.Patient stated the medication is working great.

## 2024-02-05 DIAGNOSIS — F41.1 GENERALIZED ANXIETY DISORDER: Primary | ICD-10-CM

## 2024-02-05 DIAGNOSIS — G47.09 OTHER INSOMNIA: ICD-10-CM

## 2024-02-05 DIAGNOSIS — F41.9 ANXIETY: ICD-10-CM

## 2024-02-05 RX ORDER — BUSPIRONE HYDROCHLORIDE 10 MG/1
10 TABLET ORAL 3 TIMES DAILY
Qty: 90 TABLET | Refills: 1 | Status: SHIPPED | OUTPATIENT
Start: 2024-02-05

## 2024-02-05 RX ORDER — ZOLPIDEM TARTRATE 10 MG/1
5-10 TABLET ORAL
Qty: 30 TABLET | Refills: 0 | Status: SHIPPED | OUTPATIENT
Start: 2024-02-05

## 2024-02-05 RX ORDER — ALPRAZOLAM 0.25 MG/1
0.25 TABLET ORAL DAILY PRN
Qty: 30 TABLET | Refills: 0 | Status: SHIPPED | OUTPATIENT
Start: 2024-02-05

## 2024-02-10 ENCOUNTER — APPOINTMENT (OUTPATIENT)
Dept: LAB | Facility: CLINIC | Age: 44
End: 2024-02-10
Payer: COMMERCIAL

## 2024-02-10 LAB
ANION GAP SERPL CALCULATED.3IONS-SCNC: 8 MMOL/L
BUN SERPL-MCNC: 11 MG/DL (ref 5–25)
CALCIUM SERPL-MCNC: 9 MG/DL (ref 8.4–10.2)
CHLORIDE SERPL-SCNC: 110 MMOL/L (ref 96–108)
CO2 SERPL-SCNC: 21 MMOL/L (ref 21–32)
CREAT SERPL-MCNC: 1.02 MG/DL (ref 0.6–1.3)
GFR SERPL CREATININE-BSD FRML MDRD: 68 ML/MIN/1.73SQ M
GLUCOSE SERPL-MCNC: 94 MG/DL (ref 65–140)
POTASSIUM SERPL-SCNC: 4 MMOL/L (ref 3.5–5.3)
SODIUM SERPL-SCNC: 139 MMOL/L (ref 135–147)
TSH SERPL DL<=0.05 MIU/L-ACNC: 1.73 UIU/ML (ref 0.45–4.5)

## 2024-02-10 PROCEDURE — 84443 ASSAY THYROID STIM HORMONE: CPT | Performed by: INTERNAL MEDICINE

## 2024-02-10 PROCEDURE — 80048 BASIC METABOLIC PNL TOTAL CA: CPT | Performed by: INTERNAL MEDICINE

## 2024-02-10 PROCEDURE — 36415 COLL VENOUS BLD VENIPUNCTURE: CPT | Performed by: INTERNAL MEDICINE

## 2024-02-21 DIAGNOSIS — Z00.6 ENCOUNTER FOR EXAMINATION FOR NORMAL COMPARISON OR CONTROL IN CLINICAL RESEARCH PROGRAM: ICD-10-CM

## 2024-02-22 ENCOUNTER — RA CDI HCC (OUTPATIENT)
Dept: OTHER | Facility: HOSPITAL | Age: 44
End: 2024-02-22

## 2024-02-22 NOTE — PROGRESS NOTES
HCC coding opportunities       Chart reviewed, no opportunity found: CHART REVIEWED, NO OPPORTUNITY FOUND   This is a reminder to address (resolve/update/assess) ALL HCC (risk adjustment) codes as found on active problem list for 2024 as patient scores reset to zero STACIA.  Also, just a reminder to please review and assess all other chronic conditions for 2024     Patients Insurance        Commercial Insurance: [employer]

## 2024-02-29 ENCOUNTER — OFFICE VISIT (OUTPATIENT)
Dept: INTERNAL MEDICINE CLINIC | Facility: CLINIC | Age: 44
End: 2024-02-29
Payer: COMMERCIAL

## 2024-02-29 VITALS
DIASTOLIC BLOOD PRESSURE: 78 MMHG | SYSTOLIC BLOOD PRESSURE: 122 MMHG | BODY MASS INDEX: 27.49 KG/M2 | WEIGHT: 161 LBS | OXYGEN SATURATION: 99 % | TEMPERATURE: 97.4 F | HEIGHT: 64 IN | HEART RATE: 76 BPM

## 2024-02-29 DIAGNOSIS — K31.84 GASTROPARESIS: ICD-10-CM

## 2024-02-29 DIAGNOSIS — E55.9 VITAMIN D DEFICIENCY: ICD-10-CM

## 2024-02-29 DIAGNOSIS — E66.3 OVERWEIGHT (BMI 25.0-29.9): ICD-10-CM

## 2024-02-29 DIAGNOSIS — G43.909 MIGRAINE WITHOUT STATUS MIGRAINOSUS, NOT INTRACTABLE, UNSPECIFIED MIGRAINE TYPE: Primary | ICD-10-CM

## 2024-02-29 DIAGNOSIS — E53.8 VITAMIN B12 DEFICIENCY: ICD-10-CM

## 2024-02-29 DIAGNOSIS — G47.09 OTHER INSOMNIA: ICD-10-CM

## 2024-02-29 DIAGNOSIS — K21.00 GASTROESOPHAGEAL REFLUX DISEASE WITH ESOPHAGITIS WITHOUT HEMORRHAGE: ICD-10-CM

## 2024-02-29 DIAGNOSIS — F41.1 GENERALIZED ANXIETY DISORDER: ICD-10-CM

## 2024-02-29 DIAGNOSIS — E78.2 MIXED HYPERLIPIDEMIA: ICD-10-CM

## 2024-02-29 PROCEDURE — 99214 OFFICE O/P EST MOD 30 MIN: CPT | Performed by: INTERNAL MEDICINE

## 2024-02-29 RX ORDER — RIBOFLAVIN (VITAMIN B2) 100 MG
1 TABLET ORAL 2 TIMES DAILY
Qty: 60 TABLET | Refills: 5 | Status: SHIPPED | OUTPATIENT
Start: 2024-02-29

## 2024-02-29 NOTE — LETTER
February 29, 2024     Patient: Brielle Pickard  YOB: 1980  Date of Visit: 2/29/2024      To Whom it May Concern:    Brielle Pickard is under my professional care. Brielle was seen in my office on 2/29/2024.     If you have any questions or concerns, please don't hesitate to call.         Sincerely,          Fabiana Rajan MD

## 2024-02-29 NOTE — PROGRESS NOTES
Assessment/Plan:    Gastroparesis  Much improve.  Continue current diet.    GERD (gastroesophageal reflux disease)  Taking PPI daily.    Generalized anxiety disorder  Improved, on venlafaxine 75 mg.  Alprazolam use has decreased, continue buspirone 10 mg tid.    Insomnia  Still taking zolpidem qHS.    Hyperlipidemia  Repeat lipids next visit.    Migraine headache  Improved.  On topiramate 150 mg daily, has not needed Maxalt.  Start B2, increase Mg to bid.    Overweight (BMI 25.0-29.9)  Lost 4 lbs since last visit.       Diagnoses and all orders for this visit:    Migraine without status migrainosus, not intractable, unspecified migraine type  -     Riboflavin (Vitamin B-2) 100 MG TABS; Take 1 tablet (100 mg total) by mouth 2 (two) times a day    Gastroparesis    Generalized anxiety disorder    Mixed hyperlipidemia  -     Comprehensive metabolic panel; Future  -     Lipid panel; Future  -     TSH, 3rd generation with Free T4 reflex; Future    Other insomnia    Vitamin B12 deficiency  -     CBC and differential; Future  -     Vitamin B12; Future    Vitamin D deficiency  -     Vitamin D 25 hydroxy; Future    Gastroesophageal reflux disease with esophagitis without hemorrhage    Overweight (BMI 25.0-29.9)      Follow up in 5 months or as needed.      Subjective:      Patient ID: Brielle Pickard is a 43 y.o. female here for a follow up.    She complains that she is losing her hair.  She would notice this when she washes his hair, feels that there is a clump of hair every time.  She does not notice it on her clothes or pillows.  She was worried about her thyroid.    She reports that her nausea and vomiting has significantly improved.  It would occur about twice a week only, lasting for a few hours.  She moves her bowels every 2 to 3 days.  She eats 2 meals a day, has decreased the amount of coffee she drinks in the morning.  She does not eat breakfast.    She reports headaches have also improved, gets it about twice a  "week for a few hours.  She is unable to find vitamin B2, does take the magnesium once a day.    She feels the venlafaxine is working better than her previous medication.  She does not take Xanax every day, maybe about 3 days a week.  She continues to take Ambien every night for sleep.    She remains stressed mostly caring for her elderly parents.      The following portions of the patient's history were reviewed and updated as appropriate: allergies, current medications, past medical history, past social history, and problem list.    Review of Systems   Constitutional:  Negative for activity change, appetite change and fatigue.   HENT:  Negative for congestion and postnasal drip.    Eyes:  Negative for visual disturbance.   Respiratory:  Negative for cough and shortness of breath.    Cardiovascular:  Negative for chest pain and leg swelling.   Gastrointestinal:  Positive for constipation and nausea. Negative for abdominal pain and diarrhea.   Genitourinary:  Negative for dysuria and frequency.   Musculoskeletal:  Negative for arthralgias and myalgias.   Skin:  Negative for rash and wound.   Neurological:  Positive for headaches. Negative for dizziness and numbness.   Psychiatric/Behavioral:  Positive for sleep disturbance. Negative for confusion and decreased concentration. The patient is not nervous/anxious.          Objective:      /78   Pulse 76   Temp (!) 97.4 °F (36.3 °C)   Ht 5' 4\" (1.626 m)   Wt 73 kg (161 lb)   SpO2 99%   BMI 27.64 kg/m²          Physical Exam  Vitals and nursing note reviewed.   Constitutional:       General: She is not in acute distress.     Appearance: She is well-developed.   HENT:      Head: Normocephalic and atraumatic.      Mouth/Throat:      Mouth: Mucous membranes are moist.   Eyes:      Pupils: Pupils are equal, round, and reactive to light.   Cardiovascular:      Rate and Rhythm: Normal rate and regular rhythm.      Heart sounds: Normal heart sounds.   Pulmonary:      " Effort: Pulmonary effort is normal.      Breath sounds: Normal breath sounds. No wheezing.   Abdominal:      General: Bowel sounds are normal.      Palpations: Abdomen is soft.   Musculoskeletal:         General: No swelling.      Right lower leg: No edema.      Left lower leg: No edema.   Skin:     General: Skin is warm.      Findings: No rash.   Neurological:      General: No focal deficit present.      Mental Status: She is alert and oriented to person, place, and time.   Psychiatric:         Mood and Affect: Mood and affect normal. Mood is not anxious or depressed.         Behavior: Behavior normal.           Labs & imaging results reviewed with patient.

## 2024-03-03 DIAGNOSIS — G43.909 MIGRAINE WITHOUT STATUS MIGRAINOSUS, NOT INTRACTABLE, UNSPECIFIED MIGRAINE TYPE: ICD-10-CM

## 2024-03-03 DIAGNOSIS — F41.9 ANXIETY: ICD-10-CM

## 2024-03-04 RX ORDER — BUSPIRONE HYDROCHLORIDE 10 MG/1
10 TABLET ORAL 3 TIMES DAILY
Qty: 90 TABLET | Refills: 1 | Status: SHIPPED | OUTPATIENT
Start: 2024-03-04

## 2024-03-04 RX ORDER — TOPIRAMATE 50 MG/1
TABLET, FILM COATED ORAL
Qty: 270 TABLET | Refills: 1 | Status: SHIPPED | OUTPATIENT
Start: 2024-03-04

## 2024-03-05 DIAGNOSIS — G47.09 OTHER INSOMNIA: ICD-10-CM

## 2024-03-05 DIAGNOSIS — F41.9 ANXIETY: ICD-10-CM

## 2024-03-06 RX ORDER — ZOLPIDEM TARTRATE 10 MG/1
5-10 TABLET ORAL
Qty: 30 TABLET | Refills: 0 | Status: SHIPPED | OUTPATIENT
Start: 2024-03-06

## 2024-03-06 RX ORDER — ALPRAZOLAM 0.25 MG/1
0.25 TABLET ORAL DAILY PRN
Qty: 30 TABLET | Refills: 0 | Status: SHIPPED | OUTPATIENT
Start: 2024-03-06

## 2024-04-01 ENCOUNTER — OFFICE VISIT (OUTPATIENT)
Dept: INTERNAL MEDICINE CLINIC | Facility: CLINIC | Age: 44
End: 2024-04-01
Payer: COMMERCIAL

## 2024-04-01 VITALS
BODY MASS INDEX: 26.67 KG/M2 | OXYGEN SATURATION: 99 % | HEART RATE: 86 BPM | SYSTOLIC BLOOD PRESSURE: 122 MMHG | HEIGHT: 64 IN | WEIGHT: 156.2 LBS | DIASTOLIC BLOOD PRESSURE: 78 MMHG | TEMPERATURE: 96.6 F

## 2024-04-01 DIAGNOSIS — R11.2 NAUSEA AND VOMITING, UNSPECIFIED VOMITING TYPE: Primary | ICD-10-CM

## 2024-04-01 DIAGNOSIS — R50.9 FEVER AND CHILLS: ICD-10-CM

## 2024-04-01 PROCEDURE — 87811 SARS-COV-2 COVID19 W/OPTIC: CPT | Performed by: INTERNAL MEDICINE

## 2024-04-01 PROCEDURE — 99213 OFFICE O/P EST LOW 20 MIN: CPT | Performed by: INTERNAL MEDICINE

## 2024-04-01 PROCEDURE — 87804 INFLUENZA ASSAY W/OPTIC: CPT | Performed by: INTERNAL MEDICINE

## 2024-04-01 NOTE — PROGRESS NOTES
Name: Brielle Pickard      : 1980      MRN: [id number]  Encounter Provider: Fabiana Rajan MD  Encounter Date: 2024   Encounter department: [facility] INTERNAL MEDICINE    Assessment & Plan     1. Nausea and vomiting, unspecified vomiting type  Comments:  Improved. Increase fluid intake, start bland diet.  Orders:  -     POCT Rapid Covid Ag  -     POCT rapid flu A and B    2. Fever and chills  Comments:  Negative COVID, flu. Continue symptomatic treatment.       Out of work note provided.    Subjective      Symptoms started yesterday morning.  She woke up not feeling well, had chills and was feeling very tired.  She had slept all day, did not really eat.  She tried to drink some fluids but was not really able to.  She vomited multiple times yesterday, up to 5 times.  Today, she started feeling slightly better.  She has a mild sore throat which she thinks is due to the vomiting.  She had vomited a few times this morning, developed diarrhea.  She had moved her bowels twice.  She is able to drink more fluids today, still no appetite.    She tried to go to work today but was sent home.  She has not taken any over-the-counter medication.  No known sick contacts.      Review of Systems   Constitutional:  Positive for activity change, appetite change, chills and fatigue. Negative for fever.   HENT:  Positive for sore throat. Negative for postnasal drip and rhinorrhea.    Respiratory:  Negative for cough and shortness of breath.    Gastrointestinal:  Positive for diarrhea, nausea and vomiting. Negative for abdominal pain.   Genitourinary:  Negative for dysuria.   Musculoskeletal:  Negative for arthralgias and myalgias.   Psychiatric/Behavioral:  Positive for sleep disturbance.        Current Outpatient Medications on File Prior to Visit   Medication Sig   • ALPRAZolam (XANAX) 0.25 mg tablet Take 1 tablet (0.25 mg total) by mouth daily as needed for anxiety   • busPIRone (BUSPAR) 10 mg tablet  "Take 1 tablet (10 mg total) by mouth 3 (three) times a day   • Cetirizine HCl 10 MG CAPS Take 10 mg by mouth.   • Cholecalciferol (Vitamin D3) 50 MCG (2000 UT) capsule Take 1 capsule (2,000 Units total) by mouth daily   • dicyclomine (BENTYL) 20 mg tablet Take 1 tablet (20 mg total) by mouth 4 (four) times a day (before meals and at bedtime)   • fluticasone (FLONASE) 50 mcg/act nasal spray 1 spray into each nostril daily   • magnesium Oxide (MAG-OX) 400 mg TABS Take 1 tablet (400 mg total) by mouth 2 (two) times a day   • omeprazole (PriLOSEC) 40 MG capsule Take 1 capsule (40 mg total) by mouth daily   • ondansetron (ZOFRAN-ODT) 4 mg disintegrating tablet Take 1 tablet (4 mg total) by mouth every 6 (six) hours as needed for nausea or vomiting   • Riboflavin (Vitamin B-2) 100 MG TABS Take 1 tablet (100 mg total) by mouth 2 (two) times a day   • rizatriptan (Maxalt) 5 mg tablet Take 1 tablet (5 mg total) by mouth once as needed for migraine for up to 1 dose May repeat in 2 hours if needed   • topiramate (TOPAMAX) 50 MG tablet Take 1 tab in AM, 2 tabs in PM   • venlafaxine (EFFEXOR-XR) 75 mg 24 hr capsule Take 1 capsule (75 mg total) by mouth daily   • zolpidem (AMBIEN) 10 mg tablet Take 0.5-1 tablets (5-10 mg total) by mouth daily at bedtime as needed for sleep       Objective     /78   Pulse 86   Temp (!) 96.6 °F (35.9 °C)   Ht 5' 4\" (1.626 m)   Wt 70.9 kg (156 lb 3.2 oz)   SpO2 99%   BMI 26.81 kg/m²     Physical Exam  Vitals reviewed.   Constitutional:       Appearance: Normal appearance.   HENT:      Head: Normocephalic and atraumatic.      Mouth/Throat:      Mouth: Mucous membranes are dry.   Eyes:      Pupils: Pupils are equal, round, and reactive to light.   Cardiovascular:      Rate and Rhythm: Normal rate and regular rhythm.   Pulmonary:      Effort: Pulmonary effort is normal. No respiratory distress.      Breath sounds: Normal breath sounds.   Neurological:      General: No focal deficit present. "      Mental Status: She is alert and oriented to person, place, and time.   Psychiatric:         Mood and Affect: Mood normal.         Behavior: Behavior normal.       Fabiana Rajan MD

## 2024-04-01 NOTE — LETTER
April 1, 2024     Patient: Brielle Pickard  YOB: 1980  Date of Visit: 4/1/2024      To Whom it May Concern:    Brielle Pickard is under my professional care. Brielle was seen in my office on 4/1/2024. Please excuse her from work today and tomorrow. Brielle may return to work on 4/3/2024 .    If you have any questions or concerns, please don't hesitate to call.         Sincerely,          Fabiana Rajan MD

## 2024-04-07 DIAGNOSIS — K21.00 GASTROESOPHAGEAL REFLUX DISEASE WITH ESOPHAGITIS WITHOUT HEMORRHAGE: ICD-10-CM

## 2024-04-07 DIAGNOSIS — F41.1 GENERALIZED ANXIETY DISORDER: ICD-10-CM

## 2024-04-07 DIAGNOSIS — K25.3 ACUTE GASTRIC EROSION: ICD-10-CM

## 2024-04-07 RX ORDER — OMEPRAZOLE 40 MG/1
40 CAPSULE, DELAYED RELEASE ORAL DAILY
Qty: 90 CAPSULE | Refills: 0 | Status: SHIPPED | OUTPATIENT
Start: 2024-04-07

## 2024-04-07 RX ORDER — VENLAFAXINE HYDROCHLORIDE 75 MG/1
75 CAPSULE, EXTENDED RELEASE ORAL DAILY
Qty: 90 CAPSULE | Refills: 0 | Status: SHIPPED | OUTPATIENT
Start: 2024-04-07

## 2024-04-11 DIAGNOSIS — F41.9 ANXIETY: ICD-10-CM

## 2024-04-11 DIAGNOSIS — F41.1 GENERALIZED ANXIETY DISORDER: ICD-10-CM

## 2024-04-11 DIAGNOSIS — G47.09 OTHER INSOMNIA: ICD-10-CM

## 2024-04-11 RX ORDER — ALPRAZOLAM 0.25 MG/1
0.25 TABLET ORAL DAILY PRN
Qty: 30 TABLET | Refills: 1 | Status: SHIPPED | OUTPATIENT
Start: 2024-04-11

## 2024-04-11 RX ORDER — VENLAFAXINE HYDROCHLORIDE 75 MG/1
75 CAPSULE, EXTENDED RELEASE ORAL DAILY
Qty: 90 CAPSULE | Refills: 0 | OUTPATIENT
Start: 2024-04-11

## 2024-04-11 RX ORDER — BUSPIRONE HYDROCHLORIDE 10 MG/1
10 TABLET ORAL 3 TIMES DAILY
Qty: 270 TABLET | Refills: 1 | Status: SHIPPED | OUTPATIENT
Start: 2024-04-11

## 2024-04-11 RX ORDER — ZOLPIDEM TARTRATE 10 MG/1
5-10 TABLET ORAL
Qty: 30 TABLET | Refills: 1 | Status: SHIPPED | OUTPATIENT
Start: 2024-04-11

## 2024-05-14 DIAGNOSIS — G47.09 OTHER INSOMNIA: ICD-10-CM

## 2024-05-14 DIAGNOSIS — F41.9 ANXIETY: ICD-10-CM

## 2024-05-15 RX ORDER — ALPRAZOLAM 0.25 MG/1
0.25 TABLET ORAL DAILY PRN
Qty: 30 TABLET | Refills: 0 | Status: SHIPPED | OUTPATIENT
Start: 2024-05-15

## 2024-05-15 RX ORDER — ZOLPIDEM TARTRATE 10 MG/1
5-10 TABLET ORAL
Qty: 30 TABLET | Refills: 0 | Status: SHIPPED | OUTPATIENT
Start: 2024-05-15

## 2024-06-03 ENCOUNTER — HOSPITAL ENCOUNTER (OUTPATIENT)
Dept: MAMMOGRAPHY | Facility: HOSPITAL | Age: 44
Discharge: HOME | End: 2024-06-03
Attending: OBSTETRICS & GYNECOLOGY
Payer: COMMERCIAL

## 2024-06-03 VITALS — BODY MASS INDEX: 26.63 KG/M2 | HEIGHT: 64 IN | WEIGHT: 156 LBS

## 2024-06-03 DIAGNOSIS — Z12.31 ENCOUNTER FOR SCREENING MAMMOGRAM FOR MALIGNANT NEOPLASM OF BREAST: ICD-10-CM

## 2024-06-03 PROCEDURE — 77063 BREAST TOMOSYNTHESIS BI: CPT

## 2024-06-03 PROCEDURE — 77067 SCR MAMMO BI INCL CAD: CPT

## 2024-06-19 DIAGNOSIS — G47.09 OTHER INSOMNIA: ICD-10-CM

## 2024-06-19 DIAGNOSIS — F41.9 ANXIETY: ICD-10-CM

## 2024-06-19 DIAGNOSIS — G43.909 MIGRAINE WITHOUT STATUS MIGRAINOSUS, NOT INTRACTABLE, UNSPECIFIED MIGRAINE TYPE: ICD-10-CM

## 2024-06-19 RX ORDER — TOPIRAMATE 50 MG/1
TABLET, FILM COATED ORAL
Qty: 270 TABLET | Refills: 0 | OUTPATIENT
Start: 2024-06-19

## 2024-06-20 RX ORDER — ALPRAZOLAM 0.25 MG/1
0.25 TABLET ORAL DAILY PRN
Qty: 30 TABLET | Refills: 0 | Status: SHIPPED | OUTPATIENT
Start: 2024-06-20

## 2024-06-20 RX ORDER — ZOLPIDEM TARTRATE 10 MG/1
5-10 TABLET ORAL
Qty: 30 TABLET | Refills: 0 | Status: SHIPPED | OUTPATIENT
Start: 2024-06-20

## 2024-07-01 ENCOUNTER — HOSPITAL ENCOUNTER (EMERGENCY)
Facility: HOSPITAL | Age: 44
Discharge: HOME | End: 2024-07-01
Attending: EMERGENCY MEDICINE
Payer: COMMERCIAL

## 2024-07-01 VITALS
TEMPERATURE: 97.6 F | SYSTOLIC BLOOD PRESSURE: 123 MMHG | OXYGEN SATURATION: 97 % | RESPIRATION RATE: 20 BRPM | DIASTOLIC BLOOD PRESSURE: 69 MMHG | HEART RATE: 78 BPM

## 2024-07-01 DIAGNOSIS — F12.90 MARIJUANA USE: ICD-10-CM

## 2024-07-01 DIAGNOSIS — R11.2 NAUSEA AND VOMITING, UNSPECIFIED VOMITING TYPE: Primary | ICD-10-CM

## 2024-07-01 LAB
ALBUMIN SERPL BCG-MCNC: 4.5 G/DL (ref 3.5–5)
ALP SERPL-CCNC: 43 U/L (ref 34–104)
ALT SERPL W P-5'-P-CCNC: 13 U/L (ref 7–52)
ANION GAP SERPL CALCULATED.3IONS-SCNC: 9 MMOL/L (ref 4–13)
AST SERPL W P-5'-P-CCNC: 18 U/L (ref 13–39)
BACTERIA UR QL AUTO: ABNORMAL /HPF
BASOPHILS # BLD AUTO: 0.06 THOUSANDS/ÂΜL (ref 0–0.1)
BASOPHILS NFR BLD AUTO: 1 % (ref 0–1)
BILIRUB SERPL-MCNC: 1.1 MG/DL (ref 0.2–1)
BILIRUB UR QL STRIP: NEGATIVE
BUN SERPL-MCNC: 11 MG/DL (ref 5–25)
CALCIUM SERPL-MCNC: 9.5 MG/DL (ref 8.4–10.2)
CHLORIDE SERPL-SCNC: 108 MMOL/L (ref 96–108)
CLARITY UR: ABNORMAL
CO2 SERPL-SCNC: 21 MMOL/L (ref 21–32)
COLOR UR: ABNORMAL
CREAT SERPL-MCNC: 0.93 MG/DL (ref 0.6–1.3)
EOSINOPHIL # BLD AUTO: 0.13 THOUSAND/ÂΜL (ref 0–0.61)
EOSINOPHIL NFR BLD AUTO: 2 % (ref 0–6)
ERYTHROCYTE [DISTWIDTH] IN BLOOD BY AUTOMATED COUNT: 12.7 % (ref 11.6–15.1)
GFR SERPL CREATININE-BSD FRML MDRD: 76 ML/MIN/1.73SQ M
GLUCOSE SERPL-MCNC: 109 MG/DL (ref 65–140)
GLUCOSE UR STRIP-MCNC: NEGATIVE MG/DL
HCG SERPL QL: NEGATIVE
HCT VFR BLD AUTO: 43.5 % (ref 34.8–46.1)
HGB BLD-MCNC: 14.4 G/DL (ref 11.5–15.4)
HGB UR QL STRIP.AUTO: ABNORMAL
IMM GRANULOCYTES # BLD AUTO: 0.03 THOUSAND/UL (ref 0–0.2)
IMM GRANULOCYTES NFR BLD AUTO: 0 % (ref 0–2)
KETONES UR STRIP-MCNC: NEGATIVE MG/DL
LEUKOCYTE ESTERASE UR QL STRIP: ABNORMAL
LIPASE SERPL-CCNC: 11 U/L (ref 11–82)
LYMPHOCYTES # BLD AUTO: 2.31 THOUSANDS/ÂΜL (ref 0.6–4.47)
LYMPHOCYTES NFR BLD AUTO: 31 % (ref 14–44)
MCH RBC QN AUTO: 31.1 PG (ref 26.8–34.3)
MCHC RBC AUTO-ENTMCNC: 33.1 G/DL (ref 31.4–37.4)
MCV RBC AUTO: 94 FL (ref 82–98)
MONOCYTES # BLD AUTO: 0.45 THOUSAND/ÂΜL (ref 0.17–1.22)
MONOCYTES NFR BLD AUTO: 6 % (ref 4–12)
MUCOUS THREADS UR QL AUTO: ABNORMAL
NEUTROPHILS # BLD AUTO: 4.43 THOUSANDS/ÂΜL (ref 1.85–7.62)
NEUTS SEG NFR BLD AUTO: 60 % (ref 43–75)
NITRITE UR QL STRIP: NEGATIVE
NON-SQ EPI CELLS URNS QL MICRO: ABNORMAL /HPF
NRBC BLD AUTO-RTO: 0 /100 WBCS
PH UR STRIP.AUTO: 6 [PH]
PLATELET # BLD AUTO: 256 THOUSANDS/UL (ref 149–390)
PMV BLD AUTO: 9.3 FL (ref 8.9–12.7)
POTASSIUM SERPL-SCNC: 4 MMOL/L (ref 3.5–5.3)
PROT SERPL-MCNC: 7.5 G/DL (ref 6.4–8.4)
PROT UR STRIP-MCNC: ABNORMAL MG/DL
RBC # BLD AUTO: 4.63 MILLION/UL (ref 3.81–5.12)
RBC #/AREA URNS AUTO: ABNORMAL /HPF
SODIUM SERPL-SCNC: 138 MMOL/L (ref 135–147)
SP GR UR STRIP.AUTO: 1.03 (ref 1–1.03)
UROBILINOGEN UR STRIP-ACNC: <2 MG/DL
WBC # BLD AUTO: 7.41 THOUSAND/UL (ref 4.31–10.16)
WBC #/AREA URNS AUTO: ABNORMAL /HPF
WBC CLUMPS # UR AUTO: PRESENT /UL

## 2024-07-01 PROCEDURE — 81001 URINALYSIS AUTO W/SCOPE: CPT | Performed by: EMERGENCY MEDICINE

## 2024-07-01 PROCEDURE — 83690 ASSAY OF LIPASE: CPT | Performed by: EMERGENCY MEDICINE

## 2024-07-01 PROCEDURE — 36415 COLL VENOUS BLD VENIPUNCTURE: CPT | Performed by: EMERGENCY MEDICINE

## 2024-07-01 PROCEDURE — 99283 EMERGENCY DEPT VISIT LOW MDM: CPT

## 2024-07-01 PROCEDURE — 96375 TX/PRO/DX INJ NEW DRUG ADDON: CPT

## 2024-07-01 PROCEDURE — 84703 CHORIONIC GONADOTROPIN ASSAY: CPT | Performed by: EMERGENCY MEDICINE

## 2024-07-01 PROCEDURE — 99284 EMERGENCY DEPT VISIT MOD MDM: CPT | Performed by: EMERGENCY MEDICINE

## 2024-07-01 PROCEDURE — 96374 THER/PROPH/DIAG INJ IV PUSH: CPT

## 2024-07-01 PROCEDURE — 87086 URINE CULTURE/COLONY COUNT: CPT | Performed by: EMERGENCY MEDICINE

## 2024-07-01 PROCEDURE — 80053 COMPREHEN METABOLIC PANEL: CPT | Performed by: EMERGENCY MEDICINE

## 2024-07-01 PROCEDURE — 85025 COMPLETE CBC W/AUTO DIFF WBC: CPT | Performed by: EMERGENCY MEDICINE

## 2024-07-01 PROCEDURE — 96361 HYDRATE IV INFUSION ADD-ON: CPT

## 2024-07-01 RX ORDER — DIPHENHYDRAMINE HYDROCHLORIDE 50 MG/ML
12.5 INJECTION INTRAMUSCULAR; INTRAVENOUS ONCE
Status: COMPLETED | OUTPATIENT
Start: 2024-07-01 | End: 2024-07-01

## 2024-07-01 RX ORDER — FAMOTIDINE 10 MG/ML
20 INJECTION, SOLUTION INTRAVENOUS ONCE
Status: COMPLETED | OUTPATIENT
Start: 2024-07-01 | End: 2024-07-01

## 2024-07-01 RX ORDER — DROPERIDOL 2.5 MG/ML
2.5 INJECTION, SOLUTION INTRAMUSCULAR; INTRAVENOUS ONCE
Status: COMPLETED | OUTPATIENT
Start: 2024-07-01 | End: 2024-07-01

## 2024-07-01 RX ORDER — METOCLOPRAMIDE HYDROCHLORIDE 5 MG/ML
10 INJECTION INTRAMUSCULAR; INTRAVENOUS ONCE
Status: COMPLETED | OUTPATIENT
Start: 2024-07-01 | End: 2024-07-01

## 2024-07-01 RX ADMIN — FAMOTIDINE 20 MG: 10 INJECTION INTRAVENOUS at 07:22

## 2024-07-01 RX ADMIN — DROPERIDOL 2.5 MG: 2.5 INJECTION, SOLUTION INTRAMUSCULAR; INTRAVENOUS at 08:23

## 2024-07-01 RX ADMIN — METOCLOPRAMIDE 10 MG: 5 INJECTION, SOLUTION INTRAMUSCULAR; INTRAVENOUS at 07:23

## 2024-07-01 RX ADMIN — SODIUM CHLORIDE 1000 ML: 0.9 INJECTION, SOLUTION INTRAVENOUS at 07:22

## 2024-07-01 RX ADMIN — DIPHENHYDRAMINE HYDROCHLORIDE 12.5 MG: 50 INJECTION, SOLUTION INTRAMUSCULAR; INTRAVENOUS at 07:23

## 2024-07-01 NOTE — ED ATTENDING ATTESTATION
"7/1/2024  I, Benjamin Heredia DO, saw and evaluated the patient. I have discussed the patient with the resident/non-physician practitioner and agree with the resident's/non-physician practitioner's findings, Plan of Care, and MDM as documented in the resident's/non-physician practitioner's note, except where noted. All available labs and Radiology studies were reviewed.  I was present for key portions of any procedure(s) performed by the resident/non-physician practitioner and I was immediately available to provide assistance.       At this point I agree with the current assessment done in the Emergency Department.  I have conducted an independent evaluation of this patient a history and physical is as follows: 43-year-old female, past medical history gastroparesis, presenting to the emergency department with soft description of signs and symptoms of the same.  Patient notes nausea and vomiting over the last few hours.  Denies any fever, chills, diarrhea, abdominal pain outside of the episodes of vomiting. Pt does endorse regular marijuana use.     VSS. Moderate discomfort. AAOX3. Lungs CTA. Heart RRR. Abd soft, non-tender despite discomfort globally.     43yoF presenting with \"gastroparesis\" per her but marijuana hyperemesis considered given use, severe upper abd cramping, and aforementioned sx resolved with 2.5 droperidol. Fluids provided. CBC, CMP wnl. Pt instructed to DC use of marijuana and monitor for sx resolution. Has f/u with GI. Reviewed all findings both relevant and incidental with the patient at bedside. Pt verbalized understanding of findings, neccesary follow up, return to ED precautions. Pt agreed to review today's findings with their primary care provider. Pt non-toxic appearing upon discharge.       ED Course         Critical Care Time  Procedures      "

## 2024-07-01 NOTE — Clinical Note
Brielle Pickard was seen and treated in our emergency department on 7/1/2024.    No restrictions            Diagnosis:     Brielle  .    She may return on this date: 07/02/2024         If you have any questions or concerns, please don't hesitate to call.      Benjamin Heredia, DO    ______________________________           _______________          _______________  Hospital Representative                              Date                                Time

## 2024-07-01 NOTE — ED PROVIDER NOTES
History  Chief Complaint   Patient presents with    Vomiting     Vomiting since Friday. Hx gastroparesis. +burning while urinating. Denies diarrhea/constipation     (Brielle Pickard) Brielle Pickard is a 43 y.o. female     They presented to the emergency department on 2024. Patient presents with:  Vomiting: Vomiting since Friday. Hx gastroparesis. +burning while urinating. Denies diarrhea/constipation.    The patient states that 3 days ago she began with symptoms of nausea as well as vomiting, unsure how many episodes of emesis.  Patient notes that she has a history of gastroparesis and that her symptoms are currently similar to previous episodes.  Patient notes previously trying zofran, however notes minimal improvement of symptoms.  Patient points to her epigastric area to describe discomfort.  Patient denies any constipation or diarrhea however notes that she has been experiencing dysuria.  Patient notes that she has also been experiencing chills, however denies any fever.  Patient denies headache, pain, chest pain, difficulty breathing, or any other complaint at this time.              Prior to Admission Medications   Prescriptions Last Dose Informant Patient Reported? Taking?   ALPRAZolam (XANAX) 0.25 mg tablet   No No   Sig: Take 1 tablet (0.25 mg total) by mouth daily as needed for anxiety   Cetirizine HCl 10 MG CAPS  Self Yes No   Sig: Take 10 mg by mouth.   Cholecalciferol (Vitamin D3) 50 MCG (2000 UT) capsule   No No   Sig: Take 1 capsule (2,000 Units total) by mouth daily   Riboflavin (Vitamin B-2) 100 MG TABS   No No   Sig: Take 1 tablet (100 mg total) by mouth 2 (two) times a day   busPIRone (BUSPAR) 10 mg tablet   No No   Sig: Take 1 tablet (10 mg total) by mouth 3 (three) times a day   dicyclomine (BENTYL) 20 mg tablet  Self No No   Sig: Take 1 tablet (20 mg total) by mouth 4 (four) times a day (before meals and at bedtime)   fluticasone (FLONASE) 50 mcg/act nasal spray   No No   Si spray  into each nostril daily   magnesium Oxide (MAG-OX) 400 mg TABS   No No   Sig: Take 1 tablet (400 mg total) by mouth 2 (two) times a day   omeprazole (PriLOSEC) 40 MG capsule   No No   Sig: Take 1 capsule (40 mg total) by mouth daily   ondansetron (ZOFRAN-ODT) 4 mg disintegrating tablet   No No   Sig: Take 1 tablet (4 mg total) by mouth every 6 (six) hours as needed for nausea or vomiting   rizatriptan (Maxalt) 5 mg tablet   No No   Sig: Take 1 tablet (5 mg total) by mouth once as needed for migraine for up to 1 dose May repeat in 2 hours if needed   topiramate (TOPAMAX) 50 MG tablet   No No   Sig: Take 1 tab in AM, 2 tabs in PM   venlafaxine (EFFEXOR-XR) 75 mg 24 hr capsule   No No   Sig: Take 1 capsule (75 mg total) by mouth daily   zolpidem (AMBIEN) 10 mg tablet   No No   Sig: Take 0.5-1 tablets (5-10 mg total) by mouth daily at bedtime as needed for sleep      Facility-Administered Medications: None       Past Medical History:   Diagnosis Date    Allergic     Anxiety     Depression     Fibroid     GERD (gastroesophageal reflux disease)     Headache(784.0)     History of pulmonary embolus (PE)     Iliotibial band syndrome     last assessed - 10Mar2014    Mitral valve prolapse     Obesity     Pneumonia     2018    PONV (postoperative nausea and vomiting)     Patient requests to be premedicated for N/V    Wears glasses     for reading       Past Surgical History:   Procedure Laterality Date    BREAST BIOPSY Left 08/07/2023    us guided biopsy benign    CHOLECYSTECTOMY      ESOPHAGOGASTRODUODENOSCOPY N/A 05/31/2018    Procedure: ESOPHAGOGASTRODUODENOSCOPY (EGD);  Surgeon: Jayro Hannah MD;  Location: [facility];  Service: Gastroenterology    GALLBLADDER SURGERY      KNEE SURGERY Left     growth plate fx left knee    KNEE SURGERY Right     ND HYSTEROSCOPY BX ENDOMETRIUM&/POLYPC W/WO D&C N/A 08/03/2020    Procedure: DILATATION AND CURETTAGE (D&C) WITH HYSTEROSCOPY (MYOSURE);  Surgeon: Laura Frazier DO;  [facility];  Service: Gynecology    TX HYSTEROSCOPY BX ENDOMETRIUM&/POLYPC W/WO D&C N/A 08/03/2020    Procedure: HYSTEROSCOPY WITH  RESECTION UTERINE TUMOR/FIBROID;  Surgeon: Laura Frazier DO;  [facility];  Service: Gynecology    TX HYSTEROSCOPY ENDOMETRIAL ABLATION N/A 08/03/2020    Procedure: ABLATION ENDOMETRIAL NOVASURE;  Surgeon: Laura Frazier DO;  [facility];  Service: Gynecology    TX OPEN TREATMENT METATARSAL FRACTURE EACH Left 04/10/2019    Procedure: OPEN REDUCTION W/ INTERNAL FIXATION (ORIF) FOOT, Left 5th metatarsal fracture open reduction internal fixation with calcaneal bone graft and bone marrow aspirate concentrate;  Surgeon: James R Lachman, MD;  [facility];  Service: Orthopedics    TUBAL LIGATION      UPPER GASTROINTESTINAL ENDOSCOPY      US GUIDED BREAST BIOPSY LEFT COMPLETE Left 08/07/2023    WISDOM TOOTH EXTRACTION         Family History   Adopted: Yes   Problem Relation Age of Onset    No Known Problems Mother     Alcohol abuse Neg Hx     Substance Abuse Neg Hx     Mental illness Neg Hx     Depression Neg Hx      I have reviewed and agree with the history as documented.    E-Cigarette/Vaping    E-Cigarette Use Current Every Day User     Comments medical marijuana vape daily      E-Cigarette/Vaping Substances    Nicotine Yes     THC Yes     CBD No     Flavoring No     Other No     Unknown No      Social History     Tobacco Use    Smoking status: Never    Smokeless tobacco: Never   Vaping Use    Vaping status: Every Day    Substances: Nicotine, THC   Substance Use Topics    Alcohol use: Not Currently    Drug use: Yes     Frequency: 7.0 times per week     Types: Marijuana     Comment: Medical marijuana        Review of Systems   Constitutional:  Positive for appetite change and chills. Negative for fever.   HENT:  Negative for ear pain and sore throat.    Eyes:  Negative for pain and visual disturbance.   Respiratory:  Negative for cough and shortness of  breath.    Cardiovascular:  Negative for chest pain and palpitations.   Gastrointestinal:  Positive for abdominal pain, nausea and vomiting. Negative for constipation and diarrhea.   Genitourinary:  Negative for dysuria and hematuria.   Musculoskeletal:  Negative for arthralgias and back pain.   Skin:  Negative for color change and rash.   Neurological:  Negative for seizures and syncope.   All other systems reviewed and are negative.      Physical Exam  ED Triage Vitals [07/01/24 0703]   Temperature Pulse Respirations Blood Pressure SpO2   (!) 97.4 °F (36.3 °C) 63 18 116/86 99 %      Temp src Heart Rate Source Patient Position - Orthostatic VS BP Location FiO2 (%)   -- Monitor Sitting Right arm --      Pain Score       --             Orthostatic Vital Signs  Vitals:    07/01/24 0703 07/01/24 0730 07/01/24 0828   BP: 116/86 123/86 123/69   Pulse: 63 58 78   Patient Position - Orthostatic VS: Sitting         Physical Exam  Vitals and nursing note reviewed.   Constitutional:       General: She is not in acute distress.     Appearance: Normal appearance.   HENT:      Head: Normocephalic and atraumatic.      Right Ear: External ear normal.      Left Ear: External ear normal.      Nose: Nose normal.      Mouth/Throat:      Mouth: Mucous membranes are moist.   Eyes:      Conjunctiva/sclera: Conjunctivae normal.   Cardiovascular:      Rate and Rhythm: Normal rate and regular rhythm.   Pulmonary:      Effort: Pulmonary effort is normal. No respiratory distress.      Breath sounds: Normal breath sounds.   Abdominal:      General: Abdomen is flat. Bowel sounds are normal.      Palpations: Abdomen is soft.      Tenderness: There is abdominal tenderness (Mild, epigastric). There is no right CVA tenderness, left CVA tenderness, guarding or rebound.   Musculoskeletal:         General: Normal range of motion.      Cervical back: Normal range of motion.   Skin:     General: Skin is warm and dry.      Capillary Refill: Capillary  refill takes less than 2 seconds.   Neurological:      Mental Status: She is alert. Mental status is at baseline.   Psychiatric:         Mood and Affect: Mood normal.         ED Medications  Medications   sodium chloride 0.9 % bolus 1,000 mL (0 mL Intravenous Stopped 7/1/24 0857)   metoclopramide (REGLAN) injection 10 mg (10 mg Intravenous Given 7/1/24 0723)   Famotidine (PF) (PEPCID) injection 20 mg (20 mg Intravenous Given 7/1/24 0722)   diphenhydrAMINE (BENADRYL) injection 12.5 mg (12.5 mg Intravenous Given 7/1/24 0723)   droperidol (INAPSINE) injection 2.5 mg (2.5 mg Intravenous Given 7/1/24 0823)       Diagnostic Studies  Results Reviewed       Procedure Component Value Units Date/Time    Rapid drug screen, urine [[id number]] Updated: 07/01/24 0932    Lab Status: No result Specimen: Urine, Clean Catch     hCG, qualitative pregnancy [[id number]]  (Normal) Collected: 07/01/24 0722    Lab Status: Final result Specimen: Blood from Arm, Left Updated: 07/01/24 0804     Preg, Serum Negative    Comprehensive metabolic panel [[id number]]  (Abnormal) Collected: 07/01/24 0722    Lab Status: Final result Specimen: Blood from Arm, Left Updated: 07/01/24 0757     Sodium 138 mmol/L      Potassium 4.0 mmol/L      Chloride 108 mmol/L      CO2 21 mmol/L      ANION GAP 9 mmol/L      BUN 11 mg/dL      Creatinine 0.93 mg/dL      Glucose 109 mg/dL      Calcium 9.5 mg/dL      AST 18 U/L      ALT 13 U/L      Alkaline Phosphatase 43 U/L      Total Protein 7.5 g/dL      Albumin 4.5 g/dL      Total Bilirubin 1.10 mg/dL      eGFR 76 ml/min/1.73sq m     Narrative:      National Kid[employer] guidelines for Chronic Kidney Disease (CKD):     Stage 1 with normal or high GFR (GFR > 90 mL/min/1.73 square meters)    Stage 2 Mild CKD (GFR = 60-89 mL/min/1.73 square meters)    Stage 3A Moderate CKD (GFR = 45-59 mL/min/1.73 square meters)    Stage 3B Moderate CKD (GFR = 30-44 mL/min/1.73 square meters)    Stage 4 Severe CKD (GFR = 15-29  mL/min/1.73 square meters)    Stage 5 End Stage CKD (GFR <15 mL/min/1.73 square meters)  Note: GFR calculation is accurate only with a steady state creatinine    Lipase [[id number]]  (Normal) Collected: 07/01/24 0722    Lab Status: Final result Specimen: Blood from Arm, Left Updated: 07/01/24 0757     Lipase 11 u/L     Urine Microscopic [[id number]]  (Abnormal) Collected: 07/01/24 0718    Lab Status: Final result Specimen: Urine, Clean Catch Updated: 07/01/24 0742     RBC, UA 4-10 /hpf      WBC, UA Innumerable /hpf      Epithelial Cells Moderate /hpf      Bacteria, UA None Seen /hpf      MUCUS THREADS Occasional     WBC Clumps Present    Urine culture [[id number]] Collected: 07/01/24 0718    Lab Status: In process Specimen: Urine, Clean Catch Updated: 07/01/24 0742    CBC and differential [[id number]] Collected: 07/01/24 0722    Lab Status: Final result Specimen: Blood from Arm, Left Updated: 07/01/24 0741     WBC 7.41 Thousand/uL      RBC 4.63 Million/uL      Hemoglobin 14.4 g/dL      Hematocrit 43.5 %      MCV 94 fL      MCH 31.1 pg      MCHC 33.1 g/dL      RDW 12.7 %      MPV 9.3 fL      Platelets 256 Thousands/uL      nRBC 0 /100 WBCs      Segmented % 60 %      Immature Grans % 0 %      Lymphocytes % 31 %      Monocytes % 6 %      Eosinophils Relative 2 %      Basophils Relative 1 %      Absolute Neutrophils 4.43 Thousands/µL      Absolute Immature Grans 0.03 Thousand/uL      Absolute Lymphocytes 2.31 Thousands/µL      Absolute Monocytes 0.45 Thousand/µL      Eosinophils Absolute 0.13 Thousand/µL      Basophils Absolute 0.06 Thousands/µL     UA w Reflex to Microscopic w Reflex to Culture [[id number]]  (Abnormal) Collected: 07/01/24 0718    Lab Status: Final result Specimen: Urine, Clean Catch Updated: 07/01/24 0737     Color, UA Orange     Clarity, UA Extra Turbid     Specific Gravity, UA 1.033     pH, UA 6.0     Leukocytes, UA Small     Nitrite, UA Negative     Protein, UA 30 (1+) mg/dl      Glucose, UA Negative  mg/dl      Ketones, UA Negative mg/dl      Urobilinogen, UA <2.0 mg/dl      Bilirubin, UA Negative     Occult Blood, UA Small                   No orders to display         Procedures  Procedures      ED Course                                       Medical Decision Making  Patient presents with:  Vomiting: Vomiting since Friday. Hx gastroparesis. +burning while urinating. Denies diarrhea/constipation      Patient seen and examined noted to have mild epigastric tenderness on examination, no guarding, no rebound, no CVA tenderness bilaterally,.      Differential diagnosis includes but is not limited to urinary tract infection, pyelonephritis, gastritis.      Patient's labs notable for: Electrolytes within normal limits, negative hCG, no leukocytosis or evidence of anemia    Patient had significant improvement of symptoms with droperidol administration, concern for cannabis induced hyperemesis.  Patient was counseled on cessation of marijuana products to help with ease of symptoms.       Patient appears well, is nontoxic appearing, patient expresses understanding and agrees with plan of care at this time.  In light of this patient would benefit from outpatient management.    Patient was rx'd as below        Amount and/or Complexity of Data Reviewed  Labs: ordered.    Risk  Prescription drug management.          Disposition  Final diagnoses:   Nausea and vomiting, unspecified vomiting type   Marijuana use     Time reflects when diagnosis was documented in both MDM as applicable and the Disposition within this note       Time User Action Codes Description Comment    7/1/2024  9:06 AM Benjamin Heredia [R11.2] Nausea and vomiting, unspecified vomiting type     7/1/2024  9:07 AM Benjamin Heredia [F12.90] Marijuana use           ED Disposition       ED Disposition   Discharge    Condition   Stable    Date/Time   Mon Jul 1, 2024  9:06 AM    Comment   Brielle Pickard discharge to home/self care.                   Follow-up  Information       Follow up With Specialties Details Why Contact Info    Fabiana Rajan MD Internal Medicine Call  For review of findings and symptoms. [address] [facility]  [address] [place] [address]  [phone]              Discharge Medication List as of 7/1/2024  9:07 AM        CONTINUE these medications which have NOT CHANGED    Details   ALPRAZolam (XANAX) 0.25 mg tablet Take 1 tablet (0.25 mg total) by mouth daily as needed for anxiety, Starting Thu 6/20/2024, Normal      busPIRone (BUSPAR) 10 mg tablet Take 1 tablet (10 mg total) by mouth 3 (three) times a day, Starting Thu 4/11/2024, Normal      Cetirizine HCl 10 MG CAPS Take 10 mg by mouth., Historical Med      Cholecalciferol (Vitamin D3) 50 MCG (2000 UT) capsule Take 1 capsule (2,000 Units total) by mouth daily, Starting Mon 10/30/2023, Normal      dicyclomine (BENTYL) 20 mg tablet Take 1 tablet (20 mg total) by mouth 4 (four) times a day (before meals and at bedtime), Starting Wed 10/13/2021, Normal      fluticasone (FLONASE) 50 mcg/act nasal spray 1 spray into each nostril daily, Starting Tue 5/3/2022, Normal      magnesium Oxide (MAG-OX) 400 mg TABS Take 1 tablet (400 mg total) by mouth 2 (two) times a day, Starting Tue 5/30/2023, Normal      omeprazole (PriLOSEC) 40 MG capsule Take 1 capsule (40 mg total) by mouth daily, Starting Sun 4/7/2024, Normal      ondansetron (ZOFRAN-ODT) 4 mg disintegrating tablet Take 1 tablet (4 mg total) by mouth every 6 (six) hours as needed for nausea or vomiting, Starting Thu 3/9/2023, Normal      Riboflavin (Vitamin B-2) 100 MG TABS Take 1 tablet (100 mg total) by mouth 2 (two) times a day, Starting Thu 2/29/2024, Normal      rizatriptan (Maxalt) 5 mg tablet Take 1 tablet (5 mg total) by mouth once as needed for migraine for up to 1 dose May repeat in 2 hours if needed, Starting Mon 12/4/2023, Normal      topiramate (TOPAMAX) 50 MG tablet Take 1 tab in AM, 2 tabs in PM, Normal      venlafaxine (EFFEXOR-XR) 75  mg 24 hr capsule Take 1 capsule (75 mg total) by mouth daily, Starting Sun 4/7/2024, Normal      zolpidem (AMBIEN) 10 mg tablet Take 0.5-1 tablets (5-10 mg total) by mouth daily at bedtime as needed for sleep, Starting Thu 6/20/2024, Normal           No discharge procedures on file.    PDMP Review         Value Time User    PDMP Reviewed  Yes 6/20/2024  9:28 AM LESTER Hagan             ED Provider  Attending physically available and evaluated Brielle Pickard. I managed the patient along with the ED Attending.    Electronically Signed by           Jaun Vaughn MD  07/01/24 5502

## 2024-07-01 NOTE — DISCHARGE INSTRUCTIONS
As discussed, it was recommended that another party drive you home given the potential sedation of the medication provided.     Also to be considered would be marijuana hyperemesis syndrome. Considering discontinuing use of marijuana for resolution of symptoms.

## 2024-07-03 LAB — BACTERIA UR CULT: NORMAL

## 2024-07-16 DIAGNOSIS — K21.00 GASTROESOPHAGEAL REFLUX DISEASE WITH ESOPHAGITIS WITHOUT HEMORRHAGE: ICD-10-CM

## 2024-07-16 DIAGNOSIS — K25.3 ACUTE GASTRIC EROSION: ICD-10-CM

## 2024-07-17 RX ORDER — OMEPRAZOLE 40 MG/1
40 CAPSULE, DELAYED RELEASE ORAL DAILY
Qty: 90 CAPSULE | Refills: 1 | Status: SHIPPED | OUTPATIENT
Start: 2024-07-17

## 2024-07-22 DIAGNOSIS — F41.9 ANXIETY: ICD-10-CM

## 2024-07-22 DIAGNOSIS — G47.09 OTHER INSOMNIA: ICD-10-CM

## 2024-07-22 RX ORDER — ALPRAZOLAM 0.25 MG/1
0.25 TABLET ORAL DAILY PRN
Qty: 30 TABLET | Refills: 0 | Status: SHIPPED | OUTPATIENT
Start: 2024-07-22

## 2024-07-22 RX ORDER — ZOLPIDEM TARTRATE 10 MG/1
5-10 TABLET ORAL
Qty: 30 TABLET | Refills: 0 | Status: SHIPPED | OUTPATIENT
Start: 2024-07-22

## 2024-07-23 DIAGNOSIS — F41.1 GENERALIZED ANXIETY DISORDER: ICD-10-CM

## 2024-07-23 RX ORDER — VENLAFAXINE HYDROCHLORIDE 75 MG/1
75 CAPSULE, EXTENDED RELEASE ORAL DAILY
Qty: 100 CAPSULE | Refills: 1 | Status: SHIPPED | OUTPATIENT
Start: 2024-07-23

## 2024-07-24 ENCOUNTER — RA CDI HCC (OUTPATIENT)
Dept: OTHER | Facility: HOSPITAL | Age: 44
End: 2024-07-24

## 2024-07-24 NOTE — PROGRESS NOTES
HCC coding opportunities       Chart reviewed, no opportunity found: CHART REVIEWED, NO OPPORTUNITY FOUND        Patients Insurance        Commercial Insurance: [employer]       
20-Jan-2022 02:18

## 2024-07-25 ENCOUNTER — APPOINTMENT (OUTPATIENT)
Dept: LAB | Facility: CLINIC | Age: 44
End: 2024-07-25
Payer: COMMERCIAL

## 2024-07-25 DIAGNOSIS — E78.2 MIXED HYPERLIPIDEMIA: ICD-10-CM

## 2024-07-25 DIAGNOSIS — E55.9 VITAMIN D DEFICIENCY: ICD-10-CM

## 2024-07-25 DIAGNOSIS — E53.8 VITAMIN B12 DEFICIENCY: ICD-10-CM

## 2024-07-25 DIAGNOSIS — Z00.6 ENCOUNTER FOR EXAMINATION FOR NORMAL COMPARISON OR CONTROL IN CLINICAL RESEARCH PROGRAM: ICD-10-CM

## 2024-07-25 LAB
25(OH)D3 SERPL-MCNC: 45.1 NG/ML (ref 30–100)
ALBUMIN SERPL BCG-MCNC: 4.3 G/DL (ref 3.5–5)
ALP SERPL-CCNC: 48 U/L (ref 34–104)
ALT SERPL W P-5'-P-CCNC: 15 U/L (ref 7–52)
ANION GAP SERPL CALCULATED.3IONS-SCNC: 7 MMOL/L (ref 4–13)
AST SERPL W P-5'-P-CCNC: 13 U/L (ref 13–39)
BASOPHILS # BLD AUTO: 0.07 THOUSANDS/ÂΜL (ref 0–0.1)
BASOPHILS NFR BLD AUTO: 1 % (ref 0–1)
BILIRUB SERPL-MCNC: 0.74 MG/DL (ref 0.2–1)
BUN SERPL-MCNC: 14 MG/DL (ref 5–25)
CALCIUM SERPL-MCNC: 9.1 MG/DL (ref 8.4–10.2)
CHLORIDE SERPL-SCNC: 110 MMOL/L (ref 96–108)
CHOLEST SERPL-MCNC: 192 MG/DL
CO2 SERPL-SCNC: 21 MMOL/L (ref 21–32)
CREAT SERPL-MCNC: 1.17 MG/DL (ref 0.6–1.3)
EOSINOPHIL # BLD AUTO: 0.27 THOUSAND/ÂΜL (ref 0–0.61)
EOSINOPHIL NFR BLD AUTO: 4 % (ref 0–6)
ERYTHROCYTE [DISTWIDTH] IN BLOOD BY AUTOMATED COUNT: 12.7 % (ref 11.6–15.1)
GFR SERPL CREATININE-BSD FRML MDRD: 58 ML/MIN/1.73SQ M
GLUCOSE P FAST SERPL-MCNC: 93 MG/DL (ref 65–99)
HCT VFR BLD AUTO: 43.2 % (ref 34.8–46.1)
HDLC SERPL-MCNC: 44 MG/DL
HGB BLD-MCNC: 14.2 G/DL (ref 11.5–15.4)
IMM GRANULOCYTES # BLD AUTO: 0.05 THOUSAND/UL (ref 0–0.2)
IMM GRANULOCYTES NFR BLD AUTO: 1 % (ref 0–2)
LDLC SERPL CALC-MCNC: 122 MG/DL (ref 0–100)
LYMPHOCYTES # BLD AUTO: 2.19 THOUSANDS/ÂΜL (ref 0.6–4.47)
LYMPHOCYTES NFR BLD AUTO: 36 % (ref 14–44)
MCH RBC QN AUTO: 30.9 PG (ref 26.8–34.3)
MCHC RBC AUTO-ENTMCNC: 32.9 G/DL (ref 31.4–37.4)
MCV RBC AUTO: 94 FL (ref 82–98)
MONOCYTES # BLD AUTO: 0.48 THOUSAND/ÂΜL (ref 0.17–1.22)
MONOCYTES NFR BLD AUTO: 8 % (ref 4–12)
NEUTROPHILS # BLD AUTO: 3.07 THOUSANDS/ÂΜL (ref 1.85–7.62)
NEUTS SEG NFR BLD AUTO: 50 % (ref 43–75)
NONHDLC SERPL-MCNC: 148 MG/DL
NRBC BLD AUTO-RTO: 0 /100 WBCS
PLATELET # BLD AUTO: 245 THOUSANDS/UL (ref 149–390)
PMV BLD AUTO: 9.2 FL (ref 8.9–12.7)
POTASSIUM SERPL-SCNC: 4.3 MMOL/L (ref 3.5–5.3)
PROT SERPL-MCNC: 7.4 G/DL (ref 6.4–8.4)
RBC # BLD AUTO: 4.59 MILLION/UL (ref 3.81–5.12)
SODIUM SERPL-SCNC: 138 MMOL/L (ref 135–147)
TRIGL SERPL-MCNC: 129 MG/DL
TSH SERPL DL<=0.05 MIU/L-ACNC: 1.56 UIU/ML (ref 0.45–4.5)
VIT B12 SERPL-MCNC: 820 PG/ML (ref 180–914)
WBC # BLD AUTO: 6.13 THOUSAND/UL (ref 4.31–10.16)

## 2024-07-25 PROCEDURE — 80053 COMPREHEN METABOLIC PANEL: CPT

## 2024-07-25 PROCEDURE — 82306 VITAMIN D 25 HYDROXY: CPT

## 2024-07-25 PROCEDURE — 36415 COLL VENOUS BLD VENIPUNCTURE: CPT

## 2024-07-25 PROCEDURE — 80061 LIPID PANEL: CPT

## 2024-07-25 PROCEDURE — 84443 ASSAY THYROID STIM HORMONE: CPT

## 2024-07-25 PROCEDURE — 82607 VITAMIN B-12: CPT

## 2024-07-25 PROCEDURE — 85025 COMPLETE CBC W/AUTO DIFF WBC: CPT

## 2024-07-31 ENCOUNTER — OFFICE VISIT (OUTPATIENT)
Dept: INTERNAL MEDICINE CLINIC | Facility: CLINIC | Age: 44
End: 2024-07-31
Payer: COMMERCIAL

## 2024-07-31 VITALS
SYSTOLIC BLOOD PRESSURE: 132 MMHG | BODY MASS INDEX: 28.68 KG/M2 | OXYGEN SATURATION: 98 % | HEART RATE: 82 BPM | DIASTOLIC BLOOD PRESSURE: 76 MMHG | TEMPERATURE: 97.7 F | HEIGHT: 64 IN | WEIGHT: 168 LBS

## 2024-07-31 DIAGNOSIS — G43.909 MIGRAINE WITHOUT STATUS MIGRAINOSUS, NOT INTRACTABLE, UNSPECIFIED MIGRAINE TYPE: ICD-10-CM

## 2024-07-31 DIAGNOSIS — E78.2 MIXED HYPERLIPIDEMIA: ICD-10-CM

## 2024-07-31 DIAGNOSIS — G47.09 OTHER INSOMNIA: ICD-10-CM

## 2024-07-31 DIAGNOSIS — R53.83 OTHER FATIGUE: ICD-10-CM

## 2024-07-31 DIAGNOSIS — R06.83 SNORING: ICD-10-CM

## 2024-07-31 DIAGNOSIS — Z71.9 HEALTH COUNSELING: ICD-10-CM

## 2024-07-31 DIAGNOSIS — K31.84 GASTROPARESIS: Primary | ICD-10-CM

## 2024-07-31 DIAGNOSIS — F41.1 GENERALIZED ANXIETY DISORDER: ICD-10-CM

## 2024-07-31 DIAGNOSIS — K21.00 GASTROESOPHAGEAL REFLUX DISEASE WITH ESOPHAGITIS WITHOUT HEMORRHAGE: ICD-10-CM

## 2024-07-31 DIAGNOSIS — E66.3 OVERWEIGHT (BMI 25.0-29.9): ICD-10-CM

## 2024-07-31 PROCEDURE — 99214 OFFICE O/P EST MOD 30 MIN: CPT | Performed by: INTERNAL MEDICINE

## 2024-07-31 NOTE — ASSESSMENT & PLAN NOTE
Intermittent symptoms, was at the ER earlier this month.  Encouraged to stop using medical marijuana, if possible since may exacerbate symptoms.  Weight gain since last visit, appetite improved.  Keep appointment with GI.

## 2024-07-31 NOTE — PROGRESS NOTES
Ambulatory Visit  Name: Brielle Pickard      : 1980      MRN: [id number]  Encounter Provider: Fabiana Rajan MD  Encounter Date: 2024   Encounter department: [facility] INTERNAL MEDICINE    Assessment & Plan   1. Gastroparesis  Assessment & Plan:  Intermittent symptoms, was at the ER earlier this month.  Encouraged to stop using medical marijuana, if possible since may exacerbate symptoms.  Weight gain since last visit, appetite improved.  Keep appointment with GI.  Orders:  -     Basic metabolic panel; Future; Expected date: 2024  2. Gastroesophageal reflux disease with esophagitis without hemorrhage  Assessment & Plan:  Taking PPI daily.  3. Generalized anxiety disorder  Assessment & Plan:  Stable, continue venlafaxine 75 mg daily.  May continue with buspirone 10 mg bid, may take tid.  4. Mixed hyperlipidemia  Assessment & Plan:  LDL remains elevated.  5. Other insomnia  Assessment & Plan:  Discussed sleep hygiene.  6. Migraine without status migrainosus, not intractable, unspecified migraine type  Assessment & Plan:  Stable. On topiramate 150 mg daily.  Continue B2, Mg.  7. Overweight (BMI 25.0-29.9)  Assessment & Plan:  Gained 7 lbs since last visit.  8. Other fatigue  Comments:  Recommend pets out of bed/ bedroom.  9. Snoring  Comments:  Agrees to do home sleep study.  Orders:  -     Ambulatory Referral to Sleep Medicine; Future  10. Health counseling  Comments:  Repeat BMP in a week, decreased kidney function. Instructed to drink more water, not juice.    Follow up in 5 months or as needed.       History of Present Illness     She has been feeling overall well.  She had a bad episode of nausea and vomiting earlier this month.  She was unable to keep anything in by the third day and went to the ER.  She is trying not to use the medical marijuana too often, usually uses it in the weekend when she does not have a regular eating schedule.  She takes Zofran as needed.    She  "reports having migraine headaches about once a week.  She tries not to take Maxalt but does so if needed 2.    She claims of feeling tired all the time.  She feels very tired and sleepy during the day, still able to work.  She feels she does not get a good night sleep at night.  Her 2 cats sleep with her in bed.  She does snore at night.  She continues to take Xanax and Ambien every night for sleep.  She feels her anxiety has been pretty good.      Review of Systems   Constitutional:  Positive for fatigue. Negative for appetite change.   HENT:  Negative for congestion, ear pain and postnasal drip.    Eyes:  Negative for visual disturbance.   Respiratory:  Negative for cough and shortness of breath.    Cardiovascular:  Negative for chest pain and leg swelling.   Gastrointestinal:  Negative for abdominal pain, constipation, diarrhea, nausea and vomiting.   Genitourinary:  Negative for dysuria, frequency and urgency.   Musculoskeletal:  Negative for arthralgias and myalgias.   Skin:  Negative for rash and wound.   Neurological:  Positive for headaches. Negative for dizziness and numbness.   Hematological:  Does not bruise/bleed easily.   Psychiatric/Behavioral:  Positive for sleep disturbance. Negative for confusion and dysphoric mood. The patient is not nervous/anxious.        Objective     /76   Pulse 82   Temp 97.7 °F (36.5 °C)   Ht 5' 4\" (1.626 m)   Wt 76.2 kg (168 lb)   SpO2 98%   BMI 28.84 kg/m²     Physical Exam  Vitals and nursing note reviewed.   Constitutional:       General: She is not in acute distress.     Appearance: She is well-developed.   HENT:      Head: Normocephalic and atraumatic.   Eyes:      Pupils: Pupils are equal, round, and reactive to light.   Cardiovascular:      Rate and Rhythm: Normal rate and regular rhythm.      Heart sounds: Normal heart sounds.   Pulmonary:      Effort: Pulmonary effort is normal.      Breath sounds: Normal breath sounds. No wheezing.   Abdominal:      " General: Bowel sounds are normal.      Palpations: Abdomen is soft.   Musculoskeletal:         General: No swelling.      Right lower leg: No edema.      Left lower leg: No edema.   Skin:     General: Skin is warm.      Findings: No rash.   Neurological:      General: No focal deficit present.      Mental Status: She is alert and oriented to person, place, and time.   Psychiatric:         Mood and Affect: Mood and affect normal. Mood is not anxious or depressed.         Behavior: Behavior normal.       Administrative Statements       Labs & imaging results reviewed with patient.

## 2024-08-04 DIAGNOSIS — F41.9 ANXIETY: ICD-10-CM

## 2024-08-04 RX ORDER — BUSPIRONE HYDROCHLORIDE 10 MG/1
10 TABLET ORAL 3 TIMES DAILY
Qty: 270 TABLET | Refills: 1 | Status: SHIPPED | OUTPATIENT
Start: 2024-08-04

## 2024-08-07 LAB
APOB+LDLR+PCSK9 GENE MUT ANL BLD/T: NOT DETECTED
BRCA1+BRCA2 DEL+DUP + FULL MUT ANL BLD/T: NOT DETECTED
MLH1+MSH2+MSH6+PMS2 GN DEL+DUP+FUL M: NOT DETECTED

## 2024-08-08 ENCOUNTER — TELEPHONE (OUTPATIENT)
Dept: GASTROENTEROLOGY | Facility: CLINIC | Age: 44
End: 2024-08-08

## 2024-08-08 NOTE — TELEPHONE ENCOUNTER
I called and lvm for the patient in regards to rescheduling appt with Graciela Hinton for 10/10/24. She is unavailable due to covering  inpatient rounds. Advised for them to call back and r/s appt.

## 2024-08-09 DIAGNOSIS — R06.83 SNORING: Primary | ICD-10-CM

## 2024-08-24 DIAGNOSIS — G47.09 OTHER INSOMNIA: ICD-10-CM

## 2024-08-24 DIAGNOSIS — F41.9 ANXIETY: ICD-10-CM

## 2024-08-26 RX ORDER — ZOLPIDEM TARTRATE 10 MG/1
5-10 TABLET ORAL
Qty: 30 TABLET | Refills: 0 | Status: SHIPPED | OUTPATIENT
Start: 2024-08-26

## 2024-08-26 RX ORDER — ALPRAZOLAM 0.25 MG
0.25 TABLET ORAL DAILY PRN
Qty: 30 TABLET | Refills: 0 | Status: SHIPPED | OUTPATIENT
Start: 2024-08-26

## 2024-09-24 ENCOUNTER — OFFICE VISIT (OUTPATIENT)
Dept: GASTROENTEROLOGY | Facility: CLINIC | Age: 44
End: 2024-09-24
Payer: COMMERCIAL

## 2024-09-24 VITALS
DIASTOLIC BLOOD PRESSURE: 72 MMHG | TEMPERATURE: 99.3 F | HEIGHT: 64 IN | WEIGHT: 169.4 LBS | BODY MASS INDEX: 28.92 KG/M2 | SYSTOLIC BLOOD PRESSURE: 112 MMHG

## 2024-09-24 DIAGNOSIS — K31.84 GASTROPARESIS: ICD-10-CM

## 2024-09-24 DIAGNOSIS — R11.0 NAUSEA: Primary | ICD-10-CM

## 2024-09-24 DIAGNOSIS — K59.09 CHRONIC CONSTIPATION: ICD-10-CM

## 2024-09-24 DIAGNOSIS — R11.2 NAUSEA AND VOMITING, UNSPECIFIED VOMITING TYPE: ICD-10-CM

## 2024-09-24 PROCEDURE — 99214 OFFICE O/P EST MOD 30 MIN: CPT | Performed by: PHYSICIAN ASSISTANT

## 2024-09-24 RX ORDER — PROMETHAZINE HYDROCHLORIDE 12.5 MG/1
12.5 TABLET ORAL EVERY 6 HOURS PRN
Qty: 60 TABLET | Refills: 1 | Status: SHIPPED | OUTPATIENT
Start: 2024-09-24

## 2024-09-24 NOTE — H&P (VIEW-ONLY)
"[facility] - Outpatient Follow-up Note  Brielle Pickard 43 y.o. female MRN: [id number]  Encounter: [id number]          ASSESSMENT AND PLAN:      Gastroparesis   N/V  Chronic constipation   EGD w botox was done last summer and she says she felt \"absolutely amazing\" after this. She says her n/v, feeling of fullness and bloating completely stopped until several months ago when this re-started. She is having n/v with meals at least once/day now. She says being on Mg oxide BID has alleviated her constipation but she feels she is going \"too much\" as she has several Bms/day. She says her Bms vary from soft-loose. She says zofran is no longer helping and she would like to avoid reglan for now. She requests repeat EGD w botox. Of note: she says she is on a strict gastroparesis diet but this is not helping her  -EGD w botox ordered  -stop zofran  -start phenergan 12.5 daily PRN nausea  -DECREASE Mg oxide 400 mg BID to once/day for 1 week: if this is still not helping in a week, give us a call  -continue gastroparesis diet: dietician referral placed  -continue omeprazole 40 mg daily  -start OTC BEANO with meals as needed  ______________________________________________________________________    SUBJECTIVE:  She says her n/v, feeling of fullness and bloating completely stopped until several months ago when this re-started. She says being on Mg oxide BID has alleviated her constipation but she feels she is going \"too much\" as she has several Bms/day. She says her Bms vary from soft-loose. She says zofran is no longer helping and she would like to avoid reglan for now.  she says she is on a strict gastroparesis diet but this is not helping her. She is having n/v with meals at least once/day now. She denies heartburn, trouble swallowing, fevers, chills, night sweats, bloody or black BM. She denies NSAID use.       REVIEW OF SYSTEMS IS OTHERWISE NEGATIVE.      Historical Information   Past Medical " History:   Diagnosis Date    Allergic     Anxiety     Depression     Fibroid     GERD (gastroesophageal reflux disease)     Headache(784.0)     History of pulmonary embolus (PE)     Iliotibial band syndrome     last assessed - 10Mar2014    Mitral valve prolapse     Obesity     Pneumonia     2018    PONV (postoperative nausea and vomiting)     Patient requests to be premedicated for N/V    Wears glasses     for reading     Past Surgical History:   Procedure Laterality Date    BREAST BIOPSY Left 08/07/2023    us guided biopsy benign    CHOLECYSTECTOMY      ESOPHAGOGASTRODUODENOSCOPY N/A 05/31/2018    Procedure: ESOPHAGOGASTRODUODENOSCOPY (EGD);  Surgeon: Jayro Hannah MD;  [facility];  Service: Gastroenterology    GALLBLADDER SURGERY      KNEE SURGERY Left     growth plate fx left knee    KNEE SURGERY Right     LA HYSTEROSCOPY BX ENDOMETRIUM&/POLYPC W/WO D&C N/A 08/03/2020    Procedure: DILATATION AND CURETTAGE (D&C) WITH HYSTEROSCOPY (MYOSURE);  Surgeon: Laura Frazier DO;  [facility];  Service: Gynecology    LA HYSTEROSCOPY BX ENDOMETRIUM&/POLYPC W/WO D&C N/A 08/03/2020    Procedure: HYSTEROSCOPY WITH  RESECTION UTERINE TUMOR/FIBROID;  Surgeon: Laura Frazier DO;  [facility];  Service: Gynecology    LA HYSTEROSCOPY ENDOMETRIAL ABLATION N/A 08/03/2020    Procedure: ABLATION ENDOMETRIAL NOVASURE;  Surgeon: Laura Frazier DO;  [facility];  Service: Gynecology    LA OPEN TREATMENT METATARSAL FRACTURE EACH Left 04/10/2019    Procedure: OPEN REDUCTION W/ INTERNAL FIXATION (ORIF) FOOT, Left 5th metatarsal fracture open reduction internal fixation with calcaneal bone graft and bone marrow aspirate concentrate;  Surgeon: James R Lachman, MD;  [facility];  Service: Orthopedics    TUBAL LIGATION      UPPER GASTROINTESTINAL ENDOSCOPY      US GUIDED BREAST BIOPSY LEFT COMPLETE Left 08/07/2023    WISDOM TOOTH EXTRACTION       Social History   Social History      Substance and Sexual Activity   Alcohol Use Not Currently     Social History     Substance and Sexual Activity   Drug Use Yes    Frequency: 7.0 times per week    Types: Marijuana    Comment: Medical marijuana     Social History     Tobacco Use   Smoking Status Never   Smokeless Tobacco Never     Family History   Adopted: Yes   Problem Relation Age of Onset    No Known Problems Mother     Alcohol abuse Neg Hx     Substance Abuse Neg Hx     Mental illness Neg Hx     Depression Neg Hx        Meds/Allergies       Current Outpatient Medications:     ALPRAZolam (XANAX) 0.25 mg tablet    busPIRone (BUSPAR) 10 mg tablet    Cetirizine HCl 10 MG CAPS    Cholecalciferol (Vitamin D3) 50 MCG (2000 UT) capsule    dicyclomine (BENTYL) 20 mg tablet    fluticasone (FLONASE) 50 mcg/act nasal spray    magnesium Oxide (MAG-OX) 400 mg TABS    omeprazole (PriLOSEC) 40 MG capsule    ondansetron (ZOFRAN-ODT) 4 mg disintegrating tablet    Riboflavin (Vitamin B-2) 100 MG TABS    rizatriptan (Maxalt) 5 mg tablet    topiramate (TOPAMAX) 50 MG tablet    venlafaxine (EFFEXOR-XR) 75 mg 24 hr capsule    zolpidem (AMBIEN) 10 mg tablet    Allergies   Allergen Reactions    Penicillin G Hives           Objective     There were no vitals taken for this visit. There is no height or weight on file to calculate BMI.      PHYSICAL EXAM:      General Appearance:   Alert, cooperative, no distress   HEENT:   Normocephalic, atraumatic, anicteric.     Neck:  Supple, symmetrical, trachea midline   Lungs:   Clear to auscultation bilaterally; no rales, rhonchi or wheezing; respirations unlabored    Heart::   Regular rate and rhythm; no murmur, rub, or gallop.   Abdomen:   Soft, non-tender, non-distended; normal bowel sounds; no masses, no organomegaly    Genitalia:   Deferred    Rectal:   Deferred    Extremities:  No cyanosis, clubbing or edema    Pulses:  2+ and symmetric    Skin:  No jaundice, rashes, or lesions    Lymph nodes:  No palpable cervical  lymphadenopathy        Lab Results:   No visits with results within 1 Day(s) from this visit.   Latest known visit with results is:   Appointment on 07/25/2024   Component Date Value    WBC 07/25/2024 6.13     RBC 07/25/2024 4.59     Hemoglobin 07/25/2024 14.2     Hematocrit 07/25/2024 43.2     MCV 07/25/2024 94     MCH 07/25/2024 30.9     MCHC 07/25/2024 32.9     RDW 07/25/2024 12.7     MPV 07/25/2024 9.2     Platelets 07/25/2024 245     nRBC 07/25/2024 0     Segmented % 07/25/2024 50     Immature Grans % 07/25/2024 1     Lymphocytes % 07/25/2024 36     Monocytes % 07/25/2024 8     Eosinophils Relative 07/25/2024 4     Basophils Relative 07/25/2024 1     Absolute Neutrophils 07/25/2024 3.07     Absolute Immature Grans 07/25/2024 0.05     Absolute Lymphocytes 07/25/2024 2.19     Absolute Monocytes 07/25/2024 0.48     Eosinophils Absolute 07/25/2024 0.27     Basophils Absolute 07/25/2024 0.07     Sodium 07/25/2024 138     Potassium 07/25/2024 4.3     Chloride 07/25/2024 110 (H)     CO2 07/25/2024 21     ANION GAP 07/25/2024 7     BUN 07/25/2024 14     Creatinine 07/25/2024 1.17     Glucose, Fasting 07/25/2024 93     Calcium 07/25/2024 9.1     AST 07/25/2024 13     ALT 07/25/2024 15     Alkaline Phosphatase 07/25/2024 48     Total Protein 07/25/2024 7.4     Albumin 07/25/2024 4.3     Total Bilirubin 07/25/2024 0.74     eGFR 07/25/2024 58     Cholesterol 07/25/2024 192     Triglycerides 07/25/2024 129     HDL, Direct 07/25/2024 44 (L)     LDL Calculated 07/25/2024 122 (H)     Non-HDL-Chol (CHOL-HDL) 07/25/2024 148     TSH 3RD GENERATON 07/25/2024 1.560     Vitamin B-12 07/25/2024 820     Vit D, 25-Hydroxy 07/25/2024 45.1     WILKINSON SYNDROME DNA MELISSA* 07/25/2024 Not Detected     HEREDITARY BREAST & OVAR* 07/25/2024 Not Detected     FAMILIAL HYPERCHOLESTERO* 07/25/2024 Not Detected          Radiology Results:   No results found.

## 2024-09-24 NOTE — PROGRESS NOTES
"[facility] - Outpatient Follow-up Note  Brielle Pickard 43 y.o. female MRN: [id number]  Encounter: [id number]          ASSESSMENT AND PLAN:      Gastroparesis   N/V  Chronic constipation   EGD w botox was done last summer and she says she felt \"absolutely amazing\" after this. She says her n/v, feeling of fullness and bloating completely stopped until several months ago when this re-started. She is having n/v with meals at least once/day now. She says being on Mg oxide BID has alleviated her constipation but she feels she is going \"too much\" as she has several Bms/day. She says her Bms vary from soft-loose. She says zofran is no longer helping and she would like to avoid reglan for now. She requests repeat EGD w botox. Of note: she says she is on a strict gastroparesis diet but this is not helping her  -EGD w botox ordered  -stop zofran  -start phenergan 12.5 daily PRN nausea  -DECREASE Mg oxide 400 mg BID to once/day for 1 week: if this is still not helping in a week, give us a call  -continue gastroparesis diet: dietician referral placed  -continue omeprazole 40 mg daily  -start OTC BEANO with meals as needed  ______________________________________________________________________    SUBJECTIVE:  She says her n/v, feeling of fullness and bloating completely stopped until several months ago when this re-started. She says being on Mg oxide BID has alleviated her constipation but she feels she is going \"too much\" as she has several Bms/day. She says her Bms vary from soft-loose. She says zofran is no longer helping and she would like to avoid reglan for now.  she says she is on a strict gastroparesis diet but this is not helping her. She is having n/v with meals at least once/day now. She denies heartburn, trouble swallowing, fevers, chills, night sweats, bloody or black BM. She denies NSAID use.       REVIEW OF SYSTEMS IS OTHERWISE NEGATIVE.      Historical Information   Past Medical " History:   Diagnosis Date    Allergic     Anxiety     Depression     Fibroid     GERD (gastroesophageal reflux disease)     Headache(784.0)     History of pulmonary embolus (PE)     Iliotibial band syndrome     last assessed - 10Mar2014    Mitral valve prolapse     Obesity     Pneumonia     2018    PONV (postoperative nausea and vomiting)     Patient requests to be premedicated for N/V    Wears glasses     for reading     Past Surgical History:   Procedure Laterality Date    BREAST BIOPSY Left 08/07/2023    us guided biopsy benign    CHOLECYSTECTOMY      ESOPHAGOGASTRODUODENOSCOPY N/A 05/31/2018    Procedure: ESOPHAGOGASTRODUODENOSCOPY (EGD);  Surgeon: Jayro Hannah MD;  [facility];  Service: Gastroenterology    GALLBLADDER SURGERY      KNEE SURGERY Left     growth plate fx left knee    KNEE SURGERY Right     NC HYSTEROSCOPY BX ENDOMETRIUM&/POLYPC W/WO D&C N/A 08/03/2020    Procedure: DILATATION AND CURETTAGE (D&C) WITH HYSTEROSCOPY (MYOSURE);  Surgeon: Laura Frazier DO;  [facility];  Service: Gynecology    NC HYSTEROSCOPY BX ENDOMETRIUM&/POLYPC W/WO D&C N/A 08/03/2020    Procedure: HYSTEROSCOPY WITH  RESECTION UTERINE TUMOR/FIBROID;  Surgeon: Laura Frazier DO;  [facility];  Service: Gynecology    NC HYSTEROSCOPY ENDOMETRIAL ABLATION N/A 08/03/2020    Procedure: ABLATION ENDOMETRIAL NOVASURE;  Surgeon: Laura Frazier DO;  [facility];  Service: Gynecology    NC OPEN TREATMENT METATARSAL FRACTURE EACH Left 04/10/2019    Procedure: OPEN REDUCTION W/ INTERNAL FIXATION (ORIF) FOOT, Left 5th metatarsal fracture open reduction internal fixation with calcaneal bone graft and bone marrow aspirate concentrate;  Surgeon: James R Lachman, MD;  [facility];  Service: Orthopedics    TUBAL LIGATION      UPPER GASTROINTESTINAL ENDOSCOPY      US GUIDED BREAST BIOPSY LEFT COMPLETE Left 08/07/2023    WISDOM TOOTH EXTRACTION       Social History   Social History      Substance and Sexual Activity   Alcohol Use Not Currently     Social History     Substance and Sexual Activity   Drug Use Yes    Frequency: 7.0 times per week    Types: Marijuana    Comment: Medical marijuana     Social History     Tobacco Use   Smoking Status Never   Smokeless Tobacco Never     Family History   Adopted: Yes   Problem Relation Age of Onset    No Known Problems Mother     Alcohol abuse Neg Hx     Substance Abuse Neg Hx     Mental illness Neg Hx     Depression Neg Hx        Meds/Allergies       Current Outpatient Medications:     ALPRAZolam (XANAX) 0.25 mg tablet    busPIRone (BUSPAR) 10 mg tablet    Cetirizine HCl 10 MG CAPS    Cholecalciferol (Vitamin D3) 50 MCG (2000 UT) capsule    dicyclomine (BENTYL) 20 mg tablet    fluticasone (FLONASE) 50 mcg/act nasal spray    magnesium Oxide (MAG-OX) 400 mg TABS    omeprazole (PriLOSEC) 40 MG capsule    ondansetron (ZOFRAN-ODT) 4 mg disintegrating tablet    Riboflavin (Vitamin B-2) 100 MG TABS    rizatriptan (Maxalt) 5 mg tablet    topiramate (TOPAMAX) 50 MG tablet    venlafaxine (EFFEXOR-XR) 75 mg 24 hr capsule    zolpidem (AMBIEN) 10 mg tablet    Allergies   Allergen Reactions    Penicillin G Hives           Objective     There were no vitals taken for this visit. There is no height or weight on file to calculate BMI.      PHYSICAL EXAM:      General Appearance:   Alert, cooperative, no distress   HEENT:   Normocephalic, atraumatic, anicteric.     Neck:  Supple, symmetrical, trachea midline   Lungs:   Clear to auscultation bilaterally; no rales, rhonchi or wheezing; respirations unlabored    Heart::   Regular rate and rhythm; no murmur, rub, or gallop.   Abdomen:   Soft, non-tender, non-distended; normal bowel sounds; no masses, no organomegaly    Genitalia:   Deferred    Rectal:   Deferred    Extremities:  No cyanosis, clubbing or edema    Pulses:  2+ and symmetric    Skin:  No jaundice, rashes, or lesions    Lymph nodes:  No palpable cervical  lymphadenopathy        Lab Results:   No visits with results within 1 Day(s) from this visit.   Latest known visit with results is:   Appointment on 07/25/2024   Component Date Value    WBC 07/25/2024 6.13     RBC 07/25/2024 4.59     Hemoglobin 07/25/2024 14.2     Hematocrit 07/25/2024 43.2     MCV 07/25/2024 94     MCH 07/25/2024 30.9     MCHC 07/25/2024 32.9     RDW 07/25/2024 12.7     MPV 07/25/2024 9.2     Platelets 07/25/2024 245     nRBC 07/25/2024 0     Segmented % 07/25/2024 50     Immature Grans % 07/25/2024 1     Lymphocytes % 07/25/2024 36     Monocytes % 07/25/2024 8     Eosinophils Relative 07/25/2024 4     Basophils Relative 07/25/2024 1     Absolute Neutrophils 07/25/2024 3.07     Absolute Immature Grans 07/25/2024 0.05     Absolute Lymphocytes 07/25/2024 2.19     Absolute Monocytes 07/25/2024 0.48     Eosinophils Absolute 07/25/2024 0.27     Basophils Absolute 07/25/2024 0.07     Sodium 07/25/2024 138     Potassium 07/25/2024 4.3     Chloride 07/25/2024 110 (H)     CO2 07/25/2024 21     ANION GAP 07/25/2024 7     BUN 07/25/2024 14     Creatinine 07/25/2024 1.17     Glucose, Fasting 07/25/2024 93     Calcium 07/25/2024 9.1     AST 07/25/2024 13     ALT 07/25/2024 15     Alkaline Phosphatase 07/25/2024 48     Total Protein 07/25/2024 7.4     Albumin 07/25/2024 4.3     Total Bilirubin 07/25/2024 0.74     eGFR 07/25/2024 58     Cholesterol 07/25/2024 192     Triglycerides 07/25/2024 129     HDL, Direct 07/25/2024 44 (L)     LDL Calculated 07/25/2024 122 (H)     Non-HDL-Chol (CHOL-HDL) 07/25/2024 148     TSH 3RD GENERATON 07/25/2024 1.560     Vitamin B-12 07/25/2024 820     Vit D, 25-Hydroxy 07/25/2024 45.1     WILKINSON SYNDROME DNA MELISSA* 07/25/2024 Not Detected     HEREDITARY BREAST & OVAR* 07/25/2024 Not Detected     FAMILIAL HYPERCHOLESTERO* 07/25/2024 Not Detected          Radiology Results:   No results found.

## 2024-09-26 DIAGNOSIS — G47.09 OTHER INSOMNIA: ICD-10-CM

## 2024-09-26 DIAGNOSIS — F41.9 ANXIETY: ICD-10-CM

## 2024-09-26 RX ORDER — ALPRAZOLAM 0.25 MG
0.25 TABLET ORAL DAILY PRN
Qty: 30 TABLET | Refills: 0 | Status: SHIPPED | OUTPATIENT
Start: 2024-09-26

## 2024-09-26 RX ORDER — ZOLPIDEM TARTRATE 10 MG/1
5-10 TABLET ORAL
Qty: 30 TABLET | Refills: 0 | Status: SHIPPED | OUTPATIENT
Start: 2024-09-26

## 2024-10-18 ENCOUNTER — ANESTHESIA (OUTPATIENT)
Dept: GASTROENTEROLOGY | Facility: HOSPITAL | Age: 44
End: 2024-10-18
Payer: COMMERCIAL

## 2024-10-18 ENCOUNTER — HOSPITAL ENCOUNTER (OUTPATIENT)
Dept: GASTROENTEROLOGY | Facility: HOSPITAL | Age: 44
Setting detail: OUTPATIENT SURGERY
End: 2024-10-18
Payer: COMMERCIAL

## 2024-10-18 ENCOUNTER — ANESTHESIA EVENT (OUTPATIENT)
Dept: GASTROENTEROLOGY | Facility: HOSPITAL | Age: 44
End: 2024-10-18
Payer: COMMERCIAL

## 2024-10-18 VITALS
SYSTOLIC BLOOD PRESSURE: 114 MMHG | WEIGHT: 169 LBS | TEMPERATURE: 98.1 F | OXYGEN SATURATION: 99 % | DIASTOLIC BLOOD PRESSURE: 71 MMHG | BODY MASS INDEX: 28.85 KG/M2 | RESPIRATION RATE: 18 BRPM | HEIGHT: 64 IN | HEART RATE: 78 BPM

## 2024-10-18 DIAGNOSIS — K31.84 GASTROPARESIS: ICD-10-CM

## 2024-10-18 LAB
EXT PREGNANCY TEST URINE: NEGATIVE
EXT. CONTROL: NORMAL

## 2024-10-18 PROCEDURE — 43236 UPPR GI SCOPE W/SUBMUC INJ: CPT | Performed by: INTERNAL MEDICINE

## 2024-10-18 PROCEDURE — 81025 URINE PREGNANCY TEST: CPT | Performed by: ANESTHESIOLOGY

## 2024-10-18 RX ORDER — ONDANSETRON 2 MG/ML
4 INJECTION INTRAMUSCULAR; INTRAVENOUS ONCE AS NEEDED
Status: CANCELLED | OUTPATIENT
Start: 2024-10-18

## 2024-10-18 RX ORDER — SODIUM CHLORIDE, SODIUM LACTATE, POTASSIUM CHLORIDE, CALCIUM CHLORIDE 600; 310; 30; 20 MG/100ML; MG/100ML; MG/100ML; MG/100ML
125 INJECTION, SOLUTION INTRAVENOUS CONTINUOUS
Status: DISCONTINUED | OUTPATIENT
Start: 2024-10-18 | End: 2024-10-22 | Stop reason: HOSPADM

## 2024-10-18 RX ORDER — PROPOFOL 10 MG/ML
INJECTION, EMULSION INTRAVENOUS AS NEEDED
Status: DISCONTINUED | OUTPATIENT
Start: 2024-10-18 | End: 2024-10-18

## 2024-10-18 RX ORDER — SODIUM CHLORIDE, SODIUM LACTATE, POTASSIUM CHLORIDE, CALCIUM CHLORIDE 600; 310; 30; 20 MG/100ML; MG/100ML; MG/100ML; MG/100ML
INJECTION, SOLUTION INTRAVENOUS CONTINUOUS PRN
Status: DISCONTINUED | OUTPATIENT
Start: 2024-10-18 | End: 2024-10-18

## 2024-10-18 RX ADMIN — PROPOFOL 100 MG: 10 INJECTION, EMULSION INTRAVENOUS at 15:04

## 2024-10-18 RX ADMIN — SODIUM CHLORIDE, SODIUM LACTATE, POTASSIUM CHLORIDE, AND CALCIUM CHLORIDE: .6; .31; .03; .02 INJECTION, SOLUTION INTRAVENOUS at 14:45

## 2024-10-18 RX ADMIN — SODIUM CHLORIDE, SODIUM LACTATE, POTASSIUM CHLORIDE, AND CALCIUM CHLORIDE 125 ML/HR: .6; .31; .03; .02 INJECTION, SOLUTION INTRAVENOUS at 14:13

## 2024-10-18 RX ADMIN — SODIUM CHLORIDE: 9 INJECTION, SOLUTION INTRAVENOUS at 15:02

## 2024-10-18 RX ADMIN — PROPOFOL 100 MG: 10 INJECTION, EMULSION INTRAVENOUS at 15:00

## 2024-10-18 RX ADMIN — PROPOFOL 50 MG: 10 INJECTION, EMULSION INTRAVENOUS at 15:02

## 2024-10-18 RX ADMIN — PROPOFOL 50 MG: 10 INJECTION, EMULSION INTRAVENOUS at 15:06

## 2024-10-18 NOTE — INTERVAL H&P NOTE
H&P reviewed. After examining the patient I find no changes in the patients condition since the H&P had been written.    Vitals:    10/18/24 1351   BP: 129/68   Pulse: 79   Resp: 14   Temp: 98.8 °F (37.1 °C)   SpO2: 100%

## 2024-10-18 NOTE — ANESTHESIA POSTPROCEDURE EVALUATION
Post-Op Assessment Note    CV Status:  Stable  Pain Score: 0    Pain management: adequate       Mental Status:  Alert   Hydration Status:  Stable   PONV Controlled:  None   Airway Patency:  Patent  There is a medical reason for not screening for obstructive sleep apnea and/or for not using two or more mitigation strategies   Post Op Vitals Reviewed: Yes    No anethesia notable event occurred.    Staff: CRNA           Last Filed PACU Vitals:  Vitals Value Taken Time   Temp     Pulse 100    /84    Resp 16    SpO2 98        Modified Mariela:  No data recorded

## 2024-10-18 NOTE — ANESTHESIA PREPROCEDURE EVALUATION
Procedure:  EGD    Relevant Problems   ANESTHESIA   (+) Motion sickness      CARDIO   (+) Hyperlipidemia   (+) Migraine headache      GI/HEPATIC   (+) GERD (gastroesophageal reflux disease)   (+) Gastric erosion      NEURO/PSYCH   (+) Generalized anxiety disorder   (+) Migraine headache      Obstetrics/Gynecology   (+) S/P dilation and curettage      Neurology/Sleep   (+) Insomnia      FEN/Gastrointestinal   (+) Acute gastritis without hemorrhage   (+) Gastroparesis   (+) Nausea and vomiting      Other   (+) History of pulmonary embolus (PE)   (+) Homocysteinemia   (+) Overweight (BMI 25.0-29.9)   (+) Vitamin B12 deficiency   (+) Vitamin D deficiency       Latest Reference Range & Units 10/18/24 14:13   PREGNANCY TEST URINE Negative  Negative     Lab Results   Component Value Date     05/11/2014    SODIUM 140 08/02/2024    K 3.9 08/02/2024     (H) 08/02/2024    CO2 22 08/02/2024    ANIONGAP 5 05/11/2014    AGAP 4 08/02/2024    BUN 9 08/02/2024    CREATININE 0.98 08/02/2024    GLUC 84 08/02/2024    GLUF 93 07/25/2024    CALCIUM 8.4 (L) 08/02/2024    AST 14 08/02/2024    ALT 13 08/02/2024    ALKPHOS 50 08/02/2024    PROT 7.6 05/11/2014    TP 6.5 08/02/2024    BILITOT 0.19 (L) 05/11/2014    TBILI 0.3 08/02/2024    EGFR 74 08/02/2024     Lab Results   Component Value Date    WBC 6.13 07/25/2024    HGB 14.2 07/25/2024    HCT 43.2 07/25/2024    MCV 94 07/25/2024     07/25/2024           Physical Exam    Airway    Mallampati score: II  TM Distance: >3 FB  Neck ROM: full     Dental       Cardiovascular      Pulmonary      Other Findings  post-pubertal.      Anesthesia Plan  ASA Score- 2     Anesthesia Type- IV sedation with anesthesia with ASA Monitors.         Additional Monitors:     Airway Plan:            Plan Factors-Exercise tolerance (METS): >4 METS.    Chart reviewed. EKG reviewed. Imaging results reviewed. Existing labs reviewed. Patient summary reviewed.                  Induction-  intravenous.    Postoperative Plan-         Informed Consent- Anesthetic plan and risks discussed with patient.  I personally reviewed this patient with the CRNA. Discussed and agreed on the Anesthesia Plan with the CRNA..

## 2024-10-18 NOTE — ANESTHESIA POSTPROCEDURE EVALUATION
Post-Op Assessment Note    CV Status:  Stable    Pain management: adequate       Mental Status:  Alert and awake   Hydration Status:  Euvolemic   PONV Controlled:  Controlled   Airway Patency:  Patent     Post Op Vitals Reviewed: Yes    No anethesia notable event occurred.    Staff: Anesthesiologist           Last Filed PACU Vitals:  Vitals Value Taken Time   Temp 98.1 °F (36.7 °C) 10/18/24 1517   Pulse 78 10/18/24 1528   /71 10/18/24 1528   Resp 18 10/18/24 1528   SpO2 99 % 10/18/24 1528       Modified Mariela:  Activity: 2 (10/18/2024  3:28 PM)  Respiration: 2 (10/18/2024  3:28 PM)  Circulation: 2 (10/18/2024  3:28 PM)  Consciousness: 2 (10/18/2024  3:28 PM)  Oxygen Saturation: 2 (10/18/2024  3:28 PM)  Modified Mariela Score: 10 (10/18/2024  3:28 PM)

## 2024-10-23 DIAGNOSIS — F41.9 ANXIETY: ICD-10-CM

## 2024-10-23 DIAGNOSIS — K25.3 ACUTE GASTRIC EROSION: ICD-10-CM

## 2024-10-23 DIAGNOSIS — G43.909 MIGRAINE WITHOUT STATUS MIGRAINOSUS, NOT INTRACTABLE, UNSPECIFIED MIGRAINE TYPE: ICD-10-CM

## 2024-10-23 DIAGNOSIS — K21.00 GASTROESOPHAGEAL REFLUX DISEASE WITH ESOPHAGITIS WITHOUT HEMORRHAGE: ICD-10-CM

## 2024-10-23 DIAGNOSIS — F41.1 GENERALIZED ANXIETY DISORDER: ICD-10-CM

## 2024-10-24 RX ORDER — OMEPRAZOLE 40 MG/1
40 CAPSULE, DELAYED RELEASE ORAL DAILY
Qty: 90 CAPSULE | Refills: 1 | Status: SHIPPED | OUTPATIENT
Start: 2024-10-24

## 2024-10-24 RX ORDER — TOPIRAMATE 50 MG/1
TABLET, FILM COATED ORAL
Qty: 270 TABLET | Refills: 1 | Status: SHIPPED | OUTPATIENT
Start: 2024-10-24

## 2024-10-24 RX ORDER — VENLAFAXINE HYDROCHLORIDE 75 MG/1
75 CAPSULE, EXTENDED RELEASE ORAL DAILY
Qty: 90 CAPSULE | Refills: 1 | Status: SHIPPED | OUTPATIENT
Start: 2024-10-24

## 2024-10-24 RX ORDER — BUSPIRONE HYDROCHLORIDE 10 MG/1
10 TABLET ORAL 3 TIMES DAILY
Qty: 270 TABLET | Refills: 1 | Status: SHIPPED | OUTPATIENT
Start: 2024-10-24

## 2024-10-28 DIAGNOSIS — F41.9 ANXIETY: ICD-10-CM

## 2024-10-28 DIAGNOSIS — G47.09 OTHER INSOMNIA: ICD-10-CM

## 2024-10-28 RX ORDER — ALPRAZOLAM 0.25 MG/1
0.25 TABLET ORAL DAILY PRN
Qty: 30 TABLET | Refills: 0 | Status: SHIPPED | OUTPATIENT
Start: 2024-10-28

## 2024-10-28 RX ORDER — ZOLPIDEM TARTRATE 10 MG/1
5-10 TABLET ORAL
Qty: 30 TABLET | Refills: 0 | Status: SHIPPED | OUTPATIENT
Start: 2024-10-28

## 2024-12-02 DIAGNOSIS — F41.9 ANXIETY: ICD-10-CM

## 2024-12-02 DIAGNOSIS — G47.09 OTHER INSOMNIA: ICD-10-CM

## 2024-12-04 RX ORDER — ZOLPIDEM TARTRATE 10 MG/1
5-10 TABLET ORAL
Qty: 30 TABLET | Refills: 0 | Status: SHIPPED | OUTPATIENT
Start: 2024-12-04

## 2024-12-04 RX ORDER — ALPRAZOLAM 0.25 MG/1
0.25 TABLET ORAL DAILY PRN
Qty: 30 TABLET | Refills: 0 | Status: SHIPPED | OUTPATIENT
Start: 2024-12-04

## 2024-12-11 ENCOUNTER — RA CDI HCC (OUTPATIENT)
Dept: OTHER | Facility: HOSPITAL | Age: 44
End: 2024-12-11

## 2025-01-07 DIAGNOSIS — G47.09 OTHER INSOMNIA: ICD-10-CM

## 2025-01-07 DIAGNOSIS — F41.9 ANXIETY: ICD-10-CM

## 2025-01-07 NOTE — TELEPHONE ENCOUNTER
Please call patient, reschedule appointment she missed last month.    Please remind her that she needs to keep appointment for ongoing medication refills.

## 2025-01-08 RX ORDER — ZOLPIDEM TARTRATE 10 MG/1
5-10 TABLET ORAL
Qty: 30 TABLET | Refills: 0 | Status: SHIPPED | OUTPATIENT
Start: 2025-01-08

## 2025-01-08 RX ORDER — ALPRAZOLAM 0.25 MG/1
0.25 TABLET ORAL DAILY PRN
Qty: 30 TABLET | Refills: 0 | Status: SHIPPED | OUTPATIENT
Start: 2025-01-08

## 2025-01-08 NOTE — LETTER
January 4, 2021     Patient: Vishnu Silveira   YOB: 1980   Date of Visit: 1/4/2021       To Whom it May Concern:    Beverley Velazquez is under my professional care  She was seen in my office on 1/4/2021  She will need to stay out of work until [facility] test results available  Please excuse her from work from 1/4/21 to 1/6/21  If you have any questions or concerns, please don't hesitate to call           Sincerely,          Willem Salgado MD        CC: No Recipients
3 = A little assistance

## 2025-01-21 NOTE — PATIENT INSTRUCTIONS
Cut back on the magnesium: take it only once/day for about 1 week. Please ensure you are drinking at least 64 ounces of water/day. If you do this for a week without relief, give our office a call, magnesium oxide is quite aggressive and we can find an alternative   Continue gastroparesis diet; call for dietician   Start using phenergan as needed for nausea  Start using over-the-counter BEANO with meals: this can also be taken as needed for bloating and fullness     Scheduled date of EGD(as of today): EGD w/ Botox 10/18/2024  Physician performing EGD: Dr. Atkins   Location of EGD:   Instructions reviewed with patient by: Heidi BEDOLLA   Clearances:  N/A  
Do Not Use If Visit Has Modifier 25 And Other Service Is Not A Preventive Service, Immunization, Or Annual Wellness Visit: : Visit complexity inherent to evaluation and management associated with medical care services that serve as the continuing focal point for all needed health care services and/or with medical care services that are part of ongoing care related to a patient’s single, serious, or complex chronic condition
Detail Level: Simple
Indication: Provided medical care services as part of ongoing care related to the patient's single, serious or complex chronic condition.

## 2025-01-23 ENCOUNTER — RA CDI HCC (OUTPATIENT)
Dept: OTHER | Facility: HOSPITAL | Age: 45
End: 2025-01-23

## 2025-01-23 NOTE — PROGRESS NOTES
HCC coding opportunities       Chart reviewed, no opportunity found: CHART REVIEWED, NO OPPORTUNITY FOUND      This is a reminder to address (resolve/update/assess) ALL HCC (risk adjustment) codes as found on active problem list for 2025 as patient scores reset to zero STACIA.  Patients Insurance        Commercial Insurance: [employer]

## 2025-01-28 ENCOUNTER — OFFICE VISIT (OUTPATIENT)
Dept: INTERNAL MEDICINE CLINIC | Facility: CLINIC | Age: 45
End: 2025-01-28
Payer: COMMERCIAL

## 2025-01-28 VITALS
HEART RATE: 80 BPM | SYSTOLIC BLOOD PRESSURE: 110 MMHG | TEMPERATURE: 96.3 F | DIASTOLIC BLOOD PRESSURE: 70 MMHG | BODY MASS INDEX: 27.66 KG/M2 | HEIGHT: 64 IN | OXYGEN SATURATION: 97 % | WEIGHT: 162 LBS

## 2025-01-28 DIAGNOSIS — G43.909 MIGRAINE WITHOUT STATUS MIGRAINOSUS, NOT INTRACTABLE, UNSPECIFIED MIGRAINE TYPE: ICD-10-CM

## 2025-01-28 DIAGNOSIS — K21.00 GASTROESOPHAGEAL REFLUX DISEASE WITH ESOPHAGITIS WITHOUT HEMORRHAGE: ICD-10-CM

## 2025-01-28 DIAGNOSIS — E78.2 MIXED HYPERLIPIDEMIA: ICD-10-CM

## 2025-01-28 DIAGNOSIS — E66.3 OVERWEIGHT (BMI 25.0-29.9): ICD-10-CM

## 2025-01-28 DIAGNOSIS — K31.84 GASTROPARESIS: Primary | ICD-10-CM

## 2025-01-28 DIAGNOSIS — E55.9 VITAMIN D DEFICIENCY: ICD-10-CM

## 2025-01-28 DIAGNOSIS — F41.1 GENERALIZED ANXIETY DISORDER: ICD-10-CM

## 2025-01-28 DIAGNOSIS — Z00.00 ANNUAL PHYSICAL EXAM: ICD-10-CM

## 2025-01-28 DIAGNOSIS — G47.09 OTHER INSOMNIA: ICD-10-CM

## 2025-01-28 DIAGNOSIS — E53.8 VITAMIN B12 DEFICIENCY: ICD-10-CM

## 2025-01-28 PROCEDURE — 99214 OFFICE O/P EST MOD 30 MIN: CPT | Performed by: INTERNAL MEDICINE

## 2025-01-28 PROCEDURE — 99396 PREV VISIT EST AGE 40-64: CPT | Performed by: INTERNAL MEDICINE

## 2025-01-28 RX ORDER — TOPIRAMATE 100 MG/1
100 TABLET, FILM COATED ORAL 2 TIMES DAILY
Qty: 60 TABLET | Refills: 5 | Status: SHIPPED | OUTPATIENT
Start: 2025-01-28 | End: 2025-02-03 | Stop reason: SDUPTHER

## 2025-01-28 RX ORDER — VENLAFAXINE HYDROCHLORIDE 150 MG/1
150 CAPSULE, EXTENDED RELEASE ORAL DAILY
Qty: 90 CAPSULE | Refills: 1 | Status: SHIPPED | OUTPATIENT
Start: 2025-01-28

## 2025-01-28 NOTE — LETTER
January 28, 2025     Patient: Brielle Pickard  YOB: 1980  Date of Visit: 1/28/2025      To Whom it May Concern:    Brielle Pickard is under my professional care. Brielle was seen in my office on 1/28/2025.   If you have any questions or concerns, please don't hesitate to call.         Sincerely,          Fabiana Rajan MD

## 2025-01-28 NOTE — ASSESSMENT & PLAN NOTE
Increase topiramate to 100 mg bid.   Continue daily, B2 and Mg.  Awaiting for neurology appointment.    Orders:  •  topiramate (TOPAMAX) 100 mg tablet; Take 1 tablet (100 mg total) by mouth 2 (two) times a day

## 2025-01-28 NOTE — ASSESSMENT & PLAN NOTE
Lipids due.  Orders:  •  Comprehensive metabolic panel; Future  •  Lipid panel; Future  •  TSH, 3rd generation with Free T4 reflex; Future

## 2025-01-28 NOTE — PROGRESS NOTES
Name: Brielle Pickard      : 1980      MRN: [id number]  Encounter Provider: Fabiana Rajan MD  Encounter Date: 2025   Encounter department: [facility] INTERNAL MEDICINE  :  Assessment & Plan  Gastroparesis  Botox injections significantly improves symptoms.  Followed by GI.  Now taking Phenergan instead of Zofran.  Mg decreased to daily dose.       Gastroesophageal reflux disease with esophagitis without hemorrhage  On daily PPI.       Generalized anxiety disorder  Worse recently. Increase venlafaxine to  150 mg daily, may continue with buspirone 10 mg bid or tid.  Discussed alprazolam qHS use.  Recommend to look into virtual counseling since in -person has long wait list.  Orders:  •  venlafaxine (EFFEXOR-XR) 150 mg 24 hr capsule; Take 1 capsule (150 mg total) by mouth daily  •  Ambulatory referral to Psych Services; Future    Other insomnia  Still taking zolpidem qHS. Discussed chronic use.    Reschedule sleep study.       Migraine without status migrainosus, not intractable, unspecified migraine type  Increase topiramate to 100 mg bid.   Continue daily, B2 and Mg.  Awaiting for neurology appointment.    Orders:  •  topiramate (TOPAMAX) 100 mg tablet; Take 1 tablet (100 mg total) by mouth 2 (two) times a day    Vitamin B12 deficiency    Orders:  •  CBC and differential; Future  •  Vitamin B12; Future    Vitamin D deficiency    Orders:  •  Vitamin D 25 hydroxy; Future    Mixed hyperlipidemia  Lipids due.  Orders:  •  Comprehensive metabolic panel; Future  •  Lipid panel; Future  •  TSH, 3rd generation with Free T4 reflex; Future    Overweight (BMI 25.0-29.9)  Lost 6 lbs since last visit.  Instructed to have a light snack in the evening, main meal is lunch.       Annual physical exam         Follow up in 4 months or as needed.       History of Present Illness   She reports feeling very stressed.  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  She takes care of their  "medications, brings them to their doctor visits.  She reports that her  completed rehab, has not been alcohol free for over 6 months.  She feels it is tense at home, increases the stress she feels when she is not at home.  Recently, her best friends in the hospital.    She feels a little bit more depressed recently.  She would like to speak to someone but has called multiple places and had a long wait list.  She reports headaches are about the same.  She did call neurology for an appointment and is also on the wait list.  She continues to take Topamax regularly.  She reports the Botox injections really helps with her symptoms.  Her GI doctor said that they would not do it anymore.      Review of Systems   Constitutional:  Negative for activity change, appetite change and fatigue.   HENT:  Negative for congestion, ear pain and postnasal drip.    Eyes:  Negative for visual disturbance.   Respiratory:  Negative for cough and shortness of breath.    Cardiovascular:  Negative for chest pain and leg swelling.   Gastrointestinal:  Negative for abdominal pain, constipation, diarrhea and nausea.   Genitourinary:  Negative for dysuria, frequency and urgency.   Musculoskeletal:  Negative for arthralgias and myalgias.   Skin:  Negative for rash and wound.   Neurological:  Negative for dizziness, weakness, numbness and headaches.   Hematological:  Does not bruise/bleed easily.   Psychiatric/Behavioral:  Positive for dysphoric mood and sleep disturbance. Negative for confusion and suicidal ideas. The patient is not nervous/anxious and is not hyperactive.        Objective   /70   Pulse 80   Temp (!) 96.3 °F (35.7 °C)   Ht 5' 4\" (1.626 m)   Wt 73.5 kg (162 lb)   SpO2 97%   BMI 27.81 kg/m²      Physical Exam  Vitals and nursing note reviewed.   Constitutional:       General: She is not in acute distress.     Appearance: She is well-developed.   HENT:      Head: Normocephalic and atraumatic.      Mouth/Throat:      " Mouth: Mucous membranes are moist.   Eyes:      Pupils: Pupils are equal, round, and reactive to light.   Cardiovascular:      Rate and Rhythm: Normal rate and regular rhythm.      Heart sounds: Normal heart sounds.   Pulmonary:      Effort: Pulmonary effort is normal.      Breath sounds: Normal breath sounds. No wheezing.   Abdominal:      General: Bowel sounds are normal.      Palpations: Abdomen is soft.   Musculoskeletal:         General: No swelling.      Right lower leg: No edema.      Left lower leg: No edema.   Skin:     General: Skin is warm.      Findings: No rash.   Neurological:      General: No focal deficit present.      Mental Status: She is alert and oriented to person, place, and time.   Psychiatric:         Mood and Affect: Mood and affect normal. Mood is not anxious or depressed.         Behavior: Behavior normal.           Labs & imaging results reviewed with patient.

## 2025-01-28 NOTE — PATIENT INSTRUCTIONS
Increase Effexor (venlafaxine) to 2 tablets daily (150 mg). New prescription sent.    If no issues after about 2 weeks, increase Topamax to 100 mg twice a day.

## 2025-01-28 NOTE — ASSESSMENT & PLAN NOTE
Worse recently. Increase venlafaxine to  150 mg daily, may continue with buspirone 10 mg bid or tid.  Discussed alprazolam qHS use.  Recommend to look into virtual counseling since in -person has long wait list.  Orders:  •  venlafaxine (EFFEXOR-XR) 150 mg 24 hr capsule; Take 1 capsule (150 mg total) by mouth daily  •  Ambulatory referral to Psych Services; Future

## 2025-01-28 NOTE — ASSESSMENT & PLAN NOTE
Botox injections significantly improves symptoms.  Followed by GI.  Now taking Phenergan instead of Zofran.  Mg decreased to daily dose.

## 2025-01-28 NOTE — ASSESSMENT & PLAN NOTE
Lost 6 lbs since last visit.  Instructed to have a light snack in the evening, main meal is lunch.

## 2025-02-03 DIAGNOSIS — F41.9 ANXIETY: ICD-10-CM

## 2025-02-03 DIAGNOSIS — G43.909 MIGRAINE WITHOUT STATUS MIGRAINOSUS, NOT INTRACTABLE, UNSPECIFIED MIGRAINE TYPE: ICD-10-CM

## 2025-02-03 RX ORDER — TOPIRAMATE 100 MG/1
100 TABLET, FILM COATED ORAL 2 TIMES DAILY
Qty: 180 TABLET | Refills: 1 | Status: SHIPPED | OUTPATIENT
Start: 2025-02-03

## 2025-02-03 RX ORDER — BUSPIRONE HYDROCHLORIDE 10 MG/1
10 TABLET ORAL 3 TIMES DAILY
Qty: 270 TABLET | Refills: 1 | Status: SHIPPED | OUTPATIENT
Start: 2025-02-03

## 2025-02-13 ENCOUNTER — TELEPHONE (OUTPATIENT)
Age: 45
End: 2025-02-13

## 2025-02-13 DIAGNOSIS — G47.09 OTHER INSOMNIA: ICD-10-CM

## 2025-02-13 DIAGNOSIS — F41.9 ANXIETY: ICD-10-CM

## 2025-02-13 RX ORDER — ZOLPIDEM TARTRATE 10 MG/1
5-10 TABLET ORAL
Qty: 30 TABLET | Refills: 0 | Status: SHIPPED | OUTPATIENT
Start: 2025-02-13

## 2025-02-13 RX ORDER — ALPRAZOLAM 0.25 MG/1
0.25 TABLET ORAL DAILY PRN
Qty: 30 TABLET | Refills: 0 | Status: SHIPPED | OUTPATIENT
Start: 2025-02-13

## 2025-03-16 DIAGNOSIS — G47.09 OTHER INSOMNIA: ICD-10-CM

## 2025-03-16 DIAGNOSIS — G43.909 MIGRAINE WITHOUT STATUS MIGRAINOSUS, NOT INTRACTABLE, UNSPECIFIED MIGRAINE TYPE: ICD-10-CM

## 2025-03-16 DIAGNOSIS — F41.9 ANXIETY: ICD-10-CM

## 2025-03-17 RX ORDER — TOPIRAMATE 100 MG/1
100 TABLET, FILM COATED ORAL 2 TIMES DAILY
Qty: 180 TABLET | Refills: 0 | Status: SHIPPED | OUTPATIENT
Start: 2025-03-17

## 2025-03-17 RX ORDER — ALPRAZOLAM 0.25 MG
0.25 TABLET ORAL DAILY PRN
Qty: 30 TABLET | Refills: 0 | Status: SHIPPED | OUTPATIENT
Start: 2025-03-17

## 2025-03-17 RX ORDER — ZOLPIDEM TARTRATE 10 MG/1
5-10 TABLET ORAL
Qty: 30 TABLET | Refills: 0 | Status: SHIPPED | OUTPATIENT
Start: 2025-03-17

## 2025-04-15 DIAGNOSIS — F41.9 ANXIETY: ICD-10-CM

## 2025-04-15 DIAGNOSIS — G47.09 OTHER INSOMNIA: ICD-10-CM

## 2025-04-16 RX ORDER — ALPRAZOLAM 0.25 MG
0.25 TABLET ORAL DAILY PRN
Qty: 30 TABLET | Refills: 1 | Status: SHIPPED | OUTPATIENT
Start: 2025-04-16

## 2025-04-16 RX ORDER — ZOLPIDEM TARTRATE 10 MG/1
5-10 TABLET ORAL
Qty: 30 TABLET | Refills: 1 | Status: SHIPPED | OUTPATIENT
Start: 2025-04-16

## 2025-06-16 ENCOUNTER — APPOINTMENT (OUTPATIENT)
Dept: LAB | Facility: CLINIC | Age: 45
End: 2025-06-16
Attending: INTERNAL MEDICINE
Payer: COMMERCIAL

## 2025-06-16 DIAGNOSIS — K31.84 GASTROPARESIS: ICD-10-CM

## 2025-06-16 DIAGNOSIS — E55.9 VITAMIN D DEFICIENCY: ICD-10-CM

## 2025-06-16 DIAGNOSIS — E53.8 VITAMIN B12 DEFICIENCY: ICD-10-CM

## 2025-06-16 DIAGNOSIS — E78.2 MIXED HYPERLIPIDEMIA: ICD-10-CM

## 2025-06-16 LAB
25(OH)D3 SERPL-MCNC: 58.3 NG/ML (ref 30–100)
ALBUMIN SERPL BCG-MCNC: 4.2 G/DL (ref 3.5–5)
ALP SERPL-CCNC: 44 U/L (ref 34–104)
ALT SERPL W P-5'-P-CCNC: 16 U/L (ref 7–52)
ANION GAP SERPL CALCULATED.3IONS-SCNC: 7 MMOL/L (ref 4–13)
AST SERPL W P-5'-P-CCNC: 17 U/L (ref 13–39)
BASOPHILS # BLD AUTO: 0.07 THOUSANDS/ÂΜL (ref 0–0.1)
BASOPHILS NFR BLD AUTO: 1 % (ref 0–1)
BILIRUB SERPL-MCNC: 0.45 MG/DL (ref 0.2–1)
BUN SERPL-MCNC: 14 MG/DL (ref 5–25)
CALCIUM SERPL-MCNC: 9.4 MG/DL (ref 8.4–10.2)
CHLORIDE SERPL-SCNC: 109 MMOL/L (ref 96–108)
CHOLEST SERPL-MCNC: 173 MG/DL (ref ?–200)
CO2 SERPL-SCNC: 25 MMOL/L (ref 21–32)
CREAT SERPL-MCNC: 1 MG/DL (ref 0.6–1.3)
EOSINOPHIL # BLD AUTO: 0.4 THOUSAND/ÂΜL (ref 0–0.61)
EOSINOPHIL NFR BLD AUTO: 7 % (ref 0–6)
ERYTHROCYTE [DISTWIDTH] IN BLOOD BY AUTOMATED COUNT: 13 % (ref 11.6–15.1)
GFR SERPL CREATININE-BSD FRML MDRD: 69 ML/MIN/1.73SQ M
GLUCOSE P FAST SERPL-MCNC: 128 MG/DL (ref 65–99)
HCT VFR BLD AUTO: 42 % (ref 34.8–46.1)
HDLC SERPL-MCNC: 58 MG/DL
HGB BLD-MCNC: 14.1 G/DL (ref 11.5–15.4)
IMM GRANULOCYTES # BLD AUTO: 0.02 THOUSAND/UL (ref 0–0.2)
IMM GRANULOCYTES NFR BLD AUTO: 0 % (ref 0–2)
LDLC SERPL CALC-MCNC: 93 MG/DL (ref 0–100)
LYMPHOCYTES # BLD AUTO: 2.21 THOUSANDS/ÂΜL (ref 0.6–4.47)
LYMPHOCYTES NFR BLD AUTO: 36 % (ref 14–44)
MCH RBC QN AUTO: 31.1 PG (ref 26.8–34.3)
MCHC RBC AUTO-ENTMCNC: 33.6 G/DL (ref 31.4–37.4)
MCV RBC AUTO: 93 FL (ref 82–98)
MONOCYTES # BLD AUTO: 0.45 THOUSAND/ÂΜL (ref 0.17–1.22)
MONOCYTES NFR BLD AUTO: 7 % (ref 4–12)
NEUTROPHILS # BLD AUTO: 2.95 THOUSANDS/ÂΜL (ref 1.85–7.62)
NEUTS SEG NFR BLD AUTO: 49 % (ref 43–75)
NONHDLC SERPL-MCNC: 115 MG/DL
NRBC BLD AUTO-RTO: 0 /100 WBCS
PLATELET # BLD AUTO: 221 THOUSANDS/UL (ref 149–390)
PMV BLD AUTO: 9.8 FL (ref 8.9–12.7)
POTASSIUM SERPL-SCNC: 3.8 MMOL/L (ref 3.5–5.3)
PROT SERPL-MCNC: 7.2 G/DL (ref 6.4–8.4)
RBC # BLD AUTO: 4.54 MILLION/UL (ref 3.81–5.12)
SODIUM SERPL-SCNC: 141 MMOL/L (ref 135–147)
TRIGL SERPL-MCNC: 110 MG/DL (ref ?–150)
TSH SERPL DL<=0.05 MIU/L-ACNC: 2.98 UIU/ML (ref 0.45–4.5)
VIT B12 SERPL-MCNC: 587 PG/ML (ref 180–914)
WBC # BLD AUTO: 6.1 THOUSAND/UL (ref 4.31–10.16)

## 2025-06-16 PROCEDURE — 85025 COMPLETE CBC W/AUTO DIFF WBC: CPT

## 2025-06-16 PROCEDURE — 82306 VITAMIN D 25 HYDROXY: CPT

## 2025-06-16 PROCEDURE — 36415 COLL VENOUS BLD VENIPUNCTURE: CPT

## 2025-06-16 PROCEDURE — 84443 ASSAY THYROID STIM HORMONE: CPT

## 2025-06-16 PROCEDURE — 80061 LIPID PANEL: CPT

## 2025-06-16 PROCEDURE — 82607 VITAMIN B-12: CPT

## 2025-06-16 PROCEDURE — 80053 COMPREHEN METABOLIC PANEL: CPT

## 2025-06-17 ENCOUNTER — OFFICE VISIT (OUTPATIENT)
Dept: INTERNAL MEDICINE CLINIC | Facility: CLINIC | Age: 45
End: 2025-06-17
Payer: COMMERCIAL

## 2025-06-17 VITALS
DIASTOLIC BLOOD PRESSURE: 82 MMHG | BODY MASS INDEX: 28.17 KG/M2 | HEART RATE: 80 BPM | TEMPERATURE: 98 F | OXYGEN SATURATION: 100 % | SYSTOLIC BLOOD PRESSURE: 136 MMHG | HEIGHT: 64 IN | WEIGHT: 165 LBS | RESPIRATION RATE: 14 BRPM

## 2025-06-17 DIAGNOSIS — G43.909 MIGRAINE WITHOUT STATUS MIGRAINOSUS, NOT INTRACTABLE, UNSPECIFIED MIGRAINE TYPE: ICD-10-CM

## 2025-06-17 DIAGNOSIS — K21.00 GASTROESOPHAGEAL REFLUX DISEASE WITH ESOPHAGITIS WITHOUT HEMORRHAGE: ICD-10-CM

## 2025-06-17 DIAGNOSIS — K31.84 GASTROPARESIS: Primary | ICD-10-CM

## 2025-06-17 DIAGNOSIS — G47.09 OTHER INSOMNIA: ICD-10-CM

## 2025-06-17 DIAGNOSIS — G89.29 CHRONIC BILATERAL LOW BACK PAIN WITHOUT SCIATICA: ICD-10-CM

## 2025-06-17 DIAGNOSIS — F41.9 ANXIETY: ICD-10-CM

## 2025-06-17 DIAGNOSIS — R73.01 ABNORMAL FASTING GLUCOSE: ICD-10-CM

## 2025-06-17 DIAGNOSIS — Z12.31 ENCOUNTER FOR SCREENING MAMMOGRAM FOR BREAST CANCER: ICD-10-CM

## 2025-06-17 DIAGNOSIS — M54.50 CHRONIC BILATERAL LOW BACK PAIN WITHOUT SCIATICA: ICD-10-CM

## 2025-06-17 DIAGNOSIS — E66.3 OVERWEIGHT (BMI 25.0-29.9): ICD-10-CM

## 2025-06-17 DIAGNOSIS — F41.1 GENERALIZED ANXIETY DISORDER: ICD-10-CM

## 2025-06-17 DIAGNOSIS — E78.2 MIXED HYPERLIPIDEMIA: ICD-10-CM

## 2025-06-17 LAB — SL AMB POCT HEMOGLOBIN AIC: 5 (ref ?–6.5)

## 2025-06-17 PROCEDURE — 83036 HEMOGLOBIN GLYCOSYLATED A1C: CPT | Performed by: INTERNAL MEDICINE

## 2025-06-17 PROCEDURE — 99214 OFFICE O/P EST MOD 30 MIN: CPT | Performed by: INTERNAL MEDICINE

## 2025-06-17 NOTE — ASSESSMENT & PLAN NOTE
Improved, on venlafaxine 150 mg daily.  Still taking buspirone 10 mg at least bid.  Now seeing a therapist weekly.  Discussed regular alprazolam use.

## 2025-06-17 NOTE — PROGRESS NOTES
Name: Brielle Pickard      : 1980      MRN: [id number]  Encounter Provider: Fabiana Rajan MD  Encounter Date: 2025   Encounter department: [facility] INTERNAL MEDICINE  :  Assessment & Plan  Gastroparesis  Minimal symptoms.  Follow up with GI, s/p Botox injection.       Gastroesophageal reflux disease with esophagitis without hemorrhage  Taking PPI daily.       Generalized anxiety disorder  Improved, on venlafaxine 150 mg daily.  Still taking buspirone 10 mg at least bid.  Now seeing a therapist weekly.  Discussed regular alprazolam use.       Other insomnia  Still taking zolpidem qHS.       Migraine without status migrainosus, not intractable, unspecified migraine type  Improved. Instructed to start headache diary, discussed seeing neurology.  Taking topiramate 100 mg bid.  Continue Mg, B2.     Overweight (BMI 25.0-29.9)  Stable weight.       Mixed hyperlipidemia  Lipids improved.  Not on statin.       Chronic bilateral low back pain without sciatica  Reviewed proper body mechanics.  Recommend to start stretching exercises at home.       Abnormal fasting glucose  A1c normal.  Orders:  •  POCT hemoglobin A1c    Encounter for screening mammogram for breast cancer    Orders:  •  Mammo screening bilateral w 3d and cad; Future    Follow up in 5 months or as needed.       History of Present Illness   She reports increased stressed recently.  She had  from her  2 weeks ago. She is now staying with her elderly father. She is coping alright. She started seeing a therapist, happy with her. She feels the medications are working.    She reports occasional migraine headaches, says it does not occur weekly.  She had a bad headache over the weekend, an episode of vomiting.  She is not sure if it is due to the food or due to the headaches.  She reports nausea has not been as bad.    She continues to take Ambien every night for sleep.  She would take Xanax later in the evening as  "needed only, she is trying not to take it every night.      Review of Systems   Constitutional:  Negative for appetite change and fatigue.   HENT:  Negative for congestion, ear pain and postnasal drip.    Eyes:  Negative for visual disturbance.   Respiratory:  Negative for cough and shortness of breath.    Cardiovascular:  Negative for chest pain and leg swelling.   Gastrointestinal:  Negative for abdominal pain, constipation and diarrhea.   Genitourinary:  Negative for dysuria, frequency and urgency.   Musculoskeletal:  Negative for arthralgias and myalgias.   Skin:  Negative for rash and wound.   Neurological:  Negative for dizziness, numbness and headaches.   Hematological:  Does not bruise/bleed easily.   Psychiatric/Behavioral:  Negative for confusion. The patient is not nervous/anxious.        Objective   /82 (BP Location: Left arm, Patient Position: Sitting, Cuff Size: Standard)   Pulse 80   Temp 98 °F (36.7 °C)   Resp 14   Ht 5' 4\" (1.626 m)   Wt 74.8 kg (165 lb)   SpO2 100%   BMI 28.32 kg/m²      Physical Exam  Vitals and nursing note reviewed.   Constitutional:       General: She is not in acute distress.     Appearance: She is well-developed.   HENT:      Head: Normocephalic and atraumatic.      Right Ear: External ear normal.      Left Ear: External ear normal.      Mouth/Throat:      Mouth: Mucous membranes are moist.     Eyes:      Pupils: Pupils are equal, round, and reactive to light.       Cardiovascular:      Rate and Rhythm: Normal rate and regular rhythm.      Heart sounds: Normal heart sounds.   Pulmonary:      Effort: Pulmonary effort is normal.      Breath sounds: Normal breath sounds. No wheezing.   Abdominal:      General: Bowel sounds are normal.      Palpations: Abdomen is soft.     Musculoskeletal:         General: No swelling.      Right lower leg: No edema.      Left lower leg: No edema.     Skin:     General: Skin is warm.      Findings: No rash.     Neurological:      " General: No focal deficit present.      Mental Status: She is alert and oriented to person, place, and time.     Psychiatric:         Mood and Affect: Mood and affect normal. Mood is not anxious or depressed.         Behavior: Behavior normal.           Labs & imaging results reviewed with patient.

## 2025-06-17 NOTE — PATIENT INSTRUCTIONS
"Patient Education     Back exercises   The Basics   Written by the doctors and editors at [facility]   Why do I need to do back exercises? -- Back exercises can help ease back pain and might help prevent future back pain. Long term, it is important to strengthen the muscles in your lower back, buttocks, and belly. These are your \"core muscles.\" Stretching exercises are also important to keep your muscles flexible.  Below are some stretching and strengthening exercises that might help you. Other forms of movement can help ease or prevent back pain, too. For example, some people like to walk, do aerobic exercise, or do yoga or stan chi. The most important thing is to move your body. Your doctor, nurse, or physical therapist can help you find different types of activity that work for you.  What stretching exercises should I do? -- Below are some examples of stretching exercises. Warm up your muscles before stretching to help prevent injury. To warm up, you can walk, jog, or cycle for a few minutes.  Start by repeating each of these stretches 2 to 3 times. Try to hold each stretch for 5 to 10 seconds, and try to do the stretches 2 to 3 times each day. Breathe slowly and deeply as you do the exercises. Never bounce when doing stretches.   Cat-cow stretch (figure 1) - Start on all fours on the floor, with your hands under your shoulders, knees under your hips, and your back flat. First, tuck your chin and tighten your stomach muscles as you round your back (like a \"cat\"). Hold the stretch for about 10 seconds. Rest for a few seconds as you flatten your back. Next, lift your chin and let your belly and lower back sink toward the floor (like a \"cow\"). Hold the stretch for about 10 seconds.   Single knee-to-chest stretches (figure 2) ? While lying on your back, bend your knees with your feet flat on the floor. Pull 1 knee toward your chest until you feel a stretch in your lower back and buttock area. Lower, and repeat with the " other knee. If you have knee problems, pull your knee up by grabbing the back of your thigh instead of the front of your knee. You can also do this exercise by grabbing both knees at the same time.   Lower trunk rotations (figure 3) ? While lying on your back, bend your knees with your feet flat on the floor. Keep your knees and ankles together, and then drop them to 1 side. Keep both of your shoulders touching the floor until you feel a stretch in the muscles at the side of the back. Repeat on the other side.   Lower back stretches seated (figure 4) ? Sit in a chair with your feet spread about shoulder width apart. Then, lean forward until you feel a stretch in your lower back.  What strengthening exercises should I do? -- Below are some examples of strengthening exercises.  Start by doing each exercise 2 to 3 times. Work up to doing each exercise 10 times. Hold each exercise for 3 to 5 seconds, and try to do the exercises 2 to 3 times each day. Do all exercises slowly.   Shoulder blade squeezes (figure 5) ? Pinch your shoulder blades together on your upper back, and hold 3 to 5 seconds. You can also do these 1 side at a time. Sit with good posture, and make sure that your shoulders do not rise up when you do this exercise. Relax.   Pelvic tilts (figure 6) ? Lie on your back with your knees bent and feet flat on the floor. Tighten your stomach muscles, and press your lower back down to the floor. Relax. You should be able to breathe comfortably during this exercise.   Hip lifts (figure 7) ? Lie on your back with your knees bent and feet flat on the floor. Tighten your stomach muscles, keep your back flat, and lift your buttocks off of the floor. Relax. You should feel this in your buttocks, not in your lower back.  What else should I know?    Exercise, including stretching, might be slightly uncomfortable. But you should not have sharp or severe pain. If you do get severe pain, stop what you are doing. If severe  "pain continues, call your doctor or nurse.   Do not hold your breath when exercising. If you tend to hold your breath, try counting out loud when exercising. If any exercise bothers you, stop right away.   Always warm up before exercising. Warm muscles stretch much easier than cool muscles. Stretching cool muscles can lead to injury.   Doing exercises before a meal can be a good way to get into a routine.  All topics are updated as new evidence becomes available and our peer review process is complete.  This topic retrieved from [employer] on: Apr 03, 2024.  Topic 800348 Version 2.0  Release: 32.2.4 - C32.92  © 2024 [employer], Inc[employer] and/or its affiliates. All rights reserved.  figure 1: Cat-cow stretch     Start on all fours on the floor, with hands under your shoulders, knees under your hips, and your back flat. First, tuck your chin and tighten your stomach muscles as you round your back (like a \"cat\"). Hold the stretch for about 10 seconds. Rest for a few seconds as you flatten your back. Next, lift your chin and let your belly and lower back sink toward the floor (like a \"cow\"). Hold the stretch for about 10 seconds.  Graphic 154309 Version 1.0  figure 2: Single knee-to-chest stretch     Lie on your back, bend your knees, and have your feet flat on the floor. Pull 1 knee toward your chest until you feel a stretch in your lower back and buttock area. Repeat with the other knee. If you have knee problems, pull your knee up by grabbing the back of your thigh instead of the front of your knee. You can also do this exercise by grabbing both knees at the same time.  Graphic [id number] Version 1.0  figure 3: Lower trunk rotation     While lying on your back, bend your knees and have your feet flat on the floor. Keep your legs together and then drop them to 1 side. Keep both of your shoulders touching the floor until you feel a stretch in the muscles at the side of the back. Repeat on the other side.  Graphic [id number] Version " 1.0  figure 4: Lower back stretch     Sit in a chair with your feet spread about shoulder width apart. Then, lean forward until you feel a stretch in your lower back.  Graphic [id number] Version 1.0  figure 5: Shoulder blade squeezes     Pinch your shoulder blades together on your upper back and hold for 3 to 5 seconds. Make sure that you are sitting with good posture and that your shoulders do not raise up when you do this exercise. Relax.  Graphic [id number] Version 1.0  figure 6: Pelvic tilts     Lie on your back with your knees bent and feet flat on the floor. Tighten your stomach muscles and press your lower back down to the floor. Relax.  Graphic [id number] Version 1.0  figure 7: Hip lifts     Lie on your back with your knees bent and feet flat on the floor. Tighten your stomach muscles and lift your buttocks off of the floor. Relax.  Graphic [id number] Version 1.0  Consumer Information Use and Disclaimer   Disclaimer: This generalized information is a limited summary of diagnosis, treatment, and/or medication information. It is not meant to be comprehensive and should be used as a tool to help the user understand and/or assess potential diagnostic and treatment options. It does NOT include all information about conditions, treatments, medications, side effects, or risks that may apply to a specific patient. It is not intended to be medical advice or a substitute for the medical advice, diagnosis, or treatment of a health care provider based on the health care provider's examination and assessment of a patient's specific and unique circumstances. Patients must speak with a health care provider for complete information about their health, medical questions, and treatment options, including any risks or benefits regarding use of medications. This information does not endorse any treatments or medications as safe, effective, or approved for treating a specific patient. [employer], Inc[employer] and its affiliates disclaim any warranty or  liability relating to this information or the use thereof.The use of this information is governed by the Terms of Use, available at https://www.wolterskluwer.com/en/know/clinical-effectiveness-terms. 2024© [employer]Date, Inc. and its affiliates and/or licensors. All rights reserved.  Copyright   © 2024 [employer] and/or its affiliates. All rights reserved.

## 2025-06-17 NOTE — ASSESSMENT & PLAN NOTE
Improved. Instructed to start headache diary, discussed seeing neurology.  Taking topiramate 100 mg bid.  Continue Mg, B2.

## 2025-06-18 RX ORDER — ZOLPIDEM TARTRATE 10 MG/1
5-10 TABLET ORAL
Qty: 30 TABLET | Refills: 1 | Status: SHIPPED | OUTPATIENT
Start: 2025-06-18

## 2025-06-18 RX ORDER — ALPRAZOLAM 0.25 MG
0.25 TABLET ORAL DAILY PRN
Qty: 30 TABLET | Refills: 1 | Status: SHIPPED | OUTPATIENT
Start: 2025-06-18

## 2025-06-18 RX ORDER — RIZATRIPTAN BENZOATE 5 MG/1
5 TABLET ORAL ONCE AS NEEDED
Qty: 9 TABLET | Refills: 0 | Status: SHIPPED | OUTPATIENT
Start: 2025-06-18

## 2025-06-20 DIAGNOSIS — G43.909 MIGRAINE WITHOUT STATUS MIGRAINOSUS, NOT INTRACTABLE, UNSPECIFIED MIGRAINE TYPE: ICD-10-CM

## 2025-06-20 RX ORDER — TOPIRAMATE 100 MG/1
100 TABLET, FILM COATED ORAL 2 TIMES DAILY
Qty: 180 TABLET | Refills: 1 | Status: SHIPPED | OUTPATIENT
Start: 2025-06-20

## 2025-07-21 DIAGNOSIS — G47.09 OTHER INSOMNIA: ICD-10-CM

## 2025-07-21 DIAGNOSIS — F41.9 ANXIETY: ICD-10-CM

## 2025-07-21 RX ORDER — ALPRAZOLAM 0.25 MG
0.25 TABLET ORAL DAILY PRN
Qty: 30 TABLET | Refills: 0 | Status: SHIPPED | OUTPATIENT
Start: 2025-07-21

## 2025-07-21 RX ORDER — ZOLPIDEM TARTRATE 10 MG/1
5-10 TABLET ORAL
Qty: 30 TABLET | Refills: 0 | Status: SHIPPED | OUTPATIENT
Start: 2025-07-21

## 2025-08-20 ENCOUNTER — HOSPITAL ENCOUNTER (OUTPATIENT)
Dept: MAMMOGRAPHY | Facility: MEDICAL CENTER | Age: 45
Discharge: HOME | End: 2025-08-20
Payer: COMMERCIAL

## 2025-08-20 VITALS — BODY MASS INDEX: 28.15 KG/M2 | HEIGHT: 64 IN | WEIGHT: 164.9 LBS

## 2025-08-20 DIAGNOSIS — Z12.31 ENCOUNTER FOR SCREENING MAMMOGRAM FOR BREAST CANCER: ICD-10-CM

## 2025-08-20 PROCEDURE — 77067 SCR MAMMO BI INCL CAD: CPT

## 2025-08-20 PROCEDURE — 77063 BREAST TOMOSYNTHESIS BI: CPT

## 2025-08-21 DIAGNOSIS — G47.09 OTHER INSOMNIA: ICD-10-CM

## 2025-08-21 DIAGNOSIS — F41.9 ANXIETY: ICD-10-CM

## 2025-08-21 DIAGNOSIS — F41.1 GENERALIZED ANXIETY DISORDER: ICD-10-CM

## 2025-08-22 RX ORDER — ALPRAZOLAM 0.25 MG
0.25 TABLET ORAL DAILY PRN
Qty: 30 TABLET | Refills: 0 | Status: SHIPPED | OUTPATIENT
Start: 2025-08-22

## 2025-08-22 RX ORDER — ZOLPIDEM TARTRATE 10 MG/1
5-10 TABLET ORAL
Qty: 30 TABLET | Refills: 0 | Status: SHIPPED | OUTPATIENT
Start: 2025-08-22

## 2025-08-22 RX ORDER — VENLAFAXINE HYDROCHLORIDE 150 MG/1
150 CAPSULE, EXTENDED RELEASE ORAL DAILY
Qty: 90 CAPSULE | Refills: 1 | Status: SHIPPED | OUTPATIENT
Start: 2025-08-22

## (undated) DEVICE — INVIEW CLEAR LEGGINGS: Brand: CONVERTORS

## (undated) DEVICE — NOVASURE ADVANCED DEVICE

## (undated) DEVICE — SPLINT 5 X 30 IN FAST SET PLASTER

## (undated) DEVICE — MAYO STAND COVER: Brand: CONVERTORS

## (undated) DEVICE — OCCLUSIVE GAUZE STRIP,3% BISMUTH TRIBROMOPHENATE IN PETROLATUM BLEND: Brand: XEROFORM

## (undated) DEVICE — LIGHT HANDLE COVER SLEEVE DISP BLUE STELLAR

## (undated) DEVICE — STRL ALLENTOWN HYSTEROSCOPY PK: Brand: CARDINAL HEALTH

## (undated) DEVICE — NOVASURE CO2 CARTRIDGE

## (undated) DEVICE — SUT VICRYL 2-0 CT-2 18 IN J726D

## (undated) DEVICE — CHLORAPREP HI-LITE 26ML ORANGE

## (undated) DEVICE — BRUSH EZ SCRUB PCMX W/NAIL CLEANER

## (undated) DEVICE — GLOVE PI ULTRA TOUCH SZ.7.0

## (undated) DEVICE — CHLORHEXIDINE 4PCT 4 OZ

## (undated) DEVICE — TUBE OUTFLOW F/FLUID CONTROL SYSTEM AQUILEX

## (undated) DEVICE — SUT ETHILON 3-0 PS-1 18 IN 1663H

## (undated) DEVICE — 3M™ DURAPORE™ SURGICAL TAPE 1538-1, 1 INCH X 10 YARD (2,5CM X 9,1M), 12 ROLLS/BOX: Brand: 3M™ DURAPORE™

## (undated) DEVICE — ACE WRAP 6 IN UNSTERILE

## (undated) DEVICE — PADDING CAST 4 IN  COTTON STRL

## (undated) DEVICE — Device

## (undated) DEVICE — GAUZE SPONGES,16 PLY: Brand: CURITY

## (undated) DEVICE — PVC URETHRAL CATHETER: Brand: DOVER

## (undated) DEVICE — TUBE INFLOW F/FLUID CONTROL SYSTEM AQUILEX

## (undated) DEVICE — PADDING,UNDERCAST,COTTON, 4"X4YD STERILE: Brand: MEDLINE

## (undated) DEVICE — DRAPE SHEET THREE QUARTER

## (undated) DEVICE — PACK TUR

## (undated) DEVICE — DISPOSABLE EQUIPMENT COVER: Brand: SMALL TOWEL DRAPE

## (undated) DEVICE — CAST PADDING 6 IN SYNTHETIC STRL

## (undated) DEVICE — SUT VICRYL 4-0 PS-2 18 IN J496G

## (undated) DEVICE — BETHLEHEM UNIVERSAL  MIONR EXT: Brand: CARDINAL HEALTH

## (undated) DEVICE — NOVASURE KIT

## (undated) DEVICE — SPECIMEN SOCK - SHORT: Brand: MEDI-VAC

## (undated) DEVICE — INTENDED FOR TISSUE SEPARATION, AND OTHER PROCEDURES THAT REQUIRE A SHARP SURGICAL BLADE TO PUNCTURE OR CUT.: Brand: BARD-PARKER ® CARBON RIB-BACK BLADES

## (undated) DEVICE — PREMIUM DRY TRAY LF: Brand: MEDLINE INDUSTRIES, INC.

## (undated) DEVICE — DRAPE C-ARM X-RAY

## (undated) DEVICE — ABDOMINAL PAD: Brand: DERMACEA

## (undated) DEVICE — SPONGE LAP 18 X 18 IN STRL RFD

## (undated) DEVICE — KIT ANGEL BONE MARROW PROCESSING

## (undated) DEVICE — DEVICE MYOSURE TISSUE REMOVAL HYSTEROSCOPIC

## (undated) DEVICE — GUIDEWIRE 2 X 200MM W/TROCAR TIP

## (undated) DEVICE — GLOVE INDICATOR PI UNDERGLOVE SZ 7.5 BLUE

## (undated) DEVICE — STERILE SURGICAL LUBRICANT,  TUBE: Brand: SURGILUBE

## (undated) DEVICE — MYOSURE SEAL SET

## (undated) DEVICE — SUT ETHILON 3-0 PS-1 18 IN 1663G